# Patient Record
Sex: MALE | Race: WHITE | NOT HISPANIC OR LATINO | Employment: OTHER | URBAN - METROPOLITAN AREA
[De-identification: names, ages, dates, MRNs, and addresses within clinical notes are randomized per-mention and may not be internally consistent; named-entity substitution may affect disease eponyms.]

---

## 2017-01-06 ENCOUNTER — ALLSCRIPTS OFFICE VISIT (OUTPATIENT)
Dept: OTHER | Facility: OTHER | Age: 82
End: 2017-01-06

## 2017-01-06 DIAGNOSIS — I50.9 HEART FAILURE (HCC): ICD-10-CM

## 2017-01-06 DIAGNOSIS — R53.83 OTHER FATIGUE: ICD-10-CM

## 2017-01-06 DIAGNOSIS — D72.829 ELEVATED WHITE BLOOD CELL COUNT: ICD-10-CM

## 2017-01-06 DIAGNOSIS — J69.0 PNEUMONITIS DUE TO INHALATION OF FOOD AND VOMIT (HCC): ICD-10-CM

## 2017-01-06 LAB — HBA1C MFR BLD HPLC: 6.4 %

## 2017-01-10 ENCOUNTER — HOSPITAL ENCOUNTER (OUTPATIENT)
Dept: RADIOLOGY | Facility: HOSPITAL | Age: 82
Discharge: HOME | End: 2017-01-10
Payer: MEDICARE

## 2017-01-10 DIAGNOSIS — J69.0 PNEUMONITIS DUE TO INHALATION OF FOOD AND VOMIT (HCC): ICD-10-CM

## 2017-01-10 PROCEDURE — 71020 HB CHEST X-RAY 2VW FRONTAL&LATL: CPT

## 2017-01-20 ENCOUNTER — TRANSCRIBE ORDERS (OUTPATIENT)
Dept: LAB | Facility: CLINIC | Age: 82
End: 2017-01-20

## 2017-01-20 ENCOUNTER — APPOINTMENT (OUTPATIENT)
Dept: LAB | Facility: CLINIC | Age: 82
End: 2017-01-20
Payer: MEDICARE

## 2017-01-20 ENCOUNTER — ALLSCRIPTS OFFICE VISIT (OUTPATIENT)
Dept: OTHER | Facility: OTHER | Age: 82
End: 2017-01-20

## 2017-01-20 DIAGNOSIS — I50.9 HEART FAILURE (HCC): ICD-10-CM

## 2017-01-20 DIAGNOSIS — D72.829 ELEVATED WHITE BLOOD CELL COUNT: ICD-10-CM

## 2017-01-20 LAB
ANION GAP SERPL CALCULATED.3IONS-SCNC: 3 MMOL/L (ref 4–13)
BASOPHILS # BLD AUTO: 0.05 THOUSANDS/ΜL (ref 0–0.1)
BASOPHILS NFR BLD AUTO: 0 % (ref 0–1)
BUN SERPL-MCNC: 11 MG/DL (ref 5–25)
CALCIUM SERPL-MCNC: 8.9 MG/DL (ref 8.3–10.1)
CHLORIDE SERPL-SCNC: 101 MMOL/L (ref 100–108)
CO2 SERPL-SCNC: 33 MMOL/L (ref 21–32)
CREAT SERPL-MCNC: 0.68 MG/DL (ref 0.6–1.3)
EOSINOPHIL # BLD AUTO: 0.44 THOUSAND/ΜL (ref 0–0.61)
EOSINOPHIL NFR BLD AUTO: 3 % (ref 0–6)
ERYTHROCYTE [DISTWIDTH] IN BLOOD BY AUTOMATED COUNT: 14.9 % (ref 11.6–15.1)
GFR SERPL CREATININE-BSD FRML MDRD: >60 ML/MIN/1.73SQ M
GLUCOSE SERPL-MCNC: 157 MG/DL (ref 65–140)
HCT VFR BLD AUTO: 41 % (ref 36.5–49.3)
HGB BLD-MCNC: 13.4 G/DL (ref 12–17)
LYMPHOCYTES # BLD AUTO: 4.62 THOUSANDS/ΜL (ref 0.6–4.47)
LYMPHOCYTES NFR BLD AUTO: 34 % (ref 14–44)
MCH RBC QN AUTO: 31.8 PG (ref 26.8–34.3)
MCHC RBC AUTO-ENTMCNC: 32.7 G/DL (ref 31.4–37.4)
MCV RBC AUTO: 97 FL (ref 82–98)
MONOCYTES # BLD AUTO: 1.19 THOUSAND/ΜL (ref 0.17–1.22)
MONOCYTES NFR BLD AUTO: 9 % (ref 4–12)
NEUTROPHILS # BLD AUTO: 7.09 THOUSANDS/ΜL (ref 1.85–7.62)
NEUTS SEG NFR BLD AUTO: 54 % (ref 43–75)
NRBC BLD AUTO-RTO: 0 /100 WBCS
NT-PROBNP SERPL-MCNC: 1990 PG/ML
PLATELET # BLD AUTO: 200 THOUSANDS/UL (ref 149–390)
PMV BLD AUTO: 12.2 FL (ref 8.9–12.7)
POTASSIUM SERPL-SCNC: 4.2 MMOL/L (ref 3.5–5.3)
RBC # BLD AUTO: 4.22 MILLION/UL (ref 3.88–5.62)
SODIUM SERPL-SCNC: 137 MMOL/L (ref 136–145)
WBC # BLD AUTO: 13.6 THOUSAND/UL (ref 4.31–10.16)

## 2017-01-20 PROCEDURE — 80048 BASIC METABOLIC PNL TOTAL CA: CPT

## 2017-01-20 PROCEDURE — 36415 COLL VENOUS BLD VENIPUNCTURE: CPT

## 2017-01-20 PROCEDURE — 83880 ASSAY OF NATRIURETIC PEPTIDE: CPT

## 2017-01-20 PROCEDURE — 85025 COMPLETE CBC W/AUTO DIFF WBC: CPT

## 2017-01-23 ENCOUNTER — GENERIC CONVERSION - ENCOUNTER (OUTPATIENT)
Dept: OTHER | Facility: OTHER | Age: 82
End: 2017-01-23

## 2017-02-27 ENCOUNTER — ALLSCRIPTS OFFICE VISIT (OUTPATIENT)
Dept: OTHER | Facility: OTHER | Age: 82
End: 2017-02-27

## 2017-03-08 ENCOUNTER — ALLSCRIPTS OFFICE VISIT (OUTPATIENT)
Dept: OTHER | Facility: OTHER | Age: 82
End: 2017-03-08

## 2017-03-08 DIAGNOSIS — E11.40 TYPE 2 DIABETES MELLITUS WITH DIABETIC NEUROPATHY (HCC): ICD-10-CM

## 2017-03-08 DIAGNOSIS — I50.9 HEART FAILURE (HCC): ICD-10-CM

## 2017-03-08 DIAGNOSIS — I10 ESSENTIAL (PRIMARY) HYPERTENSION: ICD-10-CM

## 2017-03-08 DIAGNOSIS — J96.10 CHRONIC RESPIRATORY FAILURE (HCC): ICD-10-CM

## 2017-03-08 DIAGNOSIS — J69.0 PNEUMONITIS DUE TO INHALATION OF FOOD AND VOMIT (HCC): ICD-10-CM

## 2017-03-08 DIAGNOSIS — D72.829 ELEVATED WHITE BLOOD CELL COUNT: ICD-10-CM

## 2017-03-22 ENCOUNTER — APPOINTMENT (OUTPATIENT)
Dept: LAB | Facility: HOSPITAL | Age: 82
End: 2017-03-22
Payer: MEDICARE

## 2017-03-22 ENCOUNTER — HOSPITAL ENCOUNTER (OUTPATIENT)
Dept: RADIOLOGY | Facility: HOSPITAL | Age: 82
Discharge: HOME | End: 2017-03-22
Payer: MEDICARE

## 2017-03-22 ENCOUNTER — TRANSCRIBE ORDERS (OUTPATIENT)
Dept: ADMINISTRATIVE | Facility: HOSPITAL | Age: 82
End: 2017-03-22

## 2017-03-22 DIAGNOSIS — I50.9 HEART FAILURE (HCC): ICD-10-CM

## 2017-03-22 DIAGNOSIS — E11.40 TYPE 2 DIABETES MELLITUS WITH DIABETIC NEUROPATHY (HCC): ICD-10-CM

## 2017-03-22 DIAGNOSIS — J69.0 PNEUMONITIS DUE TO INHALATION OF FOOD OR VOMITUS (HCC): ICD-10-CM

## 2017-03-22 DIAGNOSIS — J69.0 PNEUMONITIS DUE TO INHALATION OF FOOD AND VOMIT (HCC): ICD-10-CM

## 2017-03-22 DIAGNOSIS — J69.0 PNEUMONITIS DUE TO INHALATION OF FOOD OR VOMITUS (HCC): Primary | ICD-10-CM

## 2017-03-22 LAB
ALBUMIN SERPL BCP-MCNC: 4.1 G/DL (ref 3.5–5)
ALP SERPL-CCNC: 147 U/L (ref 46–116)
ALT SERPL W P-5'-P-CCNC: 40 U/L (ref 12–78)
ANION GAP SERPL CALCULATED.3IONS-SCNC: 5 MMOL/L (ref 4–13)
AST SERPL W P-5'-P-CCNC: 17 U/L (ref 5–45)
BASOPHILS # BLD MANUAL: 0 THOUSAND/UL (ref 0–0.1)
BASOPHILS NFR MAR MANUAL: 0 % (ref 0–1)
BILIRUB SERPL-MCNC: 0.52 MG/DL (ref 0.2–1)
BUN SERPL-MCNC: 28 MG/DL (ref 5–25)
CALCIUM SERPL-MCNC: 9.7 MG/DL (ref 8.3–10.1)
CHLORIDE SERPL-SCNC: 102 MMOL/L (ref 100–108)
CO2 SERPL-SCNC: 35 MMOL/L (ref 21–32)
CREAT SERPL-MCNC: 0.81 MG/DL (ref 0.6–1.3)
EOSINOPHIL # BLD MANUAL: 0 THOUSAND/UL (ref 0–0.4)
EOSINOPHIL NFR BLD MANUAL: 0 % (ref 0–6)
ERYTHROCYTE [DISTWIDTH] IN BLOOD BY AUTOMATED COUNT: 14.9 % (ref 11.6–15.1)
EST. AVERAGE GLUCOSE BLD GHB EST-MCNC: 183 MG/DL
GFR SERPL CREATININE-BSD FRML MDRD: >60 ML/MIN/1.73SQ M
GLUCOSE P FAST SERPL-MCNC: 225 MG/DL (ref 65–99)
HBA1C MFR BLD: 8 % (ref 4.2–6.3)
HCT VFR BLD AUTO: 50.3 % (ref 36.5–49.3)
HGB BLD-MCNC: 16.6 G/DL (ref 12–17)
LG PLATELETS BLD QL SMEAR: PRESENT
LYMPHOCYTES # BLD AUTO: 36 % (ref 14–44)
LYMPHOCYTES # BLD AUTO: 6.81 THOUSAND/UL (ref 0.6–4.47)
MCH RBC QN AUTO: 31 PG (ref 26.8–34.3)
MCHC RBC AUTO-ENTMCNC: 33 G/DL (ref 31.4–37.4)
MCV RBC AUTO: 94 FL (ref 82–98)
METAMYELOCYTES NFR BLD MANUAL: 1 % (ref 0–1)
MONOCYTES # BLD AUTO: 0.76 THOUSAND/UL (ref 0–1.22)
MONOCYTES NFR BLD: 4 % (ref 4–12)
NEUTROPHILS # BLD MANUAL: 11.16 THOUSAND/UL (ref 1.85–7.62)
NEUTS BAND NFR BLD MANUAL: 6 % (ref 0–8)
NEUTS SEG NFR BLD AUTO: 53 % (ref 43–75)
NT-PROBNP SERPL-MCNC: 1211 PG/ML
PLATELET # BLD AUTO: 197 THOUSANDS/UL (ref 149–390)
PLATELET BLD QL SMEAR: ADEQUATE
PMV BLD AUTO: 12.4 FL (ref 8.9–12.7)
POTASSIUM SERPL-SCNC: 5.3 MMOL/L (ref 3.5–5.3)
PROT SERPL-MCNC: 7.9 G/DL (ref 6.4–8.2)
RBC # BLD AUTO: 5.36 MILLION/UL (ref 3.88–5.62)
RBC MORPH BLD: NORMAL
SODIUM SERPL-SCNC: 142 MMOL/L (ref 136–145)
TOTAL CELLS COUNTED SPEC: 100
WBC # BLD AUTO: 18.92 THOUSAND/UL (ref 4.31–10.16)

## 2017-03-22 PROCEDURE — 85027 COMPLETE CBC AUTOMATED: CPT

## 2017-03-22 PROCEDURE — 36415 COLL VENOUS BLD VENIPUNCTURE: CPT

## 2017-03-22 PROCEDURE — 71020 HB CHEST X-RAY 2VW FRONTAL&LATL: CPT

## 2017-03-22 PROCEDURE — 83036 HEMOGLOBIN GLYCOSYLATED A1C: CPT

## 2017-03-22 PROCEDURE — 85007 BL SMEAR W/DIFF WBC COUNT: CPT

## 2017-03-22 PROCEDURE — 83880 ASSAY OF NATRIURETIC PEPTIDE: CPT

## 2017-03-22 PROCEDURE — 80053 COMPREHEN METABOLIC PANEL: CPT

## 2017-03-23 ENCOUNTER — GENERIC CONVERSION - ENCOUNTER (OUTPATIENT)
Dept: OTHER | Facility: OTHER | Age: 82
End: 2017-03-23

## 2017-03-24 ENCOUNTER — GENERIC CONVERSION - ENCOUNTER (OUTPATIENT)
Dept: OTHER | Facility: OTHER | Age: 82
End: 2017-03-24

## 2017-04-25 ENCOUNTER — GENERIC CONVERSION - ENCOUNTER (OUTPATIENT)
Dept: OTHER | Facility: OTHER | Age: 82
End: 2017-04-25

## 2017-05-02 ENCOUNTER — ALLSCRIPTS OFFICE VISIT (OUTPATIENT)
Dept: OTHER | Facility: OTHER | Age: 82
End: 2017-05-02

## 2017-06-13 ENCOUNTER — ALLSCRIPTS OFFICE VISIT (OUTPATIENT)
Dept: OTHER | Facility: OTHER | Age: 82
End: 2017-06-13

## 2017-06-13 DIAGNOSIS — E11.40 TYPE 2 DIABETES MELLITUS WITH DIABETIC NEUROPATHY (HCC): ICD-10-CM

## 2017-06-13 DIAGNOSIS — K62.5 HEMORRHAGE OF ANUS AND RECTUM: ICD-10-CM

## 2017-06-13 LAB — HBA1C MFR BLD HPLC: 8.4 %

## 2017-06-14 ENCOUNTER — GENERIC CONVERSION - ENCOUNTER (OUTPATIENT)
Dept: OTHER | Facility: OTHER | Age: 82
End: 2017-06-14

## 2017-06-14 ENCOUNTER — APPOINTMENT (OUTPATIENT)
Dept: LAB | Facility: CLINIC | Age: 82
End: 2017-06-14
Payer: MEDICARE

## 2017-06-14 ENCOUNTER — TRANSCRIBE ORDERS (OUTPATIENT)
Dept: LAB | Facility: CLINIC | Age: 82
End: 2017-06-14

## 2017-06-14 DIAGNOSIS — E11.40 TYPE 2 DIABETES MELLITUS WITH DIABETIC NEUROPATHY (HCC): ICD-10-CM

## 2017-06-14 DIAGNOSIS — K62.5 HEMORRHAGE OF ANUS AND RECTUM: ICD-10-CM

## 2017-06-14 LAB
ALBUMIN SERPL BCP-MCNC: 4.1 G/DL (ref 3.5–5)
ALP SERPL-CCNC: 103 U/L (ref 46–116)
ALT SERPL W P-5'-P-CCNC: 35 U/L (ref 12–78)
ANION GAP SERPL CALCULATED.3IONS-SCNC: 10 MMOL/L (ref 4–13)
AST SERPL W P-5'-P-CCNC: 26 U/L (ref 5–45)
BASOPHILS # BLD AUTO: 0.04 THOUSANDS/ΜL (ref 0–0.1)
BASOPHILS NFR BLD AUTO: 0 % (ref 0–1)
BILIRUB SERPL-MCNC: 0.88 MG/DL (ref 0.2–1)
BUN SERPL-MCNC: 24 MG/DL (ref 5–25)
CALCIUM SERPL-MCNC: 8.9 MG/DL (ref 8.3–10.1)
CHLORIDE SERPL-SCNC: 101 MMOL/L (ref 100–108)
CO2 SERPL-SCNC: 29 MMOL/L (ref 21–32)
CREAT SERPL-MCNC: 1 MG/DL (ref 0.6–1.3)
EOSINOPHIL # BLD AUTO: 0.08 THOUSAND/ΜL (ref 0–0.61)
EOSINOPHIL NFR BLD AUTO: 1 % (ref 0–6)
ERYTHROCYTE [DISTWIDTH] IN BLOOD BY AUTOMATED COUNT: 14.5 % (ref 11.6–15.1)
GFR SERPL CREATININE-BSD FRML MDRD: >60 ML/MIN/1.73SQ M
GLUCOSE P FAST SERPL-MCNC: 227 MG/DL (ref 65–99)
HCT VFR BLD AUTO: 44.3 % (ref 36.5–49.3)
HGB BLD-MCNC: 14.6 G/DL (ref 12–17)
LYMPHOCYTES # BLD AUTO: 4.51 THOUSANDS/ΜL (ref 0.6–4.47)
LYMPHOCYTES NFR BLD AUTO: 32 % (ref 14–44)
MCH RBC QN AUTO: 31.7 PG (ref 26.8–34.3)
MCHC RBC AUTO-ENTMCNC: 33 G/DL (ref 31.4–37.4)
MCV RBC AUTO: 96 FL (ref 82–98)
MONOCYTES # BLD AUTO: 1.36 THOUSAND/ΜL (ref 0.17–1.22)
MONOCYTES NFR BLD AUTO: 10 % (ref 4–12)
NEUTROPHILS # BLD AUTO: 7.73 THOUSANDS/ΜL (ref 1.85–7.62)
NEUTS SEG NFR BLD AUTO: 57 % (ref 43–75)
NRBC BLD AUTO-RTO: 1 /100 WBCS
PLATELET # BLD AUTO: 186 THOUSANDS/UL (ref 149–390)
PMV BLD AUTO: 12.9 FL (ref 8.9–12.7)
POTASSIUM SERPL-SCNC: 4.2 MMOL/L (ref 3.5–5.3)
PROT SERPL-MCNC: 7.4 G/DL (ref 6.4–8.2)
RBC # BLD AUTO: 4.6 MILLION/UL (ref 3.88–5.62)
SODIUM SERPL-SCNC: 140 MMOL/L (ref 136–145)
WBC # BLD AUTO: 13.96 THOUSAND/UL (ref 4.31–10.16)

## 2017-06-14 PROCEDURE — 85025 COMPLETE CBC W/AUTO DIFF WBC: CPT

## 2017-06-14 PROCEDURE — 80053 COMPREHEN METABOLIC PANEL: CPT

## 2017-06-14 PROCEDURE — 36415 COLL VENOUS BLD VENIPUNCTURE: CPT

## 2017-07-14 DIAGNOSIS — E11.9 TYPE 2 DIABETES MELLITUS WITHOUT COMPLICATIONS (HCC): ICD-10-CM

## 2017-07-14 DIAGNOSIS — I10 ESSENTIAL (PRIMARY) HYPERTENSION: ICD-10-CM

## 2017-07-14 DIAGNOSIS — R06.02 SHORTNESS OF BREATH: ICD-10-CM

## 2017-07-14 DIAGNOSIS — R31.29 OTHER MICROSCOPIC HEMATURIA: ICD-10-CM

## 2017-07-14 DIAGNOSIS — I50.9 HEART FAILURE (HCC): ICD-10-CM

## 2017-07-14 DIAGNOSIS — R53.83 OTHER FATIGUE: ICD-10-CM

## 2017-07-21 ENCOUNTER — APPOINTMENT (OUTPATIENT)
Dept: LAB | Facility: CLINIC | Age: 82
End: 2017-07-21
Payer: MEDICARE

## 2017-07-21 ENCOUNTER — TRANSCRIBE ORDERS (OUTPATIENT)
Dept: LAB | Facility: CLINIC | Age: 82
End: 2017-07-21

## 2017-07-21 ENCOUNTER — ALLSCRIPTS OFFICE VISIT (OUTPATIENT)
Dept: OTHER | Facility: OTHER | Age: 82
End: 2017-07-21

## 2017-07-21 ENCOUNTER — LAB REQUISITION (OUTPATIENT)
Dept: LAB | Facility: HOSPITAL | Age: 82
End: 2017-07-21
Payer: MEDICARE

## 2017-07-21 ENCOUNTER — GENERIC CONVERSION - ENCOUNTER (OUTPATIENT)
Dept: OTHER | Facility: OTHER | Age: 82
End: 2017-07-21

## 2017-07-21 ENCOUNTER — APPOINTMENT (OUTPATIENT)
Dept: RADIOLOGY | Facility: MEDICAL CENTER | Age: 82
End: 2017-07-21
Payer: MEDICARE

## 2017-07-21 DIAGNOSIS — I10 ESSENTIAL (PRIMARY) HYPERTENSION: ICD-10-CM

## 2017-07-21 DIAGNOSIS — R06.02 SHORTNESS OF BREATH: ICD-10-CM

## 2017-07-21 DIAGNOSIS — I50.9 HEART FAILURE (HCC): ICD-10-CM

## 2017-07-21 DIAGNOSIS — E11.9 TYPE 2 DIABETES MELLITUS WITHOUT COMPLICATIONS (HCC): ICD-10-CM

## 2017-07-21 DIAGNOSIS — R31.29 OTHER MICROSCOPIC HEMATURIA: ICD-10-CM

## 2017-07-21 LAB
ALBUMIN SERPL BCP-MCNC: 4.1 G/DL (ref 3.5–5)
ALP SERPL-CCNC: 98 U/L (ref 46–116)
ALT SERPL W P-5'-P-CCNC: 28 U/L (ref 12–78)
ANION GAP SERPL CALCULATED.3IONS-SCNC: 9 MMOL/L (ref 4–13)
AST SERPL W P-5'-P-CCNC: 17 U/L (ref 5–45)
BASOPHILS # BLD AUTO: 0.03 THOUSANDS/ΜL (ref 0–0.1)
BASOPHILS NFR BLD AUTO: 0 % (ref 0–1)
BILIRUB SERPL-MCNC: 1.19 MG/DL (ref 0.2–1)
BILIRUB UR QL STRIP: NEGATIVE
BUN SERPL-MCNC: 18 MG/DL (ref 5–25)
CALCIUM SERPL-MCNC: 9.5 MG/DL (ref 8.3–10.1)
CHLORIDE SERPL-SCNC: 102 MMOL/L (ref 100–108)
CLARITY UR: NORMAL
CO2 SERPL-SCNC: 30 MMOL/L (ref 21–32)
COLOR UR: YELLOW
CREAT SERPL-MCNC: 0.77 MG/DL (ref 0.6–1.3)
EOSINOPHIL # BLD AUTO: 0.15 THOUSAND/ΜL (ref 0–0.61)
EOSINOPHIL NFR BLD AUTO: 2 % (ref 0–6)
ERYTHROCYTE [DISTWIDTH] IN BLOOD BY AUTOMATED COUNT: 14.4 % (ref 11.6–15.1)
GFR SERPL CREATININE-BSD FRML MDRD: >60 ML/MIN/1.73SQ M
GLUCOSE (HISTORICAL): NEGATIVE
GLUCOSE P FAST SERPL-MCNC: 149 MG/DL (ref 65–99)
HCT VFR BLD AUTO: 44.6 % (ref 36.5–49.3)
HGB BLD-MCNC: 15.2 G/DL (ref 12–17)
HGB UR QL STRIP.AUTO: NORMAL
KETONES UR STRIP-MCNC: NEGATIVE MG/DL
LEUKOCYTE ESTERASE UR QL STRIP: NEGATIVE
LYMPHOCYTES # BLD AUTO: 3.47 THOUSANDS/ΜL (ref 0.6–4.47)
LYMPHOCYTES NFR BLD AUTO: 39 % (ref 14–44)
MCH RBC QN AUTO: 33.2 PG (ref 26.8–34.3)
MCHC RBC AUTO-ENTMCNC: 34.1 G/DL (ref 31.4–37.4)
MCV RBC AUTO: 97 FL (ref 82–98)
MONOCYTES # BLD AUTO: 1 THOUSAND/ΜL (ref 0.17–1.22)
MONOCYTES NFR BLD AUTO: 11 % (ref 4–12)
NEUTROPHILS # BLD AUTO: 4.17 THOUSANDS/ΜL (ref 1.85–7.62)
NEUTS SEG NFR BLD AUTO: 48 % (ref 43–75)
NITRITE UR QL STRIP: NEGATIVE
NRBC BLD AUTO-RTO: 0 /100 WBCS
NT-PROBNP SERPL-MCNC: 2165 PG/ML
PH UR STRIP.AUTO: 6 [PH]
PLATELET # BLD AUTO: 142 THOUSANDS/UL (ref 149–390)
PMV BLD AUTO: 13.7 FL (ref 8.9–12.7)
POTASSIUM SERPL-SCNC: 4.5 MMOL/L (ref 3.5–5.3)
PROT SERPL-MCNC: 7.4 G/DL (ref 6.4–8.2)
PROT UR STRIP-MCNC: NORMAL MG/DL
RBC # BLD AUTO: 4.58 MILLION/UL (ref 3.88–5.62)
SODIUM SERPL-SCNC: 141 MMOL/L (ref 136–145)
SP GR UR STRIP.AUTO: 1.03
UROBILINOGEN UR QL STRIP.AUTO: 0.2
WBC # BLD AUTO: 8.97 THOUSAND/UL (ref 4.31–10.16)

## 2017-07-21 PROCEDURE — 80053 COMPREHEN METABOLIC PANEL: CPT

## 2017-07-21 PROCEDURE — 83880 ASSAY OF NATRIURETIC PEPTIDE: CPT

## 2017-07-21 PROCEDURE — 85025 COMPLETE CBC W/AUTO DIFF WBC: CPT

## 2017-07-21 PROCEDURE — 36415 COLL VENOUS BLD VENIPUNCTURE: CPT

## 2017-07-21 PROCEDURE — 71020 HB CHEST X-RAY 2VW FRONTAL&LATL: CPT

## 2017-07-21 PROCEDURE — 87086 URINE CULTURE/COLONY COUNT: CPT | Performed by: PHYSICIAN ASSISTANT

## 2017-07-22 LAB — BACTERIA UR CULT: NORMAL

## 2017-07-24 ENCOUNTER — GENERIC CONVERSION - ENCOUNTER (OUTPATIENT)
Dept: OTHER | Facility: OTHER | Age: 82
End: 2017-07-24

## 2017-08-01 ENCOUNTER — ALLSCRIPTS OFFICE VISIT (OUTPATIENT)
Dept: OTHER | Facility: OTHER | Age: 82
End: 2017-08-01

## 2017-08-30 ENCOUNTER — GENERIC CONVERSION - ENCOUNTER (OUTPATIENT)
Dept: OTHER | Facility: OTHER | Age: 82
End: 2017-08-30

## 2017-08-30 ENCOUNTER — ALLSCRIPTS OFFICE VISIT (OUTPATIENT)
Dept: OTHER | Facility: OTHER | Age: 82
End: 2017-08-30

## 2017-08-30 DIAGNOSIS — I50.22 CHRONIC SYSTOLIC CONGESTIVE HEART FAILURE (HCC): ICD-10-CM

## 2017-09-11 ENCOUNTER — GENERIC CONVERSION - ENCOUNTER (OUTPATIENT)
Dept: OTHER | Facility: OTHER | Age: 82
End: 2017-09-11

## 2017-09-22 ENCOUNTER — APPOINTMENT (OUTPATIENT)
Dept: LAB | Facility: CLINIC | Age: 82
End: 2017-09-22
Payer: MEDICARE

## 2017-09-22 ENCOUNTER — ALLSCRIPTS OFFICE VISIT (OUTPATIENT)
Dept: OTHER | Facility: OTHER | Age: 82
End: 2017-09-22

## 2017-09-22 ENCOUNTER — TRANSCRIBE ORDERS (OUTPATIENT)
Dept: LAB | Facility: CLINIC | Age: 82
End: 2017-09-22

## 2017-09-22 DIAGNOSIS — I50.22 CHRONIC SYSTOLIC CONGESTIVE HEART FAILURE (HCC): ICD-10-CM

## 2017-09-22 LAB
ANION GAP SERPL CALCULATED.3IONS-SCNC: 6 MMOL/L (ref 4–13)
BUN SERPL-MCNC: 12 MG/DL (ref 5–25)
CALCIUM SERPL-MCNC: 9.2 MG/DL (ref 8.3–10.1)
CHLORIDE SERPL-SCNC: 102 MMOL/L (ref 100–108)
CO2 SERPL-SCNC: 31 MMOL/L (ref 21–32)
CREAT SERPL-MCNC: 0.86 MG/DL (ref 0.6–1.3)
GFR SERPL CREATININE-BSD FRML MDRD: 81 ML/MIN/1.73SQ M
GLUCOSE P FAST SERPL-MCNC: 124 MG/DL (ref 65–99)
NT-PROBNP SERPL-MCNC: 1602 PG/ML
POTASSIUM SERPL-SCNC: 4.1 MMOL/L (ref 3.5–5.3)
SODIUM SERPL-SCNC: 139 MMOL/L (ref 136–145)

## 2017-09-22 PROCEDURE — 36415 COLL VENOUS BLD VENIPUNCTURE: CPT

## 2017-09-22 PROCEDURE — 83880 ASSAY OF NATRIURETIC PEPTIDE: CPT

## 2017-09-22 PROCEDURE — 80048 BASIC METABOLIC PNL TOTAL CA: CPT

## 2017-10-25 NOTE — PROGRESS NOTES
Assessment  Assessed    1  Atrial fibrillation, permanent (427 31) (I48 2)   2  Benign essential hypertension (401 1) (I10)   3  CAD S/P percutaneous coronary angioplasty (414 01,V45 82) (I25 10,Z98 61)   4  CHF, chronic (428 0) (I50 9)   5  Chronic systolic congestive heart failure (428 22,428 0) (I50 22)    Plan  Atrial fibrillation, permanent    · EKG/ECG- POC; Status:Complete;   Done: 27WBN2621 01:52PM   Perform: In Office; Last Updated Tony Silevrman; 8/30/2017 2:02:13 PM;Ordered; For:Atrial fibrillation, permanent; Ordered By:Kori Chavis;  Chronic systolic congestive heart failure    · Follow-up visit in 2 months Evaluation and Treatment  Follow-up  Status: Hold For -  Scheduling  Requested for: 06Oec1473   Ordered; For: Chronic systolic congestive heart failure; Ordered By: Burton Alvares Performed:  Due: 21GIP1104   · (1) BASIC METABOLIC PROFILE; Status:Active; Requested for:03Sdz7240;    Perform:[facility]; Due:85Xpp5974; Ordered; For:Chronic systolic congestive heart failure; Ordered By:Kori Chavis;   · (1) NT- BNP (PRO BRAIN NATRIURETIC PEPTIDE); Status:Active; Requested  for:89Bwq3603;    Perform:[facility]; Due:04Wry2213; Ordered; For:Chronic systolic congestive heart failure; Ordered By:Kori Chavis;    Discussion/Summary  Cardiology Discussion Summary Free Text Note Form [facility]:   #1  CAD status post remote myocardial infarction, PCI/stenting, unknown details: Symptoms of angina with at least moderate levels of exertion, which has been stable  Echocardiogram from August 2016 reviewed, ejection fraction likely low-normal with apical wall motion abnormalities and akinesis/wall thinning suggestive of prior myocardial infarction  Hypertension: Well controlled, continue metoprololPermanent atrial fibrillation, rate controlled, continue metoprolol but at lower dose secondary to intermittent bradycardia per rn, continue anticoagulation with Eliquis   Will transition to warfarin if patient's son is agreeable  Pt's personal RN (April) will let me know  Chronic diastolic heart failure: Euvolemic by examination without edema or shortness of breath  Continue furosemide 40 mg alternating with 20  This was recently increased by PCP secondary to increased BNP and shortness of breath which has resolved  Will give slip for BMP/BMP to be done in the near future before PCP appointment next month  Will follow up with patient in 2 months  in 2 months     Chief Complaint  Chief Complaint Free Text Note Form: 6 mth F/U of CHF-7/28/17 til 8/1/17--no hospital admission-resolved with diuretics      History of Present Illness  Cardiology HPI Free Text Note Form [facility]: This is an 80-year-old male with a history of CAD status post multiple PCI in the remote past and permanent atrial fibrillation, admitted to [facility] in August 2016 with health care acquired pneumonia and aspiration pneumonia  He is on chronic anticoagulation with Eliquis  His history of prior cardiomyopathy with ejection fraction 20%, however repeat echocardiogram in August 2016 revealed an ejection fraction about 55% with apical wall motion abnormalities  was hospitalized in December 2016 secondary to recurrent aspiration, and had gastroenteritis earlier in 2017  presents today for follow up with no complaints  He has had symptoms of stable angina, which has been controlled  He has a personal RN (April) that presents w/ him today  She states the pt's Eliquis is very expensive for him and states they may consider warfarin  His HR has mostly been mildly bradycardic at home  Review of Systems  Cardiology Male ROS:     Cardiac: chest pain, but-- no signs of swelling-- and-- no palpitations present  Genitourinary: frequent urination at night   Psychological: anxiety, but-- no depression  General: trouble sleeping-- and-- lack of energy/fatigue  Respiratory: no shortness of breath     Gastrointestinal: diarrhea, but-- no nausea,-- no vomiting,-- no heartburn-- and-- no constipation   Hematologic: no excessive bruising   Neurological: numbnes-- and-- dizziness, but-- no tingling-- neuropathy  Musculoskeletal: arthritis-- and-- back pain       ROS Reviewed:   ROS reviewed  Active Problems  Problems    1  Allergic rhinitis (477 9) (J30 9)   2  Atrial fibrillation, permanent (427 31) (I48 2)   3  Benign essential hypertension (401 1) (I10)   4  BPH with obstruction/lower urinary tract symptoms (600 01,599 69) (N40 1,N13 8)   5  CAD S/P percutaneous coronary angioplasty (414 01,V45 82) (I25 10,Z98 61)   6  Cancer, colon (153 9) (C18 9)   7  Cataract (366 9) (H26 9)   8  CHF, chronic (428 0) (I50 9)   9  Chronic systolic congestive heart failure (428 22,428 0) (I50 22)   10  Controlled type 2 diabetes with neuropathy (250 60,357 2) (E11 40)   11  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)   12  Dysphagia (787 20) (R13 10)   13  Eczema (692 9) (L30 9)   14  Encounter for preventive health examination (V70 0) (Z00 00)   15  Esophageal reflux (530 81) (K21 9)   16  Exercise counseling (V65 41) (Z71 89)   17  Fatigue (780 79) (R53 83)   18  Floaters in visual field (379 24) (H43 399)   19  Flu vaccine need (V04 81) (Z23)   20  Hemorrhoids (455 6) (K64 9)   21  Insomnia (780 52) (G47 00)   22  Leukocytosis (288 60) (D72 829)   23  Lumbar compression fracture (805 4) (S32 000A)   24  Medicare annual wellness visit, initial (V70 0) (Z00 00)   25  Microscopic hematuria (599 72) (R31 29)   26  Osteomyelitis (730 20) (M86 9)   27  Other muscle spasm (728 85) (M62 838)   28  Rectal bleeding (569 3) (K62 5)   29  Respiratory failure, chronic (518 83) (J96 10)   30  Shortness of breath (786 05) (R06 02)   31  Type 2 diabetes mellitus without complication (219 79) (Y75 0)   32  Urinary frequency (788 41) (R35 0)   33  History of Urinary retention (788 20) (R33 9)    Past Medical History  Problems    1   History of Aspiration pneumonia (507 0) (J69 0)   2  History of pneumonia due to Pseudomonas (V12 61) (Z87 01)   3  History of Hyponatremia (276 1) (E87 1)   4  History of Infectious discitis (722 90) (M46 40)   5  History of Osteomyelitis of spine (730 28) (M46 20)   6  History of Sepsis due to group B beta-hemolytic Streptococcus (038 0,041 02,995 91)   (A40 1)   7  History of Urinary retention (788 20) (R33 9)  Active Problems And Past Medical History Reviewed: The active problems and past medical history were reviewed and updated today  Surgical History  Problems    1  History of Cath Stent Placement   2  History of Colon Surgery   3  History of Hemorrhoidectomy   4  History of Laminectomy Lumbar   5  History of Lung Surgery   6  History of Percutaneous Placement Of Gastrostomy Tube   7  History of Tracheostomy  Surgical History Reviewed: The surgical history was reviewed and updated today  Family History  Mother    1  FH: heart attack (V17 3) (Z82 49)  Family History Reviewed: The family history was reviewed and updated today  Social History  Problems    · Former smoker (C04 54) (A85 401)   · Lives independently   · Retired  Social History Reviewed: The social history was reviewed and updated today  Current Meds   1  Accu-Chek FastClix Lancets Miscellaneous; TEST BLOOD SUGAR TWICE A DAY; Therapy: 00Bjx8768 to (Evaluate:2017)  Requested for: 14IYF2797; Last   Rx:2017 Ordered   2  Aspirin EC 81 MG Oral Tablet Delayed Release; TAKE 1 TABLET DAILY AS DIRECTED; Therapy: 42BPX3244 to (Evaluate:32Wkt4549); Last Rx:2016 Ordered   3  Celecoxib 200 MG Oral Capsule; TAKE 1 CAPSULE ONCE DAILY; Therapy: 80GBR6244 to [phone])  Requested for: 65WSB4085; Last   Rx:2017 Ordered   4  Eliquis 5 MG Oral Tablet; Take 1 tablet by mouth  twice a day; Therapy: 52WHF0609 to (Evaluate:08Bqa3071)  Requested for: 94VJH0040; Last   R06CMK5641 Ordered   5   Famotidine 20 MG Oral Tablet; TAKE 1 TABLET TWICE DAILY; Therapy: 79SRC5960 to (Evaluate:06Oct2017); Last Rx:11Oct2016 Ordered   6  Folic Acid 1 MG Oral Tablet; TAKE 1 TABLET DAILY; Last Rx:11Oct2016 Ordered   7  Furosemide 20 MG Oral Tablet; Alternate 20mg and 40mg daily  Requested for:   11JYT9295; Last DL:04ECM9208 Ordered   8  Hydrocortisone 2 5 % External Cream; APPLY SPARINGLY TO AFFECTED AREA(S) ON   THE NECK TWICE DAILY AS NEEDED; Therapy: 29Iwq2457 to (Evaluate:08Gpb8494)  Requested for: 95Yjm0153; Last   Rx:20Zwi4393 Ordered   9  Januvia 100 MG Oral Tablet; TAKE 1 TABLET DAILY; Therapy: 14XOU8004 to (Evaluate:14Jan2018)  Requested for: 96XWY7849; Last   Rx:52Hfj1229 Ordered   10  Levalbuterol HCl - 1 25 MG/3ML Inhalation Nebulization Solution; 1-VIAL VIA NEBULIZER    EVERY 6 HOURS AS NEEDED FOR SHORTNESS OF BREATH FOR 30 DAYS; Therapy: 58Ids4794 to (Evaluate:27Apr2018)  Requested for: 62ERH4433; Last    Rx:62Mcx6152 Ordered   11  Loratadine 10 MG Oral Tablet; 1 TABLET DAILY; Therapy: 55SNT9536 to (Evaluate:07Pue9626)  Requested for: 13JTB5992; Last    VA:86YOX2110 Ordered   12  Metoprolol Tartrate 25 MG Oral Tablet; TAKE 1/2 TABLET DAILY; Last Rx:22Fam1086    Ordered   13  Nasacort Allergy 24HR 55 MCG/ACT Nasal Aerosol; INHALE 1 PUFF DAILY; Therapy: 53QTP2808 to (Evaluate:01Jun2017); Last WA:75MMB3425 Ordered   14  Nitroglycerin 0 4 MG/SPRAY Translingual Solution; USE 1 SPRAY UNDER THE TONGUE    AS NEEDED FOR CHEST PAIN;    Therapy: 20Jan2017 to (Evaluate:46Ilh0134)  Requested for: 82MRN6241; Last    Rx:20Jan2017 Ordered   15  Pantoprazole Sodium 40 MG Oral Tablet Delayed Release; TAKE 1 TABLET DAILY; Therapy: 20QJD9439 to (Evaluate:62Dma7133)  Requested for: 09HNS1324 Recorded   16  Sodium Chloride 0 9 % Inhalation Nebulization Solution; INHALE 1 VIAL 4 times daily     Requested for: 02QDH5485; Last WX:55FAA5235 Ordered   17  Tamsulosin HCl - 0 4 MG Oral Capsule; TAKE 1 CAPSULE Daily;     Therapy: 68ZZI2725 to Candace Chisholm) Requested for: 50GQC5944; Last    Rx:11Oct2016 Ordered   18  TraMADol HCl - 50 MG Oral Tablet; Take 1 tablet twice daily; Therapy: 99LGD7999 to (Evaluate:01Jun2017); Last OL:71CWU1315 Ordered  Medication List Reviewed: The medication list was reviewed and updated today  Allergies  Medication    1  No Known Drug Allergies   2  No Known Drug Allergies    Vitals  Vital Signs    Recorded: 30Aug2017 02:02PM   Heart Rate 64, Apical   Respiration 16   Systolic 182, LUE, Sitting   Diastolic 72, LUE, Sitting   Height 5 ft 10 in   Weight 176 lb 4 oz   BMI Calculated 25 29   BSA Calculated 1 98     Physical Exam    Constitutional - General appearance: No acute distress, well appearing and well nourished  Eyes - Conjunctiva and Sclera examination: Conjunctiva pink, sclera anicteric  Neck - Normal, no JVD   Pulmonary - Respiratory effort: No signs of respiratory distress  -- Auscultation of lungs: Clear to auscultation  Cardiovascular - Auscultation of heart: Normal rate and rhythm, normal S1 and S2, no murmurs  -- Pedal pulses: Normal, 2+ bilaterally  -- Examination of extremities for edema and/or varicosities: Normal     Abdomen - Soft  Musculoskeletal -   Gait and station: Abnormal-- Ambulates with cane  Skin - Skin: Normal without rashes  Skin is warm and well perfused  Neurologic - Speech normal  No focal deficits  Psychiatric - Orientation to person, place, and time: Normal       Health Management  Type 2 diabetes mellitus without complication   *VB - Eye Exam; every 1 year; Last 25Apr2017; Next Due: 17ZAK8477; Active  *VB - Foot Exam; every 1 year; Last 30Aug2017; Next Due: 68Ojz6718; Active    Future Appointments    Date/Time Provider Specialty Site   09/11/2017 10:40 AM Jannet Tan DO Gastroenterology [facility]   09/29/2017 02:15 PM VALENTE Farr   Family Medicine [address] [place] [address]   09/25/2017 10:45 AM Vivian Bowman MD Urology  [facility] UROLOGY  [place]     Signatures   Electronically signed by : Lior Smith DO; Aug 30 2017  2:43PM EST                       (Author)

## 2017-11-09 ENCOUNTER — ALLSCRIPTS OFFICE VISIT (OUTPATIENT)
Dept: OTHER | Facility: OTHER | Age: 82
End: 2017-11-09

## 2017-11-09 LAB — HBA1C MFR BLD HPLC: 8.6 %

## 2017-11-29 ENCOUNTER — ALLSCRIPTS OFFICE VISIT (OUTPATIENT)
Dept: OTHER | Facility: OTHER | Age: 82
End: 2017-11-29

## 2017-11-29 DIAGNOSIS — I50.22 CHRONIC SYSTOLIC CONGESTIVE HEART FAILURE (HCC): ICD-10-CM

## 2017-11-29 DIAGNOSIS — R53.83 OTHER FATIGUE: ICD-10-CM

## 2017-12-01 ENCOUNTER — APPOINTMENT (OUTPATIENT)
Dept: LAB | Facility: CLINIC | Age: 82
End: 2017-12-01
Payer: MEDICARE

## 2017-12-01 ENCOUNTER — TRANSCRIBE ORDERS (OUTPATIENT)
Dept: LAB | Facility: CLINIC | Age: 82
End: 2017-12-01

## 2017-12-01 DIAGNOSIS — R53.83 OTHER FATIGUE: ICD-10-CM

## 2017-12-01 DIAGNOSIS — I50.22 CHRONIC SYSTOLIC CONGESTIVE HEART FAILURE (HCC): ICD-10-CM

## 2017-12-01 LAB
ALBUMIN SERPL BCP-MCNC: 3.6 G/DL (ref 3.5–5)
ALP SERPL-CCNC: 94 U/L (ref 46–116)
ALT SERPL W P-5'-P-CCNC: 49 U/L (ref 12–78)
ANION GAP SERPL CALCULATED.3IONS-SCNC: 5 MMOL/L (ref 4–13)
AST SERPL W P-5'-P-CCNC: 30 U/L (ref 5–45)
BACTERIA UR QL AUTO: ABNORMAL /HPF
BASOPHILS # BLD AUTO: 0.04 THOUSANDS/ΜL (ref 0–0.1)
BASOPHILS NFR BLD AUTO: 0 % (ref 0–1)
BILIRUB SERPL-MCNC: 1.22 MG/DL (ref 0.2–1)
BILIRUB UR QL STRIP: NEGATIVE
BUN SERPL-MCNC: 16 MG/DL (ref 5–25)
CALCIUM SERPL-MCNC: 9.1 MG/DL (ref 8.3–10.1)
CHLORIDE SERPL-SCNC: 100 MMOL/L (ref 100–108)
CLARITY UR: CLEAR
CO2 SERPL-SCNC: 32 MMOL/L (ref 21–32)
COLOR UR: YELLOW
CREAT SERPL-MCNC: 0.84 MG/DL (ref 0.6–1.3)
CRP SERPL QL: 4.9 MG/L
EOSINOPHIL # BLD AUTO: 0.17 THOUSAND/ΜL (ref 0–0.61)
EOSINOPHIL NFR BLD AUTO: 2 % (ref 0–6)
ERYTHROCYTE [DISTWIDTH] IN BLOOD BY AUTOMATED COUNT: 14.6 % (ref 11.6–15.1)
ERYTHROCYTE [SEDIMENTATION RATE] IN BLOOD: 2 MM/HOUR (ref 0–10)
GFR SERPL CREATININE-BSD FRML MDRD: 82 ML/MIN/1.73SQ M
GLUCOSE P FAST SERPL-MCNC: 198 MG/DL (ref 65–99)
GLUCOSE UR STRIP-MCNC: ABNORMAL MG/DL
HCT VFR BLD AUTO: 49.5 % (ref 36.5–49.3)
HGB BLD-MCNC: 16.6 G/DL (ref 12–17)
HGB UR QL STRIP.AUTO: NEGATIVE
HYALINE CASTS #/AREA URNS LPF: ABNORMAL /LPF
KETONES UR STRIP-MCNC: NEGATIVE MG/DL
LEUKOCYTE ESTERASE UR QL STRIP: NEGATIVE
LYMPHOCYTES # BLD AUTO: 4.49 THOUSANDS/ΜL (ref 0.6–4.47)
LYMPHOCYTES NFR BLD AUTO: 49 % (ref 14–44)
MCH RBC QN AUTO: 32.4 PG (ref 26.8–34.3)
MCHC RBC AUTO-ENTMCNC: 33.5 G/DL (ref 31.4–37.4)
MCV RBC AUTO: 97 FL (ref 82–98)
MONOCYTES # BLD AUTO: 0.75 THOUSAND/ΜL (ref 0.17–1.22)
MONOCYTES NFR BLD AUTO: 8 % (ref 4–12)
NEUTROPHILS # BLD AUTO: 4 THOUSANDS/ΜL (ref 1.85–7.62)
NEUTS SEG NFR BLD AUTO: 41 % (ref 43–75)
NITRITE UR QL STRIP: NEGATIVE
NON-SQ EPI CELLS URNS QL MICRO: ABNORMAL /HPF
NRBC BLD AUTO-RTO: 0 /100 WBCS
NT-PROBNP SERPL-MCNC: 1144 PG/ML
PH UR STRIP.AUTO: 7.5 [PH] (ref 4.5–8)
PLATELET # BLD AUTO: 150 THOUSANDS/UL (ref 149–390)
PMV BLD AUTO: 13.4 FL (ref 8.9–12.7)
POTASSIUM SERPL-SCNC: 4 MMOL/L (ref 3.5–5.3)
PROT SERPL-MCNC: 7.3 G/DL (ref 6.4–8.2)
PROT UR STRIP-MCNC: ABNORMAL MG/DL
RBC # BLD AUTO: 5.13 MILLION/UL (ref 3.88–5.62)
RBC #/AREA URNS AUTO: ABNORMAL /HPF
SODIUM SERPL-SCNC: 137 MMOL/L (ref 136–145)
SP GR UR STRIP.AUTO: 1.02 (ref 1–1.03)
TSH SERPL DL<=0.05 MIU/L-ACNC: 2.04 UIU/ML (ref 0.36–3.74)
UROBILINOGEN UR QL STRIP.AUTO: 1 E.U./DL
WBC # BLD AUTO: 9.64 THOUSAND/UL (ref 4.31–10.16)
WBC #/AREA URNS AUTO: ABNORMAL /HPF

## 2017-12-01 PROCEDURE — 86140 C-REACTIVE PROTEIN: CPT

## 2017-12-01 PROCEDURE — 80053 COMPREHEN METABOLIC PANEL: CPT

## 2017-12-01 PROCEDURE — 36415 COLL VENOUS BLD VENIPUNCTURE: CPT

## 2017-12-01 PROCEDURE — 81001 URINALYSIS AUTO W/SCOPE: CPT

## 2017-12-01 PROCEDURE — 85652 RBC SED RATE AUTOMATED: CPT

## 2017-12-01 PROCEDURE — 83880 ASSAY OF NATRIURETIC PEPTIDE: CPT

## 2017-12-01 PROCEDURE — 85025 COMPLETE CBC W/AUTO DIFF WBC: CPT

## 2017-12-01 PROCEDURE — 84443 ASSAY THYROID STIM HORMONE: CPT

## 2017-12-03 ENCOUNTER — GENERIC CONVERSION - ENCOUNTER (OUTPATIENT)
Dept: OTHER | Facility: OTHER | Age: 82
End: 2017-12-03

## 2017-12-05 NOTE — PROGRESS NOTES
Assessment    1  Atrial fibrillation, permanent (427 31) (I48 2)   2  Controlled type 2 diabetes with neuropathy (250 60,357 2) (E11 40)   3  CAD S/P percutaneous coronary angioplasty (414 01,V45 82) (I25 10,Z98 61)   · Stent x 5   4  Chronic systolic congestive heart failure (428 22,428 0) (I50 22)   5  Fatigue (780 79) (R53 83)   6  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, without long-term   current use of insulin (250 62,357 2) (E11 42,E11 65)   7  Urinary frequency (788 41) (R35 0)    Plan  Chronic systolic congestive heart failure    · (1) NT- BNP (PRO BRAIN NATRIURETIC PEPTIDE); Status:Active; Requested  for:29Nov2017; Controlled type 2 diabetes with neuropathy    · MetFORMIN HCl - 500 MG Oral Tablet; TAKE 1 TABLET DAILY  Fatigue    · (1) CBC/PLT/DIFF; Status:Active; Requested for:29Nov2017;    · (1) COMPREHENSIVE METABOLIC PANEL; Status:Active; Requested for:29Nov2017;    · (1) C-REACTIVE PROTEIN; Status:Active; Requested for:29Nov2017;    · (1) SED RATE; Status:Active; Requested for:29Nov2017;    · (1) TSH WITH FT4 REFLEX; Status:Active; Requested for:29Nov2017;    · (1) URINALYSIS w URINE C/S REFLEX (will reflex a microscopy if leukocytes, occult  blood, or nitrites are not within normal limits); Status:Active; Requested for:29Nov2017;   PMH: Aspiration pneumonia of left lower lobe, unspecified aspiration pneumonia type    · * XR CHEST PA & LATERAL; Status:Canceled; Unlinked    · VESIcare 10 MG Oral Tablet    Discussion/Summary    Patient presents today feeling a bit fatigued  He has multiple issues that could cause fatigue  He remains on Myrbetriq for urinary incontinence and feels this may be giving him a dry mouth  He is not sure if it is helping with his incontinence  I would certainly consider stopping this in light of some recent cognitive decline  He does have some significant polyuria likely secondary to chronic diuresis for his congestive heart failure   I would like him to get some lab work including a CBC, CMP, CRP as well as a urinalysis  He does have a history of diffuse osteomyelitis and, while I do not believe this is the issue, I would like to make sure his sed rate and CRP are not too elevated  Diabetic control continues to get worse and I am going to restart low-dose metformin 500 mg daily  He has a history of a peripheral neuropathy from his severe illness and probably some contribution from his type 2 diabetes  He has a history of congestive heart failure which currently seems clinically stable based on his weight, but I am going to get a BNP to compare to his previous ones  Hemoccult today was positive and CBC will be checked as above  He does remain on Eliquis for his history of atrial fibrillation  He also remains on Celebrex and I think it would be a good idea to stop that at this time as well  Continue with tramadol as needed  I will reach out to his son in regards to stopping Celebrex  In regards to his mental status, he did score 22/30 on his mini-mental status today which I was somewhat surprised that as he does not appear to have much in the way of cognitive issues  We will continue to monitor this I am going to bring him back within a month's time  Chief Complaint  c/o Mental status change  Up-to-date with Flu shot      History of Present Illness  HPI: Patient presents today for follow-up for his chronic health issues  He has also had some intermittent confusion lately according to his son  Apparently, he has been more forgetful with his cane and several other objects  The patient does not seem to think he is having any increased difficulty with his ADLs  He does receive a lot of assistance from April, who is a nurse who was hired for private duty to monitor him  He does admit to some probable cognitive decline and does not feel he is as âsharpâ as he used to be prior to his significant illness 2 years ago   He continues to live relatively independently with assistance with medications  His only new medicine his Nathalie Ubaldoma which was started by his son for chronic urinary incontinence  His son is Dr Doc Guerra  The patient is annoyed by some chronic polyuria as well as intermittent urinary incontinence  He does not feel that the medicine has been very helpful and he does continue to have a dry mouth which she had with other incontinence medications  He has a history of combined systolic and diastolic congestive heart failure  He feels his breathing has been relatively stable  He has chronic peripheral neuropathy which she feels seems to be a bit worse with some tingling of his feet and some sensation of lower extremity weakness  Unfortunately, is type 2 diabetic control is worse with an A1c of 8 6  He has a history of diarrhea with metformin and he just remains on Januvia currently  He has history of atrial fibrillation and remains on anticoagulation without excessive bruising or bleeding  Reflux is well controlled with pantoprazole  He denies heartburn or dysphagia  He does take Pepcid as well on occasion  He has severe chronic low back pain and has remained on tramadol up to 3 times daily  He believes he has recently been taking it routinely and not as needed  Review of Systems    Constitutional: feeling poorly, feeling tired and recent weight loss, but no recent weight gain  Respiratory: shortness of breath during exertion, but no shortness of breath, no cough and no wheezing  Gastrointestinal: as noted in HPI, no abdominal pain, no nausea, no constipation, no diarrhea and no blood in stools  Genitourinary: incontinence, but no urinary hesitancy and no nocturia  Musculoskeletal: arthralgias and myalgias  Integumentary: no rashes  Neurological: no headache  Active Problems    1  Allergic rhinitis (477 9) (J30 9)   2  Atrial fibrillation, permanent (427 31) (I48 2)   3  Benign essential hypertension (401 1) (I10)   4  BPH with obstruction/lower urinary tract symptoms (600 01,599 69) (N40 1,N13 8)   5  CAD S/P percutaneous coronary angioplasty (414 01,V45 82) (I25 10,Z98 61)   6  Cancer, colon (153 9) (C18 9)   7  Cataract (366 9) (H26 9)   8  Chronic systolic congestive heart failure (428 22,428 0) (I50 22)   9  Controlled type 2 diabetes with neuropathy (250 60,357 2) (E11 40)   10  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)   11  Diarrhea (787 91) (R19 7)   12  Dysphagia (787 20) (R13 10)   13  Eczema (692 9) (L30 9)   14  Esophageal reflux (530 81) (K21 9)   15  Exercise counseling (V65 41) (Z71 82)   16  Fatigue (780 79) (R53 83)   17  Floaters in visual field (379 24) (H43 399)   18  Flu vaccine need (V04 81) (Z23)   19  Hemorrhoids (455 6) (K64 9)   20  Insomnia (780 52) (G47 00)   21  Leukocytosis (288 60) (D72 829)   22  Lumbar compression fracture (805 4) (S32 000A)   23  Medicare annual wellness visit, initial (V70 0) (Z00 00)   24  Microscopic hematuria (599 72) (R31 29)   25  Osteomyelitis (730 20) (M86 9)   26  Other muscle spasm (728 85) (M62 838)   27  Rectal bleeding (569 3) (K62 5)   28  Respiratory failure, chronic (518 83) (J96 10)   29  Shortness of breath (786 05) (R06 02)   30  Sore throat (462) (J02 9)   31  Type 2 diabetes mellitus without complication (486 22) (A11 5)   32  Urinary frequency (788 41) (R35 0)   33  History of Urinary retention (788 20) (R33 9)    Past Medical History    1  History of Aspiration pneumonia (507 0) (J69 0)   2  History of Encounter for preventive health examination (V70 0) (Z00 00)   3  History of malignant neoplasm of colon (V10 05) (Z85 038)   4  History of pneumonia due to Pseudomonas (V12 61) (Z87 01)   5  History of Hyponatremia (276 1) (E87 1)   6  History of Infectious discitis (722 90) (M46 40)   7  History of Osteomyelitis of spine (730 28) (M46 20)   8  History of Sepsis due to group B beta-hemolytic Streptococcus (038 0,041 02,995 91)   (A40 1)   9   History of Urinary retention (788 20) (R33 9)  Active Problems And Past Medical History Reviewed: The active problems and past medical history were reviewed and updated today  Family History  Mother    1  Denied: Family history of colon cancer   2  Denied: Family history of colonic polyps   3  Denied: Family history of Crohn's disease   4  Denied: Family history of liver disease   5  FH: heart attack (V17 3) (Z82 49)  Father    6  Denied: Family history of colon cancer   7  Denied: Family history of colonic polyps   8  Denied: Family history of Crohn's disease   9  Denied: Family history of liver disease  Family History Reviewed: The family history was reviewed and updated today  Social History    · Former smoker (T56 91) (B66 038)   · 20 pack year history   · Lives independently   · Lived independently up until recent episode of sepsis 3/16- now in assisted living  · No alcohol use   · Non smoker (V49 89) (Z78 9)   · Retired   · Manual labor  The social history was reviewed and updated today  Surgical History    1  History of Appendectomy   2  History of Cath Stent Placement   3  History of Colon Surgery   4  History of Hemorrhoidectomy   5  History of Laminectomy Lumbar   6  History of Lung Surgery   7  History of Percutaneous Placement Of Gastrostomy Tube   8  History of Tracheostomy  Surgical History Reviewed: The surgical history was reviewed and updated today  Current Meds   1  Accu-Chek FastClix Lancets Miscellaneous; TEST BLOOD SUGAR TWICE A DAY; Therapy: 66Lse1306 to (Evaluate:28Nov2017)  Requested for: 23INJ4479; Last   Rx:02May2017 Ordered   2  Aspirin EC 81 MG Oral Tablet Delayed Release; TAKE 1 TABLET DAILY AS DIRECTED; Therapy: 30NEW8369 to (Evaluate:72Mna3512); Last Rx:21Nov2016 Ordered   3  Eliquis 5 MG Oral Tablet; Take 1 tablet by mouth  twice a day; Therapy: 35ZWD6404 to (Randy Cortez)  Requested for: 96IXP3288; Last   Rx:13Oct2017 Ordered   4   Famotidine 20 MG Oral Tablet; Take 1 tablet by mouth two  times daily; Therapy: 05Yqm3292 to (Evaluate:08Oct2018)  Requested for: 60KZB5668; Last   Rx:13Oct2017 Ordered   5  Folic Acid 1 MG Oral Tablet; TAKE 1 TABLET DAILY; Last Rx:11Oct2016 Ordered   6  Furosemide 20 MG Oral Tablet; Take 2 tablets by mouth in  the morning; Therapy: 31UOV8139 to (797 9386)  Requested for: 14MEI8806; Last   Rx:46Out7879 Ordered   7  Hydrocortisone 2 5 % External Cream; APPLY SPARINGLY TO AFFECTED AREA(S) ON   THE NECK TWICE DAILY AS NEEDED; Therapy: 37Ojj8753 to (Evaluate:37Mpp1384)  Requested for: 17Pis4149; Last   Rx:97Pfl8912 Ordered   8  Imodium A-D 2 MG Oral Tablet; TAKE 1 TABLET 4 TIMES DAILY AS NEEDED FOR   DIARRHEA; Therapy: 59Ocu7993 to (Evaluate:10Mar2018)  Requested for: 47Nvn5157; Last   Rx:63Pbb0259 Ordered   9  Januvia 100 MG Oral Tablet; TAKE 1 TABLET DAILY; Therapy: 64EQE0087 to (Evaluate:14Jan2018)  Requested for: 36YZD5883; Last   Rx:65Coh4979 Ordered   10  Levalbuterol HCl - 1 25 MG/3ML Inhalation Nebulization Solution; 1-VIAL VIA    NEBULIZER EVERY 6 HOURS AS NEEDED FOR SHORTNESS OF BREATH FOR 30    DAYS; Therapy: 66Ask4068 to (Evaluate:27Apr2018)  Requested for: 35BQP7756; Last    Rx:70Fnq7116 Ordered   11  Loratadine 10 MG Oral Tablet; 1 TABLET DAILY; Therapy: 36OPY7121 to (Evaluate:26Nrl1821)  Requested for: 11OFR3658; Last    TL:76EUP5739 Ordered   12  Metoprolol Tartrate 25 MG Oral Tablet; TAKE 1/2 TABLET DAILY; Last Rx:43Cmn6636    Ordered   13  Myrbetriq 50 MG Oral Tablet Extended Release 24 Hour; 1 PO QD; Therapy: 68IUL0609 to Recorded   14  Nasacort Allergy 24HR 55 MCG/ACT Nasal Aerosol; INHALE 1 PUFF DAILY; Therapy: 90GBQ2187 to (Evaluate:01Jun2017); Last NK:67KYY6505 Ordered   15  Nitroglycerin 0 4 MG/SPRAY Translingual Solution; USE 1 SPRAY UNDER THE TONGUE    AS NEEDED FOR CHEST PAIN;    Therapy: 20Jan2017 to (Evaluate:12Zpj9381)  Requested for: 60DWD1015; Last    Rx:20Jan2017 Ordered   16  Pantoprazole Sodium 40 MG Oral Tablet Delayed Release; TAKE 1 TABLET DAILY; Therapy: 03JCM1475 to (Murphy Montrose)  Requested for: 22NSF0526; Last    Rx:96Qqe5599 Ordered   17  Sodium Chloride 0 9 % Inhalation Nebulization Solution; INHALE 1 VIAL 4 times daily     Requested for: 46CDO7220; Last ZY:37AET1305 Ordered   18  Tamsulosin HCl - 0 4 MG Oral Capsule; TAKE 1 CAPSULE Daily; Therapy: 35CDC2849 to [phone])  Requested for: 22BMT9188; Last    Rx:77Hxf6481 Ordered   19  TraMADol HCl - 50 MG Oral Tablet; Take 1 tablet twice daily; Therapy: 03XJV6122 to (Evaluate:01Jun2017); Last CT:41UBN8355 Ordered   20  VESIcare 10 MG Oral Tablet; Therapy: 46BMT6864 to Recorded    The medication list was reviewed and updated today  Allergies    1  No Known Drug Allergies   2  No Known Drug Allergies    Vitals   Recorded: 40GYI8902 03:12PM   Heart Rate 72   Respiration 18   Systolic 355   Diastolic 80   Height 5 ft 8 in   Weight 172 lb    BMI Calculated 26 15   BSA Calculated 1 92   O2 Saturation 94, RA     Physical Exam    Constitutional   General appearance: No acute distress, well appearing and well nourished  Eyes   Conjunctiva and lids: No swelling, erythema, or discharge  Pupils and irises: Equal, round and reactive to light  Ears, Nose, Mouth, and Throat   External inspection of ears and nose: Normal     Nasal mucosa, septum, and turbinates: Normal without edema or erythema  Oropharynx: Normal with no erythema, edema, exudate or lesions  Pulmonary   Respiratory effort: No increased work of breathing or signs of respiratory distress  Auscultation of lungs: Clear to auscultation, equal breath sounds bilaterally, no wheezes, no rales, no rhonci  Cardiovascular   Auscultation of heart: Normal rate and rhythm, normal S1 and S2, without murmurs  Examination of extremities for edema and/or varicosities: Abnormal   trace edema b/l     Carotid pulses: Normal     Abdomen   Liver and spleen: No hepatomegaly or splenomegaly  Lymphatic   Palpation of lymph nodes in neck: No lymphadenopathy  Musculoskeletal   Gait and station: Abnormal   Slow and wide based  Skin   Skin and subcutaneous tissue: Normal without rashes or lesions  Neurologic   Cranial nerves: Cranial nerves 2-12 intact  Sensation: No sensory loss  Psychiatric   Orientation to person, place and time: Normal     Mood and affect: Normal         Mental Status Exam:  the patient achieved a score of 22 /30  date / time 3 / 5  place 3 / 5  registration 3 / 3  serial sevens 3 / 5  naming 2 objects 2 / 2  repeating a sentence 1 / 1  write sentence 0 / 1  3-stage verbal command 3 / 3  written command 1 / 1  copy design 1 / 1  recall 2 / 3  Future Appointments    Date/Time Provider Specialty Site   12/22/2017 09:15 AM VALENTE Monreal  [address] [place] [address]   01/05/2018 10:45 AM VALENTE Monreal   [address] [place] [address]   12/15/2017 10:20 AM Yojana Champion DO Cardiology  CARDIOLOGY  [place]     Signatures   Electronically signed by : VALENTE Lomeli ; Nov 30 2017  9:20AM EST                       (Author)

## 2017-12-15 ENCOUNTER — ALLSCRIPTS OFFICE VISIT (OUTPATIENT)
Dept: OTHER | Facility: OTHER | Age: 82
End: 2017-12-15

## 2017-12-16 NOTE — PROGRESS NOTES
Assessment  Assessed    1  Atrial fibrillation, permanent (427 31) (I48 2)    Plan  Atrial fibrillation, permanent    · Eliquis 5 MG Oral Tablet; Take 1 tablet twice daily   Rx By: Nicolas Ruelas; Dispense: 28 Days ; #:56 Tablet; Refill: 0;For: Atrial fibrillation, permanent; ROGELIO = N; Dispense Sample; Last Updated By: Yary Ontiveros; 12/15/2017 11:23:58 AM   · EKG/ECG- POC; Status:Complete;   Done: 77ASC1666 10:45AM   Perform: In Office; Last Updated By:Heber Aldridge; 12/15/2017 10:45:04 AM;Ordered; For:Atrial fibrillation, permanent; Ordered By:Kori Chavis;   · Follow-up visit in 6 months Evaluation and Treatment  Follow-up  Status: Hold For -Scheduling  Requested for: 63IFI8458   Ordered; For: Atrial fibrillation, permanent; Ordered By: Nicolas Ruelas Performed:  Due: 40OIF3990    Discussion/Summary  Cardiology Discussion Summary Free Text Note Form [facility]:   #1  CAD status post remote myocardial infarction, PCI/stenting, unknown details : Patient has reported symptoms of angina in the past with at least moderate levels of exertion which she denies today  Echocardiogram from August 2016 reviewed, ejection fraction likely low-normal with apical wall motion abnormalities and akinesis/wall thinning suggestive of prior myocardial infarction  #2  Hypertension: Well controlled, continue metoprolol#3  Permanent atrial fibrillation, rate controlled, continue metoprolol and Eliquis  #4  Chronic diastolic heart failure: Euvolemic by examination without edema or shortness of breath  Continue furosemide 40 mg alternating with 20  F/U in 6 months  Chief Complaint  Chief Complaint Free Text Note Form: 2M RTN OV      History of Present Illness  Cardiology HPI Free Text Note Form [facility]:  This is an 28-year-old male with a history of CAD status post multiple PCI in the remote past and permanent atrial fibrillation, admitted to [address] [place] [address] in August 2016 with health care acquired pneumonia and aspiration pneumonia  He is on chronic anticoagulation with Eliquis  His history of prior cardiomyopathy with ejection fraction 20%, however repeat echocardiogram in August 2016 revealed an ejection fraction about 55% with apical wall motion abnormalities  He was hospitalized in December 2016 secondary to recurrent aspiration, and had gastroenteritis earlier in 2017  Eliquis has been expensive for him, but he has family have decided to continue to stay on it as a post at switch to warfarin  In light of after he had some increased confusion and was treated with increased diuretics for 1 week although without symptoms of weight gain /lower extremity edema/ increased dyspnea  He presents today for follow up with no complaints  He has a personal RN (April) that presents w/ him today   Patient denies shortness of breath, chest pain, worsening lower extremity edema, weight gain  Review of Systems  Cardiology Male ROS:    Cardiac: No complaints of chest pain, no palpitations, no fainiting  Skin: No complaints of nonhealing sores or skin rash  Genitourinary: frequent urination at night, but-- no recurrent urinary tract infections,-- no difficult urination,-- no blood in urine,-- no kidney stones,-- no loss of bladder control,-- no kidney problems,-- no prostate problems-- and-- no erectile dysfunction  Psychological: No complaints of feeling depressed, anxiety, panic attacks, or difficulty concentrating  General: No complaints of trouble sleeping, lack of energy, fatigue, appetite changes, weight changes, fever, frequent infections, or night sweats  Respiratory: No complaints of shortness of breath, cough with sputum, or wheezing  HEENT: No complaints of serious problems, hearing problems, nose problems, throat problems, or snoring  Gastrointestinal: No complaints of liver problems, nausea, vomiting, heartburn, constipation, bloody stools, diarrhea, problems swallowing, adbominal pain, or rectal bleeding    Hematologic: No complaints of bleeding disorders, anemia, blood clots, or excessive brusing  Neurological: No complaints of numbness, tingling, dizziness, weakness, seizures, headaches, syncope or fainting, AM fatigue, daytime sleepiness, no witnessed apnea episodes  Musculoskeletal: arthritis-- and-- back pain, but-- no swelling/pain    ROS Reviewed:   ROS reviewed  Active Problems  Problems    1  Allergic rhinitis (477 9) (J30 9)   2  Atrial fibrillation, permanent (427 31) (I48 2)   3  Benign essential hypertension (401 1) (I10)   4  BPH with obstruction/lower urinary tract symptoms (600 01,599 69) (N40 1,N13 8)   5  CAD S/P percutaneous coronary angioplasty (414 01,V45 82) (I25 10,Z98 61)   6  Cancer, colon (153 9) (C18 9)   7  Cataract (366 9) (H26 9)   8  Chronic systolic congestive heart failure (428 22,428 0) (I50 22)   9  Controlled type 2 diabetes with neuropathy (250 60,357 2) (E11 40)   10  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)   11  Diarrhea (787 91) (R19 7)   12  Dysphagia (787 20) (R13 10)   13  Eczema (692 9) (L30 9)   14  Esophageal reflux (530 81) (K21 9)   15  Exercise counseling (V65 41) (Z71 82)   16  Fatigue (780 79) (R53 83)   17  Floaters in visual field (379 24) (H43 399)   18  Flu vaccine need (V04 81) (Z23)   19  Hemorrhoids (455 6) (K64 9)   20  Insomnia (780 52) (G47 00)   21  Leukocytosis (288 60) (D72 829)   22  Lumbar compression fracture (805 4) (S32 000A)   23  Medicare annual wellness visit, initial (V70 0) (Z00 00)   24  Microscopic hematuria (599 72) (R31 29)   25  Osteomyelitis (730 20) (M86 9)   26  Other muscle spasm (728 85) (M62 838)   27  Rectal bleeding (569 3) (K62 5)   28  Respiratory failure, chronic (518 83) (J96 10)   29  Shortness of breath (786 05) (R06 02)   30  Sore throat (462) (J02 9)   31  Type 2 diabetes mellitus without complication (613 41) (B13 4)   32   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, without long-term  current use of insulin (250 62,357 2) (E11 42,E11 65)   33  Urinary frequency (788 41) (R35 0)   34  History of Urinary retention (788 20) (R33 9)    Past Medical History  Problems    1  History of Aspiration pneumonia (507 0) (J69 0)   2  History of Encounter for preventive health examination (V70 0) (Z00 00)   3  History of malignant neoplasm of colon (V10 05) (Z85 038)   4  History of pneumonia due to Pseudomonas (V12 61) (Z87 01)   5  History of Hyponatremia (276 1) (E87 1)   6  History of Infectious discitis (722 90) (M46 40)   7  History of Osteomyelitis of spine (730 28) (M46 20)   8  History of Sepsis due to group B beta-hemolytic Streptococcus (038 0,041 02,995 91) (A40 1)   9  History of Urinary retention (788 20) (R33 9)  Active Problems And Past Medical History Reviewed: The active problems and past medical history were reviewed and updated today  Surgical History  Problems    1  History of Appendectomy   2  History of Cath Stent Placement   3  History of Colon Surgery   4  History of Hemorrhoidectomy   5  History of Laminectomy Lumbar   6  History of Lung Surgery   7  History of Percutaneous Placement Of Gastrostomy Tube   8  History of Tracheostomy  Surgical History Reviewed: The surgical history was reviewed and updated today  Family History  Mother    1  Denied: Family history of colon cancer   2  Denied: Family history of colonic polyps   3  Denied: Family history of Crohn's disease   4  Denied: Family history of liver disease   5  FH: heart attack (V17 3) (Z82 49)  Father    6  Denied: Family history of colon cancer   7  Denied: Family history of colonic polyps   8  Denied: Family history of Crohn's disease   9  Denied: Family history of liver disease  Family History Reviewed: The family history was reviewed and updated today         Social History  Problems    · Former smoker (K74 22) (N78 108)   · Lives independently   · No alcohol use   · Non smoker (V49 89) (Z78 9)   · Retired  Social History Reviewed: The social history was reviewed and updated today  Current Meds   1  Accu-Chek FastClix Lancets Miscellaneous; TEST BLOOD SUGAR TWICE A DAY; Therapy: 43Hid1635 to (Evaluate:28Nov2017)  Requested for: 95LKT4277; Last Rx:02May2017 Ordered   2  Aspirin EC 81 MG Oral Tablet Delayed Release; TAKE 1 TABLET DAILY AS DIRECTED; Therapy: 36FBW1807 to (Evaluate:98Bva6815); Last Rx:21Nov2016 Ordered   3  Eliquis 5 MG Oral Tablet; Take 1 tablet by mouth  twice a day; Therapy: 22KRX3997 to (Susie Goldstein)  Requested for: 76EMV1691; Last Rx:13Oct2017 Ordered   4  Famotidine 20 MG Oral Tablet; Take 1 tablet by mouth two  times daily; Therapy: 85Fqb5424 to (Evaluate:08Oct2018)  Requested for: 07IVS8524; Last Rx:13Oct2017 Ordered   5  Furosemide 20 MG Oral Tablet; Take 20mg daily alternating with 40mg daily; Therapy: 50BSQ5500 to (Evaluate:09Rmg1108)  Requested for: 52BAD5715 Recorded   6  Hydrocortisone 2 5 % External Cream; APPLY SPARINGLY TO AFFECTED AREA(S) ON THE NECK TWICE DAILY AS NEEDED; Therapy: 59Iph4642 to (Evaluate:26Dnw9704)  Requested for: 82Vwq3027; Last Rx:01Aug2017 Ordered   7  Imodium A-D 2 MG Oral Tablet; TAKE 1 TABLET 4 TIMES DAILY AS NEEDED FOR DIARRHEA; Therapy: 36Nsx2719 to (Evaluate:10Mar2018)  Requested for: 89Chu9684; Last Rx:22Jtc7952 Ordered   8  Levalbuterol HCl - 1 25 MG/3ML Inhalation Nebulization Solution; 1-VIAL VIA NEBULIZER EVERY 6 HOURS AS NEEDED FOR SHORTNESS OF BREATH FOR 30 DAYS; Therapy: 72Qjw3010 to (Evaluate:27Apr2018)  Requested for: 86ERC1883; Last Rx:02May2017 Ordered   9  Metoprolol Tartrate 25 MG Oral Tablet; TAKE 1/2 TABLET DAILY; Last Rx:02Aug2017 Ordered   10  Myrbetriq 50 MG Oral Tablet Extended Release 24 Hour; 1 PO QD; Therapy: 47STN7165 to Recorded   11  Nasacort Allergy 24HR 55 MCG/ACT Nasal Aerosol; INHALE 1 PUFF DAILY; Therapy: 62CFD8352 to (Evaluate:01Jun2017); Last PQ:32JXS2945 Ordered   12   Nitroglycerin 0 4 MG/SPRAY Translingual Solution; USE 1 SPRAY UNDER THE TONGUE  AS NEEDED FOR CHEST PAIN;  Therapy: 20Jan2017 to (Evaluate:76Hsb5595)  Requested for: 20Jan2017; Last  Rx:20Jan2017 Ordered   13  Pantoprazole Sodium 40 MG Oral Tablet Delayed Release; TAKE 1 TABLET DAILY; Therapy: 06XYJ5178 to (Avis Romberg)  Requested for: 14JYX6793; Last  Rx:92Bkb2852 Ordered   14  Sodium Chloride 0 9 % Inhalation Nebulization Solution; INHALE 1 VIAL 4 times daily   Requested for: 04ITR7224; Last BL:71STA0750 Ordered   15  TraMADol HCl - 50 MG Oral Tablet; Take 1 tablet twice daily; Therapy: 88AOH8379 to (Evaluate:01Jun2017); Last AT:18HWX2747 Ordered  Medication List Reviewed: The medication list was reviewed and updated today  Allergies  Medication    1  No Known Drug Allergies   2  No Known Drug Allergies    Physical Exam   Constitutional - General appearance: No acute distress, well appearing and well nourished  Eyes - Conjunctiva and Sclera examination: Conjunctiva pink, sclera anicteric  Neck - Normal, no JVD   Pulmonary - Respiratory effort: No signs of respiratory distress  -- Auscultation of lungs: Clear to auscultation  Cardiovascular - Auscultation of heart: Normal rate and rhythm, normal S1 and S2, no murmurs  -- Pedal pulses: Normal, 2+ bilaterally  -- Examination of extremities for edema and/or varicosities: Normal    Abdomen - Soft  Musculoskeletal - Gait and station: Normal gait  Skin - Skin: Normal without rashes  Skin is warm and well perfused  Neurologic - Speech normal  No focal deficits  Psychiatric - Orientation to person, place, and time: Normal       Health Management  Type 2 diabetes mellitus without complication   *VB - Eye Exam; every 1 year; Last 25Apr2017; Next Due: 88TAQ9120; Active  *VB - Foot Exam; every 1 year; Last 37Bzu2315; Next Due: 50Lub8417; Active    Future Appointments    Date/Time Provider Specialty Site   12/22/2017 09:15 AM VALENTE Vo   [facility]   01/05/2018 10:45 AM VALENTE Vo   Family Medicine [address] [facility]     Signatures   Electronically signed by Binu Morrow DO; Dec 15 2017 12:09PM EST                       (Author)

## 2017-12-22 ENCOUNTER — GENERIC CONVERSION - ENCOUNTER (OUTPATIENT)
Dept: OTHER | Facility: OTHER | Age: 82
End: 2017-12-22

## 2017-12-22 ENCOUNTER — LAB (OUTPATIENT)
Dept: LAB | Facility: CLINIC | Age: 82
End: 2017-12-22
Payer: MEDICARE

## 2017-12-22 DIAGNOSIS — I65.29 STENOSIS OF CAROTID ARTERY, UNSPECIFIED LATERALITY: ICD-10-CM

## 2017-12-22 DIAGNOSIS — E78.00 PURE HYPERCHOLESTEROLEMIA: ICD-10-CM

## 2017-12-22 DIAGNOSIS — D63.1 ANEMIA OF CHRONIC RENAL FAILURE, UNSPECIFIED CKD STAGE: ICD-10-CM

## 2017-12-22 DIAGNOSIS — I63.9 IMPENDING CEREBROVASCULAR ACCIDENT (HCC): ICD-10-CM

## 2017-12-22 DIAGNOSIS — N18.9 ANEMIA OF CHRONIC RENAL FAILURE, UNSPECIFIED CKD STAGE: ICD-10-CM

## 2017-12-22 DIAGNOSIS — I12.9 PARENCHYMAL RENAL HYPERTENSION, STAGE 1 THROUGH STAGE 4 OR UNSPECIFIED CHRONIC KIDNEY DISEASE: Primary | ICD-10-CM

## 2017-12-22 DIAGNOSIS — E87.5 HYPERPOTASSEMIA: ICD-10-CM

## 2017-12-22 DIAGNOSIS — I50.22 CHRONIC SYSTOLIC CONGESTIVE HEART FAILURE (HCC): ICD-10-CM

## 2017-12-22 DIAGNOSIS — M10.9 PODAGRA: ICD-10-CM

## 2017-12-22 DIAGNOSIS — E21.1 HYPERPARATHYROIDISM DUE TO 1,25(0H)2D3 (HCC): ICD-10-CM

## 2017-12-22 LAB
ANION GAP SERPL CALCULATED.3IONS-SCNC: 6 MMOL/L (ref 4–13)
BUN SERPL-MCNC: 15 MG/DL (ref 5–25)
CALCIUM SERPL-MCNC: 9 MG/DL (ref 8.3–10.1)
CHLORIDE SERPL-SCNC: 102 MMOL/L (ref 100–108)
CO2 SERPL-SCNC: 30 MMOL/L (ref 21–32)
CREAT SERPL-MCNC: 0.92 MG/DL (ref 0.6–1.3)
GFR SERPL CREATININE-BSD FRML MDRD: 78 ML/MIN/1.73SQ M
GLUCOSE SERPL-MCNC: 357 MG/DL (ref 65–140)
NT-PROBNP SERPL-MCNC: 1359 PG/ML
POTASSIUM SERPL-SCNC: 4.1 MMOL/L (ref 3.5–5.3)
SODIUM SERPL-SCNC: 138 MMOL/L (ref 136–145)

## 2017-12-22 PROCEDURE — 83880 ASSAY OF NATRIURETIC PEPTIDE: CPT

## 2017-12-22 PROCEDURE — 80048 BASIC METABOLIC PNL TOTAL CA: CPT

## 2017-12-22 PROCEDURE — 36415 COLL VENOUS BLD VENIPUNCTURE: CPT

## 2017-12-26 ENCOUNTER — GENERIC CONVERSION - ENCOUNTER (OUTPATIENT)
Dept: OTHER | Facility: OTHER | Age: 82
End: 2017-12-26

## 2018-01-09 NOTE — MISCELLANEOUS
Assessment    1  Type 2 diabetes mellitus without complication (388 50) (H22 8)   2  Atrial fibrillation, permanent (427 31) (I48 2)   3  Chronic systolic congestive heart failure (428 22,428 0) (I50 22)   4  Insomnia (780 52) (G47 00)   5  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)   6  CHF, chronic (428 0) (I50 9)   7  Osteomyelitis (730 20) (M86 9)   8  CAD S/P percutaneous coronary angioplasty (414 01,V45 82) (I25 10,Z98 61)   9  Controlled type 2 diabetes with neuropathy (250 60,357 2) (E11 40)   10  Cancer, colon (153 9) (C18 9)   11  Benign essential hypertension (401 1) (I10)   12  Respiratory failure, chronic (518 83) (J96 10)   13  BPH with obstruction/lower urinary tract symptoms (600 01,599 69) (N40 1,N13 8)   14  Lumbar compression fracture (805 4) (S32 000A)    Plan  CHF, chronic    · 1 - Randy Brown DO  (Cardiology) Physician Referral  Consult  Status: Active   Requested for: 32Kyv7398   Ordered; For: CHF, chronic; Ordered By: Marvin Verduzco Performed:  Due: 63SWK8128  Care Summary provided  : Yes   · (1) BASIC METABOLIC PROFILE; Status:Active; Requested for:24Ypp2261;    Perform:[facility]; GYU:[id number];FXPWGOT; For:CHF, chronic; Ordered By:McGorry Arvid Bence; Insomnia    · Mirtazapine 15 MG Oral Tablet; TAKE 1 TABLET AT BEDTIME   Rx By: Marvin Verduzco; Dispense: 30 Days ; #:30 Tablet; Refill: 5; For: Insomnia; ROGELIO = N; Verified Transmission to [address] [place] [address]; Last Updated By: System, [email or web address]; 8/24/2016 3:31:34 PM  Osteomyelitis    · (1) CBC/PLT/DIFF; Status:Active; Requested for:86Ivs9755;    Perform:[facility]; LJE:[id number];UDOAFAB;  For:Osteomyelitis; Ordered By:McGorry Arvid Bence;   · (1) SED RATE; Status:Active; Requested for:87Bez3374;    Perform:[facility]; ZRI:[id number];RCEXYJR;  For:Osteomyelitis; Ordered By:McGorry Arvid Bence;   Type 2 diabetes mellitus without complication    · MetFORMIN HCl - 500 MG Oral Tablet; TAKE 1 TABLET TWICE DAILY   Rx By: Lena Earl; Dispense: 30 Days ; #:60 Tablet; Refill: 3; For: Type 2 diabetes mellitus without complication; ROGELIO = N; Verified Transmission to [address] [place] [address]; Last Updated By: System, SureScripts; 8/24/2016 3:31:35 PM  Unlinked    · From  Furosemide 20 MG Oral Tablet One tablet daily To Furosemide 20 MG  Oral Tablet Take 2 tablets in the morning   Rx By: Lena Earl; Dispense: 0 Days ; #:60 Tablet; Refill: 5; ROGELIO = N; Sent To: [facility]    Discussion/Summary  Discussion Summary:   The patient resents today for his initial visit to our office  We did follow him at [place] for several months after his sepsis which led to significant encephalopathy, diffuse weakness, cardiomyopathy, congestive heart failure  Fortunately, he is doing well  He originally had a PEG tube which has been pulled and he is tolerating a normal diet at this point  He did require tracheostomy which has been removed now for the last month and he is doing well  He has a history of diabetes and his glucose has been extremely well controlled on low-dose Lantus  I'm going to stop Lantus and start him on low-dose metformin 500 mg twice daily  He has been checking his sugars and should contact me if they're consistently above 200  He does appear to have some fluid overload and I'm going to increase his Lasix to 40 mg each morning  He is still on Keflex for his diffuse ostial myelitis of the spine  I spoke with infectious disease yesterday and they would like him to continue with his Keflex 4 times daily at least until his visit with them on September 7  He will continue to have a CBC with a sedimentation rate done every 2 weeks  I'm going to have him get a basic metabolic panel done every 2 weeks as well since we're initiating metformin as well as increasing his Lasix to 20 mg twice daily for what appears to be a slight flare of congestive heart failure    He has a cardiomyopathy which hopefully has improved significantly  I'm going to have him see cardiology for repeat echocardiography as well as management  He has a history of coronary artery disease with 5 stents  He currently seems stable, but cardiology input will be appreciated  He is intending on having an epidural injection in the near future  He will need to hold his Eliquis 4 days prior to injection  He has been cleared from infectious disease to have an epidural injection without the need to repeat an MRI, as the MRI will remain abnormal, but clinically he is not   exhibiting signs of osteomyelitis  He has a right femoral DVT since 4/16 while in the ICU  He does remain on Eliquis  He will probably remain on Eliquis lifelong for his history of paroxysmal atrial fibrillation  I will await cardiology input on this, but he will need to be on it at least until mid to late September for his DVT  He has been having difficulty with sleep and I placed him on mirtazapine 15 mg daily at bedtime  I stopped his citalopram     He has a history of colon cancer status post polypectomy in the 1980s  Apparently, he had a colonoscopy done in March just prior to his hemorrhoidectomy, which may have been the culprit for his group B strep  Chief Complaint  Chief Complaint Free Text Note Form: Fluzone immunization given today   foot exam done      History of Present Illness  TCM Communication [facility]: The patient is being contacted for follow-up after hospitalization  He was hospitalized at  [facility] 8/23/16  Diagnosis: s/p elective hemorrhoidectomy, dev, B strep sepsis with vertebral osteomyelitis disciitis ,acute and chronic resp failure req vent/trach dev pneumonia asp, required bronchial lavage  He was discharged to an assisted living facility, [facility]  needs walker, cont on keflex Referrals Needed:  Mauri BLAKE ID     Communication performed and completed by Kiah Castano RN   HPI: Patient was asked today for his May 13  He has a history of coronary disease status post 5 stents  An echocardiogram done prior to his hospitalization had revealed an EF of 45%  EF on 3/27 echocardiogram was only 20%  He was seen by nephrology for some hyponatremia which was felt to be multifactorial, due to tube feeds, Celexa, medical illness  Upon admission to the Son home, patient was noted to be chronically ill with a PEG tube and a tracheostomy  He did require a readmission for bilious drainage from his PEG tube on June 29  CAT scan at that time revealed no issues with his gastrostomy tube but he did have a filling defect in his left main bronchus  He underwent bronchoscopy and suctioning of secretions  He was discharged on June 30  Tracheostomy was able to be removed in late June and his PEG tube was removed around August 10  He made significant gains in his physical strength from his sepsis-induced myopathy while at [facility]  His glucose was very well controlled with low-dose insulin  Renal function had improved  White blood cell Remained chronically a bit elevated at 12,000  His acute renal failure has resolved and has remained quite stable  He presents today accompanied by his son, Dr Poe Preciosu, who has been very much a part of his care  The patient's main concerns at this point are his pain  He has severe low back pain and is considering an epidural injection  He continues to be weak, but is doing well on his own apartment currently  He is getting medications back [facility] through [facility]  He denies any fever or chills  He denies any chest pain, palpitations or lightheadedness  His back pain is diffuse but seems worse in his lumbar spine  Cervical spine pain has been stable  He denies any current issues with chest pain, shortness of breath or palpitations  He does note some lower extremity swelling which seems to be worse since his discharge   He has some chronic shortness of breath with exertion but not orthopnea  He is voiding and stooling without difficulty  He is having some difficulty with insomnia  His old chart does note a history of OCD, but he has not exhibited symptoms consistent with this  He has some mild depression which he blames on his ongoing chronic illness  Review of Systems  Complete-Male:   Constitutional: recent weight gain, but no fever, not feeling poorly, no chills, not feeling tired and no recent weight loss  ENT: no earache  Cardiovascular: lower extremity edema, but the heart rate was not slow, no chest pain, no intermittent leg claudication, the heart rate was not fast and no palpitations  Respiratory: shortness of breath during exertion, but no shortness of breath, no cough and no wheezing  Active Problems    1  Atrial fibrillation, permanent (427 31) (I48 2)   2  Benign essential hypertension (401 1) (I10)   3  BPH with obstruction/lower urinary tract symptoms (600 01,599 69) (N40 1,N13 8)   4  Chronic systolic congestive heart failure (428 22,428 0) (I50 22)   5  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)   6  Dysphagia (787 20) (R13 10)   7  Microscopic hematuria (599 72) (R31 2)   8  Respiratory failure, chronic (518 83) (J96 10)   9  Type 2 diabetes mellitus without complication (950 32) (G36 8)   10  History of Urinary retention (788 20) (R33 9)    Past Medical History    1  History of pneumonia due to Pseudomonas (V12 61) (Z87 01)   2  History of Hyponatremia (276 1) (E87 1)   3  History of Infectious discitis (722 90) (M46 40)   4  History of Osteomyelitis of spine (730 28) (M46 20)   5  History of Sepsis due to group B beta-hemolytic Streptococcus (038 0,041 02,995 91)   (A40 1)   6  History of Urinary retention (788 20) (R33 9)    Surgical History    1  History of Cath Stent Placement   2  History of Colon Surgery   3  History of Hemorrhoidectomy   4  History of Laminectomy Lumbar   5  History of Lung Surgery   6   History of Percutaneous Placement Of Gastrostomy Tube   7  History of Tracheostomy    Family History  Mother    1  FH: heart attack (V17 3) (Z82 49)  Family History Reviewed: The family history was reviewed and updated today  Social History    · Former smoker (E90 62) (S47 191)   · Lives independently   · Retired  Social History Reviewed: The social history was reviewed and updated today  Current Meds   1  Albuterol Sulfate (2 5 MG/3ML) 0 083% Inhalation Nebulization Solution; USE 1 UNIT   DOSE IN NEBULIZER 4 TIMES DAILY Recorded   2  Aspirin 81 MG CAPS; take 1 capsule daily; Therapy: (O'Brien Bajwa) to Recorded   3  Cephalexin 500 MG Oral Capsule; TAKE 1 CAPSULE Every 6 hours; Therapy: (O'Brien Bajwa) to Recorded   4  Famotidine 20 MG Oral Tablet; TAKE 1 TABLET TWICE DAILY; Therapy: 04Aug2016 to Recorded   5  FentaNYL 12 MCG/HR Transdermal Patch 72 Hour; APPLY 1 PATCH EVERY 3 DAYS; Therapy: (O'Brien Bajwa) to Recorded   6  FentaNYL 50 MCG/HR Transdermal Patch 72 Hour; APPLY 1 PATCH EVERY 3 DAYS; Therapy: (O'Brien Bajwa) to Recorded   7  Folic Acid 1 MG Oral Tablet; TAKE 1 TABLET DAILY; Therapy: (O'Brien Bajwa) to Recorded   8  Furosemide 20 MG Oral Tablet; One tablet daily; Therapy: (Recorded:04Aug2016) to Recorded   9  Gabapentin 100 MG Oral Capsule; one capsule every 8 hours; Therapy: 04Aug2016 to Recorded   10  Lactinex 1 GM GRAN; TAKE 1 GM Twice daily; Therapy: (O'Brien Bajwa) to Recorded   11  Lantus 100 UNIT/ML Subcutaneous Solution; INJECT 10 UNIT Bedtime; Therapy: (O'Brien Bajwa) to Recorded   12  LORazepam 2 MG Oral Tablet; TAKE 1 TABLET 3 TIMES DAILY AS NEEDED; Therapy: 04Aug2016 to Recorded   13  Metoprolol Tartrate 25 MG Oral Tablet; TAKE 0 5 TABLET Twice daily; Therapy: (O'Brien Bajwa) to Recorded   14  Sodium Chloride 0 9 % Inhalation Nebulization Solution; INHALE 1 VIAL 4 times daily; Therapy: (O'Brien Bajwa) to Recorded   15   Tamsulosin HCl - 0 4 MG Oral Capsule; TAKE 1 CAPSULE Daily; Therapy: [id number] to 61 23 68)  Requested for: 76Gwv0830; Last    Rx:[id number] Ordered  Medication List Reviewed: The medication list was reviewed and updated today  Allergies    1  No Known Drug Allergies   2  No Known Drug Allergies    Physical Exam    Constitutional   General appearance: No acute distress, well appearing and well nourished  Eyes   Conjunctiva and lids: No swelling, erythema, or discharge  Pupils and irises: Equal, round and reactive to light  Ears, Nose, Mouth, and Throat   External inspection of ears and nose: Normal     Nasal mucosa, septum, and turbinates: Normal without edema or erythema  Oropharynx: Normal with no erythema, edema, exudate or lesions  Pulmonary   Respiratory effort: No increased work of breathing or signs of respiratory distress  Auscultation of lungs: Abnormal   He has bibasilar crackles  Cardiovascular   Auscultation of heart: Normal rate and rhythm, normal S1 and S2, without murmurs  Examination of extremities for edema and/or varicosities: Abnormal   He has 2+ pitting edema bilaterally  Carotid pulses: Normal     Abdomen   Abdomen: Non-tender, no masses  Liver and spleen: No hepatomegaly or splenomegaly  Lymphatic   Palpation of lymph nodes in neck: No lymphadenopathy  Musculoskeletal   Gait and station: Normal     Neurologic   Cranial nerves: Cranial nerves 2-12 intact  Reflexes: 2+ and symmetric  Sensation: No sensory loss  Psychiatric   Orientation to person, place and time: Normal     Mood and affect: Normal     Diabetic Foot Screen: Normal     Socks and shoes removed, the Right Foot: the foot was normal, no swelling, no erythema  The toes on the right were normal    The sensory exam showed  normal vibratory sensation at the level of the toes on the right  Normal tactile sensation with monofilament testing throughout the right foot     Socks and shoes removed, Left Foot: the foot was normal, no swelling, no erythema  The toes on the left were normal    The sensory exam showed  normal vibratory sensation at the level of the toes on the left , Normal tactile sensation with monofilament testing throughout the left foot        Future Appointments    Date/Time Provider Specialty Site   09/20/2016 02:30 PM Martin Quiñonez MD Urology [address]   Electronically signed by : Marie May, ; Aug 23 2016  6:00PM EST                       (Author)    Electronically signed by : VALENTE Almaraz ; Aug 25 2016 11:38AM EST                       (Author)

## 2018-01-10 NOTE — RESULT NOTES
Verified Results  * XR CHEST PA & LATERAL [id number] 08:57AM [place] Fidel Loaiza     Test Name Result Flag Reference   XR CHEST PA & LATERAL (Report)     CHEST DUAL ENERGY     INDICATION: Pneumonitis due to aspiration  Atrial fibrillation  History of colon cancer  Former smoker  COMPARISON: 1/10/2017     VIEWS: PA (including soft tissue and bone algorithms) and lateral projections; 4 images     FINDINGS:        Cardiomediastinal silhouette appears stable  The lungs are clear  No pneumothorax or pleural effusion  Visualized osseous structures appear within normal limits for the patient's age  IMPRESSION:     No active pulmonary disease  Workstation performed: [id number]     Signed by:    Raymundo Umana MD   3/23/17

## 2018-01-10 NOTE — PROCEDURES
Procedures by OPAL Richardson at 9/1/2016  12:10 PM      Author:  OPAL Richardson Service:  (none) Author Type:  Speech and Language Pathologist     Filed:  9/1/2016 12:46 PM Date of Service:  9/1/2016 12:10 PM Status:  Signed     :  OPAL Richardson (Speech and Language Pathologist)                                               Video Swallow Study      Patient Name: Harmony Latif  Today's Date: 9/1/2016  par  par  Past Medical History  Past Medical History      Diagnosis   Date    Atrial fibrillation      CAD (coronary artery disease)      Chronic diastolic (congestive) heart failure      COPD (chronic obstructive pulmonary disease)      Critical illness polyneuropathy      Diabetes mellitus       type 2     Hemorrhoids      History of colon cancer      History of DVT (deep vein thrombosis)      Hyperlipidemia      Hypertension      Osteomyelitis       par  Past Surgical History  Past Surgical History       Procedure   Laterality Date    Lumbar laminectomy       Tracheostomy       Hemorroidectomy       Colectomy       Appendectomy       Cholecystectomy       Back surgery       Bronchogram w/ bronchoscopy  N/A 6/30/2016     Procedure: BRONCHOSCOPY FLEXIBLE with bronchial lavage to the left lower lobe; Surgeon: Inge Thomas MD;  Location: [facility]; Service:        PEG    Pt is an 81yom known to the speech department  Admitted w/ R pna  Refer to bedside swallow eval done 8/29 for details  Previous VBS:  Most recent vbs 7/11/16 showed silent aspiration w/ thin  Still had trach at that time  Level 3 dysphagia and nectar recommended  Pt also had vbs 5/31/16 and 5/13/16  Current Diet:  Dysphagia 2 mech soft and nectar  Premorbid diet:  [place] [facility]  Reportedly was noncompliant w/ diet recs when he was on a modified diet  Dentition:  Upper plate was unavailable for study  O2 requirement:  3L nc  Oral mech:  Strength and ROM:  Wnl/wfl  States he is cold and tired  (given multiple blankets)  Vocal Quality/Speech:  wnl  Cognitive status:  Alert and oriented    Consistencies administered: Barium laden applesauce, cheerios/nectar, soft solid, hard solid, nectar thick, thin liquids, 13mm barium pill w/ applesauce  Liquids were administered by cup  Pt was seated laterally at 90 degrees  Oral stage:  WNL/mild due to upper plate not available  Lip closure:wnl  Mastication:prolonged due to plate unavailable  wfl though  Bolus formation: adequate  Bolus control:adequte x w/ thin liquids  Transfer:wnl  Residue:none/min    Pharyngeal stage:  Mild  Swallow promptness:wnl  Spill to valleculae:nectar and thin  Spill to pyriforms:thin  Epiglottic inversion:incomplete for ost swallows  Laryngeal rise:wnl/mild  Pharyngeal constriction: mildly reduced  Vallecular retention:max w/ puree and solids  Pyriform retention: midl w/ puree and solids  PPW coating: for initial swallows  Osteophytes: yes (also has distal cervical hardware)  Epiglottic undercoat: w/ most trials  Penetration: thin  Aspiration:trace x 1 w/ nectar  [place] aspiration w/ thin  lessened but still present w/ smaller sips  Response to aspiration: none  Mild wet vocal quality noted x 1 w/ kasey aspiration episode  A cued cough was strong and cleared penetration, but not aspiration  Screening of Esophageal stage:  Delayed mid/distal clearance of 13mm barium pill w/ applesauce  Needed additional dry swallow and then applesauce to clear  Summary:  MILD/wfl oral stage (upper plate not available for study), at least mild pharyngeal stage w/ kasey silent aspiration of thin liquids  Delayed passage of 13mm pill mid/distal esophagus  Recommendations:  Diet:Regular  Liquids:nectar  Meds:in appelsauce  Break or crush if large  Strategies: small sips   Periodic volitional cough to clear any penetration  F/u ST tx:  Intermittent Supervision  Results reviewed with: pt, nurse  Aspiration precaution posted  ? Compliance, as pt has a h/o non compliance, and is not fond or the thick liquids  I did stress the risk of aspiration pna  Goals:  Pt will tolerate least restrictive diet w/out s/s aspiration or oral/pharyngeal difficulties  Pt will produce volitional cough w/ min cues x 4 per meal to clear potential penetration    Pt will take small sips of liquids >80%x w/ min cues to reduce aspiration risk,                 Received for:Provider  EPIC   Sep  1 2016 12:47PM [facility] Standard Time

## 2018-01-10 NOTE — PROCEDURES
Procedures by OPAL Gudino at 5/13/2016  11:40 AM      Author:  OPAL Gudino Service:  (none) Author Type:  Speech and Language Pathologist     Filed:  5/13/2016  2:41 PM Date of Service:  5/13/2016 11:40 AM Status:  Signed     :  OPAL Gudino (Speech and Language Pathologist)         Procedures:       1  FL BARIUM [place] SPEECH [UIA001 (Custom)]                                                      Video Swallow Study      Patient Name: Sheba Mir  GFKCK'R Date: 5/13/2016  par  par  Past Medical History  No past medical history on file  par  Past Surgical History  No past surgical history on file  General Information:   [de-identified] yo gentleman referred for a VBS by Dr Estefani Mansfield for dysphagia w/  c/o +aspiration; noted puree residue expectorated from trach this am  [facility]  [place]  Pt s/p VDRF /w trach & PEG placement  Pt has #7 cortex, cuff deflated  Cognition:  Awake  Alert, c/o back pain when seated upright  Cooperative, but limited due to c/o pain  Speech/Swallow Mech: Oral motor movements appeared  WNL; Dentition was  Part natural lower, edent upper; dentures reported to be loose fitting; Cough was adequate  Respiratory Status: on trach collar;   Current diet: was on puree w/ NTL,  but reportedly had puree residue in trach  Prior VBS-unknown  Pt was seen in radiology for a Video Barium Swallow Study, seated in the upright position and viewed laterally with the following consistencies: puree,banana, HTL, NTL by tsp      Results are as follows:           Oral Stage:   adequate bolus retrieval, slow but adequate oral processing; pt took thick liquids by tsp  Transfer of all consistencies was slow  Pharyngeal Stage:   Swallow initiation was timely w/ reduced laryngeal excursion; epiglottis did not fully invert, noted soft bolus retention in vallecula   Penetration/undercoating of epiglottis w/ puree residue, eventual continued penetration; vol cough, w/ trach  occluded mostly cleared penetration  Residue of thick liquids noted in pyriform sinuses w/ overflow silent aspiration, small amt, down posterior tracheal wall  Also noted continued penetration of secretions mixed w/ barium/po residue w/ eventual silent  aspiration  Delayed coughing after study w/ barium residue in tracheal secretions noted  Esophageal Stage:   not assessed at this time       Assessment Summary: At least moderate pharyngeal dysphagia due to reduced laryngeal excursion, reduced airway closure  Noted silent aspiration of po residue mixed w/ secretions  Diagnosis/Prognosis:            Recommendations:   cont NPO, TF for primary nutrition  Po trials w/ speech only ( puree w/ HTL)  PMV to be worn w/ po trials, if tolerated  meds via PEG  Consider repeat VBS in 7-10 days or sooner if indicated                               Received for:Provider  EPIC   May 13 2016  2:41PM [facility] Standard Time

## 2018-01-11 NOTE — MISCELLANEOUS
Message  White count a bit elevated  Repeat one week  BNP trending down but still elevated  Recheck one week  I did send a note to his son via text and he agrees with plan  Diabetes is creeping back and I'm going to restart low-dose metformin at 500 mg daily  Plan  CHF, chronic    · (1) NT- BNP (PRO BRAIN NATRIURETIC PEPTIDE); Status:Active; Requested  for:30Mar2017; Controlled type 2 diabetes with neuropathy    · MetFORMIN HCl - 500 MG Oral Tablet; TAKE 1 TABLET DAILY  Leukocytosis    · (1) CBC/ PLT (NO DIFF); Status:Active;  Requested for:30Mar2017;     Signatures   Electronically signed by : VALENTE Armenta ; Mar 23 2017  9:31AM EST                       (Author)

## 2018-01-11 NOTE — PROGRESS NOTES
Assessment    1  CHF, chronic (428 0) (I50 9)   2  Atrial fibrillation, permanent (427 31) (I48 2)   3  Aspiration pneumonia of left lower lobe, unspecified aspiration pneumonia type (507 0)   (J69 0)   4  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)   5  Esophageal reflux (530 81) (K21 9)   6  Type 2 diabetes mellitus without complication (930 83) (P34 2)   7  Controlled type 2 diabetes with neuropathy (250 60,357 2) (E11 40)   8  Lumbar compression fracture (805 4) (S32 000A)   · L1    Plan  Aspiration pneumonia of left lower lobe, unspecified aspiration pneumonia type    · (1) CBC/PLT/DIFF; Status:Active; Requested for:08Mar2017;    · * XR CHEST PA & LATERAL; Status:Active; Requested for:08Mar2017;   CHF, chronic    · (1) COMPREHENSIVE METABOLIC PANEL; Status:Active; Requested for:08Mar2017;    · (1) NT- BNP (PRO BRAIN NATRIURETIC PEPTIDE); Status:Active; Requested  for:08Mar2017; Controlled type 2 diabetes with neuropathy    · (1) HEMOGLOBIN A1C; Status:Active; Requested for:08Mar2017; Insomnia    · Mirtazapine 30 MG Oral Tablet  Lumbar compression fracture    · Celecoxib 200 MG Oral Capsule (CeleBREX); TAKE 1 CAPSULE ONCE DAILY    Discussion/Summary    Patient presents today for follow-up for his chronic health issues  He has been feeling quite fatigued chronically, but this seems to have increased over the past 4 days or so  I gave him some blood work to have done to follow-up on his elevated BNP  He has some slight crackles in his left lower lobe  I will also CMP and CBC done  He's had some bright red blood per rectum, but his stool was heme-negative today  CBC will be done as above  Consider repeat colonoscopy and upper GI if he continues to have rectal bleeding or if his CBC reveals anemia  At this point, going through the upper or lower GI evaluation may be a bit distressing for him  Due to his fatigue, I'm going to stop mirtazapine and we will monitor for depressed mood    His pain seems to be a very big factor and he will be seeing pain management and near future  I gave him Celebrex to utilize just on a rare basis, with his history of being on Eliquis  He will be holding his Eliquis for the next 5 days in anticipation of an injection at pain management  Chief Complaint  2M Follow up - AFib      Advance Directives  Advance Directive  Luke:   YES - Patient has an advance health care directive  The patient has a living will located  a scanned copy is in the patient's chart  History of Present Illness  Patient presents today coming by his son, Joe Castillo  Patient complains of feeling poorly over the past several months  His son seems to note the as good days and bad days  He he complains of feeling excessively fatigued over last 4 days  He denies any fever or chills  Denies any excessive coughing, but does cough a bit throughout the exam  He believes his pain is his main issue  Fortunately, he will be seeing pain management in the next several weeks  His son will be holding his Eliquis in preparation for some injections  His son requests Celebrex 200 mg daily which has helped the patient in the past  He does continue to take intermittent Aleve with good results  He has had some problems with some intermittent bright red blood per rectum  His stools occasionally dark black but he does take Pepto-Bismol routinely  Recent CBC revealed no anemia  He did have a high BNP on his last labs  He has a history of type II which has been stable off metformin, but his last A1c did increase  He has a remote history of diffuse osteomyelitis  White blood cell count has been chronically elevated, but remained stable on blood work in January  Review of Systems    Constitutional: feeling poorly, recent weight gain and feeling tired, but no fever, no chills and no recent weight loss  Eyes: no eyesight problems     Cardiovascular: the heart rate was not slow, no chest pain, the heart rate was not fast and no palpitations  Respiratory: no shortness of breath, no cough and no shortness of breath during exertion  Gastrointestinal: bloody stools, but no abdominal pain, no nausea, no vomiting, no constipation and no diarrhea  Genitourinary: no dysuria and no nocturia  Musculoskeletal: arthralgias, but no myalgias  Integumentary: no rashes  Neurological: limb weakness and difficulty walking, but no headache, no numbness, no dizziness and no fainting  Psychiatric: no anxiety, no sleep disturbances and no depression  Endocrine: muscle weakness  Hematologic/Lymphatic: no tendency for easy bleeding and no tendency for easy bruising  Active Problems    1  Aspiration pneumonia of left lower lobe, unspecified aspiration pneumonia type (507 0)   (J69 0)   2  Atrial fibrillation, permanent (427 31) (I48 2)   3  Benign essential hypertension (401 1) (I10)   4  BPH with obstruction/lower urinary tract symptoms (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 01,599 69) (N40 1,N13 8)   5  CAD S/P percutaneous coronary angioplasty (414 01,V45 82) (I25 10,Z98 61)   6  Cancer, colon (153 9) (C18 9)   7  CHF, chronic (428 0) (I50 9)   8  Chronic systolic congestive heart failure (428 22,428 0) (I50 22)   9  Controlled type 2 diabetes with neuropathy (250 60,357 2) (E11 40)   10  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)   11  Dysphagia (787 20) (R13 10)   12  Encounter for preventive health examination (V70 0) (Z00 00)   13  Esophageal reflux (530 81) (K21 9)   14  Fatigue (780 79) (R53 83)   15  Floaters in visual field (379 24) (H43 399)   16  Flu vaccine need (V04 81) (Z23)   17  Insomnia (780 52) (G47 00)   18  Leukocytosis (288 60) (D72 829)   19  Lumbar compression fracture (805 4) (S32 000A)   20  Medicare annual wellness visit, initial (V70 0) (Z00 00)   21  Microscopic hematuria (599 72) (R31 29)   22  Osteomyelitis (730 20) (M86 9)   23  Other muscle spasm (728 85) (M62 838)   24  Respiratory failure, chronic (518 83) (J96 10)   25   Type 2 diabetes mellitus without complication (899 16) (G95 9)   26  History of Urinary retention (788 20) (R33 9)    Past Medical History    1  History of Aspiration pneumonia (507 0) (J69 0)   2  History of pneumonia due to Pseudomonas (V12 61) (Z87 01)   3  History of Hyponatremia (276 1) (E87 1)   4  History of Infectious discitis (722 90) (M46 40)   5  History of Osteomyelitis of spine (730 28) (M46 20)   6  History of Sepsis due to group B beta-hemolytic Streptococcus (038 0,041 02,995 91)   (A40 1)   7  History of Urinary retention (788 20) (R33 9)    The active problems and past medical history were reviewed and updated today  Surgical History    1  History of Cath Stent Placement   2  History of Colon Surgery   3  History of Hemorrhoidectomy   4  History of Laminectomy Lumbar   5  History of Lung Surgery   6  History of Percutaneous Placement Of Gastrostomy Tube   7  History of Tracheostomy    The surgical history was reviewed and updated today  Family History  Mother    1  FH: heart attack (V17 3) (Z82 49)    The family history was reviewed and updated today  Social History    · Former smoker (S96 44) (W14 915)   · Lives independently   · Retired  The social history was reviewed and updated today  Current Meds   1  Accu-Chek FastClix Lancets Miscellaneous; TEST BLOOD SUGAR TWICE A DAY; Therapy: 99Zao0350 to (Last Rx:89Mjz3768)  Requested for: 22Uau2898 Ordered   2  Accu-Chek Josselin SmartView w/Device Kit; test up to twice daily or as directed by   physician; Therapy: 07Qth4662 to (Last Rx:81Zuj9160) Ordered   3  Accu-Chek SmartView In Vitro Strip; TEST TWICE DAILY; Therapy: 74Tzt3926 to (Evaluate:52Xdz9774)  Requested for: 46Xne5322; Last   Rx:67Hyf6805 Ordered   4  Aspirin EC 81 MG Oral Tablet Delayed Release; TAKE 1 TABLET DAILY AS DIRECTED; Therapy: 49GLP5146 to (Evaluate:45Kki5987); Last Rx:21Nov2016 Ordered   5  Eliquis 5 MG Oral Tablet; Take 1 tablet twice daily;  Last HUGGINS:13YUZ6743 Ordered   6  Famotidine 20 MG Oral Tablet; TAKE 1 TABLET TWICE DAILY; Therapy: 10NFL3778 to (Evaluate:06Oct2017); Last Rx:11Oct2016 Ordered   7  Folic Acid 1 MG Oral Tablet; TAKE 1 TABLET DAILY; Last Rx:11Oct2016 Ordered   8  Furosemide 20 MG Oral Tablet; TAKE 1 TABLET DAILY  Requested for: 64REK8077; Last   Rx:18Nov2016 Ordered   9  Lactinex 1 GM GRAN; TAKE 1 GM Twice daily; Therapy: (Akanksha Vega) to Recorded   10  Levalbuterol HCl - 1 25 MG/3ML Inhalation Nebulization Solution; 1-VIAL VIA NEBULIZER    EVERY 6 HOURS AS NEEDED FOR SHORTNESS OF BREATH FOR 30 DAYS; Therapy: 98Kzp8878 to (21 234 )  Requested for: 52MCR9390; Last    Rx:11Oct2016 Ordered   11  Methocarbamol 500 MG Oral Tablet; Take 1 tablet twice daily as needed  Requested for:    60IST9044; Last Rx:21Nov2016 Ordered   12  Metoprolol Tartrate 25 MG Oral Tablet; Take 1 tablet daily; Therapy: ([phone] 61 38 26) to Recorded   13  Mirtazapine 30 MG Oral Tablet; take 1/2 tablet at bedtime; Therapy: 14Hme6319 to (Evaluate:01Jan2018)  Requested for: 93AXU2721; Last    Rx:06Jan2017 Ordered   14  Nitroglycerin 0 4 MG/SPRAY Translingual Solution; USE 1 SPRAY UNDER THE TONGUE    AS NEEDED FOR CHEST PAIN;    Therapy: 20Jan2017 to (Evaluate:16Vyp2731)  Requested for: 77TZO8256; Last    Rx:20Jan2017 Ordered   15  Pantoprazole Sodium 40 MG Oral Tablet Delayed Release; Take 1 tablet by mouth two     times daily 30 minutes  before breakfast and dinner; Therapy: 12PFK8667 to (66 216 78 09)  Requested for: 69HCP4810; Last    Rx:50Imb1003 Ordered   16  Sodium Chloride 0 9 % Inhalation Nebulization Solution; INHALE 1 VIAL 4 times daily     Requested for: 26FFH2466; Last Rx:09Nov2016 Ordered   17  Tamsulosin HCl - 0 4 MG Oral Capsule; TAKE 1 CAPSULE Daily; Therapy: 15TTR2253 to (21 234 )  Requested for: 73BNW5928; Last    Rx:11Oct2016 Ordered   18  Vitamin B12 TR 1000 MCG TBCR;     Therapy: (Recorded:12Oct2016) to Recorded    The medication list was reviewed and updated today  Allergies    1  No Known Drug Allergies   2  No Known Drug Allergies    Vitals  Vital Signs    Recorded: 97URF4908 04:51PM   Heart Rate 80   Respiration 16   Systolic 597   Diastolic 80   Height 5 ft 10 in   Weight 172 lb    BMI Calculated 24 68   BSA Calculated 1 96     Physical Exam    Constitutional   General appearance: Abnormal   He appears slightly fatigued  Eyes   Conjunctiva and lids: No swelling, erythema, or discharge  Pupils and irises: Equal, round and reactive to light  Ears, Nose, Mouth, and Throat   External inspection of ears and nose: Normal     Nasal mucosa, septum, and turbinates: Normal without edema or erythema  Oropharynx: Normal with no erythema, edema, exudate or lesions  Pulmonary   Respiratory effort: No increased work of breathing or signs of respiratory distress  Auscultation of lungs: Abnormal   He has LLL crackles  Cardiovascular   Auscultation of heart: Normal rate and rhythm, normal S1 and S2, without murmurs  Examination of extremities for edema and/or varicosities: Abnormal   trace edema b/l  Carotid pulses: Normal     Abdomen   Liver and spleen: No hepatomegaly or splenomegaly  Lymphatic   Palpation of lymph nodes in neck: No lymphadenopathy  Musculoskeletal   Gait and station: Abnormal   Slow and wide-based - he uses a cane  Skin   Skin and subcutaneous tissue: Normal without rashes or lesions  Neurologic   Cranial nerves: Cranial nerves 2-12 intact  Sensation: No sensory loss      Psychiatric   Orientation to person, place and time: Normal     Mood and affect: Normal          Future Appointments    Date/Time Provider Specialty Site   08/30/2017 01:40 PM Maribell Harkins DO Cardiology  CARDIOLOGY  [place] [address]   09/25/2017 10:45 AM Louie Guillory MD [facility]     Signatures   Electronically signed by : VALENTE Yates ; Mar  8 2017  5:41PM EST (Author)

## 2018-01-12 NOTE — RESULT NOTES
Verified Results  * XR CHEST PA & LATERAL 10AOP6925 09:26AM Daria Kappa   TW Order Number: [id number]     Test Name Result Flag Reference   XR CHEST PA & LATERAL (Report)     CHEST - DUAL ENERGY     INDICATION: Shortness of breath  COMPARISON: 3/22/2017     VIEWS: PA (including soft tissue/bone algorithms) and lateral projections     IMAGES: 4     FINDINGS:        Cardiomediastinal silhouette appears stable  Mild central vascular congestion noted with probable trace bilateral pleural effusions  Minimal biapical pleural thickening  No confluent infiltrates or pneumothorax  Stable bony thorax  IMPRESSION:     Mild CHF with trace pleural effusions  ##imslh##imslh       Workstation performed: [id number]     Signed by:   Nesha Middleton DO   7/21/17     (1) NT- BNP (PRO BRAIN NATRIURETIC PEPTIDE) 21AGU5180 08:58AM Daria Kappa   TW Order Number: [id number]     Test Name Result Flag Reference   NT-PRO BNP 2165 pg/mL H <450     (1) COMPREHENSIVE METABOLIC PANEL 79HZH5092 23:52NR Daria Kappa   TW Order Number: [id number]     Test Name Result Flag Reference   SODIUM 141 mmol/L  136-145   POTASSIUM 4 5 mmol/L  3 5-5 3   CHLORIDE 102 mmol/L  100-108   CARBON DIOXIDE 30 mmol/L  21-32   ANION GAP (CALC) 9 mmol/L  4-13   BLOOD UREA NITROGEN 18 mg/dL  5-25   CREATININE 0 77 mg/dL  0 60-1 30   Standardized to IDMS reference method   CALCIUM 9 5 mg/dL  8 3-10 1   BILI, TOTAL 1 19 mg/dL H 0 20-1 00   ALK PHOSPHATAS 98 U/L     ALT (SGPT) 28 U/L  12-78   AST(SGOT) 17 U/L  5-45   ALBUMIN 4 1 g/dL  3 5-5 0   TOTAL PROTEIN 7 4 g/dL  6 4-8 2   eGFR Non-African American      >60 0 ml/min/1 73sq [facility] Disease Education Program recommendations are as follows:  GFR calculation is accurate only with a steady state creatinine  Chronic Kidney disease less than 60 ml/min/1 73 sq  meters  Kidney failure less than 15 ml/min/1 73 sq  meters     GLUCOSE FASTING 149 mg/dL H 65-99     (1) CBC/PLT/DIFF 27Hvf1057 08:58AM Lei Shoulder   TW Order Number: [id number]     Test Name Result Flag Reference   WBC COUNT 8 97 Thousand/uL  4 31-10 16   RBC COUNT 4 58 Million/uL  3 88-5 62   HEMOGLOBIN 15 2 g/dL  12 0-17 0   HEMATOCRIT 44 6 %  36 5-49 3   MCV 97 fL  82-98   MCH 33 2 pg  26 8-34 3   MCHC 34 1 g/dL  31 4-37 4   RDW 14 4 %  11 6-15 1   MPV 13 7 fL H 8 9-12 7   PLATELET COUNT 085 Thousands/uL L 149-390   nRBC AUTOMATED 0 /100 WBCs     NEUTROPHILS RELATIVE PERCENT 48 %  43-75   LYMPHOCYTES RELATIVE PERCENT 39 %  14-44   MONOCYTES RELATIVE PERCENT 11 %  4-12   EOSINOPHILS RELATIVE PERCENT 2 %  0-6   BASOPHILS RELATIVE PERCENT 0 %  0-1   NEUTROPHILS ABSOLUTE COUNT 4 17 Thousands/? ??L  1 85-7 62   LYMPHOCYTES ABSOLUTE COUNT 3 47 Thousands/? ??L  0 60-4 47   MONOCYTES ABSOLUTE COUNT 1 00 Thousand/? ??L  0 17-1 22   EOSINOPHILS ABSOLUTE COUNT 0 15 Thousand/? ??L  0 00-0 61   BASOPHILS ABSOLUTE COUNT 0 03 Thousands/? ??L  0 00-0 10

## 2018-01-12 NOTE — MISCELLANEOUS
Message  Received request for F2F encounter form for Dr Garrison Barber to fill out  Per Dr Garrison Barber, we were not the prescribing prescribers for the pts O2  Notified the requesting office (Alison from Dr Mir Guevara) that we need the information from the original prescribing physician  Active Problems    1  Atrial fibrillation, permanent (427 31) (I48 2)   2  Benign essential hypertension (401 1) (I10)   3  BPH with obstruction/lower urinary tract symptoms (600 01,599 69) (N40 1,N13 8)   4  CAD S/P percutaneous coronary angioplasty (414 01,V45 82) (I25 10,Z98 61)   5  Cancer, colon (153 9) (C18 9)   6  CHF, chronic (428 0) (I50 9)   7  Chronic systolic congestive heart failure (428 22,428 0) (I50 22)   8  Controlled type 2 diabetes with neuropathy (250 60,357 2) (E11 40)   9  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)   10  Dysphagia (787 20) (R13 10)   11  Flu vaccine need (V04 81) (Z23)   12  Insomnia (780 52) (G47 00)   13  Lumbar compression fracture (805 4) (S32 000A)   14  Microscopic hematuria (599 72) (R31 29)   15  Osteomyelitis (730 20) (M86 9)   16  Respiratory failure, chronic (518 83) (J96 10)   17  Type 2 diabetes mellitus without complication (110 21) (D36 1)   18  History of Urinary retention (788 20) (R33 9)    Current Meds   1  Accu-Chek FastClix Lancets Miscellaneous; TEST BLOOD SUGAR TWICE A DAY; Therapy: 30Nqb2839 to (Last Rx:53Zss9003)  Requested for: 43Pww7837 Ordered   2  Accu-Chek Josselin SmartView w/Device Kit; test up to twice daily or as directed by   physician; Therapy: 07Hfb3629 to (Last Rx:16Nrk8405) Ordered   3  Accu-Chek SmartView In Vitro Strip; TEST TWICE DAILY; Therapy: 96Gtk7581 to (Evaluate:70Bwa6312)  Requested for: 10Cyi5461; Last   Rx:12Pci6538 Ordered   4  Albuterol Sulfate (2 5 MG/3ML) 0 083% Inhalation Nebulization Solution; USE 1 UNIT   DOSE IN NEBULIZER 4 TIMES DAILY Recorded   5  Aspirin 81 MG CAPS; take 1 capsule daily;    Therapy: (Mary Gomez) to Recorded   6  Famotidine 20 MG Oral Tablet; TAKE 1 TABLET TWICE DAILY; Therapy: 37Txh0765 to Recorded   7  FentaNYL 12 MCG/HR Transdermal Patch 72 Hour; APPLY 1 PATCH EVERY 3 DAYS; Therapy: (Esposito Sudeep) to Recorded   8  FentaNYL 50 MCG/HR Transdermal Patch 72 Hour; APPLY 1 PATCH EVERY 3 DAYS; Therapy: (Vaibhav Sheets) to Recorded   9  Folic Acid 1 MG Oral Tablet; TAKE 1 TABLET DAILY; Therapy: (Vaibhav Sheets) to Recorded   10  Furosemide 20 MG Oral Tablet; Take 2 tablets in the morning; Last Rx:96Ssf1331;    Status: NEED INFORMATION - Problem Ordered   11  Gabapentin 100 MG Oral Capsule; one capsule every 8 hours; Therapy: 09Bii7397 to Recorded   12  Lactinex 1 GM GRAN; TAKE 1 GM Twice daily; Therapy: (Vaibhav Sheets) to Recorded   13  Levalbuterol HCl - 1 25 MG/3ML Inhalation Nebulization Solution; 1-VIAL VIA    NEBULIZER EVERY 6 HOURS AS NEEDED FOR SHORTNESS OF BREATH FOR 30    DAYS; Therapy: 12Vln0128 to (Evaluate:26Fhi8226)  Requested for: 87PCO5816; Last    Rx:99Hmc6952 Ordered   14  LORazepam 2 MG Oral Tablet; TAKE 1 TABLET 3 TIMES DAILY AS NEEDED; Therapy: 44Bha5966 to Recorded   15  MetFORMIN HCl - 500 MG Oral Tablet; TAKE 1 TABLET TWICE DAILY; Therapy: 52Ziv9578 to (Evaluate:08Aot2818)  Requested for: 39Jeb8262; Last    Rx:24Ijb2586 Ordered   16  Metoprolol Tartrate 25 MG Oral Tablet; TAKE 0 5 TABLET Twice daily; Therapy: (Vaibhav Sheets) to Recorded   17  Mirtazapine 15 MG Oral Tablet; TAKE 1 TABLET AT BEDTIME; Therapy: 88Ioo9011 to (Evaluate:30Gpq1069)  Requested for: 14Mhf8179; Last    Rx:95Vfw1260 Ordered   18  Sodium Chloride 0 9 % Inhalation Nebulization Solution; INHALE 1 VIAL 4 times daily; Therapy: (Vaibhav Sheets) to Recorded   19  Tamsulosin HCl - 0 4 MG Oral Capsule; TAKE 1 CAPSULE Daily; Therapy: 97GEP1835 to (Evaluate:64Uxp7342)  Requested for: 24Kec4284; Last    Rx:81Kwa2454 Ordered    Allergies    1  No Known Drug Allergies   2  No Known Drug Allergies    Signatures   Electronically signed by : Coral Wagner, ; Oct  3 2016  4:14PM EST                       (Author)

## 2018-01-12 NOTE — RESULT NOTES
Verified Results  (1) CBC/PLT/DIFF 20Jan2017 01:57PM Samuelgin Conde    Order Number: [id number]     Test Name Result Flag Reference   WBC COUNT 13 60 Thousand/uL H 4 31-10 16   RBC COUNT 4 22 Million/uL  3 88-5 62   HEMOGLOBIN 13 4 g/dL  12 0-17 0   HEMATOCRIT 41 0 %  36 5-49 3   MCV 97 fL  82-98   MCH 31 8 pg  26 8-34 3   MCHC 32 7 g/dL  31 4-37 4   RDW 14 9 %  11 6-15 1   MPV 12 2 fL  8 9-12 7   PLATELET COUNT 005 Thousands/uL  149-390   nRBC AUTOMATED 0 /100 WBCs     NEUTROPHILS RELATIVE PERCENT 54 %  43-75   LYMPHOCYTES RELATIVE PERCENT 34 %  14-44   MONOCYTES RELATIVE PERCENT 9 %  4-12   EOSINOPHILS RELATIVE PERCENT 3 %  0-6   BASOPHILS RELATIVE PERCENT 0 %  0-1   NEUTROPHILS ABSOLUTE COUNT 7 09 Thousands/?L  1 85-7 62   LYMPHOCYTES ABSOLUTE COUNT 4 62 Thousands/?L H 0 60-4 47   MONOCYTES ABSOLUTE COUNT 1 19 Thousand/?L  0 17-1 22   EOSINOPHILS ABSOLUTE COUNT 0 44 Thousand/?L  0 00-0 61   BASOPHILS ABSOLUTE COUNT 0 05 Thousands/?L  0 00-0 10   - Patient Instructions: This bloodwork is non-fasting  Please drink two glasses of water morning of bloodwork  - Patient Instructions: This bloodwork is non-fasting  Please drink two glasses of water morning of bloodwork  (1) NT- BNP (PRO BRAIN NATRIURETIC PEPTIDE) 77RJU0360 01:57PM Irving Self Order Number: [id number]     Test Name Result Flag Reference   NT-PRO BNP 1990 pg/mL H <450     (1) BASIC METABOLIC PROFILE 15AUQ5694 01:57PM Samuelgin EvansJerome   TW Order Number: [id number]     Test Name Result Flag Reference   GLUCOSE,RANDM 157 mg/dL H    If the patient is fasting, the ADA then defines impaired fasting glucose as > 100 mg/dL and diabetes as > or equal to 123 mg/dL     SODIUM 137 mmol/L  136-145   POTASSIUM 4 2 mmol/L  3 5-5 3   CHLORIDE 101 mmol/L  100-108   CARBON DIOXIDE 33 mmol/L H 21-32   ANION GAP (CALC) 3 mmol/L L 4-13   BLOOD UREA NITROGEN 11 mg/dL  5-25   CREATININE 0 68 mg/dL  0 60-1 30 Standardized to IDMS reference method   CALCIUM 8 9 mg/dL  8 3-10 1   eGFR Non-African American      >60 0 ml/min/1 73sq m   [facility] Disease Education Program recommendations are as follows:  GFR calculation is accurate only with a steady state creatinine  Chronic Kidney disease less than 60 ml/min/1 73 sq  meters  Kidney failure less than 15 ml/min/1 73 sq  meters

## 2018-01-13 VITALS
WEIGHT: 178.38 LBS | DIASTOLIC BLOOD PRESSURE: 80 MMHG | SYSTOLIC BLOOD PRESSURE: 132 MMHG | HEIGHT: 70 IN | OXYGEN SATURATION: 97 % | BODY MASS INDEX: 25.54 KG/M2 | HEART RATE: 80 BPM | TEMPERATURE: 96.9 F

## 2018-01-13 VITALS
BODY MASS INDEX: 24.62 KG/M2 | HEART RATE: 80 BPM | WEIGHT: 172 LBS | RESPIRATION RATE: 16 BRPM | SYSTOLIC BLOOD PRESSURE: 100 MMHG | DIASTOLIC BLOOD PRESSURE: 80 MMHG | HEIGHT: 70 IN

## 2018-01-13 NOTE — RESULT NOTES
Verified Results  (1) URINE CULTURE 89Jgz9378 03:12PM Felipe Clear     Test Name Result Flag Reference   CLINICAL REPORT (Report)     Test:        Urine culture  Specimen Type:   Urine  Specimen Date:   7/21/2017 3:12 PM  Result Date:    7/22/2017 2:32 PM  Result Status:   Final result  Resulting Lab:   [address] [address]            Tel: [phone]      CULTURE                                       ------------------                                   No Growth <1000 cfu/mL

## 2018-01-13 NOTE — MISCELLANEOUS
Message  I spoke to the patient's son  The patient has been having some vomiting after meals over the past week or so  His fentanyl is recently been titrated down to 50 Âµg per hour  I'm going to place him on omeprazole 40 mg every morning 20 minutes prior to breakfast starting today  If his symptoms persist, he will certainly need to be seen and Dr Loraine May agrees        Plan  Acute gastritis    · Omeprazole 40 MG Oral Capsule Delayed Release; TAKE 1 CAPSULE Daily    Signatures   Electronically signed by : VALENTE Gonsales ; Nov 4 2016  9:43AM EST                       (Author)

## 2018-01-13 NOTE — PROCEDURES
Procedures by OPAL Gordon at 5/31/2016  9:30 AM      Author:  OPAL Gordon Service:  (none) Author Type:  Speech and Language Pathologist     Filed:  5/31/2016 11:36 AM Date of Service:  5/31/2016  9:30 AM Status:  Signed     :  OPAL Gordon (Speech and Language Pathologist)         Procedures:       1  FL BARIUM Geovanna Vini SPEECH [LOT512 (Custom)]                                                      Video Swallow Study      Patient Name: Cherie ELLISS'M Date: 5/31/2016  par  par  Past Medical History  Past Medical History      Diagnosis   Date    Atrial fibrillation      CAD (coronary artery disease)      Colon cancer      Critical illness polyneuropathy      Diabetes mellitus       type 2     DVT (deep venous thrombosis)      Hemorrhoids      Hyperlipidemia      OCD (obsessive compulsive disorder)      Osteomyelitis      Respiratory failure, chronic          Past Surgical History  Past Surgical History      Procedure  Laterality Date    Lumbar laminectomy      Tracheostomy      Hemorroidectomy           General Information:  Pt is known to this service from admission to the [facility] on 5/12/16  Pt completed prior VBS on 5/13/16, which revealed moderate pharyngeal dysphagia c silent aspiration of PO residual mixed c secretions  Pt has completed dysphagia tx sessions c PO trials  under SLP supervision only  Pt to now complete repeat VBS to determine improvement/progress of ability to tolerate PO at this time  Oral Stage:  Slower bolus manipulation noted c puree and HTL by tsp  Decreased bolus control noted c HTL by cup, NTL by tsp/cup, thin liquids by tsp/cup and ice chips, leading to premature spillage into pharynx before swallow initiated  Pharyngeal Stage:  Swallow initiation was mildly delayed c all PO trialed  Continues c decreased hyolaryngeal excursion, decreased pharyngeal constriction and no inversion of the epiglottis   The epiglottis does touch the posterior pharyngeal wall, but did not invert  to protect airway  Pt c mild-moderate vallecular and pyriform sinus retention as result of decreased pharyngeal strength/excursion was observed c all consistencies  Additionally, pt continues to demonstrate high penetration on the underside of the epiglottis  c puree and HTL by tsp  Deeper penetration observed to the vocal cords x1 c flash aspiration when taking large cup sips of HTL  Smaller sips of HTL by cup continued to present c deeper penetration  Observed c NTL by tsp to have immediate silent aspiration  on the posterior subglottic aspect and when taking NTL by cup, silent aspiration on the anterior subglottic aspect  SLP verbally prompting pt to elicit throat clear/cough which did not clear all aspiration  Pt continued to have silent aspiration events  c thin liquids by tsp and cup, again needing verbal prompts to clear  When taking ice chips, pt noted to have immediate dump into pharynx leading to deep penetration  Pt's throat clear/cough inconsistently cleared penetration/aspiration  Double swallow  strategy did not significantly decrease pharyngeal residual  Pt continues to be at high risk for aspiration at this time  Esophageal Stage:  Not assessed at this time    Assessment Summary:  Pt demonstrates mild oral dysphagia and moderate-severe pharyngeal dysphagia at this time  Continues to be at high risk for aspiration at this time due to decreased bolus control of HTL by cup, NTL by tsp/cup, ice chips, thin liquids by tsp/cup,  leading to premature spillage into pharynx before swallow  Continues to demonstrate mild delay in swallow initiation c decreased hyolaryngeal elevation/phrayngeal constriction and incomplete epiglottic inversion which leads to high penetration c puree/HTL  by tsp and deeper penetration c HTL by cup and ice chips, and silent aspiration of NTL by tsp/cup and thins by tsp/cup  Recommendations:  1   Continue NPO c alternative means of nutrition/hydration via PEG  2  Meds via PEG  3  PO trials of puree/HTL by tsp only under SLP supervision only  4  PMV to be placed during PO trials                              Received for:Provider  EPIC   May 31 2016 11:36AM [facility] Standard Time

## 2018-01-14 VITALS
WEIGHT: 173.25 LBS | HEIGHT: 70 IN | SYSTOLIC BLOOD PRESSURE: 134 MMHG | BODY MASS INDEX: 24.8 KG/M2 | DIASTOLIC BLOOD PRESSURE: 80 MMHG

## 2018-01-14 VITALS
HEIGHT: 70 IN | OXYGEN SATURATION: 96 % | BODY MASS INDEX: 24.62 KG/M2 | HEART RATE: 56 BPM | WEIGHT: 172 LBS | DIASTOLIC BLOOD PRESSURE: 72 MMHG | TEMPERATURE: 97.3 F | SYSTOLIC BLOOD PRESSURE: 110 MMHG

## 2018-01-14 VITALS
RESPIRATION RATE: 16 BRPM | SYSTOLIC BLOOD PRESSURE: 118 MMHG | WEIGHT: 176.25 LBS | HEART RATE: 64 BPM | DIASTOLIC BLOOD PRESSURE: 72 MMHG | BODY MASS INDEX: 25.23 KG/M2 | HEIGHT: 70 IN

## 2018-01-14 VITALS
OXYGEN SATURATION: 96 % | TEMPERATURE: 97.5 F | WEIGHT: 177.13 LBS | HEIGHT: 70 IN | DIASTOLIC BLOOD PRESSURE: 82 MMHG | BODY MASS INDEX: 25.36 KG/M2 | HEART RATE: 76 BPM | SYSTOLIC BLOOD PRESSURE: 148 MMHG

## 2018-01-14 VITALS
DIASTOLIC BLOOD PRESSURE: 80 MMHG | HEART RATE: 72 BPM | WEIGHT: 172 LBS | OXYGEN SATURATION: 94 % | BODY MASS INDEX: 26.07 KG/M2 | SYSTOLIC BLOOD PRESSURE: 120 MMHG | HEIGHT: 68 IN | RESPIRATION RATE: 18 BRPM

## 2018-01-15 VITALS
HEIGHT: 70 IN | HEART RATE: 78 BPM | TEMPERATURE: 96.8 F | DIASTOLIC BLOOD PRESSURE: 78 MMHG | WEIGHT: 176.38 LBS | RESPIRATION RATE: 18 BRPM | BODY MASS INDEX: 25.25 KG/M2 | SYSTOLIC BLOOD PRESSURE: 110 MMHG

## 2018-01-15 VITALS
BODY MASS INDEX: 24.05 KG/M2 | WEIGHT: 168 LBS | HEIGHT: 70 IN | SYSTOLIC BLOOD PRESSURE: 130 MMHG | RESPIRATION RATE: 18 BRPM | DIASTOLIC BLOOD PRESSURE: 80 MMHG | HEART RATE: 80 BPM

## 2018-01-15 VITALS
HEART RATE: 80 BPM | SYSTOLIC BLOOD PRESSURE: 132 MMHG | DIASTOLIC BLOOD PRESSURE: 70 MMHG | BODY MASS INDEX: 24.64 KG/M2 | RESPIRATION RATE: 16 BRPM | WEIGHT: 172.13 LBS | HEIGHT: 70 IN

## 2018-01-15 NOTE — MISCELLANEOUS
Message   Recorded as Task   Date: 10/11/2016 04:47 PM, Created By: Sarah Valverde   Task Name: Med Renewal Request   Assigned To: Sarah Valverde   Regarding Patient: Anthony Huynh, Status: Active   Comment:    Sarah Valverde - 11 Oct 2016 4:47 PM     TASK CREATED  Caller: Self; Renew Medication; [phone] (Home)  Son brought down a form and needs rxs for all meds as pt is in donut hole  I updated all meds except fentanyl patches ,lorazepam, lactinex or nacl  Also could not renew furosemide  Please print all rxs and we need to deliver to Dr Shazia Zamora tomorrow in am   Cynthia Wheeler has form to go with rxs  Marlon Delgadillo Jr - 11 Oct 2016 11:20 PM     TASK REPLIED TO: Previously Assigned To Marlon Delgadillo Jr                      fentanyl should be done by pain management as they manage this  Other prescriptions done   Jaky Dobbs - 13 Oct 2016 4:51 PM     TASK EDITED                 Son notified  Active Problems    1  Atrial fibrillation, permanent (427 31) (I48 2)   2  Benign essential hypertension (401 1) (I10)   3  BPH with obstruction/lower urinary tract symptoms (600 01,599 69) (N40 1,N13 8)   4  CAD S/P percutaneous coronary angioplasty (414 01,V45 82) (I25 10,Z98 61)   5  Cancer, colon (153 9) (C18 9)   6  CHF, chronic (428 0) (I50 9)   7  Chronic systolic congestive heart failure (428 22,428 0) (I50 22)   8  Controlled type 2 diabetes with neuropathy (250 60,357 2) (E11 40)   9  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)   10  Dysphagia (787 20) (R13 10)   11  Esophageal reflux (530 81) (K21 9)   12  Flu vaccine need (V04 81) (Z23)   13  Insomnia (780 52) (G47 00)   14  Lumbar compression fracture (805 4) (S32 000A)   15  Microscopic hematuria (599 72) (R31 29)   16  Osteomyelitis (730 20) (M86 9)   17  Respiratory failure, chronic (518 83) (J96 10)   18  Type 2 diabetes mellitus without complication (622 22) (S88 8)   19   History of Urinary retention (788 20) (R33 9)    Current Meds   1  Accu-Chek FastClix Lancets Miscellaneous; TEST BLOOD SUGAR TWICE A DAY; Therapy: 98Eoo4785 to (Last Rx:93Zpt3580)  Requested for: 24Aug2016 Ordered   2  Accu-Chek Josselin SmartView w/Device Kit; test up to twice daily or as directed by   physician; Therapy: 24Aug2016 to (Last Rx:24Aug2016) Ordered   3  Accu-Chek SmartView In Vitro Strip; TEST TWICE DAILY; Therapy: 63Hbs1773 to (Evaluate:28Eaq2129)  Requested for: 84Xql8987; Last   Rx:24Aug2016 Ordered   4  Aspirin 81 MG CAPS; take 1 capsule daily; Therapy: (Paula Mcrae) to Recorded   5  Cephalexin 250 MG Oral Capsule; TAKE 1 CAPSULE 4 TIMES DAILY; Therapy: (Recorded:12Oct2016) to Recorded   6  Eliquis 5 MG Oral Tablet; Take 1 tablet twice daily; Therapy: (Recorded:12Oct2016) to Recorded   7  Famotidine 20 MG Oral Tablet; TAKE 1 TABLET TWICE DAILY; Therapy: 61NLA2465 to (Evaluate:06Oct2017); Last Rx:11Oct2016 Ordered   8  FentaNYL 12 MCG/HR Transdermal Patch 72 Hour; APPLY 1 PATCH EVERY 3 DAYS; Therapy: (Paula Mcrae) to Recorded   9  FentaNYL 50 MCG/HR Transdermal Patch 72 Hour; APPLY 1 PATCH EVERY 3 DAYS; Therapy: (Paula Mcrae) to Recorded   10  Folic Acid 1 MG Oral Tablet; TAKE 1 TABLET DAILY; Last Rx:11Oct2016 Ordered   11  Furosemide 20 MG Oral Tablet; Take 2 tablets in the morning; Last Rx:17Eys6536    Ordered   12  Gabapentin 100 MG Oral Capsule; one capsule every 8 hours; Therapy: 01GPS2791 to (Last Rx:11Oct2016) Ordered   13  Lactinex 1 GM GRAN; TAKE 1 GM Twice daily; Therapy: (Paula Mcrae) to Recorded   14  Levalbuterol HCl - 1 25 MG/3ML Inhalation Nebulization Solution; 1-VIAL VIA    NEBULIZER EVERY 6 HOURS AS NEEDED FOR SHORTNESS OF BREATH FOR 30    DAYS; Therapy: 80Lmw1820 to ()  Requested for: 73MAX3243; Last    Rx:11Oct2016 Ordered   15  LORazepam 2 MG Oral Tablet; TAKE 1 TABLET 3 TIMES DAILY AS NEEDED; Therapy: 21JSI9286 to (Evaluate:10Nov2016);  Last Rx:11Oct2016 Ordered   16  MetFORMIN HCl - 500 MG Oral Tablet; TAKE 1 TABLET TWICE DAILY; Therapy: 06Jnn8784 to ()  Requested for: 88NXG0660; Last    Rx:11Oct2016 Ordered   17  Metoprolol Tartrate 25 MG Oral Tablet; TAKE 1/2 TABLET TWICE DAILY; Last    Rx:11Oct2016 Ordered   18  Mirtazapine 15 MG Oral Tablet; TAKE 1 TABLET AT BEDTIME; Therapy: 28Zyx1058 to ()  Requested for: 69XKB1476; Last    Rx:11Oct2016 Ordered   19  Tamsulosin HCl - 0 4 MG Oral Capsule; TAKE 1 CAPSULE Daily; Therapy: 80CZU6358 to ()  Requested for: 23RMT3666; Last    Rx:11Oct2016 Ordered   20  Vitamin B12 TR 1000 MCG TBCR; Therapy: (Recorded:12Oct2016) to Recorded    Allergies    1  No Known Drug Allergies   2   No Known Drug Allergies    Signatures   Electronically signed by : Juan C Lizarraga, ; Oct 13 2016  4:52PM EST                       (Author)

## 2018-01-15 NOTE — MISCELLANEOUS
History of Present Illness  TCM Communication [facility]: The patient is being contacted for follow-up after hospitalization  He was hospitalized at [facility]  The date of admission: 8/28/16, date of discharge: 9/5/16  Diagnosis: PNEUMONIA  He was discharged to a nursing home  Medications were not reviewed today  Communication performed and completed by ANGIE GRAHAM RN      Active Problems    1  Atrial fibrillation, permanent (427 31) (I48 2)   2  Benign essential hypertension (401 1) (I10)   3  BPH with obstruction/lower urinary tract symptoms (600 01,599 69) (N40 1,N13 8)   4  CAD S/P percutaneous coronary angioplasty (414 01,V45 82) (I25 10,Z98 61)   5  Cancer, colon (153 9) (C18 9)   6  CHF, chronic (428 0) (I50 9)   7  Chronic systolic congestive heart failure (428 22,428 0) (I50 22)   8  Controlled type 2 diabetes with neuropathy (250 60,357 2) (E11 40)   9  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)   10  Dysphagia (787 20) (R13 10)   11  Flu vaccine need (V04 81) (Z23)   12  Insomnia (780 52) (G47 00)   13  Lumbar compression fracture (805 4) (S32 000A)   14  Microscopic hematuria (599 72) (R31 2)   15  Osteomyelitis (730 20) (M86 9)   16  Respiratory failure, chronic (518 83) (J96 10)   17  Type 2 diabetes mellitus without complication (165 31) (A26 4)   18  History of Urinary retention (788 20) (R33 9)    Past Medical History    1  History of pneumonia due to Pseudomonas (V12 61) (Z87 01)   2  History of Hyponatremia (276 1) (E87 1)   3  History of Infectious discitis (722 90) (M46 40)   4  History of Osteomyelitis of spine (730 28) (M46 20)   5  History of Sepsis due to group B beta-hemolytic Streptococcus (038 0,041 02,995 91)   (A40 1)   6  History of Urinary retention (788 20) (R33 9)    Surgical History    1  History of Cath Stent Placement   2  History of Colon Surgery   3  History of Hemorrhoidectomy   4  History of Laminectomy Lumbar   5  History of Lung Surgery   6   History of Percutaneous Placement Of Gastrostomy Tube   7  History of Tracheostomy    Family History  Mother    1  FH: heart attack (V17 3) (Z82 49)    Social History    · Former smoker (W61 09) (S54 035)   · Lives independently   · Retired    Current Meds   1  Accu-Chek FastClix Lancets Miscellaneous; TEST BLOOD SUGAR TWICE A DAY; Therapy: 01Vpe8341 to (Last Rx:70Ppv6950)  Requested for: 15Flj2612 Ordered   2  Accu-Chek Josselin SmartView w/Device Kit; test up to twice daily or as directed by   physician; Therapy: 24Aug2016 to (Last Rx:51Wki2329) Ordered   3  Accu-Chek SmartView In Vitro Strip; TEST TWICE DAILY; Therapy: 24Aug2016 to (Evaluate:09Aug2017)  Requested for: 12Wnz0161; Last   Rx:92Keb8703 Ordered   4  Albuterol Sulfate (2 5 MG/3ML) 0 083% Inhalation Nebulization Solution; USE 1 UNIT   DOSE IN NEBULIZER 4 TIMES DAILY Recorded   5  Aspirin 81 MG CAPS; take 1 capsule daily; Therapy: (Morene President) to Recorded   6  Cephalexin 500 MG Oral Capsule; TAKE 1 CAPSULE Every 6 hours; Therapy: (Venitane President) to Recorded   7  Famotidine 20 MG Oral Tablet; TAKE 1 TABLET TWICE DAILY; Therapy: 04Aug2016 to Recorded   8  FentaNYL 12 MCG/HR Transdermal Patch 72 Hour; APPLY 1 PATCH EVERY 3 DAYS; Therapy: (Venitane President) to Recorded   9  FentaNYL 50 MCG/HR Transdermal Patch 72 Hour; APPLY 1 PATCH EVERY 3 DAYS; Therapy: (Venitane President) to Recorded   10  Folic Acid 1 MG Oral Tablet; TAKE 1 TABLET DAILY; Therapy: (Venitane President) to Recorded   11  Furosemide 20 MG Oral Tablet; Take 2 tablets in the morning; Last Rx:28Nwu9342;    Status: NEED INFORMATION - Problem Ordered   12  Gabapentin 100 MG Oral Capsule; one capsule every 8 hours; Therapy: 04Aug2016 to Recorded   13  Lactinex 1 GM GRAN; TAKE 1 GM Twice daily; Therapy: (Morene President) to Recorded   14  LORazepam 2 MG Oral Tablet; TAKE 1 TABLET 3 TIMES DAILY AS NEEDED; Therapy: 04Aug2016 to Recorded   15   MetFORMIN HCl - 500 MG Oral Tablet; TAKE 1 TABLET TWICE DAILY; Therapy: 75Kuu4766 to (Evaluate:83Msg1140)  Requested for: 74Gyz5048; Last    Rx:13Xpd4843 Ordered   16  Metoprolol Tartrate 25 MG Oral Tablet; TAKE 0 5 TABLET Twice daily; Therapy: (Nickvictoriano Menifee) to Recorded   17  Mirtazapine 15 MG Oral Tablet; TAKE 1 TABLET AT BEDTIME; Therapy: 28Kxh6181 to (Evaluate:69Uqc8567)  Requested for: 53Sgd5253; Last    Rx:41Qst7879 Ordered   18  Sodium Chloride 0 9 % Inhalation Nebulization Solution; INHALE 1 VIAL 4 times daily; Therapy: (Nickola Men[place]e) to Recorded   19  Tamsulosin HCl - 0 4 MG Oral Capsule; TAKE 1 CAPSULE Daily; Therapy: 35JZO4738 to (Evaluate:42Dbw9968)  Requested for: 02Yhy9141; Last    Rx:43Egl8740 Ordered    Allergies    1  No Known Drug Allergies   2  No Known Drug Allergies    Future Appointments    Date/Time Provider Specialty Site   09/14/2016 04:15 PM VALENTE Wallace   Family Medicine [facility]   09/20/2016 02:30 PM Shania López MD Urology [address]   Electronically signed by : Radha Garrett, ; Sep  6 2016 11:30AM EST                       (Author)    Electronically signed by : VALENTE Germain ; Dec 12 2016  6:23PM EST                       (Author)

## 2018-01-15 NOTE — PROCEDURES
Procedures by OPAL Zamora at 7/11/2016   2:45 PM      Author:  OPAL Jauregui Service:  (none) Author Type:  Speech and Language Pathologist     Filed:  7/11/2016  4:32 PM Date of Service:  7/11/2016  2:45 PM Status:  Signed     :  OPAL Jauregui (Speech and Language Pathologist)         Pre-procedure Diagnoses:       1  Other symptoms and signs concerning food and fluid intake [R63 8]       2  Oropharyngeal dysphagia [R13 12]                Procedures:       1  FL BARIUM Siomara Reason SPEECH [ABP014 (Custom)]                                                      Video Swallow Study      Patient Name: Nuha LEDESMACCH'JEREMY Date: 7/11/2016  par  par  Past Medical History  Past Medical History      Diagnosis   Date    Atrial fibrillation      CAD (coronary artery disease)      CHF (congestive heart failure)      Colon cancer      COPD (chronic obstructive pulmonary disease)      Critical illness polyneuropathy      Diabetes mellitus       type 2     DVT (deep venous thrombosis)      Hemorrhoids      Hyperlipidemia      OCD (obsessive compulsive disorder)      Osteomyelitis      Respiratory failure, chronic          Past Surgical History  Past Surgical History       Procedure   Laterality Date    Lumbar laminectomy       Tracheostomy       Hemorroidectomy       Colectomy       Appendectomy       Cholecystectomy       Back surgery       Bronchogram w/ bronchoscopy  N/A 6/30/2016     Procedure: BRONCHOSCOPY FLEXIBLE with bronchial lavage to the left lower lobe; Surgeon: Amira Weaver MD;  Location: [facility]; Service:            General Information:    [de-identified] yo gentleman referred to [facility]  for a VBS by Dr Ming Negro for dysphagia s/p trach decannulation   Pt had 2 previous VBS's, most recent 5/31/16 which showed mild oral/moderate-severe  pharyngeal dysphagia w/ high risk for aspiration due to decreased bolus control w/ HTL by cup, NTL by tsp/cup, thin liq by cup/tsp, high penetration w/ puree & HTL by tsp and deeper penetration w/ HTL by cup, silent aspiration of NTL & thin by cup and  tsp  NPO w/ cont PEG feedings recommended  Cognition:  Awake, alert, pleasant, cooperative, interactive  Speech/Swallow Mech: Oral motor movements appeared  [facility] Dentition was  Part natural lower, edentulous top-dentures not present w/ pt; Cough was adequate  Respiratory Status: WFL on RA;   Current diet: NPO w/ PEG  Prior VBS 5/13/16 and 5/31/16  Pt was seen in radiology for a Video Barium Swallow Study, seated in the upright position and viewed laterally with the following consistencies: puree, soft, solid, HTL, NTL, thin liquids, whole pill w/ NTL  Results are as follows:           Oral Stage:   pt w/ adequate bolus retrieval via spoon & cup  Prolonged mastication w/ soft & solids w/o dentures in place  Intermittent premature spill to vallecula w/ soft/solids  Pt took successive sips of HTL by cup, cued for single sips, but pt cont  to take mulitple sips  Pharyngeal Stage:   Swallow initation was timely w/ good airway closure; reduced tongue base retraction, epiglottic inversion, and pharyngeal constriction resulting in vallecular retention > pyriform sinus retention and residue along posterior pharyngeal wall  Retention and residue lessened over time w/ more trials  Whole pill was retained in vallecula and did not clear w/ several sips of NTL by cup  Pill eventually cleared w/ tsps of puree  Trace amts of penetration w/ eventual silent aspiration was noted  w/ successive sips of thin liquids  Unable to determine if cont w/ penetration w/ subsequent single sips of thin liquids  Esophageal Stage:    briefly assessed, all materials mostly cleared esophagus in a timely manner          Assessment Summary:   Mild-moderate oral/pharyngeal dysphagia characterized decreased mastication w/o dentures, mild pharyngeal retention, and trace amts of penetration w/ thin liquids w/ eventual silent aspiration  Whole pill was retained in vallecula, eventually  cleared w/ tsps of applesauce  Diagnosis/Prognosis:            Recommendations:    Begin dysphagia 3 w/ NTL w/ speech supervision  Pt to wear dentures  Single cup sips of NTL  Aspiration precautions  Pills can be whole in applesauce-cut or crush large pills OR via G tube                            Received for:Provider  EPIC   Jul 11 2016  4:32PM Deju[place] Standard Time

## 2018-01-15 NOTE — RESULT NOTES
Verified Results  (1) CBC/PLT/DIFF 22Mar2017 08:57AM Leona Daugherty [employer]   TW Order Number: [id number]     Test Name Result Flag Reference   WBC COUNT 18 92 Thousand/uL H 4 31-10 16   RBC COUNT 5 36 Million/uL  3 88-5 62   HEMOGLOBIN 16 6 g/dL  12 0-17 0   HEMATOCRIT 50 3 % H 36 5-49 3   MCV 94 fL  82-98   MCH 31 0 pg  26 8-34 3   MCHC 33 0 g/dL  31 4-37 4   RDW 14 9 %  11 6-15 1   MPV 12 4 fL  8 9-12 7   PLATELET COUNT 444 Thousands/uL  149-390   - Patient Instructions: This bloodwork is non-fasting  Please drink two glasses of water morning of bloodwork  - Patient Instructions: This bloodwork is non-fasting  Please drink two glasses of water morning of bloodwork  NEUTROPHILS - REL 53 %  43-75   BANDS - REL 6 %  0-8   LYMPHOCYTES - REL 36 %  14-44   MONOCYTES - REL 4 %  4-12   EOSINOPHILS - REL 0 %  0-6   BASOPHILS - REL 0 %  0-1   METAMYELOCYTES 1 %  0-1   NEUTROPHILS ABS 11 16 Thousand/uL H 1 85-7 62   LYMPHOTCYTES ABS 6 81 Thousand/uL H 0 60-4 47   MONOCYTES ABS 0 76 Thousand/uL  0 00-1 22   EOSINOPHILS ABS 0 00 Thousand/uL  0 00-0 40   BASOPHILS ABS 0 00 Thousand/uL  0 00-0 10   TOTAL COUNTED 100     RBC MORPHOLOGY Normal     PLT ESTIMATE Adequate  Adequate   Large Platelets Present     - Patient Instructions: This bloodwork is non-fasting  Please drink two glasses of water morning of bloodwork  - Patient Instructions: This bloodwork is non-fasting  Please drink two glasses of water morning of bloodwork       (1) COMPREHENSIVE METABOLIC PANEL [id number] 11:17XZ Leona Daugherty [employer]   TW Order Number: [id number]     Test Name Result Flag Reference   SODIUM 142 mmol/L  136-145   POTASSIUM 5 3 mmol/L  3 5-5 3   CHLORIDE 102 mmol/L  100-108   CARBON DIOXIDE 35 mmol/L H 21-32   ANION GAP (CALC) 5 mmol/L  4-13   BLOOD UREA NITROGEN 28 mg/dL H 5-25   CREATININE 0 81 mg/dL  0 60-1 30   Standardized to IDMS reference method   CALCIUM 9 7 mg/dL  8 3-10 1   BILI, TOTAL 0 52 mg/dL  0 20-1 00   ALK PHOSPHATAS 147 U/L H    ALT (SGPT) 40 U/L  12-78   AST(SGOT) 17 U/L  5-45   ALBUMIN 4 1 g/dL  3 5-5 0   TOTAL PROTEIN 7 9 g/dL  6 4-8 2   eGFR Non-African American      >60 0 ml/min/1 73sq m   [facility] Disease Education Program recommendations are as follows:  GFR calculation is accurate only with a steady state creatinine  Chronic Kidney disease less than 60 ml/min/1 73 sq  meters  Kidney failure less than 15 ml/min/1 73 sq  meters  GLUCOSE FASTING 225 mg/dL H 65-99     (1) NT- BNP (PRO BRAIN NATRIURETIC PEPTIDE) 39GER2313 08:57AM Leona Gilmore Order Number: [id number]     Test Name Result Flag Reference   NT-PRO BNP 1211 pg/mL H <450     (1) HEMOGLOBIN A1C 22Mar2017 08:57AM Leona James [facility] Order Number: [id number]     Test Name Result Flag Reference   HEMOGLOBIN A1C 8 0 % H 4 2-6 3   EST  AVG   GLUCOSE 183 mg/dl

## 2018-01-15 NOTE — RESULT NOTES
Verified Results  (1) CBC/PLT/DIFF [id number] 01:10PM Maryan Rogers Order Number: [id number]     Test Name Result Flag Reference   WBC COUNT 13 96 Thousand/uL H 4 31-10 16   RBC COUNT 4 60 Million/uL  3 88-5 62   HEMOGLOBIN 14 6 g/dL  12 0-17 0   HEMATOCRIT 44 3 %  36 5-49 3   MCV 96 fL  82-98   MCH 31 7 pg  26 8-34 3   MCHC 33 0 g/dL  31 4-37 4   RDW 14 5 %  11 6-15 1   MPV 12 9 fL H 8 9-12 7   PLATELET COUNT 868 Thousands/uL  149-390   nRBC AUTOMATED 1 /100 WBCs     NEUTROPHILS RELATIVE PERCENT 57 %  43-75   LYMPHOCYTES RELATIVE PERCENT 32 %  14-44   MONOCYTES RELATIVE PERCENT 10 %  4-12   EOSINOPHILS RELATIVE PERCENT 1 %  0-6   BASOPHILS RELATIVE PERCENT 0 %  0-1   NEUTROPHILS ABSOLUTE COUNT 7 73 Thousands/? ??L H 1 85-7 62   LYMPHOCYTES ABSOLUTE COUNT 4 51 Thousands/? ??L H 0 60-4 47   MONOCYTES ABSOLUTE COUNT 1 36 Thousand/? ??L H 0 17-1 22   EOSINOPHILS ABSOLUTE COUNT 0 08 Thousand/? ??L  0 00-0 61   BASOPHILS ABSOLUTE COUNT 0 04 Thousands/? ??L  0 00-0 10     (1) COMPREHENSIVE METABOLIC PANEL [id number] 08:24DT Maryan Rogers Order Number: [id number]     Test Name Result Flag Reference   SODIUM 140 mmol/L  136-145   POTASSIUM 4 2 mmol/L  3 5-5 3   CHLORIDE 101 mmol/L  100-108   CARBON DIOXIDE 29 mmol/L  21-32   ANION GAP (CALC) 10 mmol/L  4-13   BLOOD UREA NITROGEN 24 mg/dL  5-25   CREATININE 1 00 mg/dL  0 60-1 30   Standardized to IDMS reference method   CALCIUM 8 9 mg/dL  8 3-10 1   BILI, TOTAL 0 88 mg/dL  0 20-1 00   ALK PHOSPHATAS 103 U/L     ALT (SGPT) 35 U/L  12-78   AST(SGOT) 26 U/L  5-45   ALBUMIN 4 1 g/dL  3 5-5 0   TOTAL PROTEIN 7 4 g/dL  6 4-8 2   eGFR Non-African American      >60 0 ml/min/1 73sq m   [facility] Disease Education Program recommendations are as follows:  GFR calculation is accurate only with a steady state creatinine  Chronic Kidney disease less than 60 ml/min/1 73 sq  meters  Kidney failure less than 15 ml/min/1 73 sq  meters     GLUCOSE FASTING 227 mg/dL H 65-99

## 2018-01-16 NOTE — MISCELLANEOUS
Message  I had a discussion with the patient's son, Christine Leader  The patient has been depressed and we decided to bump up his mirtazapine to 30 mg daily        Plan  Insomnia    · From  Mirtazapine 15 MG Oral Tablet TAKE 1 TABLET AT BEDTIME To  Mirtazapine 30 MG Oral Tablet TAKE 1 TABLET AT BEDTIME    Signatures   Electronically signed by : VALENTE Krishnan ; Dec 12 2016  6:23PM EST                       (Author)

## 2018-01-18 NOTE — MISCELLANEOUS
History of Present Illness  TCM Communication [facility]: The patient is being contacted for follow-up after hospitalization  Hospital records were not available  He was hospitalized at [facility]  The date of admission: 12/15/2016, date of discharge: 12/19/2016  Diagnosis: ASP PNEUMONIA LLL,  He was discharged to home, with home health services  Medications were not reviewed today  He scheduled a follow up appointment  Referrals Needed:  DR Elda Harkins, [facility]  Counseling was provided to patient's caretaker  AVILA GRAHAM RN   Communication performed and completed by REESE LUNA  [facility] RN      Active Problems    1  Acute gastritis (535 00) (K29 00)   2  Atrial fibrillation, permanent (427 31) (I48 2)   3  Benign essential hypertension (401 1) (I10)   4  BPH with obstruction/lower urinary tract symptoms (600 01,599 69) (N40 1,N13 8)   5  CAD S/P percutaneous coronary angioplasty (414 01,V45 82) (I25 10,Z98 61)   6  Cancer, colon (153 9) (C18 9)   7  CHF, chronic (428 0) (I50 9)   8  Chronic systolic congestive heart failure (428 22,428 0) (I50 22)   9  Controlled type 2 diabetes with neuropathy (250 60,357 2) (E11 40)   10  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)   11  Dysphagia (787 20) (R13 10)   12  Esophageal reflux (530 81) (K21 9)   13  Floaters in visual field (379 24) (H43 399)   14  Flu vaccine need (V04 81) (Z23)   15  Insomnia (780 52) (G47 00)   16  Lumbar compression fracture (805 4) (S32 000A)   17  Microscopic hematuria (599 72) (R31 29)   18  Osteomyelitis (730 20) (M86 9)   19  Other muscle spasm (728 85) (M62 838)   20  Respiratory failure, chronic (518 83) (J96 10)   21  Type 2 diabetes mellitus without complication (847 45) (P82 9)   22  History of Urinary retention (788 20) (R33 9)    Past Medical History    1  History of pneumonia due to Pseudomonas (V12 61) (Z87 01)   2  History of Hyponatremia (276 1) (E87 1)   3  History of Infectious discitis (722 90) (M46 40)   4   History of Osteomyelitis of spine (730 28) (M46 20)   5  History of Sepsis due to group B beta-hemolytic Streptococcus (038 0,041 02,995 91)   (A40 1)   6  History of Urinary retention (788 20) (R33 9)    Surgical History    1  History of Cath Stent Placement   2  History of Colon Surgery   3  History of Hemorrhoidectomy   4  History of Laminectomy Lumbar   5  History of Lung Surgery   6  History of Percutaneous Placement Of Gastrostomy Tube   7  History of Tracheostomy    Family History  Mother    1  FH: heart attack (V17 3) (Z82 49)    Social History    · Former smoker (H40 76) (Y18 811)   · Lives independently   · Retired    Current Meds   1  Accu-Chek FastClix Lancets Miscellaneous; TEST BLOOD SUGAR TWICE A DAY; Therapy: 13Esz9357 to (Last Rx:58Kbf8844)  Requested for: 99Ptk8985 Ordered   2  Accu-Chek Josselin SmartView w/Device Kit; test up to twice daily or as directed by   physician; Therapy: 29Vtt2330 to (Last Rx:24Aug2016) Ordered   3  Accu-Chek SmartView In Vitro Strip; TEST TWICE DAILY; Therapy: 63Vcb1769 to (Evaluate:09Aug2017)  Requested for: 69Gbh0735; Last   Rx:24Aug2016 Ordered   4  Aspirin EC 81 MG Oral Tablet Delayed Release; TAKE 1 TABLET DAILY AS DIRECTED; Therapy: 43PYU1154 to (Evaluate:27Xtp4784); Last Rx:21Nov2016 Ordered   5  Cephalexin 250 MG Oral Capsule; TAKE 1 CAPSULE 4 TIMES DAILY; Therapy: (Recorded:12Oct2016) to Recorded   6  Eliquis 5 MG Oral Tablet; Take 1 tablet twice daily; Last Rx:09Nov2016 Ordered   7  Famotidine 20 MG Oral Tablet; TAKE 1 TABLET TWICE DAILY; Therapy: 40WHP2985 to (Evaluate:06Oct2017); Last Rx:11Oct2016 Ordered   8  FentaNYL 50 MCG/HR Transdermal Patch 72 Hour; APPLY 1 PATCH EVERY 3 DAYS; Therapy: 14RJG5378 to (Evaluate:13Nov2016); Last Rx:14Oct2016 Ordered   9  Folic Acid 1 MG Oral Tablet; TAKE 1 TABLET DAILY; Last Rx:11Oct2016 Ordered   10  Furosemide 20 MG Oral Tablet; TAKE 1 TABLET DAILY  Requested for: 98GVA2538; Last    Rx:18Nov2016 Ordered   11   Lactinex 1 GM GRAN; TAKE 1 GM Twice daily; Therapy: (Jaden Clarke) to Recorded   12  Levalbuterol HCl - 1 25 MG/3ML Inhalation Nebulization Solution; 1-VIAL VIA NEBULIZER    EVERY 6 HOURS AS NEEDED FOR SHORTNESS OF BREATH FOR 30 DAYS; Therapy: 63Uks1358 to ([phone])  Requested for: 23NRB5987; Last    Rx:11Oct2016 Ordered   13  LORazepam 2 MG Oral Tablet; TAKE 1 TABLET 3 TIMES DAILY AS NEEDED; Therapy: 09CEN8373 to (Evaluate:10Nov2016); Last Rx:11Oct2016 Ordered   14  Methocarbamol 500 MG Oral Tablet; Take 1 tablet twice daily as needed  Requested for:    83GBE3905; Last Rx:21Nov2016 Ordered   15  Metoprolol Tartrate 25 MG Oral Tablet; take 1/2 tablet daily; Last Rx:10Nov2016 Ordered   16  Mirtazapine 30 MG Oral Tablet; TAKE 1 TABLET AT BEDTIME; Therapy: 49Wux7890 to (Evaluate:59Vco7428)  Requested for: 62Xha7851; Last    Rx:32Rla4586 Ordered   17  Pantoprazole Sodium 40 MG Oral Tablet Delayed Release; TAKE 1 TABLET TWICE DAILY    30 MINUTES BEFORE BREAKFAST AND DINNER; Therapy: 40IDT0576 to (Evaluate:20Jan2017)  Requested for: 21Nov2016; Last    Rx:21Nov2016 Ordered   18  Sodium Chloride 0 9 % Inhalation Nebulization Solution; INHALE 1 VIAL 4 times daily     Requested for: 44HBW3487; Last Rx:09Nov2016 Ordered   19  Tamsulosin HCl - 0 4 MG Oral Capsule; TAKE 1 CAPSULE Daily; Therapy: 04IFW2797 to ([phone])  Requested for: 26BNW4041; Last    Rx:11Oct2016 Ordered   20  Vitamin B12 TR 1000 MCG TBCR; Therapy: (Recorded:12Oct2016) to Recorded    Allergies    1  No Known Drug Allergies   2   No Known Drug Allergies    Future Appointments    Date/Time Provider Specialty Site   02/15/2017 01:40 PM Malick Sánchez DO Cardiology  CARDIOLOGY  [facility]   09/25/2017 10:45 AM Con Cohn MD Urology [facility]     Signatures   Electronically signed by : VALENTE Gonzales ; Jan 4 2017 10:17PM EST

## 2018-01-22 VITALS
TEMPERATURE: 96.8 F | OXYGEN SATURATION: 94 % | HEART RATE: 74 BPM | SYSTOLIC BLOOD PRESSURE: 112 MMHG | WEIGHT: 177.13 LBS | HEIGHT: 70 IN | BODY MASS INDEX: 25.36 KG/M2 | DIASTOLIC BLOOD PRESSURE: 72 MMHG

## 2018-01-23 NOTE — RESULT NOTES
Verified Results  (1) BASIC METABOLIC PROFILE [id number] 10:28AM [facility] Order Number: [id number]     Test Name Result Flag Reference   GLUCOSE,RANDM 357 mg/dL H    If the patient is fasting, the ADA then defines impaired fasting glucose as > 100 mg/dL and diabetes as > or equal to 123 mg/dL  Specimen collection should occur prior to Sulfasalazine administration due to the potential for falsely depressed results  Specimen collection should occur prior to Sulfapyridine administration due to the potential for falsely elevated results  SODIUM 138 mmol/L  136-145   POTASSIUM 4 1 mmol/L  3 5-5 3   CHLORIDE 102 mmol/L  100-108   CARBON DIOXIDE 30 mmol/L  21-32   ANION GAP (CALC) 6 mmol/L  4-13   BLOOD UREA NITROGEN 15 mg/dL  5-25   CREATININE 0 92 mg/dL  0 60-1 30   Standardized to IDMS reference method   CALCIUM 9 0 mg/dL  8 3-10 1   eGFR 78 ml/min/1 73sq m     [facility] Disease Education Program recommendations are as follows:  GFR calculation is accurate only with a steady state creatinine  Chronic Kidney disease less than 60 ml/min/1 73 sq  meters  Kidney failure less than 15 ml/min/1 73 sq  meters       (1) NT- BNP [facility], THE NATRIURE[facility] PEPTIDE) [id number] 10:28AM [facility] Order Number: [id number]     Test Name Result Flag Reference   NT-PRO BNP 1359 pg/mL H <450

## 2018-01-23 NOTE — RESULT NOTES
Verified Results  (1) CBC/PLT/DIFF [id number] 08:57AM Leona Butler Order Number: [id number]     Test Name Result Flag Reference   WBC COUNT 9 64 Thousand/uL  4 31-10 16   RBC COUNT 5 13 Million/uL  3 88-5 62   HEMOGLOBIN 16 6 g/dL  12 0-17 0   HEMATOCRIT 49 5 % H 36 5-49 3   MCV 97 fL  82-98   MCH 32 4 pg  26 8-34 3   MCHC 33 5 g/dL  31 4-37 4   RDW 14 6 %  11 6-15 1   MPV 13 4 fL H 8 9-12 7   PLATELET COUNT 969 Thousands/uL  149-390   nRBC AUTOMATED 0 /100 WBCs     NEUTROPHILS RELATIVE PERCENT 41 % L 43-75   LYMPHOCYTES RELATIVE PERCENT 49 % H 14-44   MONOCYTES RELATIVE PERCENT 8 %  4-12   EOSINOPHILS RELATIVE PERCENT 2 %  0-6   BASOPHILS RELATIVE PERCENT 0 %  0-1   NEUTROPHILS ABSOLUTE COUNT 4 00 Thousands/? ??L  1 85-7 62   LYMPHOCYTES ABSOLUTE COUNT 4 49 Thousands/? ??L H 0 60-4 47   MONOCYTES ABSOLUTE COUNT 0 75 Thousand/? ??L  0 17-1 22   EOSINOPHILS ABSOLUTE COUNT 0 17 Thousand/? ??L  0 00-0 61   BASOPHILS ABSOLUTE COUNT 0 04 Thousands/? ??L  0 00-0 10     (1) COMPREHENSIVE METABOLIC PANEL [id number] 84:23WQ Leona Butler Order Number: [id number]     Test Name Result Flag Reference   SODIUM 137 mmol/L  136-145   POTASSIUM 4 0 mmol/L  3 5-5 3   CHLORIDE 100 mmol/L  100-108   CARBON DIOXIDE 32 mmol/L  21-32   ANION GAP (CALC) 5 mmol/L  4-13   BLOOD UREA NITROGEN 16 mg/dL  5-25   CREATININE 0 84 mg/dL  0 60-1 30   Standardized to IDMS reference method   CALCIUM 9 1 mg/dL  8 3-10 1   BILI, TOTAL 1 22 mg/dL H 0 20-1 00   ALK PHOSPHATAS 94 U/L     ALT (SGPT) 49 U/L  12-78   Specimen collection should occur prior to Sulfasalazine and/or Sulfapyridine administration due to the potential for falsely depressed results  AST(SGOT) 30 U/L  5-45   Specimen collection should occur prior to Sulfasalazine administration due to the potential for falsely depressed results     ALBUMIN 3 6 g/dL  3 5-5 0   TOTAL PROTEIN 7 3 g/dL  6 4-8 2   eGFR 82 ml/min/1 73sq umesh     [employer] Disease Education Program recommendations are as follows:  GFR calculation is accurate only with a steady state creatinine  Chronic Kidney disease less than 60 ml/min/1 73 sq  meters  Kidney failure less than 15 ml/min/1 73 sq  meters  GLUCOSE FASTING 198 mg/dL H 65-99   Specimen collection should occur prior to Sulfasalazine administration due to the potential for falsely depressed results  Specimen collection should occur prior to Sulfapyridine administration due to the potential for falsely elevated results  (1) TSH WITH FT4 REFLEX [id number] 08:57AM Leona Bakers Order Number: [id number]     Test Name Result Flag Reference   TSH 2 040 uIU/mL  0 358-3 740   Patients undergoing fluorescein dye angiography may retain small amounts of fluorescein in the body for 48-72 hours post procedure  Samples containing fluorescein can produce falsely depressed TSH values  If the patient had this procedure,a specimen should be resubmitted post fluorescein clearance       (1) URINALYSIS w URINE C/S REFLEX (will reflex a microscopy if leukocytes, occult blood, or nitrites are not within normal limits) [id number] 08:57AM Vinicio Dillard Order Number: [id number]     Test Name Result Flag Reference   COLOR Yellow     CLARITY Clear     PH UA 7 5  4 5-8 0   LEUKOCYTE ESTERASE UA Negative  Negative   NITRITE UA Negative  Negative   PROTEIN UA 30 (1+) mg/dl A Negative   GLUCOSE  (1/10%) mg/dl A Negative   KETONES UA Negative mg/dl  Negative   UROBILINOGEN UA 1 0 E U /dl  0 2, 1 0 E U /dl   BILIRUBIN UA Negative  Negative   BLOOD UA Negative  Negative   SPECIFIC GRAVITY UA 1 022  1 003-1 030   BACTERIA None Seen /hpf  None Seen, Occasional   EPITHELIAL CELLS None Seen /hpf  None Seen, Occasional   HYALINE CASTS None Seen /lpf  None Seen   RBC UA 4-10 /hpf A None Seen, 0-5   WBC UA None Seen /hpf  None Seen, 0-5, 5-55, 5-65     (1) SED RATE [id number] 08:57AM Zak Ogden    Order Number: [id number]     Test Name Result Flag Reference   SED RATE 2 mm/hour  0-10     (1) C-REACTIVE PROTEIN [id number] 08:57AM [facility] Philip Owens Order Number: [id number]     Test Name Result Flag Reference   C-REACT PROTEIN 4 9 mg/L H <3 0     (1) NT- BNP [facility], THE NATRIURETIC PEPTIDE) [id number] 08:57AM [facility] Philip Owens Order Number: [id number]     Test Name Result Flag Reference   NT-PRO BNP 1144 pg/mL H <450

## 2018-01-24 VITALS
OXYGEN SATURATION: 97 % | WEIGHT: 174 LBS | BODY MASS INDEX: 26.37 KG/M2 | HEIGHT: 68 IN | HEART RATE: 68 BPM | RESPIRATION RATE: 18 BRPM | SYSTOLIC BLOOD PRESSURE: 110 MMHG | DIASTOLIC BLOOD PRESSURE: 80 MMHG

## 2018-01-29 ENCOUNTER — TELEPHONE (OUTPATIENT)
Dept: FAMILY MEDICINE CLINIC | Facility: CLINIC | Age: 83
End: 2018-01-29

## 2018-01-29 NOTE — TELEPHONE ENCOUNTER
Viky from [facility] called today regarding pt requesting to have in home PT because he feels like he wants his legs to be stronger and he would like to be able to get around with a cane rather than with his walker  Would we be able to order this for him?

## 2018-02-06 DIAGNOSIS — R26.9 GAIT DISTURBANCE: Primary | ICD-10-CM

## 2018-02-22 ENCOUNTER — TELEPHONE (OUTPATIENT)
Dept: FAMILY MEDICINE CLINIC | Facility: CLINIC | Age: 83
End: 2018-02-22

## 2018-02-26 DIAGNOSIS — J96.90 RESPIRATORY FAILURE, UNSPECIFIED CHRONICITY, UNSPECIFIED WHETHER WITH HYPOXIA OR HYPERCAPNIA (HCC): Primary | ICD-10-CM

## 2018-03-07 NOTE — PROGRESS NOTES
Dear Nikita Lay,  My name is Royal Warren and I am a Registered Nurse and Care Coordinator for [facility]  We recently  spoke on the phone regarding your hospital stay and you opted out of continuing to receive follow-up phone calls from me  Because of the reason that you were in the hospital, Medicare has placed you in a program called [address] [place] [address]  I have enclosed a letter with more information about this program and have included my business card so that you have my contact information in case you would need to contact me in the future      Sincerely,     Royal Warren RN  Care Coordinator  [facility]  [phone]                    Electronically signed by:Shelley Gill RN  Sep  8 2016  2:53PM EST Author

## 2018-03-07 NOTE — PROGRESS NOTES
Dear Jf Ly,  My name is Robert Sharp and I am a Registered Nurse and Care Coordinator for [facility]     Because  of the reason that you were in the hospital, Medicare has placed you in a program called [address] [address]  I have enclosed a letter with more information about this program and have included my business card so that you have my contact  information in case you would need to contact me in the future      Sincerely,    Robert Sharp RN  Care Coordinator  [address] [place] [address]  [phone]            Electronically signed Bill Finn RN  Dec 21 2016 10:47AM EST Author

## 2018-04-17 RX ORDER — LANCETS
EACH MISCELLANEOUS 2 TIMES DAILY
COMMUNITY
Start: 2016-08-24 | End: 2019-06-24 | Stop reason: SDUPTHER

## 2018-04-18 ENCOUNTER — TRANSCRIBE ORDERS (OUTPATIENT)
Dept: LAB | Facility: CLINIC | Age: 83
End: 2018-04-18

## 2018-04-18 ENCOUNTER — OFFICE VISIT (OUTPATIENT)
Dept: FAMILY MEDICINE CLINIC | Facility: CLINIC | Age: 83
End: 2018-04-18
Payer: MEDICARE

## 2018-04-18 ENCOUNTER — APPOINTMENT (OUTPATIENT)
Dept: LAB | Facility: CLINIC | Age: 83
End: 2018-04-18
Payer: MEDICARE

## 2018-04-18 VITALS
HEART RATE: 72 BPM | HEIGHT: 71 IN | RESPIRATION RATE: 18 BRPM | DIASTOLIC BLOOD PRESSURE: 70 MMHG | BODY MASS INDEX: 23.1 KG/M2 | SYSTOLIC BLOOD PRESSURE: 100 MMHG | OXYGEN SATURATION: 91 % | WEIGHT: 165 LBS

## 2018-04-18 DIAGNOSIS — R53.83 FATIGUE, UNSPECIFIED TYPE: ICD-10-CM

## 2018-04-18 DIAGNOSIS — D72.829 LEUKOCYTOSIS, UNSPECIFIED TYPE: ICD-10-CM

## 2018-04-18 DIAGNOSIS — I10 BENIGN ESSENTIAL HYPERTENSION: ICD-10-CM

## 2018-04-18 DIAGNOSIS — Z98.61 CAD S/P PERCUTANEOUS CORONARY ANGIOPLASTY: ICD-10-CM

## 2018-04-18 DIAGNOSIS — IMO0002 UNCONTROLLED TYPE 2 DIABETES MELLITUS WITH DIABETIC POLYNEUROPATHY, WITHOUT LONG-TERM CURRENT USE OF INSULIN: Chronic | ICD-10-CM

## 2018-04-18 DIAGNOSIS — I50.42 CHRONIC COMBINED SYSTOLIC AND DIASTOLIC CONGESTIVE HEART FAILURE (HCC): ICD-10-CM

## 2018-04-18 DIAGNOSIS — M79.10 MYALGIA: ICD-10-CM

## 2018-04-18 DIAGNOSIS — M79.662 BILATERAL CALF PAIN: Primary | ICD-10-CM

## 2018-04-18 DIAGNOSIS — M79.661 BILATERAL CALF PAIN: Primary | ICD-10-CM

## 2018-04-18 DIAGNOSIS — I25.10 CAD S/P PERCUTANEOUS CORONARY ANGIOPLASTY: ICD-10-CM

## 2018-04-18 PROBLEM — J30.9 ALLERGIC RHINITIS: Status: ACTIVE | Noted: 2017-06-13

## 2018-04-18 LAB
25(OH)D3 SERPL-MCNC: 20.4 NG/ML (ref 30–100)
ALBUMIN SERPL BCP-MCNC: 3.6 G/DL (ref 3.5–5)
ALP SERPL-CCNC: 104 U/L (ref 46–116)
ALT SERPL W P-5'-P-CCNC: 45 U/L (ref 12–78)
ANION GAP SERPL CALCULATED.3IONS-SCNC: 10 MMOL/L (ref 4–13)
AST SERPL W P-5'-P-CCNC: 28 U/L (ref 5–45)
BASOPHILS # BLD MANUAL: 0 THOUSAND/UL (ref 0–0.1)
BASOPHILS NFR MAR MANUAL: 0 % (ref 0–1)
BILIRUB SERPL-MCNC: 0.93 MG/DL (ref 0.2–1)
BUN SERPL-MCNC: 27 MG/DL (ref 5–25)
CALCIUM SERPL-MCNC: 9.1 MG/DL (ref 8.3–10.1)
CHLORIDE SERPL-SCNC: 98 MMOL/L (ref 100–108)
CK SERPL-CCNC: 56 U/L (ref 39–308)
CO2 SERPL-SCNC: 27 MMOL/L (ref 21–32)
CREAT SERPL-MCNC: 1.1 MG/DL (ref 0.6–1.3)
CRP SERPL QL: <3 MG/L
EOSINOPHIL # BLD MANUAL: 0 THOUSAND/UL (ref 0–0.4)
EOSINOPHIL NFR BLD MANUAL: 0 % (ref 0–6)
ERYTHROCYTE [DISTWIDTH] IN BLOOD BY AUTOMATED COUNT: 13.5 % (ref 11.6–15.1)
ERYTHROCYTE [SEDIMENTATION RATE] IN BLOOD: 2 MM/HOUR (ref 0–10)
GFR SERPL CREATININE-BSD FRML MDRD: 62 ML/MIN/1.73SQ M
GLUCOSE SERPL-MCNC: 388 MG/DL (ref 65–140)
HCT VFR BLD AUTO: 50.7 % (ref 36.5–49.3)
HGB BLD-MCNC: 16.9 G/DL (ref 12–17)
LYMPHOCYTES # BLD AUTO: 45 % (ref 14–44)
LYMPHOCYTES # BLD AUTO: 5.09 THOUSAND/UL (ref 0.6–4.47)
MCH RBC QN AUTO: 32.8 PG (ref 26.8–34.3)
MCHC RBC AUTO-ENTMCNC: 33.3 G/DL (ref 31.4–37.4)
MCV RBC AUTO: 98 FL (ref 82–98)
METAMYELOCYTES NFR BLD MANUAL: 1 % (ref 0–1)
MONOCYTES # BLD AUTO: 0.79 THOUSAND/UL (ref 0–1.22)
MONOCYTES NFR BLD: 7 % (ref 4–12)
MYELOCYTES NFR BLD MANUAL: 2 % (ref 0–1)
NEUTROPHILS # BLD MANUAL: 5.09 THOUSAND/UL (ref 1.85–7.62)
NEUTS BAND NFR BLD MANUAL: 1 % (ref 0–8)
NEUTS SEG NFR BLD AUTO: 44 % (ref 43–75)
NRBC BLD AUTO-RTO: 0 /100 WBCS
NT-PROBNP SERPL-MCNC: 626 PG/ML
PLATELET # BLD AUTO: 144 THOUSANDS/UL (ref 149–390)
PLATELET BLD QL SMEAR: ADEQUATE
PMV BLD AUTO: 13.4 FL (ref 8.9–12.7)
POTASSIUM SERPL-SCNC: 4.3 MMOL/L (ref 3.5–5.3)
PROT SERPL-MCNC: 7.2 G/DL (ref 6.4–8.2)
RBC # BLD AUTO: 5.16 MILLION/UL (ref 3.88–5.62)
SODIUM SERPL-SCNC: 135 MMOL/L (ref 136–145)
TOTAL CELLS COUNTED SPEC: 100
TSH SERPL DL<=0.05 MIU/L-ACNC: 1.22 UIU/ML (ref 0.36–3.74)
WBC # BLD AUTO: 11.3 THOUSAND/UL (ref 4.31–10.16)

## 2018-04-18 PROCEDURE — 99214 OFFICE O/P EST MOD 30 MIN: CPT | Performed by: FAMILY MEDICINE

## 2018-04-18 PROCEDURE — 36415 COLL VENOUS BLD VENIPUNCTURE: CPT | Performed by: FAMILY MEDICINE

## 2018-04-18 PROCEDURE — 86140 C-REACTIVE PROTEIN: CPT

## 2018-04-18 PROCEDURE — 82550 ASSAY OF CK (CPK): CPT | Performed by: FAMILY MEDICINE

## 2018-04-18 PROCEDURE — 80053 COMPREHEN METABOLIC PANEL: CPT

## 2018-04-18 PROCEDURE — 84443 ASSAY THYROID STIM HORMONE: CPT

## 2018-04-18 PROCEDURE — 85007 BL SMEAR W/DIFF WBC COUNT: CPT

## 2018-04-18 PROCEDURE — 83880 ASSAY OF NATRIURETIC PEPTIDE: CPT

## 2018-04-18 PROCEDURE — 83036 HEMOGLOBIN GLYCOSYLATED A1C: CPT

## 2018-04-18 PROCEDURE — 82306 VITAMIN D 25 HYDROXY: CPT

## 2018-04-18 PROCEDURE — 85652 RBC SED RATE AUTOMATED: CPT

## 2018-04-18 PROCEDURE — 85027 COMPLETE CBC AUTOMATED: CPT

## 2018-04-18 RX ORDER — GABAPENTIN 400 MG/1
400 CAPSULE ORAL 2 TIMES DAILY
Refills: 3 | COMMUNITY
Start: 2018-04-02 | End: 2018-04-18 | Stop reason: ALTCHOICE

## 2018-04-18 NOTE — ASSESSMENT & PLAN NOTE
Check CPK and refer for some physical therapy  He is already on some narcotics as well as gabapentin  He has Robaxin at his disposal but it makes him quite fatigued  Hopefully, physical therapy will be helpful  He inquired about a TENs unit and that may be something we can initiate while at physical therapy

## 2018-04-18 NOTE — ASSESSMENT & PLAN NOTE
He appears to be clinically stable  He did have a very high BNP with last labs and I would like to repeat that today to see if he has a chronically elevated BNP at a baseline

## 2018-04-18 NOTE — PROGRESS NOTES
Assessment/Plan:    Bilateral calf pain  Check CPK and refer for some physical therapy  He is already on some narcotics as well as gabapentin  He has Robaxin at his disposal but it makes him quite fatigued  Hopefully, physical therapy will be helpful  He inquired about a TENs unit and that may be something we can initiate while at physical therapy  Chronic osteomyelitis of spine ([facility] [address] )  Check a CBC with inflammatory markers  Chronic combined systolic and diastolic congestive heart failure ([facility] [address] )  He appears to be clinically stable  He did have a very high BNP with last labs and I would like to repeat that today to see if he has a chronically elevated BNP at a baseline  Diagnoses and all orders for this visit:    Bilateral calf pain  -     CK    Leukocytosis, unspecified type  -     Comprehensive metabolic panel; Future  -     CBC and differential; Future    Chronic combined systolic and diastolic congestive heart failure (HCC)  -     Comprehensive metabolic panel; Future  -     NT-BNP PRO; Future    CAD S/P percutaneous coronary angioplasty    Benign essential hypertension    Fatigue, unspecified type  -     TSH, 3rd generation with T4 reflex; Future    Myalgia  -     TSH, 3rd generation with T4 reflex; Future  -     C-reactive protein; Future  -     Sedimentation rate, automated; Future  -     Vitamin D 25 hydroxy; Future  -     CK    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, without long-term current use of insulin ([facility])  -     HEMOGLOBIN A1C W/ EAG ESTIMATION; Future    Other orders  -     ACCU-CHEK FASTCLIX LANCETS MISC; by Does not apply route 2 (two) times a day  -     ONE TOUCH ULTRA TEST test strip; TEST TWICE DAILY E 11 9  -     Discontinue: gabapentin (NEURONTIN) 400 mg capsule; Take 400 mg by mouth 2 (two) times a day          Subjective:      Patient ID: Eros Rodriguez is a 81 y o  male  HPI  Patient presents today for follow-up for his chronic health issues  His main complaint today is bilateral calf pain  He notes this has been present ever since his significant illness back in March of 2016 when he had diskitis  He has blamed this intermittently on medications as well as a lack of activity  He is having some severe calf cramping with spasms intermittently  The pain is sometimes worse with ambulating but often occurs at rest   He denies any recent injuries  He does find pounding his legs on the bed seems to help a little bit when he is having the cramping  He has history of type 2 diabetes  He denies polyuria or polydipsia  He has a history of congestive heart failure  He denies any acute increase in shortness of breath  His weight has been stable  He denies chest pain, palpitations or lightheadedness currently  He remains on anticoagulation for history of DVT as well as atrial fibrillation  He denies any bleeding or bruising issues  The following portions of the patient's history were reviewed and updated as appropriate: allergies, current medications, past family history, past medical history, past social history, past surgical history and problem list     Review of Systems   Constitutional: Negative for appetite change, chills, fatigue, fever and unexpected weight change  HENT: Negative for trouble swallowing  Eyes: Negative for visual disturbance  Respiratory: Negative for cough, chest tightness, shortness of breath and wheezing  Cardiovascular: Negative for chest pain  Gastrointestinal: Negative for abdominal distention, abdominal pain, blood in stool, constipation and diarrhea  Endocrine: Negative for polyuria  Genitourinary: Negative for difficulty urinating and flank pain  Musculoskeletal: Negative for arthralgias and myalgias  Skin: Negative for rash  Neurological: Negative for dizziness and light-headedness  Hematological: Negative for adenopathy  Does not bruise/bleed easily     Psychiatric/Behavioral: Negative for sleep disturbance  Objective:      /70   Pulse 72   Resp 18   Ht 5' 11" (1 803 m)   Wt 74 8 kg (165 lb)   SpO2 91%   BMI 23 01 kg/m²          Physical Exam   Constitutional: He is oriented to person, place, and time  He appears well-developed and well-nourished  No distress  HENT:   Head: Normocephalic  Eyes: Pupils are equal, round, and reactive to light  Neck: No tracheal deviation present  No thyromegaly present  Cardiovascular: Normal rate, regular rhythm and normal heart sounds  No murmur heard  Pulmonary/Chest: Effort normal  No respiratory distress  He has no wheezes  He has no rales  Abdominal: Soft  He exhibits no distension  There is no tenderness  Musculoskeletal: Normal range of motion  He exhibits no edema  Neurological: He is alert and oriented to person, place, and time  No cranial nerve deficit  Skin: Skin is warm  He is not diaphoretic  Psychiatric: He has a normal mood and affect   Judgment and thought content normal

## 2018-04-19 LAB
EST. AVERAGE GLUCOSE BLD GHB EST-MCNC: 269 MG/DL
HBA1C MFR BLD: 11 % (ref 4.2–6.3)

## 2018-04-23 ENCOUNTER — HOSPITAL ENCOUNTER (EMERGENCY)
Facility: HOSPITAL | Age: 83
Discharge: HOME | End: 2018-04-23
Attending: EMERGENCY MEDICINE | Admitting: EMERGENCY MEDICINE
Payer: MEDICARE

## 2018-04-23 ENCOUNTER — APPOINTMENT (EMERGENCY)
Dept: CT IMAGING | Facility: HOSPITAL | Age: 83
End: 2018-04-23
Payer: MEDICARE

## 2018-04-23 VITALS
BODY MASS INDEX: 24.69 KG/M2 | TEMPERATURE: 97.7 F | DIASTOLIC BLOOD PRESSURE: 93 MMHG | HEART RATE: 86 BPM | WEIGHT: 177 LBS | RESPIRATION RATE: 17 BRPM | OXYGEN SATURATION: 97 % | SYSTOLIC BLOOD PRESSURE: 172 MMHG

## 2018-04-23 DIAGNOSIS — S22.49XA RIB FRACTURES: Primary | ICD-10-CM

## 2018-04-23 LAB
ALBUMIN SERPL BCP-MCNC: 3.7 G/DL (ref 3.5–5)
ALP SERPL-CCNC: 105 U/L (ref 46–116)
ALT SERPL W P-5'-P-CCNC: 68 U/L (ref 12–78)
ANION GAP BLD CALC-SCNC: 19 MMOL/L (ref 4–13)
ANION GAP SERPL CALCULATED.3IONS-SCNC: 13 MMOL/L (ref 4–13)
APTT PPP: 27 SECONDS (ref 23–35)
AST SERPL W P-5'-P-CCNC: 50 U/L (ref 5–45)
ATRIAL RATE: 416 BPM
BASOPHILS # BLD MANUAL: 0 THOUSAND/UL (ref 0–0.1)
BASOPHILS NFR MAR MANUAL: 0 % (ref 0–1)
BILIRUB SERPL-MCNC: 0.81 MG/DL (ref 0.2–1)
BUN BLD-MCNC: 27 MG/DL (ref 5–25)
BUN SERPL-MCNC: 25 MG/DL (ref 5–25)
CA-I BLD-SCNC: 1.12 MMOL/L (ref 1.12–1.32)
CALCIUM SERPL-MCNC: 8.9 MG/DL (ref 8.3–10.1)
CHLORIDE BLD-SCNC: 98 MMOL/L (ref 100–108)
CHLORIDE SERPL-SCNC: 99 MMOL/L (ref 100–108)
CO2 SERPL-SCNC: 27 MMOL/L (ref 21–32)
CREAT BLD-MCNC: 0.7 MG/DL (ref 0.6–1.3)
CREAT SERPL-MCNC: 1.03 MG/DL (ref 0.6–1.3)
EOSINOPHIL # BLD MANUAL: 0 THOUSAND/UL (ref 0–0.4)
EOSINOPHIL NFR BLD MANUAL: 0 % (ref 0–6)
ERYTHROCYTE [DISTWIDTH] IN BLOOD BY AUTOMATED COUNT: 13.8 % (ref 11.6–15.1)
GFR SERPL CREATININE-BSD FRML MDRD: 67 ML/MIN/1.73SQ M
GFR SERPL CREATININE-BSD FRML MDRD: 88 ML/MIN/1.73SQ M
GLUCOSE SERPL-MCNC: 410 MG/DL (ref 65–140)
GLUCOSE SERPL-MCNC: 411 MG/DL (ref 65–140)
HCT VFR BLD AUTO: 47.6 % (ref 36.5–49.3)
HCT VFR BLD CALC: 51 % (ref 36.5–49.3)
HGB BLD-MCNC: 16.6 G/DL (ref 12–17)
HGB BLDA-MCNC: 17.3 G/DL (ref 12–17)
INR PPP: 1.17 (ref 0.86–1.16)
LYMPHOCYTES # BLD AUTO: 3.82 THOUSAND/UL (ref 0.6–4.47)
LYMPHOCYTES # BLD AUTO: 37 % (ref 14–44)
MCH RBC QN AUTO: 33.5 PG (ref 26.8–34.3)
MCHC RBC AUTO-ENTMCNC: 34.9 G/DL (ref 31.4–37.4)
MCV RBC AUTO: 96 FL (ref 82–98)
MONOCYTES # BLD AUTO: 1.03 THOUSAND/UL (ref 0–1.22)
MONOCYTES NFR BLD: 10 % (ref 4–12)
NEUTROPHILS # BLD MANUAL: 5.47 THOUSAND/UL (ref 1.85–7.62)
NEUTS SEG NFR BLD AUTO: 53 % (ref 43–75)
NRBC BLD AUTO-RTO: 0 /100 WBCS
PCO2 BLD: 27 MMOL/L (ref 21–32)
PLATELET # BLD AUTO: 139 THOUSANDS/UL (ref 149–390)
PLATELET BLD QL SMEAR: ABNORMAL
PMV BLD AUTO: 13.3 FL (ref 8.9–12.7)
POTASSIUM BLD-SCNC: 3.8 MMOL/L (ref 3.5–5.3)
POTASSIUM SERPL-SCNC: 3.7 MMOL/L (ref 3.5–5.3)
PROT SERPL-MCNC: 7.2 G/DL (ref 6.4–8.2)
PROTHROMBIN TIME: 15 SECONDS (ref 12.1–14.4)
QRS AXIS: -61 DEGREES
QRSD INTERVAL: 84 MS
QT INTERVAL: 356 MS
QTC INTERVAL: 445 MS
RBC # BLD AUTO: 4.96 MILLION/UL (ref 3.88–5.62)
RBC MORPH BLD: NORMAL
SODIUM BLD-SCNC: 139 MMOL/L (ref 136–145)
SODIUM SERPL-SCNC: 139 MMOL/L (ref 136–145)
SPECIMEN SOURCE: ABNORMAL
T WAVE AXIS: 76 DEGREES
TOTAL CELLS COUNTED SPEC: 100
VENTRICULAR RATE: 94 BPM
WBC # BLD AUTO: 10.33 THOUSAND/UL (ref 4.31–10.16)

## 2018-04-23 PROCEDURE — 85007 BL SMEAR W/DIFF WBC COUNT: CPT | Performed by: EMERGENCY MEDICINE

## 2018-04-23 PROCEDURE — 72125 CT NECK SPINE W/O DYE: CPT

## 2018-04-23 PROCEDURE — 80047 BASIC METABLC PNL IONIZED CA: CPT

## 2018-04-23 PROCEDURE — 74177 CT ABD & PELVIS W/CONTRAST: CPT

## 2018-04-23 PROCEDURE — 85610 PROTHROMBIN TIME: CPT | Performed by: EMERGENCY MEDICINE

## 2018-04-23 PROCEDURE — 85014 HEMATOCRIT: CPT

## 2018-04-23 PROCEDURE — 36415 COLL VENOUS BLD VENIPUNCTURE: CPT | Performed by: EMERGENCY MEDICINE

## 2018-04-23 PROCEDURE — 70450 CT HEAD/BRAIN W/O DYE: CPT

## 2018-04-23 PROCEDURE — 93010 ELECTROCARDIOGRAM REPORT: CPT | Performed by: INTERNAL MEDICINE

## 2018-04-23 PROCEDURE — 71260 CT THORAX DX C+: CPT

## 2018-04-23 PROCEDURE — 85027 COMPLETE CBC AUTOMATED: CPT | Performed by: EMERGENCY MEDICINE

## 2018-04-23 PROCEDURE — 80053 COMPREHEN METABOLIC PANEL: CPT | Performed by: EMERGENCY MEDICINE

## 2018-04-23 PROCEDURE — 99284 EMERGENCY DEPT VISIT MOD MDM: CPT

## 2018-04-23 PROCEDURE — 85730 THROMBOPLASTIN TIME PARTIAL: CPT | Performed by: EMERGENCY MEDICINE

## 2018-04-23 PROCEDURE — 93005 ELECTROCARDIOGRAM TRACING: CPT

## 2018-04-23 RX ORDER — ACETAMINOPHEN 325 MG/1
975 TABLET ORAL ONCE
Status: COMPLETED | OUTPATIENT
Start: 2018-04-23 | End: 2018-04-23

## 2018-04-23 RX ORDER — LIDOCAINE 50 MG/G
1 PATCH TOPICAL EVERY 24 HOURS
Qty: 10 PATCH | Refills: 0 | Status: SHIPPED | OUTPATIENT
Start: 2018-04-23 | End: 2019-05-09 | Stop reason: SDUPTHER

## 2018-04-23 RX ORDER — LIDOCAINE 50 MG/G
2 PATCH TOPICAL ONCE
Status: DISCONTINUED | OUTPATIENT
Start: 2018-04-23 | End: 2018-04-23 | Stop reason: HOSPADM

## 2018-04-23 RX ORDER — TRAMADOL HYDROCHLORIDE 50 MG/1
50 TABLET ORAL ONCE
Status: COMPLETED | OUTPATIENT
Start: 2018-04-23 | End: 2018-04-23

## 2018-04-23 RX ORDER — LIDOCAINE HYDROCHLORIDE AND EPINEPHRINE 10; 10 MG/ML; UG/ML
5 INJECTION, SOLUTION INFILTRATION; PERINEURAL ONCE
Status: COMPLETED | OUTPATIENT
Start: 2018-04-23 | End: 2018-04-23

## 2018-04-23 RX ORDER — FUROSEMIDE 40 MG/1
40 TABLET ORAL EVERY OTHER DAY
COMMUNITY
End: 2019-02-04

## 2018-04-23 RX ORDER — ACETAMINOPHEN 325 MG/1
650 TABLET ORAL EVERY 6 HOURS PRN
Qty: 30 TABLET | Refills: 0 | Status: SHIPPED | OUTPATIENT
Start: 2018-04-23 | End: 2018-04-28

## 2018-04-23 RX ADMIN — LIDOCAINE HYDROCHLORIDE,EPINEPHRINE BITARTRATE 5 ML: 10; .01 INJECTION, SOLUTION INFILTRATION; PERINEURAL at 15:27

## 2018-04-23 RX ADMIN — IOHEXOL 100 ML: 350 INJECTION, SOLUTION INTRAVENOUS at 15:59

## 2018-04-23 RX ADMIN — ACETAMINOPHEN 975 MG: 325 TABLET, FILM COATED ORAL at 16:50

## 2018-04-23 RX ADMIN — LIDOCAINE 2 PATCH: 50 PATCH TOPICAL at 16:47

## 2018-04-23 RX ADMIN — TRAMADOL HYDROCHLORIDE 50 MG: 50 TABLET, FILM COATED ORAL at 16:49

## 2018-04-23 NOTE — ED NOTES
Placed Ice on patient's head at this time; visiting nurse and son of patient would like something stronger for pain; provider notified, Dr Tyrone Mtz at bedside at this time      Lindsey Farah RN  04/23/18

## 2018-04-23 NOTE — ED NOTES
Patient transported to [facility], [facility]  04/23/18 1897

## 2018-04-23 NOTE — ED NOTES
Rolled patient at this time with provider; maintained C-spine alignment at this time;      Waldemar Coffey RN  04/23/18 2055

## 2018-04-23 NOTE — ED NOTES
Dr Roe Lunch at bedside suturing patient's laceration at this time;      Christina Shipman RN  04/23/18 1027

## 2018-04-23 NOTE — ED PROVIDER NOTES
History  Chief Complaint   Patient presents with    Fall     patient transported via EMS from [employer]; patient was out walking and miss stepped and fell from standing position; patient complaining of right sided rib pain; lacerations noted to right side of face; + thinners; Pt is a 81-year-old male who was walking outside today when he fell forward, mechanical fall, hit his head, denies loss of consciousness  He did have a laceration above the right eyebrow which I repaired at bedside  Patient was taken to the CT scanner, given his right chest wall tenderness I will get a CT scan of the chest to rule out rib fracture  No focal neurological defects  Trauma exam performed: GCS 15, full ROM of bilateral upper and lower extremities  Airway intact, bilateral breath sounds, palpable pulses  No active bleeding  No bony point tenderness in extremities, abdomen or c/t,l spine to warrant imaging unless otherwise noted in the exam/hpi  No crepitus, abdomen soft/non tender  Chest wall tender to right side  Pelvis stable  Medical decision-m[age]-year-old male, fall, head injury, on thinners, will perform imaging for trauma related injury  Prior to Admission Medications   Prescriptions Last Dose Informant Patient Reported? Taking? ACCU-CHEK FASTCLIX LANCETS MISC   Yes Yes   Sig: by Does not apply route 2 (two) times a day   Multiple Vitamin (MULTIVITAMIN) tablet   Yes Yes   Sig: Take 1 tablet by mouth daily   ONE TOUCH ULTRA TEST test strip   Yes Yes   Sig: TEST TWICE DAILY E 11 9   Triamcinolone Acetonide (NASACORT ALLERGY 24HR) 55 MCG/ACT AERO   Yes No   Si puff into each nostril as needed     apixaban (ELIQUIS) 5 mg   Yes Yes   Sig: Take 5 mg by mouth 2 (two) times a day     aspirin (ECOTRIN LOW STRENGTH) 81 mg EC tablet   Yes Yes   Sig: Take 1 tablet by mouth daily   famotidine (PEPCID) 20 mg tablet   Yes Yes   Sig: Take 1 tablet by mouth 2 (two) times a day   folic acid (FOLVITE) 1 mg tablet   Yes Yes   Sig: Take by mouth  furosemide (LASIX) 20 mg tablet   Yes Yes   Sig: Take 20 mg by mouth     furosemide (LASIX) 40 mg tablet   Yes Yes   Sig: Take 40 mg by mouth 2 (two) times a day   gabapentin (NEURONTIN) 100 mg capsule   Yes Yes   Sig: Take 400 mg by mouth 3 (three) times a day     levalbuterol (XOPENEX) 1 25 mg/3 mL nebulizer solution   Yes Yes   Sig: Inhale   loperamide (IMODIUM A-D) 2 MG tablet   Yes Yes   Sig: Take 1 tablet by mouth 4 (four) times a day as needed   metoprolol tartrate (LOPRESSOR) 25 mg tablet   Yes Yes   Sig: Take 12 5 mg by mouth daily     nitroglycerin (NITROLINGUAL) 0 4 mg/spray spray   Yes Yes   Si spray every 5 (five) minutes as needed     pantoprazole (PROTONIX) 40 mg tablet   Yes Yes   Sig: Take 40 mg by mouth daily     sodium chloride 0 9 % nebulizer solution   Yes Yes   Sig: Inhale every 4 (four) hours as needed     traMADol (ULTRAM) 50 mg tablet   Yes No   Sig: Take 1 tablet by mouth every 8 (eight) hours as needed        Facility-Administered Medications: None       Past Medical History:   Diagnosis Date    Arthritis     Atrial fibrillation (HCC)     BPH (benign prostatic hyperplasia)     CAD (coronary artery disease)     Chronic back pain     Chronic diastolic (congestive) heart failure (HCC)     COPD (chronic obstructive pulmonary disease) (HCC)     Critical illness polyneuropathy (HCC)     Diabetes mellitus (HCC)     type 2    Dysphagia     Hemorrhoids     History of colon cancer     History of DVT (deep vein thrombosis)     Hyperlipidemia     Hypertension     Hyponatremia     Malignant ne[facility] of colon [facility] [address] [facility]     Myocardial infarct (HCC)     Osteomyelitis (HCC)     Pneumonia     Sepsis ([facility] )     Urinary retention        Past Surgical History:   Procedure Laterality Date    APPENDECTOMY      BACK SURGERY      BRONCHOSCOPY N/A 2016    Procedure: BRONCHOSCOPY FLEXIBLE with bronchial lavage to the left lower lobe;   Surgeon: Maribell Sanchez MD;  Location: [facility]; Service:     CHOLECYSTECTOMY      COLECTOMY      COLON SURGERY      polypectomy for cancerous lesion    CORONARY ANGIOPLASTY WITH STENT PLACEMENT      x5  pt unsure of where    GASTROSTOMY TUBE PLACEMENT      percutaneous placement of gastrostomy tube placed 4/16 - dc 8/16    HEMORROIDECTOMY      LUMBAR LAMINECTOMY      LUNG SURGERY      PEG TUBE REMOVAL      TRACHEOSTOMY         Family History   Problem Relation Age of Onset    Heart attack Mother      I have reviewed and agree with the history as documented  Social History   Substance Use Topics    Smoking status: Former Smoker     Packs/day: 1 00     Years: 20 00     Types: Cigarettes    Smokeless tobacco: Never Used      Comment: quit 40 years ago all scripts says non smoker    Alcohol use No        Review of Systems   Skin: Positive for wound  All other systems reviewed and are negative  Physical Exam  ED Triage Vitals   Temperature Pulse Respirations Blood Pressure SpO2   04/23/18 1510 04/23/18 1506 04/23/18 1506 04/23/18 1506 04/23/18 1506   97 7 °F (36 5 °C) 90 18 142/93 93 %      Temp Source Heart Rate Source Patient Position - Orthostatic VS BP Location FiO2 (%)   04/23/18 1510 04/23/18 1506 04/23/18 1506 04/23/18 1506 --   Oral Monitor Lying Right arm       Pain Score       --                  Orthostatic Vital Signs  Vitals:    04/23/18 1506 04/23/18 1615 04/23/18 1730 04/23/18 1745   BP: 142/93 158/84  (!) 172/93   Pulse: 90 90 86    Patient Position - Orthostatic VS: Lying Lying  Lying       Physical Exam   Constitutional: He is oriented to person, place, and time  He appears well-developed and well-nourished  HENT:   Head: Normocephalic  Swelling and laceration to right eyebrow   Eyes: EOM are normal  Pupils are equal, round, and reactive to light  Neck: Normal range of motion  Neck supple  Cardiovascular: Normal rate, regular rhythm and normal heart sounds      No murmur heard   Pulmonary/Chest: Effort normal and breath sounds normal  No respiratory distress  He has no wheezes  He exhibits tenderness  Abdominal: Soft  Bowel sounds are normal  He exhibits no distension  There is no tenderness  Musculoskeletal: Normal range of motion  He exhibits no edema or tenderness  Neurological: He is alert and oriented to person, place, and time  No cranial nerve deficit  Coordination normal    Skin: Skin is warm and dry  He is not diaphoretic  No erythema  Psychiatric: He has a normal mood and affect  His behavior is normal    Nursing note and vitals reviewed  ED Medications  Medications   lidocaine-epinephrine (XYLOCAINE/EPINEPHRINE) 1 %-1:100,000 injection 5 mL (5 mL Infiltration Given by Other 4/23/18 1527)   iohexol (OMNIPAQUE) 350 MG/ML injection (MULTI-DOSE) 100 mL (100 mL Intravenous Given 4/23/18 1559)   acetaminophen (TYLENOL) tablet 975 mg (975 mg Oral Given 4/23/18 1650)   traMADol (ULTRAM) tablet 50 mg (50 mg Oral Given 4/23/18 1649)       Diagnostic Studies  Results Reviewed     Procedure Component Value Units Date/Time    Comprehensive metabolic panel [[id number]]  (Abnormal) Collected:  04/23/18 1528    Lab Status:  Final result Specimen:  Blood from Arm, Left Updated:  04/23/18 1605     Sodium 139 mmol/L      Potassium 3 7 mmol/L      Chloride 99 (L) mmol/L      CO2 27 mmol/L      Anion Gap 13 mmol/L      BUN 25 mg/dL      Creatinine 1 03 mg/dL      Glucose 410 (H) mg/dL      Calcium 8 9 mg/dL      AST 50 (H) U/L      ALT 68 U/L      Alkaline Phosphatase 105 U/L      Total Protein 7 2 g/dL      Albumin 3 7 g/dL      Total Bilirubin 0 81 mg/dL      eGFR 67 ml/min/1 73sq m     Narrative:         National Kidney Disease Education Program recommendations are as follows:  GFR calculation is accurate only with a steady state creatinine  Chronic Kidney disease less than 60 ml/min/1 73 sq  meters  Kidney failure less than 15 ml/min/1 73 sq  meters      CBC and differential [[id number]]  (Abnormal) Collected:  04/23/18 1528    Lab Status:  Final result Specimen:  Blood from Arm, Left Updated:  04/23/18 1603     WBC 10 33 (H) Thousand/uL      RBC 4 96 Million/uL      Hemoglobin 16 6 g/dL      Hematocrit 47 6 %      MCV 96 fL      MCH 33 5 pg      MCHC 34 9 g/dL      RDW 13 8 %      MPV 13 3 (H) fL      Platelets 963 (L) Thousands/uL      nRBC 0 /100 WBCs     APTT [[id number]]  (Normal) Collected:  04/23/18 1528    Lab Status:  Final result Specimen:  Blood from Arm, Left Updated:  04/23/18 1552     PTT 27 seconds     Protime-INR [[id number]]  (Abnormal) Collected:  04/23/18 1528    Lab Status:  Final result Specimen:  Blood from Arm, Left Updated:  04/23/18 1552     Protime 15 0 (H) seconds      INR 1 17 (H)    POCT Chem 8+ [[id number]]  (Abnormal) Collected:  04/23/18 1526    Lab Status:  Final result Updated:  04/23/18 1531     SODIUM, I-STAT 139 mmol/l      Potassium, i-STAT 3 8 mmol/L      Chloride, istat 98 (L) mmol/L      CO2, i-STAT 27 mmol/L      Anion Gap, Istat 19 (H) mmol/L      Calcium, Ionized i-STAT 1 12 mmol/L      BUN, I-STAT 27 (H) mg/dl      Creatinine, i-STAT 0 7 mg/dl      eGFR 88 ml/min/1 73sq m      Glucose, i-STAT 411 (H) mg/dl      Hct, i-STAT 51 (H) %      Hgb, i-STAT 17 3 (H) g/dl      Specimen Type VENOUS                 CT chest abdomen pelvis w contrast   Final Result by Lupe Davey MD (04/23 1707)      Fracture of the right 5th and 6th rib at its lateral aspect      No pleural effusion or pneumothorax seen      No solid visceral injury seen      There is thickening of the right lateral wall of the urinary bladder with contour deformity, this may be due to a focal lesion or due to presence of foot small diverticulum  Consider urology evaluation    Alternatively this can be characterized with    ultrasound of the urinary bladder performed with distended bladder, performed on nonemergent basis          I personally discussed this study with Bob Marino on 4/23/2018 at 5:06 PM                       Workstation performed: [id number]         CT spine cervical without contrast   Final Result by Yue Palma MD (04/23 1638)      No acute compression collapse of the vertebra seen   Anterior cervical fusion at C5-6                   Workstation performed: [id number]         CT head without contrast   Final Result by Yue Palma MD (04/23 1624)      No acute intracranial hemorrhage seen   No mass effect or midline shift seen   Soft tissue swelling seen in the right periorbital region                  Workstation performed: [id number]                    Procedures  Lac Repair  Date/Time: 4/23/2018 4:03 PM  Performed by: Shaheen Nuñez  Authorized by: Shaheen Nuñez   Consent: Verbal consent obtained    Patient identity confirmed: verbally with patient  Body area: head/neck  Location details: right eyebrow  Laceration length: 2 cm  Foreign bodies: no foreign bodies  Tendon involvement: none  Nerve involvement: none  Vascular damage: no  Anesthesia: local infiltration    Anesthesia:  Local Anesthetic: lidocaine 1% with epinephrine    Sedation:  Patient sedated: no    Wound Dehiscence:  Superficial Wound Dehiscence: simple closure  Secondary closure or dehiscence: complex    Procedure Details:  Irrigation solution: saline  Amount of cleaning: standard  Debridement: none  Degree of undermining: none  Skin closure: 6-0 nylon  Technique: simple  Approximation: close  Approximation difficulty: simple  Dressing: 4x4 sterile gauze             Phone Contacts  ED Phone Contact    ED Course  ED Course as of Apr 29 1652 Mon Apr 23, 2018   1642   No acute compression collapse of the vertebra seen  Anterior cervical fusion at C5-6    1704 Speaking with Dr Aki Bolden, patient with     Darya LUNA                                   [facility]  CritCare Time    Disposition  Final diagnoses:   Rib fractures     Time reflects when diagnosis was documented in both MDM as applicable and the Disposition within this note     Time User Action Codes Description Comment    4/23/2018  5:24  [facility],  [address] Rib fractures       ED Disposition     ED Disposition Condition Comment    Discharge  Harmony Latif discharge to home/self care      Condition at discharge: Good        Follow-up Information     Follow up With Specialties Details Why [address] Office  In 2 days  [address] [facility] [address] [facility] [address] [phone]        Discharge Medication List as of 4/23/2018  5:28 PM      START taking these medications    Details   acetaminophen (TYLENOL) 325 mg tablet Take 2 tablets (650 mg total) by mouth every 6 (six) hours as needed for mild pain for up to 5 days, Starting Mon 4/23/2018, Until Sat 4/28/2018, Print      lidocaine (LIDODERM) 5 % Place 1 patch on the skin every 24 hours Remove & Discard patch within 12 hours or as directed by MD, Starting Mon 4/23/2018, Print         CONTINUE these medications which have NOT CHANGED    Details   ACCU-CHEK FASTCLIX LANCETS MISC by Does not apply route 2 (two) times a day, Starting Wed 8/24/2016, Historical Med      apixaban (ELIQUIS) 5 mg Take 5 mg by mouth 2 (two) times a day , Until Discontinued, Historical Med      aspirin (ECOTRIN LOW STRENGTH) 81 mg EC tablet Take 1 tablet by mouth daily, Starting Mon 11/21/2016, Historical Med      famotidine (PEPCID) 20 mg tablet Take 1 tablet by mouth 2 (two) times a day, Starting Thu 8/4/2016, Historical Med      folic acid (FOLVITE) 1 mg tablet Take by mouth , Until Discontinued, Historical Med      !! furosemide (LASIX) 20 mg tablet Take 20 mg by mouth  , Historical Med      !! furosemide (LASIX) 40 mg tablet Take 40 mg by mouth 2 (two) times a day, Historical Med      gabapentin (NEURONTIN) 100 mg capsule Take 400 mg by mouth 3 (three) times a day  , Starting Thu 8/4/2016, Historical Med levalbuterol (XOPENEX) 1 25 mg/3 mL nebulizer solution Inhale, Starting Mon 9/12/2016, Historical Med      loperamide (IMODIUM A-D) 2 MG tablet Take 1 tablet by mouth 4 (four) times a day as needed, Starting Mon 9/11/2017, Historical Med      metoprolol tartrate (LOPRESSOR) 25 mg tablet Take 12 5 mg by mouth daily  , Until Discontinued, Historical Med      Multiple Vitamin (MULTIVITAMIN) tablet Take 1 tablet by mouth daily, Until Discontinued, Historical Med      nitroglycerin (NITROLINGUAL) 0 4 mg/spray spray 1 spray every 5 (five) minutes as needed  , Starting Fri 1/20/2017, Historical Med      ONE TOUCH ULTRA TEST test strip TEST TWICE DAILY E 11 9, Historical Med      pantoprazole (PROTONIX) 40 mg tablet Take 40 mg by mouth daily  , Historical Med      sodium chloride 0 9 % nebulizer solution Inhale every 4 (four) hours as needed  , Historical Med      traMADol (ULTRAM) 50 mg tablet Take 1 tablet by mouth every 8 (eight) hours as needed  , Starting Tue 5/2/2017, Historical Med      Triamcinolone Acetonide (NASACORT ALLERGY 24HR) 55 MCG/ACT AERO 1 puff into each nostril as needed  , Starting Tue 5/2/2017, Historical Med       !! - Potential duplicate medications found  Please discuss with provider  No discharge procedures on file      ED Provider  Electronically Signed by           Apolinar Travis MD  04/29/18 [employer] Hussein Bryan MD  04/29/18 7543

## 2018-04-23 NOTE — ED NOTES
Pt cleansed with soap and water prior to d/c  Pt's son and nurse from facility at pt's bedside        Savanah Castillo RN  04/23/18 [id number]

## 2018-04-23 NOTE — DISCHARGE INSTRUCTIONS
Please use your incentive spirometer frequently and followup with the trauma team      If you have fevers, cough, chest pain or shortness of breath please return ASAP  Rib Fracture   WHAT YOU NEED TO KNOW:   A rib fracture is a crack or break in a rib bone  Rib fractures usually heal within 6 weeks  You should be able to return to normal activities before that time  Do not wrap anything around your body to try to splint your ribs  This can prevent you from taking deep breaths and increases your risk for pneumonia  DISCHARGE INSTRUCTIONS:   Call 911 for any of the following:   · You have trouble breathing  · You have new or increased pain  Return to the emergency department if:   · Your pain does not get better, even after treatment  · You have a fever or a cough  Contact your healthcare provider if:   · You have questions or concerns about your condition or care  Medicines:   · NSAIDs  help decrease swelling and pain  NSAIDs are available without a doctor's order  Ask your healthcare provider which medicine is right for you  Ask how much to take and when to take it  Take as directed  NSAIDs can cause stomach bleeding and kidney problems if not taken correctly  · Prescription pain medicine  may be given  Ask your healthcare provider how to take this medicine safely  Some prescription pain medicines contain acetaminophen  Do not take other medicines that contain acetaminophen without talking to your healthcare provider  Too much acetaminophen may cause liver damage  Prescription pain medicine may cause constipation  Ask your healthcare provider how to prevent or treat constipation  · Take your medicine as directed  Contact your healthcare provider if you think your medicine is not helping or if you have side effects  Tell him or her if you are allergic to any medicine  Keep a list of the medicines, vitamins, and herbs you take  Include the amounts, and when and why you take them   Bring the list or the pill bottles to follow-up visits  Carry your medicine list with you in case of an emergency  Follow up with your healthcare provider as directed:  Write down your questions so you remember to ask them during your visits  Deep breathing:  Deep breathing will decrease your risk for pneumonia  Hug a pillow on the injured side while doing this exercise, to decrease pain  Take a deep breath and hold it for as long as possible  You should let the air out and then cough strongly  Deep breaths help open your airway  You may be given an incentive spirometer to help take deep breaths  Put the plastic piece in your mouth  Take a slow, deep breath  You should then let the air out and cough  Repeat these steps 10 times every hour  Rest:  Rest and limit activity to decrease swelling and pain, and allow your injury to heal  Avoid activities that may cause more pain or damage to your ribs such as, pulling, pushing, and lifting  As your pain decreases, begin movements slowly  Take short walks between rest periods  Ice:  Apply ice on the fractured area for 15 to 20 minutes every hour or as directed  Use an ice pack or put crushed ice in a plastic bag  Cover it with a towel  Ice helps prevent tissue damage and decreases swelling and pain  © 2017 [facility] Information is for End User's use only and may not be sold, redistributed or otherwise used for commercial purposes  All illustrations and images included in CareNotes® are the copyrighted property of A D A M , Inc  or Lexa Bowens  The above information is an  only  It is not intended as medical advice for individual conditions or treatments  Talk to your doctor, nurse or pharmacist before following any medical regimen to see if it is safe and effective for you

## 2018-04-24 ENCOUNTER — TELEPHONE (OUTPATIENT)
Dept: SURGERY | Facility: CLINIC | Age: 83
End: 2018-04-24

## 2018-04-24 NOTE — TELEPHONE ENCOUNTER
Received a call from April RN, [facility]; Independent living) Patient fell yesterday  Has R brow laceration and a hematoma R eye that is beginning to swell  It is responding to the ice when applied  April states that she would be able to removed the sutures from his brow  Her concern is the hematoma at the R eye  The son is a doctor and pushing for him to be seen asap  Please advise    Patient is Afib CT scan of head & body did not show any bleeds  He also has 2 Rib Fractures

## 2018-04-24 NOTE — TELEPHONE ENCOUNTER
I reviewed patient's chart and CT images of his head  I reviewed CT head with Dr Marcelle Kaplan, the radiologist whom read the scan yesterday also and verified no identification of any facial fractures (likely chronic sinus opacification)  Given he is on blood thinners it is possible the sherrill-orbital hematoma could worsen depending on his coumadin levels  If he is having any vision changes he should see an ophthalmologist  If he has difficulty moving his eyeball in any direction or has eye pain, or if the hematoma is rapidly expanding or concerning he should be re-evaluated by the ER or his PCP today  Unfortunately we do not have any open appointments today

## 2018-04-24 NOTE — TELEPHONE ENCOUNTER
Called April at [facility] and informed her of Brandie's reply  April understood the instructions given  I Made f/u appointment for 5/8/18  April informed office that she would be removing the sutures from his brow

## 2018-05-01 DIAGNOSIS — IMO0002 UNCONTROLLED DIABETES MELLITUS WITH POLYNEUROPATHY: Primary | ICD-10-CM

## 2018-05-01 DIAGNOSIS — D72.829 LEUKOCYTOSIS, UNSPECIFIED TYPE: ICD-10-CM

## 2018-06-05 RX ORDER — LEVOFLOXACIN 750 MG/1
750 TABLET ORAL DAILY
Refills: 0 | COMMUNITY
Start: 2018-04-28 | End: 2018-06-12

## 2018-06-07 DIAGNOSIS — G62.81 CRITICAL ILLNESS NEUROPATHY (HCC): Primary | ICD-10-CM

## 2018-06-07 DIAGNOSIS — M54.50 CHRONIC RIGHT-SIDED LOW BACK PAIN WITHOUT SCIATICA: ICD-10-CM

## 2018-06-07 DIAGNOSIS — G89.29 CHRONIC RIGHT-SIDED LOW BACK PAIN WITHOUT SCIATICA: ICD-10-CM

## 2018-06-07 RX ORDER — TRAMADOL HYDROCHLORIDE 50 MG/1
TABLET ORAL
Qty: 60 TABLET | Refills: 3 | Status: SHIPPED | OUTPATIENT
Start: 2018-06-07 | End: 2018-12-25 | Stop reason: SDUPTHER

## 2018-06-07 RX ORDER — FOLIC ACID 1 MG/1
TABLET ORAL
Qty: 90 TABLET | Refills: 3 | Status: SHIPPED | OUTPATIENT
Start: 2018-06-07 | End: 2019-02-11 | Stop reason: ALTCHOICE

## 2018-06-12 ENCOUNTER — OFFICE VISIT (OUTPATIENT)
Dept: CARDIOLOGY CLINIC | Facility: CLINIC | Age: 83
End: 2018-06-12
Payer: MEDICARE

## 2018-06-12 VITALS
WEIGHT: 177 LBS | DIASTOLIC BLOOD PRESSURE: 76 MMHG | BODY MASS INDEX: 25.34 KG/M2 | SYSTOLIC BLOOD PRESSURE: 134 MMHG | HEART RATE: 82 BPM | HEIGHT: 70 IN

## 2018-06-12 DIAGNOSIS — I48.91 ATRIAL FIBRILLATION, UNSPECIFIED TYPE (HCC): ICD-10-CM

## 2018-06-12 DIAGNOSIS — I10 BENIGN ESSENTIAL HTN: Primary | ICD-10-CM

## 2018-06-12 DIAGNOSIS — I25.5 ISCHEMIC CARDIOMYOPATHY: ICD-10-CM

## 2018-06-12 PROCEDURE — 99214 OFFICE O/P EST MOD 30 MIN: CPT | Performed by: INTERNAL MEDICINE

## 2018-06-12 NOTE — PROGRESS NOTES
Cardiology Follow Up        Gagandeep Boone      1935      [id number]      Discussion/Summary:    1  CAD status post remote myocardial infarction, prior PCI/coronary stenting with unknown details:  Occasional stabbing pain under left breast which occurs somewhat randomly at rest and with walking  Unsure if this represents angina, but have asked patient and his LPN to monitor if any symptoms with activity/exertion  Prior echocardiogram 08/2016 with normal ejection fraction/apical wall motion abnormality suggestive of prior MI  Would consider ischemic evaluation/stress testing if any exertional symptoms  2   Benign essential hypertension:  Acceptable, continue metoprolol    3  Chronic diastolic heart failure:  Euvolemic on examination, continue furosemide 20 mg alternating with 40 mg daily  Prior NT proBNP 04/2018 within normal limits, around 600  Prior echocardiogram 2016 with normal ejection fraction  4   Permanent atrial fibrillation:  Adequately rate controlled on exam, continue metoprolol  Patient's LPN and patient both wish to transition from Eliquis to warfarin for financial reasons  Have given them both information about warfarin  She will call when he has several days left of Eliquis later this month at which time we will transition to warfarin  Follow-up in 3 months              Interval History: This is an 41-year-old male with a history of CAD status post multiple PCI in the remote past, permanent atrial fibrillation, prior cardiomyopathy with ejection fraction 20% with subsequent echocardiogram 08/2016 revealing ejection fraction 55%  He also has a history of chronic diastolic heart failure  He presents today for follow-up with his personal LPN (April) with no complaints  He denies exertional dyspnea, lower extremity edema, dizziness    Occasionally he will have a stabbing discomfort under his left breast which he states comes on somewhat randomly at rest and with walking, self-limiting x1 minute  Vitals:  Vitals:    06/12/18 1559   BP: 134/76   BP Location: Right arm   Patient Position: Sitting   Cuff Size: Standard   Pulse: 82   Weight: 80 3 kg (177 lb)   Height: 5' 10" (1 778 m)         Past Medical History:   Diagnosis Date    Arthritis     Atrial fibrillation (HCC)     BPH (benign prostatic hyperplasia)     CAD (coronary artery disease)     Chronic back pain     Chronic diastolic (congestive) heart failure (HCC)     COPD (chronic obstructive pulmonary disease) (HCC)     Critical illness polyneuropathy (HCC)     Diabetes mellitus (HCC)     type 2    Dysphagia     Hemorrhoids     History of colon cancer     History of DVT (deep vein thrombosis)     Hyperlipidemia     Hypertension     Hyponatremia     [facility]     Myocardial infarct ([facility] )     Osteomyelitis ([facility] )     Pneumonia     Sepsis ([place] [address] )     Urinary retention      Social History     Social History    Marital status:      Spouse name: N/A    Number of children: N/A    Years of education: N/A     Occupational History     - retired      Social History Main Topics    Smoking status: Former Smoker     Packs/day: 1 00     Years: 20 00     Types: Cigarettes    Smokeless tobacco: Never Used      Comment: quit 40 years ago all scripts says non smoker    Alcohol use No    Drug use: No    Sexual activity: Not Currently     Partners: Male     Birth control/ protection: None     Other Topics Concern    Not on file     Social History Narrative    Lives independently until recent episode of sepsis 3/16 - now in assisted living      Family History   Problem Relation Age of Onset    Heart attack Mother      Past Surgical History:   Procedure Laterality Date    APPENDECTOMY      BACK SURGERY      BRONCHOSCOPY N/A 6/30/2016    Procedure: BRONCHOSCOPY FLEXIBLE with bronchial lavage to the left lower lobe;   Surgeon: Santy Barron MD;  Location: [facility];   Service:     CHOLECYSTECTOMY      COLECTOMY      COLON SURGERY      polypectomy for cancerous lesion    CORONARY ANGIOPLASTY WITH STENT PLACEMENT      x5  pt unsure of where    GASTROSTOMY TUBE PLACEMENT      percutaneous placement of gastrostomy tube placed 4/16 - dc 8/16    HEMORROIDECTOMY      LUMBAR LAMINECTOMY      LUNG SURGERY      PEG TUBE REMOVAL      TRACHEOSTOMY         Current Outpatient Prescriptions:     apixaban (ELIQUIS) 5 mg, Take 5 mg by mouth 2 (two) times a day , Disp: , Rfl:     aspirin (ECOTRIN LOW STRENGTH) 81 mg EC tablet, Take 1 tablet by mouth daily, Disp: , Rfl:     famotidine (PEPCID) 20 mg tablet, Take 1 tablet by mouth 2 (two) times a day, Disp: , Rfl:     folic acid (FOLVITE) 1 mg tablet, TAKE 1 TABLET BY MOUTH  DAILY, Disp: 90 tablet, Rfl: 3    furosemide (LASIX) 20 mg tablet, Take 20 mg by mouth every other day  , Disp: , Rfl:     furosemide (LASIX) 40 mg tablet, Take 40 mg by mouth every other day  , Disp: , Rfl:     gabapentin (NEURONTIN) 400 mg capsule, Take 400 mg by mouth 3 (three) times a day  , Disp: , Rfl:     levalbuterol (XOPENEX) 1 25 mg/3 mL nebulizer solution, Inhale, Disp: , Rfl:     lidocaine (LIDODERM) 5 %, Place 1 patch on the skin every 24 hours Remove & Discard patch within 12 hours or as directed by MD, Disp: 10 patch, Rfl: 0    loperamide (IMODIUM A-D) 2 MG tablet, Take 1 tablet by mouth 4 (four) times a day as needed, Disp: , Rfl:     metoprolol tartrate (LOPRESSOR) 25 mg tablet, Take 12 5 mg by mouth daily  , Disp: , Rfl:     Multiple Vitamin (MULTIVITAMIN) tablet, Take 1 tablet by mouth daily, Disp: , Rfl:     nitroglycerin (NITROLINGUAL) 0 4 mg/spray spray, 1 spray every 5 (five) minutes as needed  , Disp: , Rfl:     ONE TOUCH ULTRA TEST test strip, TEST TWICE DAILY E 11 9, Disp: , Rfl: 0    pantoprazole (PROTONIX) 40 mg tablet, Take 40 mg by mouth daily  , Disp: , Rfl:     sodium chloride 0 9 % nebulizer solution, Inhale every 4 (four) hours as needed  , Disp: , Rfl:     traMADol (ULTRAM) 50 mg tablet, TAKE 1 TABLET BY MOUTH TWO  TIMES DAILY (Patient taking differently: TAKE 1 TABLET BY MOUTH TWO  TIMES DAILY  AS NEEDED), Disp: 60 tablet, Rfl: 3    Triamcinolone Acetonide (NASACORT ALLERGY 24HR) 55 MCG/ACT AERO, 1 puff into each nostril as needed  , Disp: , Rfl:     ACCU-CHEK FASTCLIX LANCETS MISC, by Does not apply route 2 (two) times a day, Disp: , Rfl:         Review of Systems:  Review of Systems   Respiratory: Negative  Cardiovascular: Positive for chest pain  All other systems reviewed and are negative  Physical Exam:  Physical Exam   Constitutional: He is oriented to person, place, and time  He appears well-developed and well-nourished  No distress  HENT:   Head: Normocephalic and atraumatic  Eyes: EOM are normal  Pupils are equal, round, and reactive to light  Right eye exhibits no discharge  No scleral icterus  Neck: Normal range of motion  Neck supple  No thyromegaly present  Cardiovascular: Normal rate and normal heart sounds  Exam reveals no gallop and no friction rub  No murmur heard  Irregularly irregular rhythm, normal rate   Pulmonary/Chest: Effort normal and breath sounds normal    Abdominal: He exhibits no distension  There is no tenderness  There is no rebound and no guarding  Musculoskeletal: Normal range of motion  He exhibits no edema  Neurological: He is alert and oriented to person, place, and time  Coordination normal    Skin: Skin is warm and dry  No rash noted  He is not diaphoretic  No erythema  No pallor  Psychiatric: He has a normal mood and affect   His behavior is normal  Judgment and thought content normal

## 2018-06-20 ENCOUNTER — TELEPHONE (OUTPATIENT)
Dept: UROLOGY | Facility: AMBULATORY SURGERY CENTER | Age: 83
End: 2018-06-20

## 2018-06-20 ENCOUNTER — TELEPHONE (OUTPATIENT)
Dept: FAMILY MEDICINE CLINIC | Facility: CLINIC | Age: 83
End: 2018-06-20

## 2018-06-20 NOTE — TELEPHONE ENCOUNTER
April called stating she went to see pt and pt has a fungal rash of the groin and is requesting an rx for diflucan

## 2018-06-20 NOTE — TELEPHONE ENCOUNTER
Transfer of Care:  Patient's son is a physician and is requesting Dr Leo Walsh  Formerly a patient of Dr Javier Mason      Complaint:  Urinary Frequency day and night    Insurance:  Medicare and [employer] supplement    History of Cancer:  Colon cancer ~20 years ago

## 2018-06-20 NOTE — TELEPHONE ENCOUNTER
They can have Diflucan 150 mg x1, but he should also use nystatin powder 2-3 times daily  Until resolved    1  Large bottle with 3 refills

## 2018-06-22 DIAGNOSIS — B49 FUNGAL INFECTION: Primary | ICD-10-CM

## 2018-06-22 RX ORDER — FLUCONAZOLE 150 MG/1
150 TABLET ORAL ONCE
Qty: 1 TABLET | Refills: 0 | Status: SHIPPED | OUTPATIENT
Start: 2018-06-22 | End: 2018-06-22

## 2018-06-22 RX ORDER — NYSTATIN 100000 [USP'U]/G
POWDER TOPICAL
Qty: 15 G | Refills: 0 | Status: SHIPPED | OUTPATIENT
Start: 2018-06-22 | End: 2019-09-06 | Stop reason: ALTCHOICE

## 2018-06-27 ENCOUNTER — OFFICE VISIT (OUTPATIENT)
Dept: UROLOGY | Facility: CLINIC | Age: 83
End: 2018-06-27
Payer: MEDICARE

## 2018-06-27 VITALS
HEART RATE: 78 BPM | HEIGHT: 70 IN | DIASTOLIC BLOOD PRESSURE: 78 MMHG | WEIGHT: 174 LBS | SYSTOLIC BLOOD PRESSURE: 130 MMHG | BODY MASS INDEX: 24.91 KG/M2

## 2018-06-27 DIAGNOSIS — N40.1 BPH WITH OBSTRUCTION/LOWER URINARY TRACT SYMPTOMS: Primary | ICD-10-CM

## 2018-06-27 DIAGNOSIS — N13.8 BPH WITH OBSTRUCTION/LOWER URINARY TRACT SYMPTOMS: Primary | ICD-10-CM

## 2018-06-27 DIAGNOSIS — N32.81 OAB (OVERACTIVE BLADDER): ICD-10-CM

## 2018-06-27 LAB
SL AMB  POCT GLUCOSE, UA: ABNORMAL
SL AMB LEUKOCYTE ESTERASE,UA: ABNORMAL
SL AMB POCT BILIRUBIN,UA: ABNORMAL
SL AMB POCT BLOOD,UA: ABNORMAL
SL AMB POCT CLARITY,UA: CLEAR
SL AMB POCT COLOR,UA: YELLOW
SL AMB POCT KETONES,UA: ABNORMAL
SL AMB POCT NITRITE,UA: ABNORMAL
SL AMB POCT PH,UA: 5
SL AMB POCT SPECIFIC GRAVITY,UA: 1.02
SL AMB POCT URINE PROTEIN: ABNORMAL
SL AMB POCT UROBILINOGEN: ABNORMAL

## 2018-06-27 PROCEDURE — 99214 OFFICE O/P EST MOD 30 MIN: CPT | Performed by: UROLOGY

## 2018-06-27 PROCEDURE — 52000 CYSTOURETHROSCOPY: CPT | Performed by: UROLOGY

## 2018-06-27 PROCEDURE — 81002 URINALYSIS NONAUTO W/O SCOPE: CPT | Performed by: UROLOGY

## 2018-06-27 PROCEDURE — 76872 US TRANSRECTAL: CPT | Performed by: UROLOGY

## 2018-06-27 PROCEDURE — 87086 URINE CULTURE/COLONY COUNT: CPT | Performed by: UROLOGY

## 2018-06-27 NOTE — LETTER
June 27, 2018     Juana Kauffman MD  [id number]  [address]    Patient: Nancy Snyder   YOB: 1935   Date of Visit: 6/27/2018       Dear Dr Mia Knox: Thank you for referring Nancy Snyder to me for evaluation  Below are my notes for this consultation  If you have questions, please do not hesitate to call me  I look forward to following your patient along with you  Sincerely,        Julien Vance MD        CC: MD Kori Haskins MD  6/27/2018  4:18 PM  Sign at close encounter  Referring Physician: Juana Kauffman MD  A copy of this note was sent to the referring physician  Diagnoses and all orders for this visit:    BPH with obstruction/lower urinary tract symptoms  -     Urine culture; Future  -     Urine culture  -     POCT urine dip  -     Case request operating room: [address] [place] [address]; Standing  -     Case request operating room: CYSTOSCOPY WITH INSERTION UROLIFT    OAB (overactive bladder)  -     Urine culture; Future  -     Urine culture  -     POCT urine dip    Other orders  -     Diet NPO; Sips with meds; Standing  -     Place sequential compression device; Standing  -     ceFAZolin (ANCEF) 1,000 mg in dextrose 5 % 100 mL IVPB; Infuse 1,000 mg into a venous catheter on call to OR             Assessment and plan:     BPH with medically refractory lower urinary tract symptoms    Patient has bothersome frequency and urgency despite tamsulosin as well as mirabegron  The symptoms are impacting his quality of life and activities of daily living  We reviewed the options for treating BPH/LUTS which include but are not limited to expectant management, medical therapy, transurethral resection of prostate (TURP)  We also discussed minimally invasive options  At this point, the patient wishes to proceed with Urolift    This is a great option for the patient based on the following criteria:    - cystoscopy revealed moderate lateral lobe hyperplasia, a tiny nonobstructing median lobe coming from the right side of the gland  - gland volume 32 g calculated by transrectal ultrasound  - uroflow with Q max of 6  - PVR with 22  - normal renal function  - no active urinary tract infection  - PSA:  NotApplicable as the patient is outside of screening age   - no documented allergy to nickel    - He has failed the following treatment options:  Tamsulosin, mirabegron  The additional morbidity of standard surgical options (ie, TURP) is not necessary given the above criteria  The risks of Urolift include but are not limited to bleeding, infection, reaction to anesthesia such as heart attack, stroke, DVT/PE, hyponatremia, bladder neck contracture, urethral stricture, injury to surrounding structures (ureters, rectum, etc)  We discussed that additional risks of trans urethral resection procedures such as incontinence, retrograde ejaculation, and erectile dysfunction have been only rarely reported with the Uro lift procedure  We did discuss that this is a new were procedure and a permanent implant  We discussed that there may be some long-term implications that her on for seen with this newer technology, such as perhaps complicating treatment for prostate cancer if indicated down the line  Finally, I told him that he may require additional procedures secondary to some of these complications  Informed consent was obtained for the Uro lift procedure  This will be scheduled in the near future to be performed under IV sedation  I will request medical clearance by Dr Cuate Zuniga  The patient and his caregiver were instructed discontinue the Eliquis 48 hours prior to surgery  Finally we discussed that due to his severe storage symptoms, we will likely continue mirabegron in conjunction with an anticholinergic in the immediate postoperative period        Frank Rossi MD      Chief Complaint     BPH evaluation      History of Present Illness     Lalo Aviles is a 81 y o  male with a longstanding history of obstructive lower urinary tract symptoms  He has been on medical management for some time including tamsulosin and currently mirabegron  He also used an anticholinergic in the past   He reports bothersome weak stream, daytime frequency and urgency  He has no incontinence however  He denies a history of prior urinary tract infections or gross hematuria  He does have a number of medical comorbidities including history of myopathy requiring feeding tube placement as well as a prior tracheostomy in the past, diabetes, hypertension, distant history of deep vein thrombosis, and atrial fibrillation on Eliquis  Detailed Urologic History     - please refer to HPI    Review of Systems     Review of Systems   Constitutional: Negative for activity change and fatigue  HENT: Negative for congestion  Eyes: Negative for visual disturbance  Respiratory: Negative for shortness of breath and wheezing  Cardiovascular: Negative for chest pain and leg swelling  Gastrointestinal: Negative for abdominal pain  Endocrine: Positive for polyuria  Genitourinary: Positive for dysuria, frequency and urgency  Negative for flank pain and hematuria  Musculoskeletal: Negative for back pain  Allergic/Immunologic: Negative for immunocompromised state  Neurological: Negative for dizziness and numbness  Psychiatric/Behavioral: Negative for dysphoric mood  All other systems reviewed and are negative  Allergies     No Known Allergies    Physical Exam     Physical Exam   Constitutional: He is oriented to person, place, and time  He appears well-developed and well-nourished  No distress  HENT:   Head: Normocephalic and atraumatic  Eyes: EOM are normal    Neck: Normal range of motion     Prior tracheostomy site noted   Cardiovascular:   Negative lower extremity edema   Pulmonary/Chest: Effort normal and breath sounds normal    Abdominal: Soft  Genitourinary: Penis normal    Musculoskeletal: Normal range of motion  Neurological: He is alert and oriented to person, place, and time  Skin: Skin is warm  Psychiatric: He has a normal mood and affect  His behavior is normal            Vital Signs  There were no vitals filed for this visit        Current Medications       Current Outpatient Prescriptions:     ACCU-CHEK FASTCLIX LANCETS MISC, by Does not apply route 2 (two) times a day, Disp: , Rfl:     apixaban (ELIQUIS) 5 mg, Take 5 mg by mouth 2 (two) times a day , Disp: , Rfl:     aspirin (ECOTRIN LOW STRENGTH) 81 mg EC tablet, Take 1 tablet by mouth daily, Disp: , Rfl:     famotidine (PEPCID) 20 mg tablet, Take 1 tablet by mouth 2 (two) times a day, Disp: , Rfl:     folic acid (FOLVITE) 1 mg tablet, TAKE 1 TABLET BY MOUTH  DAILY, Disp: 90 tablet, Rfl: 3    furosemide (LASIX) 20 mg tablet, Take 20 mg by mouth every other day  , Disp: , Rfl:     furosemide (LASIX) 40 mg tablet, Take 40 mg by mouth every other day  , Disp: , Rfl:     gabapentin (NEURONTIN) 400 mg capsule, Take 400 mg by mouth 3 (three) times a day  , Disp: , Rfl:     levalbuterol (XOPENEX) 1 25 mg/3 mL nebulizer solution, Inhale, Disp: , Rfl:     lidocaine (LIDODERM) 5 %, Place 1 patch on the skin every 24 hours Remove & Discard patch within 12 hours or as directed by MD, Disp: 10 patch, Rfl: 0    loperamide (IMODIUM A-D) 2 MG tablet, Take 1 tablet by mouth 4 (four) times a day as needed, Disp: , Rfl:     metoprolol tartrate (LOPRESSOR) 25 mg tablet, Take 12 5 mg by mouth daily  , Disp: , Rfl:     Multiple Vitamin (MULTIVITAMIN) tablet, Take 1 tablet by mouth daily, Disp: , Rfl:     nitroglycerin (NITROLINGUAL) 0 4 mg/spray spray, 1 spray every 5 (five) minutes as needed  , Disp: , Rfl:     nystatin (MYCOSTATIN) powder, Apply to affected area 2-3 times daily until resolved , Disp: 15 g, Rfl: 0    ONE TOUCH ULTRA TEST test strip, TEST TWICE DAILY E 11 9, Disp: , Rfl: 0    pantoprazole (PROTONIX) 40 mg tablet, Take 40 mg by mouth daily  , Disp: , Rfl:     sodium chloride 0 9 % nebulizer solution, Inhale every 4 (four) hours as needed  , Disp: , Rfl:     traMADol (ULTRAM) 50 mg tablet, TAKE 1 TABLET BY MOUTH TWO  TIMES DAILY (Patient taking differently: TAKE 1 TABLET BY MOUTH TWO  TIMES DAILY  AS NEEDED), Disp: 60 tablet, Rfl: 3    Triamcinolone Acetonide (NASACORT ALLERGY 24HR) 55 MCG/ACT AERO, 1 puff into each nostril as needed  , Disp: , Rfl:       Active Problems     Patient Active Problem List   Diagnosis    Critical illness myopathy    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, without long-term current use of insulin ([facility]    Chronic osteomyelitis of spine ([facility]    Respiratory failure, chronic ([facility]    Atrial fibrillation, permanent ([facility]    Cancer, colon ([facility]    Critical illness neuropathy ([facility]    Diabetes mellitus [facility]    DVT (deep venous thrombosis) ([facility] )    Pure hypercholesterolemia    Hemorrhoids    Malfunction of percutaneous endoscopic gastrostomy (PEG) tube ([facility]    History of DVT (deep vein thrombosis)    History of colon cancer    COPD (chronic obstructive pulmonary disease) ([facility]    Pneumonia involving right lung    Aspiration pneumonia of left lower lobe ([facility]    Chronic right-sided low back pain without sciatica    Allergic rhinitis    Benign essential hypertension    BPH with obstruction/lower urinary tract symptoms    CAD S/P percutaneous coronary angioplasty    Chronic combined systolic and diastolic congestive heart failure ([facility]    Dysphagia    Esophageal reflux    Fatigue    Floaters in visual field    Insomnia    Leukocytosis    Lumbar compression fracture ([facility]    Bilateral calf pain    OAB (overactive bladder)         Past Medical History     Past Medical History:   Diagnosis Date    Arthritis     Atrial fibrillation (HCC)     BPH (benign prostatic hyperplasia)     CAD (coronary artery disease)     Chronic back pain     Chronic diastolic (congestive) heart failure (HCC)     COPD (chronic obstructive pulmonary disease) (HCC)     Critical illness polyneuropathy (HCC)     [facility]     type 2    Dysphagia     Hemorrhoids     History of colon cancer     History of DVT (deep vein thrombosis)     Hyperlipidemia     Hypertension     Hyponatremia     Malignant neoplasm of colon (HCC)     [facility]ct [facility]     [facility]     Pneumonia     Sepsis [facility]     Urinary retention          Surgical History     Past Surgical History:   Procedure Laterality Date    APPENDECTOMY      BACK SURGERY      BRONCHOSCOPY N/A 6/30/2016    Procedure: BRONCHOSCOPY FLEXIBLE with bronchial lavage to the left lower lobe; Surgeon: Otilia Sever, MD;  Location: [facility];   Service:     CHOLECYSTECTOMY      COLECTOMY      COLON SURGERY      polypectomy for cancerous lesion    CORONARY ANGIOPLASTY WITH STENT PLACEMENT      x5  pt unsure of where    GASTROSTOMY TUBE PLACEMENT      percutaneous placement of gastrostomy tube placed 4/16 - dc 8/16    HEMORROIDECTOMY      LUMBAR LAMINECTOMY      LUNG SURGERY      PEG TUBE REMOVAL      TRACHEOSTOMY           Family History     Family History   Problem Relation Age of Onset    Heart attack Mother          Social History     Social History     History   Smoking Status    Former Smoker    Packs/day: 1 00    Years: 20 00    Types: Cigarettes   Smokeless Tobacco    Never Used     Comment: quit 40 years ago all scripts says non smoker         Pertinent Lab Values     Lab Results   Component Value Date    CREATININE 1 03 04/23/2018       No results found for: PSA    @RESULTRCNT(1H])@      Pertinent Imaging      - n/a    Portions of the record may have been created with voice recognition software   Occasional wrong word or "sound a like" substitutions may have occurred due to the inherent limitations of voice recognition software   Read the chart carefully and recognize, using context, where substitutions have occurred  Ray Dia MD  6/27/2018  2:43 PM  Sign at close encounter  Office TRUS    Indication    Prostate volumetric for surgical planning     Transrectal ultrasonography  The patient was placed in the left lateral decubitus position  After an attentive digital rectal examination, a 7 5 mHz sidefire ultrasound probe was gently inserted into the rectum and biplanar imaging of the prostate was done with the findings noted below  Images were taken of any abnormal findings and also to document prostate size  Bladder  The bladder base appeared normal     Prostate      Ultrasound size measurements:  -Volume:  32 cm3 (4 9 width x 3 2 by 3 9)    Ultrasound findings:  -Cysts: None  -Masses: None  -Median lobe: absent       Ray Dia MD  6/27/2018  2:42 PM  Sign at close encounter  Office Cystoscopy Procedure Note    Indication:     medically refractory lower urinary tract symptoms     Informed consent   The risks, benefits, complications, treatment options, and expected outcomes were discussed with the patient  The patient concurred with the proposed plan and provided informed consent  Anesthesia  Lidocaine jelly 2%    Antibiotic prophylaxis   None    Procedure  The patient was placed in the supineposition, was prepped and draped in the usual manner using sterile technique, and 2% lidocaine jelly instilled into the urethra  A 17 F flexible cystoscope was then inserted into the urethra and the urethra and bladder carefully examined  The following findings were noted:    Findings:  Urethra:  Normal  Prostate: Moderate lateral lobe hyperplasia with near kissing of the lobes in the midline  Prostatic fossa is short and not particularly high    There is a tiny nonobstructing median lobe present emanating from the right side  Bladder:  High capacity, grade 2-3 trabeculation throughout  Ureteral orifices:  Normal  Other findings:  None     Specimens: None                 Complications:    None; patient tolerated the procedure well           Disposition: To home after 30 minute observation             Condition: Stable

## 2018-06-27 NOTE — PROGRESS NOTES
Office TRUS    Indication    Prostate volumetric for surgical planning     Transrectal ultrasonography  The patient was placed in the left lateral decubitus position  After an attentive digital rectal examination, a 7 5 mHz sidefire ultrasound probe was gently inserted into the rectum and biplanar imaging of the prostate was done with the findings noted below  Images were taken of any abnormal findings and also to document prostate size      Bladder  The bladder base appeared normal     Prostate      Ultrasound size measurements:  -Volume:  32 cm3 (4 9 width x 3 2 by 3 9)    Ultrasound findings:  -Cysts: None  -Masses: None  -Median lobe: absent

## 2018-06-27 NOTE — PROGRESS NOTES
Referring Physician: Rohan Garland MD  A copy of this note was sent to the referring physician  Diagnoses and all orders for this visit:    BPH with obstruction/lower urinary tract symptoms  -     Urine culture; Future  -     Urine culture  -     POCT urine dip  -     Case request operating room: [address] ; Standing  -     Case request operating room: CYSTOSCOPY WITH INSERTION UROLIFT    OAB (overactive bladder)  -     Urine culture; Future  -     Urine culture  -     POCT urine dip    Other orders  -     Diet NPO; Sips with meds; Standing  -     Place sequential compression device; Standing  -     ceFAZolin (ANCEF) 1,000 mg in dextrose 5 % 100 mL IVPB; Infuse 1,000 mg into a venous catheter on call to OR             Assessment and plan:     BPH with medically refractory lower urinary tract symptoms    Patient has bothersome frequency and urgency despite tamsulosin as well as mirabegron  The symptoms are impacting his quality of life and activities of daily living  We reviewed the options for treating BPH/LUTS which include but are not limited to expectant management, medical therapy, transurethral resection of prostate (TURP)  We also discussed minimally invasive options  At this point, the patient wishes to proceed with Urolift  This is a great option for the patient based on the following criteria:    - cystoscopy revealed moderate lateral lobe hyperplasia, a tiny nonobstructing median lobe coming from the right side of the gland  - gland volume 32 g calculated by transrectal ultrasound  - uroflow with Q max of 6  - PVR with 22  - normal renal function  - no active urinary tract infection  - PSA:  NotApplicable as the patient is outside of screening age   - no documented allergy to nickel    - He has failed the following treatment options:  Tamsulosin, mirabegron    The additional morbidity of standard surgical options (ie, TURP) is not necessary given the above criteria  The risks of Urolift include but are not limited to bleeding, infection, reaction to anesthesia such as heart attack, stroke, DVT/PE, hyponatremia, bladder neck contracture, urethral stricture, injury to surrounding structures (ureters, rectum, etc)  We discussed that additional risks of trans urethral resection procedures such as incontinence, retrograde ejaculation, and erectile dysfunction have been only rarely reported with the Uro lift procedure  We did discuss that this is a new were procedure and a permanent implant  We discussed that there may be some long-term implications that her on for seen with this newer technology, such as perhaps complicating treatment for prostate cancer if indicated down the line  Finally, I told him that he may require additional procedures secondary to some of these complications  Informed consent was obtained for the Uro lift procedure  This will be scheduled in the near future to be performed under IV sedation  I will request medical clearance by Dr Brain Thibodeaux  The patient and his caregiver were instructed discontinue the Eliquis 48 hours prior to surgery  Finally we discussed that due to his severe storage symptoms, we will likely continue mirabegron in conjunction with an anticholinergic in the immediate postoperative period  Soledad Goodell, MD      Chief Complaint     BPH evaluation      History of Present Illness     Sheba Mir is a 81 y o  male with a longstanding history of obstructive lower urinary tract symptoms  He has been on medical management for some time including tamsulosin and currently mirabegron  He also used an anticholinergic in the past   He reports bothersome weak stream, daytime frequency and urgency  He has no incontinence however  He denies a history of prior urinary tract infections or gross hematuria      He does have a number of medical comorbidities including history of myopathy requiring feeding tube placement as well as a prior tracheostomy in the past, diabetes, hypertension, distant history of deep vein thrombosis, and atrial fibrillation on Eliquis  Detailed Urologic History     - please refer to HPI    Review of Systems     Review of Systems   Constitutional: Negative for activity change and fatigue  HENT: Negative for congestion  Eyes: Negative for visual disturbance  Respiratory: Negative for shortness of breath and wheezing  Cardiovascular: Negative for chest pain and leg swelling  Gastrointestinal: Negative for abdominal pain  Endocrine: Positive for polyuria  Genitourinary: Positive for dysuria, frequency and urgency  Negative for flank pain and hematuria  Musculoskeletal: Negative for back pain  Allergic/Immunologic: Negative for immunocompromised state  Neurological: Negative for dizziness and numbness  Psychiatric/Behavioral: Negative for dysphoric mood  All other systems reviewed and are negative  Allergies     No Known Allergies    Physical Exam     Physical Exam   Constitutional: He is oriented to person, place, and time  He appears well-developed and well-nourished  No distress  HENT:   Head: Normocephalic and atraumatic  Eyes: EOM are normal    Neck: Normal range of motion  Prior tracheostomy site noted   Cardiovascular:   Negative lower extremity edema   Pulmonary/Chest: Effort normal and breath sounds normal    Abdominal: Soft  Genitourinary: Penis normal    Musculoskeletal: Normal range of motion  Neurological: He is alert and oriented to person, place, and time  Skin: Skin is warm  Psychiatric: He has a normal mood and affect  His behavior is normal            Vital Signs  There were no vitals filed for this visit        Current Medications       Current Outpatient Prescriptions:     ACCU-CHEK FASTCLIX LANCETS MISC, by Does not apply route 2 (two) times a day, Disp: , Rfl:     apixaban (ELIQUIS) 5 mg, Take 5 mg by mouth 2 (two) times a day , Disp: , Rfl:     aspirin (ECOTRIN LOW STRENGTH) 81 mg EC tablet, Take 1 tablet by mouth daily, Disp: , Rfl:     famotidine (PEPCID) 20 mg tablet, Take 1 tablet by mouth 2 (two) times a day, Disp: , Rfl:     folic acid (FOLVITE) 1 mg tablet, TAKE 1 TABLET BY MOUTH  DAILY, Disp: 90 tablet, Rfl: 3    furosemide (LASIX) 20 mg tablet, Take 20 mg by mouth every other day  , Disp: , Rfl:     furosemide (LASIX) 40 mg tablet, Take 40 mg by mouth every other day  , Disp: , Rfl:     gabapentin (NEURONTIN) 400 mg capsule, Take 400 mg by mouth 3 (three) times a day  , Disp: , Rfl:     levalbuterol (XOPENEX) 1 25 mg/3 mL nebulizer solution, Inhale, Disp: , Rfl:     lidocaine (LIDODERM) 5 %, Place 1 patch on the skin every 24 hours Remove & Discard patch within 12 hours or as directed by MD, Disp: 10 patch, Rfl: 0    loperamide (IMODIUM A-D) 2 MG tablet, Take 1 tablet by mouth 4 (four) times a day as needed, Disp: , Rfl:     metoprolol tartrate (LOPRESSOR) 25 mg tablet, Take 12 5 mg by mouth daily  , Disp: , Rfl:     Multiple Vitamin (MULTIVITAMIN) tablet, Take 1 tablet by mouth daily, Disp: , Rfl:     nitroglycerin (NITROLINGUAL) 0 4 mg/spray spray, 1 spray every 5 (five) minutes as needed  , Disp: , Rfl:     nystatin (MYCOSTATIN) powder, Apply to affected area 2-3 times daily until resolved , Disp: 15 g, Rfl: 0    ONE TOUCH ULTRA TEST test strip, TEST TWICE DAILY E 11 9, Disp: , Rfl: 0    pantoprazole (PROTONIX) 40 mg tablet, Take 40 mg by mouth daily  , Disp: , Rfl:     sodium chloride 0 9 % nebulizer solution, Inhale every 4 (four) hours as needed  , Disp: , Rfl:     traMADol (ULTRAM) 50 mg tablet, TAKE 1 TABLET BY MOUTH TWO  TIMES DAILY (Patient taking differently: TAKE 1 TABLET BY MOUTH TWO  TIMES DAILY  AS NEEDED), Disp: 60 tablet, Rfl: 3    Triamcinolone Acetonide (NASACORT ALLERGY 24HR) 55 MCG/ACT AERO, 1 puff into each nostril as needed  , Disp: , Rfl:       Active Problems     Patient Active Problem List   Diagnosis    Critical illness myopathy    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, without long-term current use of insulin (HCC)    Chronic osteomyelitis of spine (HCC)    Respiratory failure, chronic (HCC)    Atrial fibrillation, permanent (HCC)    Cancer, colon (HCC)    Critical illness neuropathy ([facility] )    Diabetes mellitus ([facility] )    DVT (deep venous thrombosis) ([facility] )    Pure hypercholesterolemia    Hemorrhoids    Malfunction of percutaneous endoscopic gastrostomy (PEG) tube ([facility] )    History of DVT (deep vein thrombosis)    History of colon cancer    COPD (chronic obstructive pulmonary disease) (HCC)    Pneumonia involving right lung    Aspiration pneumonia of left lower lobe (HCC)    Chronic right-sided low back pain without sciatica    Allergic rhinitis    Benign essential hypertension    BPH with obstruction/lower urinary tract symptoms    CAD S/P percutaneous coronary angioplasty    Chronic combined systolic and diastolic congestive heart failure (HCC)    Dysphagia    Esophageal reflux    Fatigue    Floaters in visual field    Insomnia    Leukocytosis    Lumbar compression fracture (HCC)    Bilateral calf pain    OAB (overactive bladder)         Past Medical History     Past Medical History:   Diagnosis Date    Arthritis     Atrial fibrillation (HCC)     BPH (benign prostatic hyperplasia)     CAD (coronary artery disease)     Chronic back pain     Chronic diastolic (congestive) heart failure (HCC)     COPD (chronic obstructive pulmonary disease) (HCC)     Critical illness polyneuropathy (HCC)     Diabetes mellitus (HCC)     type 2    Dysphagia     Hemorrhoids     History of colon cancer     History of DVT (deep vein thrombosis)     Hyperlipidemia     Hypertension     Hyponatremia     Malignant neoplasm of colon (HCC)     Myocardial infarct ([facility] )     Osteomyelitis (HCC)     Pneumonia     Sepsis ([facility] )     Urinary retention          Surgical History     Past Surgical History:   Procedure Laterality Date    APPENDECTOMY      BACK SURGERY      BRONCHOSCOPY N/A 6/30/2016    Procedure: BRONCHOSCOPY FLEXIBLE with bronchial lavage to the left lower lobe; Surgeon: Amira Weaver MD;  Location: [facility]; Service:     CHOLECYSTECTOMY      COLECTOMY      COLON SURGERY      polypectomy for cancerous lesion    CORONARY ANGIOPLASTY WITH STENT PLACEMENT      x5  pt unsure of where    GASTROSTOMY TUBE PLACEMENT      percutaneous placement of gastrostomy tube placed 4/16 - dc 8/16    HEMORROIDECTOMY      LUMBAR LAMINECTOMY      LUNG SURGERY      PEG TUBE REMOVAL      TRACHEOSTOMY           Family History     Family History   Problem Relation Age of Onset    Heart attack Mother          Social History     Social History     History   Smoking Status    Former Smoker    Packs/day: 1 00    Years: 20 00    Types: Cigarettes   Smokeless Tobacco    Never Used     Comment: quit 40 years ago all scripts says non smoker         Pertinent Lab Values     Lab Results   Component Value Date    CREATININE 1 03 04/23/2018       No results found for: PSA    @RESULTRCNT(1H])@      Pertinent Imaging      - n/a    Portions of the record may have been created with voice recognition software   Occasional wrong word or "sound a like" substitutions may have occurred due to the inherent limitations of voice recognition software   Read the chart carefully and recognize, using context, where substitutions have occurred

## 2018-06-27 NOTE — PROGRESS NOTES
Office Cystoscopy Procedure Note    Indication:     medically refractory lower urinary tract symptoms     Informed consent   The risks, benefits, complications, treatment options, and expected outcomes were discussed with the patient  The patient concurred with the proposed plan and provided informed consent  Anesthesia  Lidocaine jelly 2%    Antibiotic prophylaxis   None    Procedure  The patient was placed in the supineposition, was prepped and draped in the usual manner using sterile technique, and 2% lidocaine jelly instilled into the urethra  A 17 F flexible cystoscope was then inserted into the urethra and the urethra and bladder carefully examined  The following findings were noted:    Findings:  Urethra:  Normal  Prostate: Moderate lateral lobe hyperplasia with near kissing of the lobes in the midline  Prostatic fossa is short and not particularly high  There is a tiny nonobstructing median lobe present emanating from the right side  Bladder:  High capacity, grade 2-3 trabeculation throughout  Ureteral orifices:  Normal  Other findings:  None     Specimens: None                 Complications:    None; patient tolerated the procedure well           Disposition: To home after 30 minute observation             Condition: Stable

## 2018-06-28 ENCOUNTER — TELEPHONE (OUTPATIENT)
Dept: UROLOGY | Facility: AMBULATORY SURGERY CENTER | Age: 83
End: 2018-06-28

## 2018-06-28 LAB — BACTERIA UR CULT: NORMAL

## 2018-06-28 NOTE — TELEPHONE ENCOUNTER
Can you please let me know where this guideline is coming from? His current HbA1c is not a contraindication to this minor cystoscopic procedure  Please let me know who I can speak do about the minimally invasive nature of his surgery, to make sure it gets scheduled  The patient and his son would like to proceed with surgery at this time despite his endocrinologic issues

## 2018-06-28 NOTE — TELEPHONE ENCOUNTER
I will send the [facility] Perioperative Hyperglycemic Algorithm for Surgical Optimization to you in an attachment to your email  Medical clearance was requested  I will call patient's nurse and get clearance scheduled  If he is cleared, I will schedule him

## 2018-06-28 NOTE — TELEPHONE ENCOUNTER
Dr Lynn Torre would like to do a cysto/Urolift procedure on patient, but, must obtain medical clearance  Patient has uncontrolled diabetes, glucose on 4/23/18 was 410, glucose on 4/18/18 was 388, A1C on 4/18/18 was 11 0  Blood sugars need to be controlled and closer to 200 for surgery  I spoke with patient's nurse, April, and she absolutely was on board with this  She will contact Dr Edilia Montano office to get started with this  Patient will be transitioning to Coumadin in the next 2 months per Dr Dianna Rosenbaum note of 6/12/18 April will call me when patient is controlled and ready to schedule Urolift

## 2018-06-28 NOTE — TELEPHONE ENCOUNTER
I spoke with Dr Filipe Acevedo nurse, Young Rondon  His bloodwork cannot be done before three months time  He is due to have it done after 7/18/18  He will see Dr George Vasques on 8/10/18 to go over everything and clear, or not clear him for his Urolift  I've given them a date of 8/27/18 for his Urolift  I will let patient's nurse know

## 2018-07-03 RX ORDER — FLUCONAZOLE 150 MG/1
TABLET ORAL
COMMUNITY
Start: 2018-06-24 | End: 2018-08-14

## 2018-07-05 ENCOUNTER — CONSULT (OUTPATIENT)
Dept: FAMILY MEDICINE CLINIC | Facility: CLINIC | Age: 83
End: 2018-07-05
Payer: MEDICARE

## 2018-07-05 VITALS
RESPIRATION RATE: 20 BRPM | DIASTOLIC BLOOD PRESSURE: 80 MMHG | HEIGHT: 70 IN | OXYGEN SATURATION: 94 % | HEART RATE: 90 BPM | BODY MASS INDEX: 24.48 KG/M2 | WEIGHT: 171 LBS | TEMPERATURE: 98.1 F | SYSTOLIC BLOOD PRESSURE: 126 MMHG

## 2018-07-05 DIAGNOSIS — I50.42 CHRONIC COMBINED SYSTOLIC AND DIASTOLIC CONGESTIVE HEART FAILURE (HCC): ICD-10-CM

## 2018-07-05 DIAGNOSIS — N13.8 BPH WITH OBSTRUCTION/LOWER URINARY TRACT SYMPTOMS: ICD-10-CM

## 2018-07-05 DIAGNOSIS — IMO0002 UNCONTROLLED TYPE 2 DIABETES MELLITUS WITH DIABETIC POLYNEUROPATHY, WITHOUT LONG-TERM CURRENT USE OF INSULIN: Primary | Chronic | ICD-10-CM

## 2018-07-05 DIAGNOSIS — I48.21 ATRIAL FIBRILLATION, PERMANENT (HCC): Chronic | ICD-10-CM

## 2018-07-05 DIAGNOSIS — N40.1 BPH WITH OBSTRUCTION/LOWER URINARY TRACT SYMPTOMS: ICD-10-CM

## 2018-07-05 PROBLEM — R53.83 FATIGUE: Status: RESOLVED | Noted: 2017-01-06 | Resolved: 2018-07-05

## 2018-07-05 PROBLEM — M79.661 BILATERAL CALF PAIN: Status: RESOLVED | Noted: 2018-04-18 | Resolved: 2018-07-05

## 2018-07-05 PROBLEM — Z01.818 PREOPERATIVE CLEARANCE: Status: ACTIVE | Noted: 2018-07-05

## 2018-07-05 PROBLEM — M79.662 BILATERAL CALF PAIN: Status: RESOLVED | Noted: 2018-04-18 | Resolved: 2018-07-05

## 2018-07-05 PROCEDURE — 99214 OFFICE O/P EST MOD 30 MIN: CPT | Performed by: INTERNAL MEDICINE

## 2018-07-09 NOTE — PROGRESS NOTES
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, without long-term current use of insulin (HCC)  -     insulin glargine (LANTUS SOLOSTAR) 100 units/mL injection pen; Inject 10 Units under the skin daily at bedtime  -     Hemoglobin A1C; Future    Atrial fibrillation, permanent (HCC)    Chronic combined systolic and diastolic congestive heart failure (HCC)  -     Comprehensive metabolic panel  -     CBC and differential  -     NT-BNP PRO; Future    BPH with obstruction/lower urinary tract symptoms      Asif Vargas was seen and examined in the office today  On exam, he does have an irregularly irregular rhythm, consistent with atrial fibrillation and appears euvolemic  His lab work was reviewed and does show an A1c of 11 0%  We discussed this in detail and decided to start Lantus once daily  He has done this in the past  I discussed that the goal initially be for the fasting sugar to be less than 200  He will increase by 3 units every 3-5 days until fastings are less than 200  In terms of his proposed upcoming procedure, further cardiac testing is not warranted at this time  He did see Dr Georgia Loera in June and this note was reviewed  Otherwise no other changes were made  Subjective:      Patient ID: Cherie Hannon is a 81 y o  male  Asif Vargas is here today for a preoperative evaluation  He has been bothered with frequent urination and is hoping to undergo urolift procedure  Medical history was reviewed and updated  He is here today with his nurse as well  He has known diabetes mellitus and was on Lantus for short period in the past when he was in rehabilitation at [address] [place] [address]  He was eventually taken off of this  Recent blood work showed and increase in the A1c and it was suggested that he start Metformin  He was having GI side effects and remained off of this  He denies being tried on extended release metformin   Other history was reviewed and updated  The following portions of the patient's history were reviewed and updated as appropriate: allergies, current medications, past family history, past medical history, past social history, past surgical history and problem list     Review of Systems   Constitutional: Negative for chills, fever and unexpected weight change  HENT: Negative for sinus pain, sinus pressure and sore throat  Eyes: Negative for visual disturbance  Respiratory: Negative for cough, chest tightness, shortness of breath and wheezing  Cardiovascular: Negative for chest pain, palpitations and leg swelling  Gastrointestinal: Negative for abdominal pain, blood in stool, constipation, diarrhea, nausea and vomiting  Genitourinary: Positive for frequency  Negative for difficulty urinating and dysuria  Musculoskeletal: Negative for arthralgias, back pain and myalgias  Neurological: Negative for dizziness, syncope, numbness and headaches  Psychiatric/Behavioral: Negative for dysphoric mood and sleep disturbance  The patient is not nervous/anxious  Objective:      /80   Pulse 90   Temp 98 1 °F (36 7 °C)   Resp 20   Ht 5' 10" (1 778 m)   Wt 77 6 kg (171 lb)   SpO2 94%   BMI 24 54 kg/m²          Physical Exam   Constitutional: He is oriented to person, place, and time  He appears well-developed and well-nourished  No distress  HENT:   Head: Normocephalic and atraumatic  Right Ear: External ear normal    Left Ear: External ear normal    Mouth/Throat: Oropharynx is clear and moist    Eyes: Conjunctivae and EOM are normal  Right eye exhibits no discharge  Left eye exhibits no discharge  No scleral icterus  Neck: Normal range of motion  No tracheal deviation present  No thyromegaly present  Cardiovascular: Normal rate and normal heart sounds  An irregularly irregular rhythm present  No murmur heard  Pulmonary/Chest: Effort normal and breath sounds normal  No respiratory distress   He has no wheezes  He exhibits no tenderness  Abdominal: Soft  Bowel sounds are normal  There is no tenderness  There is no rebound  Musculoskeletal: Normal range of motion  He exhibits no edema  Lymphadenopathy:     He has no cervical adenopathy  Neurological: He is alert and oriented to person, place, and time  He has normal reflexes  No cranial nerve deficit  Skin: Skin is warm and dry  He is not diaphoretic  No erythema  Psychiatric: He has a normal mood and affect  His speech is normal and behavior is normal  Judgment and thought content normal    Vitals reviewed

## 2018-07-27 ENCOUNTER — TELEPHONE (OUTPATIENT)
Dept: FAMILY MEDICINE CLINIC | Facility: CLINIC | Age: 83
End: 2018-07-27

## 2018-07-27 DIAGNOSIS — F32.A DEPRESSION, UNSPECIFIED DEPRESSION TYPE: Primary | ICD-10-CM

## 2018-07-27 NOTE — TELEPHONE ENCOUNTER
April, nurse liaison from [facility], called today and wanted to know if we are going to start pt on metformin  She states it was discussed at his appt that we were going to try a different kind of metformin than he was previously prescribed that would be once a day in the evening  She also would like to know if we can start pt back on his remeron because he was taken off of it in the hospital after being adjusted to a higher dose (15mg) and had some cognitive impairment  She states that he tolerated the 7 5mg dose well and family is concerned about him and would like him to start taking it again

## 2018-07-30 NOTE — TELEPHONE ENCOUNTER
The 7 5 mg of remeron is okay  There is an extended release of the Metformin that can be tried at supper time  But can you confirm that he is taking insulin?

## 2018-07-31 NOTE — TELEPHONE ENCOUNTER
Left detailed message on April's voicemail and asked for call back to confirm and schedule f/u appt  Also remeron just needs to be approved  Thanks!

## 2018-07-31 NOTE — TELEPHONE ENCOUNTER
PT referral is fine  With the lantus, why dont we first start going up on that  3 units every 3 days until fasting sugars are less than 300  Im going to hold off on the metformin right now and then lets have him come in in 3-4 weeks to go over it all

## 2018-07-31 NOTE — TELEPHONE ENCOUNTER
Pt is taking lantus but sugars are still running in the high 200s/low 300s per pt's nurse April  Order entered for remeron pending your approval   I did not enter the metformin because I wasn't sure what dose you wanted  Pt nurse also states that pt had a fall a couple months ago and pt was seen at the hospital and they wanted him to stay but son signed him out, however, that resulted in no rehab or pt/ot  Nurse has noticed a change in pt's gait and decline in ambulation abilities and believes he would really benefit from PT/OT    Would it be okay for us to refer him to  PT?

## 2018-08-01 ENCOUNTER — TRANSCRIBE ORDERS (OUTPATIENT)
Dept: ADMINISTRATIVE | Facility: HOSPITAL | Age: 83
End: 2018-08-01

## 2018-08-01 DIAGNOSIS — M96.1 POST LAMINECTOMY SYNDROME: Primary | ICD-10-CM

## 2018-08-01 DIAGNOSIS — G47.00 INSOMNIA, UNSPECIFIED TYPE: Primary | ICD-10-CM

## 2018-08-01 RX ORDER — MIRTAZAPINE 7.5 MG/1
7.5 TABLET, FILM COATED ORAL
Qty: 30 TABLET | Refills: 1 | Status: SHIPPED | OUTPATIENT
Start: 2018-08-01 | End: 2019-02-11 | Stop reason: SDUPTHER

## 2018-08-02 LAB — HBA1C MFR BLD HPLC: 12.5 %

## 2018-08-03 ENCOUNTER — HOSPITAL ENCOUNTER (OUTPATIENT)
Dept: NON INVASIVE DIAGNOSTICS | Facility: HOSPITAL | Age: 83
Discharge: HOME | End: 2018-08-03
Attending: UROLOGY
Payer: MEDICARE

## 2018-08-03 DIAGNOSIS — N13.8 ENLARGED PROSTATE WITH URINARY OBSTRUCTION: ICD-10-CM

## 2018-08-03 DIAGNOSIS — N40.1 ENLARGED PROSTATE WITH URINARY OBSTRUCTION: ICD-10-CM

## 2018-08-03 LAB
QRS AXIS: -44 DEGREES
QRSD INTERVAL: 76 MS
QT INTERVAL: 396 MS
QTC INTERVAL: 427 MS
T WAVE AXIS: 85 DEGREES
VENTRICULAR RATE: 70 BPM

## 2018-08-03 PROCEDURE — 93010 ELECTROCARDIOGRAM REPORT: CPT | Performed by: INTERNAL MEDICINE

## 2018-08-03 PROCEDURE — 93005 ELECTROCARDIOGRAM TRACING: CPT

## 2018-08-06 RX ORDER — MIRTAZAPINE 7.5 MG/1
7.5 TABLET, FILM COATED ORAL
Qty: 30 TABLET | Refills: 0 | Status: SHIPPED | OUTPATIENT
Start: 2018-08-06 | End: 2018-08-14

## 2018-08-14 ENCOUNTER — HOSPITAL ENCOUNTER (OUTPATIENT)
Dept: MRI IMAGING | Facility: HOSPITAL | Age: 83
Discharge: HOME | End: 2018-08-14
Attending: ANESTHESIOLOGY
Payer: MEDICARE

## 2018-08-14 ENCOUNTER — TELEPHONE (OUTPATIENT)
Dept: FAMILY MEDICINE CLINIC | Facility: CLINIC | Age: 83
End: 2018-08-14

## 2018-08-14 ENCOUNTER — PREP FOR PROCEDURE (OUTPATIENT)
Dept: UROLOGY | Facility: AMBULATORY SURGERY CENTER | Age: 83
End: 2018-08-14

## 2018-08-14 DIAGNOSIS — M96.1 POST LAMINECTOMY SYNDROME: ICD-10-CM

## 2018-08-14 PROCEDURE — 72148 MRI LUMBAR SPINE W/O DYE: CPT

## 2018-08-14 RX ORDER — FESOTERODINE FUMARATE 4 MG/1
TABLET, EXTENDED RELEASE ORAL
COMMUNITY
End: 2019-02-04

## 2018-08-14 NOTE — PRE-PROCEDURE INSTRUCTIONS
Pre-Surgery Instructions:   Medication Instructions    apixaban (ELIQUIS) 5 mg Patient was instructed by Physician and understands   aspirin (ECOTRIN LOW STRENGTH) 81 mg EC tablet Instructed patient per Anesthesia Guidelines   famotidine (PEPCID) 20 mg tablet Instructed patient per Anesthesia Guidelines   Fesoterodine Fumarate ER (TOVIAZ) 4 MG TB24 Instructed patient per Anesthesia Guidelines   folic acid (FOLVITE) 1 mg tablet Instructed patient per Anesthesia Guidelines   furosemide (LASIX) 20 mg tablet Instructed patient per Anesthesia Guidelines   furosemide (LASIX) 40 mg tablet Instructed patient per Anesthesia Guidelines   gabapentin (NEURONTIN) 400 mg capsule Instructed patient per Anesthesia Guidelines   insulin glargine (LANTUS SOLOSTAR) 100 units/mL injection pen Instructed patient per Anesthesia Guidelines   levalbuterol (XOPENEX) 1 25 mg/3 mL nebulizer solution Instructed patient per Anesthesia Guidelines   lidocaine (LIDODERM) 5 % Instructed patient per Anesthesia Guidelines   loperamide (IMODIUM A-D) 2 MG tablet Instructed patient per Anesthesia Guidelines   metoprolol tartrate (LOPRESSOR) 25 mg tablet Instructed patient per Anesthesia Guidelines   Mirabegron ER (MYRBETRIQ) 50 MG TB24 Instructed patient per Anesthesia Guidelines   mirtazapine (REMERON) 7 5 MG tablet Instructed patient per Anesthesia Guidelines   Multiple Vitamin (MULTIVITAMIN) tablet Instructed patient per Anesthesia Guidelines   nitroglycerin (NITROLINGUAL) 0 4 mg/spray spray Instructed patient per Anesthesia Guidelines   nystatin (MYCOSTATIN) powder Instructed patient per Anesthesia Guidelines   pantoprazole (PROTONIX) 40 mg tablet Instructed patient per Anesthesia Guidelines   traMADol (ULTRAM) 50 mg tablet Instructed patient per Anesthesia Guidelines   Triamcinolone Acetonide (NASACORT ALLERGY 24HR) 55 MCG/ACT AERO Instructed patient per Anesthesia Guidelines         Pre-op and bathing instructions reviewed with his nurse April April reported Eliquis instructions were to hold for three days prior to DOS

## 2018-08-14 NOTE — TELEPHONE ENCOUNTER
He is scheduled for Urolift on Aug 27  They noted that his labs are bad - Ha1C is 12 5! (done [facility]) This needs to be addressed at his appt tomorrow  If this is not under better control, they may have to cancel his surgery  Please call Emma Dallas back after visit with an update

## 2018-08-16 DIAGNOSIS — E11.8 TYPE 2 DIABETES MELLITUS WITH COMPLICATION, WITH LONG-TERM CURRENT USE OF INSULIN (HCC): Primary | ICD-10-CM

## 2018-08-16 DIAGNOSIS — Z79.4 TYPE 2 DIABETES MELLITUS WITH COMPLICATION, WITH LONG-TERM CURRENT USE OF INSULIN (HCC): Primary | ICD-10-CM

## 2018-08-24 ENCOUNTER — TELEPHONE (OUTPATIENT)
Dept: OTHER | Facility: HOSPITAL | Age: 83
End: 2018-08-24

## 2018-08-24 NOTE — PROGRESS NOTES
Lab work reviewed including hemoglobin A-1 C  Discussed with the patient son who is a physician as well as his primary care physician  From my standpoint there are no current contraindications to proceeding with the minimally invasive Urolift procedure under sedation  He will continue to address his hyperglycemia with his PCP Dr George Vasques

## 2018-08-26 ENCOUNTER — ANESTHESIA EVENT (OUTPATIENT)
Dept: PERIOP | Facility: HOSPITAL | Age: 83
End: 2018-08-26
Payer: MEDICARE

## 2018-08-27 ENCOUNTER — HOSPITAL ENCOUNTER (OUTPATIENT)
Facility: HOSPITAL | Age: 83
Setting detail: OUTPATIENT SURGERY
Discharge: HOME | End: 2018-08-27
Attending: UROLOGY | Admitting: UROLOGY
Payer: MEDICARE

## 2018-08-27 ENCOUNTER — ANESTHESIA (OUTPATIENT)
Dept: PERIOP | Facility: HOSPITAL | Age: 83
End: 2018-08-27
Payer: MEDICARE

## 2018-08-27 VITALS
BODY MASS INDEX: 24.48 KG/M2 | WEIGHT: 171 LBS | TEMPERATURE: 98 F | HEART RATE: 68 BPM | HEIGHT: 70 IN | OXYGEN SATURATION: 96 % | DIASTOLIC BLOOD PRESSURE: 70 MMHG | SYSTOLIC BLOOD PRESSURE: 138 MMHG | RESPIRATION RATE: 20 BRPM

## 2018-08-27 DIAGNOSIS — N13.8 BPH WITH OBSTRUCTION/LOWER URINARY TRACT SYMPTOMS: Primary | ICD-10-CM

## 2018-08-27 DIAGNOSIS — N40.1 BPH WITH OBSTRUCTION/LOWER URINARY TRACT SYMPTOMS: Primary | ICD-10-CM

## 2018-08-27 LAB
GLUCOSE SERPL-MCNC: 144 MG/DL (ref 65–140)
GLUCOSE SERPL-MCNC: 154 MG/DL (ref 65–140)

## 2018-08-27 PROCEDURE — 82948 REAGENT STRIP/BLOOD GLUCOSE: CPT

## 2018-08-27 PROCEDURE — L8699 PROSTHETIC IMPLANT NOS: HCPCS | Performed by: UROLOGY

## 2018-08-27 PROCEDURE — 52441 CYSTO INSJ TRNSPRSTC 1 IMPLT: CPT | Performed by: UROLOGY

## 2018-08-27 PROCEDURE — 52442 CYSTO INS TRNSPRSTC IMPLT EA: CPT | Performed by: UROLOGY

## 2018-08-27 DEVICE — IMPLANT URO PROSTATE UROLIFT: Type: IMPLANTABLE DEVICE | Site: URETHRA | Status: FUNCTIONAL

## 2018-08-27 RX ORDER — DOCUSATE SODIUM 100 MG/1
100 CAPSULE, LIQUID FILLED ORAL 2 TIMES DAILY
Qty: 30 CAPSULE | Refills: 0 | Status: SHIPPED | OUTPATIENT
Start: 2018-08-27 | End: 2019-02-11 | Stop reason: SDUPTHER

## 2018-08-27 RX ORDER — IBUPROFEN 200 MG
600 TABLET ORAL EVERY 6 HOURS PRN
Refills: 0
Start: 2018-08-27 | End: 2019-02-04

## 2018-08-27 RX ORDER — SODIUM CHLORIDE 9 MG/ML
INJECTION, SOLUTION INTRAVENOUS CONTINUOUS PRN
Status: DISCONTINUED | OUTPATIENT
Start: 2018-08-27 | End: 2018-08-27 | Stop reason: SURG

## 2018-08-27 RX ORDER — HYDROCODONE BITARTRATE AND ACETAMINOPHEN 5; 325 MG/1; MG/1
1 TABLET ORAL EVERY 6 HOURS PRN
Qty: 5 TABLET | Refills: 0 | Status: SHIPPED | OUTPATIENT
Start: 2018-08-27 | End: 2018-09-05 | Stop reason: ALTCHOICE

## 2018-08-27 RX ORDER — PROPOFOL 10 MG/ML
INJECTION, EMULSION INTRAVENOUS CONTINUOUS PRN
Status: DISCONTINUED | OUTPATIENT
Start: 2018-08-27 | End: 2018-08-27 | Stop reason: SURG

## 2018-08-27 RX ORDER — FENTANYL CITRATE/PF 50 MCG/ML
25 SYRINGE (ML) INJECTION
Status: DISCONTINUED | OUTPATIENT
Start: 2018-08-27 | End: 2018-08-27 | Stop reason: HOSPADM

## 2018-08-27 RX ORDER — MIDAZOLAM HYDROCHLORIDE 1 MG/ML
INJECTION INTRAMUSCULAR; INTRAVENOUS AS NEEDED
Status: DISCONTINUED | OUTPATIENT
Start: 2018-08-27 | End: 2018-08-27 | Stop reason: SURG

## 2018-08-27 RX ORDER — FENTANYL CITRATE 50 UG/ML
INJECTION, SOLUTION INTRAMUSCULAR; INTRAVENOUS AS NEEDED
Status: DISCONTINUED | OUTPATIENT
Start: 2018-08-27 | End: 2018-08-27 | Stop reason: SURG

## 2018-08-27 RX ORDER — ONDANSETRON 2 MG/ML
4 INJECTION INTRAMUSCULAR; INTRAVENOUS ONCE AS NEEDED
Status: DISCONTINUED | OUTPATIENT
Start: 2018-08-27 | End: 2018-08-27 | Stop reason: HOSPADM

## 2018-08-27 RX ORDER — PROPOFOL 10 MG/ML
INJECTION, EMULSION INTRAVENOUS AS NEEDED
Status: DISCONTINUED | OUTPATIENT
Start: 2018-08-27 | End: 2018-08-27 | Stop reason: SURG

## 2018-08-27 RX ORDER — CEPHALEXIN 500 MG/1
500 CAPSULE ORAL EVERY 6 HOURS SCHEDULED
Qty: 8 CAPSULE | Refills: 0 | Status: SHIPPED | OUTPATIENT
Start: 2018-08-27 | End: 2018-08-29

## 2018-08-27 RX ORDER — PHENAZOPYRIDINE HYDROCHLORIDE 200 MG/1
200 TABLET, FILM COATED ORAL 3 TIMES DAILY PRN
Qty: 10 TABLET | Refills: 0 | Status: SHIPPED | OUTPATIENT
Start: 2018-08-27 | End: 2018-08-30

## 2018-08-27 RX ORDER — MAGNESIUM HYDROXIDE 1200 MG/15ML
LIQUID ORAL AS NEEDED
Status: DISCONTINUED | OUTPATIENT
Start: 2018-08-27 | End: 2018-08-27 | Stop reason: HOSPADM

## 2018-08-27 RX ADMIN — FENTANYL CITRATE 100 MCG: 50 INJECTION INTRAMUSCULAR; INTRAVENOUS at 12:00

## 2018-08-27 RX ADMIN — PROPOFOL 100 MCG/KG/MIN: 10 INJECTION, EMULSION INTRAVENOUS at 12:03

## 2018-08-27 RX ADMIN — CEFAZOLIN SODIUM 1000 MG: 1 SOLUTION INTRAVENOUS at 12:00

## 2018-08-27 RX ADMIN — MIDAZOLAM HYDROCHLORIDE 2 MG: 1 INJECTION, SOLUTION INTRAMUSCULAR; INTRAVENOUS at 12:00

## 2018-08-27 RX ADMIN — PROPOFOL 50 MG: 10 INJECTION, EMULSION INTRAVENOUS at 12:03

## 2018-08-27 RX ADMIN — SODIUM CHLORIDE: 0.9 INJECTION, SOLUTION INTRAVENOUS at 12:00

## 2018-08-27 NOTE — H&P
UROLOGY PREOP     Patient Identifiers: Ngozi Lea (MRN [id number])  Service Providing Consultation:  Urology, Zainab Pineda MD    Date of Service: 8/27/2018        ASSESSMENT:     81 y o  old male with  medically refractory BPH presents for Uro lift  PLAN:     - to OR for Uro lift  - informed consent was verified today  - plan for hospital discharge postoperatively, most likely with a catheter for 24 hours      History of Present Illness:     Ngozi Lea is a 81 y o  old with a history of medically refractory BPH presents for the Uro lift    Past Medical, Past Surgical History:     Past Medical History:   Diagnosis Date    Arthritis     Atrial fibrillation (HCC)     BPH (benign prostatic hyperplasia)     CAD (coronary artery disease)     Chronic back pain     Chronic diastolic (congestive) heart failure (HCC)     COPD (chronic obstructive pulmonary disease) ([address] )     Critical illness poly[facility] [address] [facility]     Diabetes mellitus ([address] )     type 2    Dysphagia     GERD (gastroesophageal reflux disease)     Hemorrhoids     History of colon cancer     History of DVT (deep vein thrombosis)     Hyperlipidemia     Hypertension     Hyponatremia     Malignant neoplasm of colon ([facility] [address] [facility]     Myocardial infarct [facility] [address] [facility]     Osteomyelitis ([facility] [address] [facility]     Pneumonia     Sepsis ([facility] [address] [facility]     Urinary retention    :    Past Surgical History:   Procedure Laterality Date    APPENDECTOMY      BACK SURGERY      BRONCHOSCOPY N/A 6/30/2016    Procedure: BRONCHOSCOPY FLEXIBLE with bronchial lavage to the left lower lobe; Surgeon: Otilia Sever, MD;  Location: [facility];   Service:     CATARACT EXTRACTION      CHOLECYSTECTOMY      COLECTOMY      COLON SURGERY      polypectomy for cancerous lesion    COLONOSCOPY      CORONARY ANGIOPLASTY WITH STENT PLACEMENT      x5  pt unsure of where    GASTROSTOMY TUBE PLACEMENT      percutaneous placement of gastrostomy tube placed 4/16 - dc 8/16    HEMORROIDECTOMY  LUMBAR LAMINECTOMY      LUNG SURGERY      PEG TUBE REMOVAL      TRACHEOSTOMY     :    Medications, Allergies:     Current Facility-Administered Medications:     ceFAZolin (ANCEF) IVPB (premix) 1,000 mg, 1,000 mg, Intravenous, On Call To OR, Zainab Pineda MD    Allergies:  No Known Allergies:    Social and Family History:   Social History:   Social History   Substance Use Topics    Smoking status: Former Smoker     Packs/day: 1 00     Years: 20 00     Types: Cigarettes    Smokeless tobacco: Never Used      Comment: quit 40 years ago    Alcohol use No        History   Smoking Status    Former Smoker    Packs/day: 1 00    Years: 20 00    Types: Cigarettes   Smokeless Tobacco    Never Used     Comment: quit 40 years ago       Family History:  Family History   Problem Relation Age of Onset    Heart attack Mother    :     Review of Systems:     General: Fever, chills, or night sweats: negative  Cardiac: Negative for chest pain  Pulmonary: Negative for shortness of breath  Gastrointestinal: Abdominal pain negative  Nausea, vomiting, or diarrhea negative,  Genitourinary: See HPI above  Patient does not have hematuria  All other systems queried were negative  Physical Exam:   General: Patient is pleasant and in NAD  Awake and alert  /92   Pulse 67   Temp (!) 96 9 °F (36 1 °C) (Temporal)   Resp 18   Ht 5' 10" (1 778 m)   Wt 77 6 kg (171 lb)   SpO2 96%   BMI 24 54 kg/m²   Cardiac: Peripheral edema: negative  Pulmonary: Non-labored breathing  Abdomen: Soft, non-tender, non-distended  No surgical scars  No masses, tenderness, hernias noted  Genitourinary: Negative CVA tenderness, negative suprapubic tenderness        Labs:     Lab Results   Component Value Date    HGB 16 6 04/23/2018    HCT 47 6 04/23/2018    WBC 10 33 (H) 04/23/2018     (L) 04/23/2018   ]    Lab Results   Component Value Date     04/23/2018    K 3 7 04/23/2018    CL 99 (L) 04/23/2018    CO2 27 04/23/2018    BUN 25 04/23/2018    CREATININE 1 03 04/23/2018    CALCIUM 8 9 04/23/2018    GLUCOSE 411 (H) 04/23/2018   ]      Thank you for allowing me to participate in this patients care  Please do not hesitate to call with any additional questions    Rob Davison MD

## 2018-08-27 NOTE — DISCHARGE INSTRUCTIONS
UROLIFT    Please remove the hollingsworth catheter tomorrow Tuesday 8/29 as instructed by the nurses  OK to resume aspirin  Can resume Elliquis wednesday      WHAT YOU NEED TO KNOW:   A UroLift is procedure that is done to open the natural channel within your prostate gland  DISCHARGE INSTRUCTIONS:   Medicines:   · Pain medicine: You may be given a prescription medicine to decrease pain  Do not wait until the pain is severe before you take this medicine  · Antibiotics:  You may be provided with antibiotics at discharge  This medicine is given to fight or prevent an infection caused by bacteria  Always take your antibiotics exactly as ordered by your healthcare provider  Do not stop taking your medicine unless directed by your healthcare provider  Never save antibiotics or take leftover antibiotics that were given to you for another illness  · Take your medicine as directed  Contact your healthcare provider if you think your medicine is not helping or if you have side effects  Tell him or her if you are allergic to any medicine  Keep a list of the medicines, vitamins, and herbs you take  Include the amounts, and when and why you take them  Bring the list or the pill bottles to follow-up visits  Carry your medicine list with you in case of an emergency  Follow up with your healthcare provider or urologist as directed: You may need to return to make sure you do not have an infection, or to have your Hollingsworth catheter removed  Write down your questions so you remember to ask them during your visits  Hollingsworth catheter care: You may be sent home with a Hollingsworth catheter  If so you will be provided with instructions to remove it    Bladder control:  After surgery, you may leak urine and have trouble controlling when you urinate   Ask for more information about the following ways to help decrease urine leakage:  · Avoid caffeine:  Caffeine can cause problems with bladder control and increase your need to urinate  · Do pelvic floor muscle exercises:  Pelvic floor muscle exercises may help improve your bladder control, if you leak urine  These exercises are done by tightening and relaxing your pelvic muscles  Ask how to do pelvic floor muscle exercises, and how often to do them  · Limit your liquids:  Drink smaller amounts of liquid throughout the day  Do not drink before bedtime  Ask if you should decrease the amount of liquid you drink each day  This may help you control your bladder  · Wear a pad or adult diapers: These may help to absorb leaking urine and decrease the odor  Activity guidelines:  Ask when it is okay for you to return to work and activities, or to have sex  Contact your healthcare provider or urologist if:   · You have a fever  · You have new or more blood in your urine  · You have trouble starting to urinate, or have a weak stream of urine when you urinate  · You feel like you have a full bladder, even after you urinate  You may also leak urine  · You often wake up during the night to urinate  You may also feel the need to urinate right away  · You feel pain and burning when you urinate  · You feel pain or pressure in your lower abdomen  · Your urine looks cloudy, and smells bad  · You have trouble getting an erection or ejaculating  · You have questions or concerns about your condition or care  Seek care immediately or call 911 if:   · You urinate little or not at all  · You have severe abdominal or back pain  · You are dizzy or confused  · You have abdominal pain, nausea, and vomiting  · Your heartbeat is slower than usual   © 2017 [address] [employer] Information is for End User's use only and may not be sold, redistributed or otherwise used for commercial purposes  All illustrations and images included in CareNotes® are the copyrighted property of [employer]  or Lexa Bow[employer]    The above information is an educational aid only  It is not intended as medical advice for individual conditions or treatments  Talk to your doctor, nurse or pharmacist before following any medical regimen to see if it is safe and effective for you

## 2018-08-27 NOTE — OP NOTE
Operative Note     PATIENT:  Ella Hernandez (MRN [id number])    DATE OF PROCEDURE:   8/27/2018    PRE-OP DIAGNOSES:   1) BPH with obstruction     POST-OP DIAGNOSES AND OPERATIVE FINDINGS:   1) BPH with obstruction    PROCEDURES:  1) UroLift implantation    SURGEON:   Abe Mabry MD    No qualified teaching residents available to assist    ANESTHESIA TYPE:  General anesthesia    ESTIMATED BLOOD LOSS:   Minimal    COMPLICATIONS:   None    ANTIBIOTICS:  cefazolin    INTRAOPERATIVE THROMBOEMBOLISM PROPHYLAXIS:  Pneumatic compression stockings      PROCEDURE SUMMARY:    The patient was identified, brought to the operating room, and placed on the table in supine position  After induction of general anesthesia, the patient was placed in dorsal lithotomy position and prepped and draped in the usual sterile fashion  A complete formal timeout was performed  A 20F cystoscope was inserted into the bladder  The cystoscopy bridge was replaced with a UroLift delivery device  The first treatment site was the patient's left side approximately 1 5 cm distal to the bladder neck  The distal tip of the delivery device was then angled laterally approximately 20 degrees at this position to compress the lateral lobe  The trigger was pulled, thereby deploying a needle containing the implant through the prostate  The needle was then retracted, allowing one end of the implant to be delivered to the capsular surface of the prostate  The implant was then tensioned to assure capsular seating and removal of slack monofilament  The device was then angled back toward midline and slowly advanced proximally until cystoscopic verification of the monofilament being centered in the delivery bay  The urethral end piece was then affixed to the monofilament thereby tailoring the size of the implant  Excess filament was then severed  The delivery device was then re-advanced into the bladder   The delivery device was then replaced with cystoscope and bridge and the implant location and opening effect was confirmed cystoscopically  The same procedure was then repeated on the right side, and two additional implants were delivered just proximal to the veru montanum, again one on left and one on right side of the prostate, following the same technique  A total of 5 implants were deployed to create a nice anterior channel  Implants were deployed in the following locations:    1  Right bladder neck  2  Left bladder neck  3  Right mid gland  4  Left mid gland  5  Right apex      A final cystoscopy was conducted first to inspect the location and state of each implant and second, to confirm the presence of a continuous anterior channel was present through the prostatic urethra with irrigation flow turned off  A hollingsworth catheter was then placed  The patient tolerated the procedure well and was transferred to the recovery room awake alert and in stable condition  IMPLANTS:     Implant Name Type Inv   Item Serial No   Lot No  LRB No  Used   IMPLANT URO PROSTATE UROLIFT - [id number]  IMPLANT URO PROSTATE UROLIFT  [employer] [id number] N/A 4   IMPLANT URO PROSTATE UROLIFT - [id number]   IMPLANT URO PROSTATE UROLIFT   [employer] [id number] N/A 1

## 2018-08-27 NOTE — ANESTHESIA POSTPROCEDURE EVALUATION
Post-Op Assessment Note      CV Status:  Stable    Mental Status:  Alert and awake    Hydration Status:  Stable and euvolemic    PONV Controlled:  Controlled    Airway Patency:  Patent and adequate    Post Op Vitals Reviewed: Yes          Staff: CRNA           /71 (08/27/18 1228)    Temp     Pulse 74 (08/27/18 1228)   Resp 20 (08/27/18 1228)    SpO2 97 % (08/27/18 1228)

## 2018-08-28 ENCOUNTER — TELEPHONE (OUTPATIENT)
Dept: UROLOGY | Facility: CLINIC | Age: 83
End: 2018-08-28

## 2018-08-28 NOTE — TELEPHONE ENCOUNTER
Called and spoke to home nurse April at [phone] and confirmed she has order to remove hollingsworth this AM,  Advised patient to follow up on 9/24/18 at 9:30 am, [facility] with DR Fartun Escobedo as scheduled

## 2018-08-28 NOTE — TELEPHONE ENCOUNTER
Miguelairam Gutierrez patient, s/p Urolift Implantation on 8/27/18 with Dr Benny Og, per Dr Benny Og, home nurse to remove hollingsworth this AM 8/28/18 and patient to follow up in 3 to 4 weeks with MD Uroflow and PVR at visit  Post op already scheduled for 9/24/18 with DR Benny Og

## 2018-08-29 ENCOUNTER — OFFICE VISIT (OUTPATIENT)
Dept: FAMILY MEDICINE CLINIC | Facility: CLINIC | Age: 83
End: 2018-08-29
Payer: MEDICARE

## 2018-08-29 VITALS
HEIGHT: 70 IN | DIASTOLIC BLOOD PRESSURE: 90 MMHG | WEIGHT: 173 LBS | HEART RATE: 76 BPM | SYSTOLIC BLOOD PRESSURE: 140 MMHG | BODY MASS INDEX: 24.77 KG/M2 | OXYGEN SATURATION: 97 %

## 2018-08-29 DIAGNOSIS — D72.829 LEUKOCYTOSIS, UNSPECIFIED TYPE: ICD-10-CM

## 2018-08-29 DIAGNOSIS — I48.21 ATRIAL FIBRILLATION, PERMANENT (HCC): Chronic | ICD-10-CM

## 2018-08-29 DIAGNOSIS — M46.20: ICD-10-CM

## 2018-08-29 DIAGNOSIS — E78.00 PURE HYPERCHOLESTEROLEMIA: ICD-10-CM

## 2018-08-29 DIAGNOSIS — Z98.61 CAD S/P PERCUTANEOUS CORONARY ANGIOPLASTY: ICD-10-CM

## 2018-08-29 DIAGNOSIS — IMO0002 UNCONTROLLED TYPE 2 DIABETES MELLITUS WITH DIABETIC POLYNEUROPATHY, WITHOUT LONG-TERM CURRENT USE OF INSULIN: Primary | Chronic | ICD-10-CM

## 2018-08-29 DIAGNOSIS — E11.8 TYPE 2 DIABETES MELLITUS WITH COMPLICATION, WITH LONG-TERM CURRENT USE OF INSULIN (HCC): ICD-10-CM

## 2018-08-29 DIAGNOSIS — I25.10 CAD S/P PERCUTANEOUS CORONARY ANGIOPLASTY: ICD-10-CM

## 2018-08-29 DIAGNOSIS — N40.1 BPH WITH OBSTRUCTION/LOWER URINARY TRACT SYMPTOMS: ICD-10-CM

## 2018-08-29 DIAGNOSIS — Z79.4 TYPE 2 DIABETES MELLITUS WITH COMPLICATION, WITH LONG-TERM CURRENT USE OF INSULIN (HCC): ICD-10-CM

## 2018-08-29 DIAGNOSIS — I50.42 CHRONIC COMBINED SYSTOLIC AND DIASTOLIC CONGESTIVE HEART FAILURE (HCC): ICD-10-CM

## 2018-08-29 DIAGNOSIS — N13.8 BPH WITH OBSTRUCTION/LOWER URINARY TRACT SYMPTOMS: ICD-10-CM

## 2018-08-29 DIAGNOSIS — M54.50 ACUTE LEFT-SIDED LOW BACK PAIN WITHOUT SCIATICA: ICD-10-CM

## 2018-08-29 PROBLEM — Z01.818 PREOPERATIVE CLEARANCE: Status: RESOLVED | Noted: 2018-07-05 | Resolved: 2018-08-29

## 2018-08-29 PROCEDURE — 99214 OFFICE O/P EST MOD 30 MIN: CPT | Performed by: FAMILY MEDICINE

## 2018-08-29 NOTE — ASSESSMENT & PLAN NOTE
Clinically stable  I stressed the need to really get control of his diabetes to prevent further recurrence of infections and other complications

## 2018-08-29 NOTE — ASSESSMENT & PLAN NOTE
He is still having some hematuria  Continue to monitor him clinically  Continue with Urology follow-up

## 2018-08-29 NOTE — ASSESSMENT & PLAN NOTE
Lab Results   Component Value Date    HGBA1C 12 5 08/02/2018     Increase insulin to 18 U daily at this point  Monitor glucose readings at least twice daily  His glucose will bump up in light of my trigger point injection today

## 2018-08-29 NOTE — ASSESSMENT & PLAN NOTE
He does have chronic severe low back pain  He remains on tramadol as needed  He also follows routinely with pain management

## 2018-08-29 NOTE — PROGRESS NOTES
Assessment/Plan:    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, without long-term current use of insulin ([facility])  Lab Results   Component Value Date    HGBA1C 12 5 08/02/2018     Increase insulin to 18 U daily at this point  Monitor glucose readings at least twice daily  His glucose will bump up in light of my trigger point injection today  Atrial fibrillation, permanent ([facility] [address] )   Rate controlled  He does remain on anticoagulation and beta blockade  DVT (deep venous thrombosis) ([facility])    No sign of recurrence  He is on   Eliquis    Benign essential hypertension   Blood pressure remains well controlled  Continue current medications  CAD S/P percutaneous coronary angioplasty    He seems quite stable from a coronary perspective  Chronic combined systolic and diastolic congestive heart failure ([facility] [address] )    He appears to be euvolemic at this point  Chronic osteomyelitis of spine ([facility])    Clinically stable  I stressed the need to really get control of his diabetes to prevent further recurrence of infections and other complications  Lumbar compression fracture ([facility])    He does have chronic severe low back pain  He remains on tramadol as needed  He also follows routinely with pain management  BPH with obstruction/lower urinary tract symptoms   He is still having some hematuria  Continue to monitor him clinically  Continue with Urology follow-up  Leukocytosis   Check CBC       Diagnoses and all orders for this visit:    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, without long-term current use of insulin ([facility])  -     insulin glargine (LANTUS SOLOSTAR) 100 units/mL injection pen; Inject 18 Units under the skin daily  -     Hemoglobin A1C; Future  -     Comprehensive metabolic panel; Future    Atrial fibrillation, permanent ([facility])  -     CBC and differential; Future  -     TSH, 3rd generation with Free T4 reflex;  Future    CAD S/P percutaneous coronary angioplasty  -     Comprehensive metabolic panel; Future  -     CBC and differential; Future    Chronic combined systolic and diastolic congestive heart failure (HCC)    Chronic osteomyelitis of spine (HCC)    Type 2 diabetes mellitus with complication, with long-term current use of insulin (HCC)  -     Hemoglobin A1C; Future    BPH with obstruction/lower urinary tract symptoms    Pure hypercholesterolemia  -     Comprehensive metabolic panel; Future    Acute left-sided low back pain without sciatica  -     Inj Trigger Point Single/Multiple; Future    Leukocytosis, unspecified type          Subjective:      Patient ID: Gagandeep Boone is a 81 y o  male  HPI patient presents today for follow-up for his chronic health issues  He has history of type 2 diabetes which remains poorly controlled  Unfortunately, his glucose readings are still in the 200s although this is an improvement  He denies polyuria or polydipsia  He does have a history of atrial fibrillation as well as coronary artery disease  He denies any current problems chest pain, shortness of breath, palpitations or lightheadedness  He tolerates anticoagulation without bruising or bleeding  His main complaint today is pain in his left upper buttock  He does have chronic low back pain with a history of compression fracture  This pain has been present for about 2 weeks and occurred in the absence of any fall or injury  It is quite severe and increases when he palpates on the area  He has history of congestive heart failure and denies any excessive shortness of breath lower extremity swelling  He has known COPD and denies any current coughing or wheezing      He is currently on Keflex for a recent procedure for his chronic polyuria called a Uro lift    The following portions of the patient's history were reviewed and updated as appropriate: allergies, current medications, past family history, past medical history, past social history, past surgical history and problem list     Review of Systems   Constitutional: Negative for appetite change, chills, fatigue, fever and unexpected weight change  HENT: Negative for trouble swallowing  Eyes: Negative for visual disturbance  Respiratory: Negative for cough, chest tightness, shortness of breath and wheezing  Cardiovascular: Negative for chest pain  Gastrointestinal: Negative for abdominal distention, abdominal pain, blood in stool, constipation and diarrhea  Endocrine: Negative for polyuria  Genitourinary: Positive for hematuria  Negative for difficulty urinating and flank pain  Musculoskeletal: Positive for arthralgias, back pain and gait problem  Negative for myalgias  Skin: Negative for rash  Neurological: Negative for dizziness and light-headedness  Hematological: Negative for adenopathy  Does not bruise/bleed easily  Psychiatric/Behavioral: Negative for sleep disturbance  Objective:      /90 (BP Location: Left arm, Patient Position: Sitting, Cuff Size: Standard)   Pulse 76   Ht 5' 10" (1 778 m)   Wt 78 5 kg (173 lb)   SpO2 97%   BMI 24 82 kg/m²          Physical Exam   Constitutional: He is oriented to person, place, and time  He appears well-developed and well-nourished  No distress  HENT:   Head: Normocephalic  Eyes: Pupils are equal, round, and reactive to light  Neck: No tracheal deviation present  No thyromegaly present  Cardiovascular: Normal rate, regular rhythm and normal heart sounds  No murmur heard  Pulmonary/Chest: Effort normal  No respiratory distress  He has no wheezes  He has no rales  Abdominal: Soft  He exhibits no distension  There is no tenderness  Musculoskeletal: Normal range of motion  He exhibits tenderness ( he has significant painTo palpation and the left upper buttock  )  Neurological: He is alert and oriented to person, place, and time  No cranial nerve deficit  Skin: Skin is warm  He is not diaphoretic  Psychiatric: He has a normal mood and affect   Judgment and thought content normal

## 2018-09-04 DIAGNOSIS — IMO0002 UNCONTROLLED TYPE 2 DIABETES MELLITUS WITH DIABETIC POLYNEUROPATHY, WITHOUT LONG-TERM CURRENT USE OF INSULIN: Chronic | ICD-10-CM

## 2018-09-04 DIAGNOSIS — R26.2 AMBULATORY DYSFUNCTION: Primary | ICD-10-CM

## 2018-09-05 ENCOUNTER — OFFICE VISIT (OUTPATIENT)
Dept: CARDIOLOGY CLINIC | Facility: CLINIC | Age: 83
End: 2018-09-05
Payer: MEDICARE

## 2018-09-05 VITALS
DIASTOLIC BLOOD PRESSURE: 60 MMHG | BODY MASS INDEX: 24.88 KG/M2 | WEIGHT: 173.8 LBS | SYSTOLIC BLOOD PRESSURE: 100 MMHG | HEART RATE: 75 BPM | HEIGHT: 70 IN

## 2018-09-05 DIAGNOSIS — I48.91 ATRIAL FIBRILLATION, UNSPECIFIED TYPE (HCC): Primary | ICD-10-CM

## 2018-09-05 PROCEDURE — 93000 ELECTROCARDIOGRAM COMPLETE: CPT | Performed by: INTERNAL MEDICINE

## 2018-09-05 PROCEDURE — 99214 OFFICE O/P EST MOD 30 MIN: CPT | Performed by: INTERNAL MEDICINE

## 2018-09-05 RX ORDER — CELECOXIB 100 MG/1
CAPSULE ORAL
COMMUNITY
Start: 2015-06-24 | End: 2019-02-04

## 2018-09-05 RX ORDER — ATORVASTATIN CALCIUM 10 MG/1
TABLET, FILM COATED ORAL
COMMUNITY
Start: 2015-09-15 | End: 2019-02-04

## 2018-09-05 RX ORDER — AMLODIPINE BESYLATE 5 MG/1
5 TABLET ORAL DAILY
COMMUNITY
Start: 2015-05-26 | End: 2019-02-04

## 2018-09-05 NOTE — TELEPHONE ENCOUNTER
Pt rx printed    Which pharm was it supposed to be sent or faxed to? I called and left message for pt to call us back and let us know  But I put rx up front in the meantime!

## 2018-09-05 NOTE — PROGRESS NOTES
Cardiology Follow Up        Nuha Vicente      1935      [id number]      Discussion/Summary:    1  CAD status post remote myocardial infarction, prior PCI/coronary stenting with unknown details:  No symptoms of angina  Prior echocardiogram 08/2016 with normal ejection fraction/apical wall motion abnormality suggestive of prior MI  Would consider ischemic evaluation/stress testing if any exertional symptoms  2   Benign essential hypertension:  Acceptable, continue metoprolol    3  Chronic diastolic heart failure:  Euvolemic on examination, continue furosemide 20 mg alternating with 40 mg daily  4   Permanent atrial fibrillation:  Adequately rate controlled on exam, continue metoprolol  Patient's LPN and patient both have expressed interest in transitioning from Eliquis to warfarin for financial reasons  I called his LPN, April who stated that the patient has about 2 more weeks of Eliquis left after which she will call and we will make the transition to warfarin  The patient is agreeable with this  Follow-up in 3 months              Interval History: This is an 81-year-old male with a history of CAD status post multiple PCI in the remote past, permanent atrial fibrillation, prior cardiomyopathy with ejection fraction 20% with subsequent echocardiogram 08/2016 revealing ejection fraction 55%  He also has a history of chronic diastolic heart failure  He presents today for follow-up  He has no complaints on today's visit  His personal LPN (April) is not present with him today             Vitals:  Vitals:    09/05/18 1352   BP: 100/60   BP Location: Left arm   Patient Position: Sitting   Cuff Size: Large   Pulse: 75   Weight: 78 8 kg (173 lb 12 8 oz)   Height: 5' 10" (1 778 m)         Past Medical History:   Diagnosis Date    Arthritis     Atrial fibrillation (HCC)     BPH (benign prostatic hyperplasia)     CAD (coronary artery disease)     Chronic back pain     Chronic diastolic (congestive) heart failure (HCC)     COPD (chronic obstructive pulmonary disease) (HCC)     Critical illness polyneuropathy (HCC)     Diabetes mellitus (HCC)     type 2    Dysphagia     GERD (gastroesophageal reflux disease)     Hemorrhoids     History of colon cancer     History of DVT (deep vein thrombosis)     Hyperlipidemia     Hypertension     Hyponatremia     [facility]     Myocardial infarct ([facility] )     Osteomyelitis ([facility] )     Pneumonia     Sepsis ([facility] )     Urinary retention      Social History     Social History    Marital status:      Spouse name: N/A    Number of children: N/A    Years of education: N/A     Occupational History     - retired      Social History Main Topics    Smoking status: Former Smoker     Packs/day: 1 00     Years: 20 00     Types: Cigarettes     Quit date: 8/29/1978    Smokeless tobacco: Never Used      Comment: quit 40 years ago    Alcohol use No    Drug use: No    Sexual activity: Not on file     Other Topics Concern    Not on file     Social History Narrative    Lives independently until recent episode of sepsis 3/16 - now in assisted living      Family History   Problem Relation Age of Onset    Heart attack Mother      Past Surgical History:   Procedure Laterality Date    APPENDECTOMY      BACK SURGERY      BRONCHOSCOPY N/A 6/30/2016    Procedure: BRONCHOSCOPY FLEXIBLE with bronchial lavage to the left lower lobe; Surgeon: Amira Weaver MD;  Location: [facility];   Service:     CATARACT EXTRACTION      CHOLECYSTECTOMY      COLECTOMY      COLON SURGERY      polypectomy for cancerous lesion    COLONOSCOPY      CORONARY ANGIOPLASTY WITH STENT PLACEMENT      x5  pt unsure of where    GASTROSTOMY TUBE PLACEMENT      percutaneous placement of gastrostomy tube placed 4/16 - dc 8/16    HEMORROIDECTOMY      LUMBAR LAMINECTOMY      LUNG SURGERY      PEG TUBE REMOVAL      WA CYSTOURETHRO W/IMPLANT N/A 8/27/2018    Procedure: Denisha Hawkins;  Surgeon: Omar Ramos MD;  Location: [facility];  Service: Urology    TRACHEOSTOMY         Current Outpatient Prescriptions:     ACCU-CHEK FASTCLIX LANCETS MISC, by Does not apply route 2 (two) times a day, Disp: , Rfl:     amLODIPine (NORVASC) 5 mg tablet, Take by mouth, Disp: , Rfl:     apixaban (ELIQUIS) 5 mg, Take 5 mg by mouth 2 (two) times a day , Disp: , Rfl:     aspirin (ECOTRIN LOW STRENGTH) 81 mg EC tablet, Take 1 tablet by mouth daily, Disp: , Rfl:     atorvastatin (LIPITOR) 10 mg tablet, Take by mouth, Disp: , Rfl:     celecoxib (CELEBREX) 100 mg capsule, Take by mouth, Disp: , Rfl:     docusate sodium (COLACE) 100 mg capsule, Take 1 capsule (100 mg total) by mouth 2 (two) times a day for 15 days, Disp: 30 capsule, Rfl: 0    famotidine (PEPCID) 20 mg tablet, Take 1 tablet by mouth 2 (two) times a day, Disp: , Rfl:     Fesoterodine Fumarate ER (TOVIAZ) 4 MG TB24, Take by mouth, Disp: , Rfl:     folic acid (FOLVITE) 1 mg tablet, TAKE 1 TABLET BY MOUTH  DAILY, Disp: 90 tablet, Rfl: 3    furosemide (LASIX) 20 mg tablet, Take 20 mg by mouth every other day  , Disp: , Rfl:     furosemide (LASIX) 40 mg tablet, Take 40 mg by mouth every other day  , Disp: , Rfl:     gabapentin (NEURONTIN) 400 mg capsule, Take 400 mg by mouth 3 (three) times a day  , Disp: , Rfl:     ibuprofen (MOTRIN) 200 mg tablet, Take 3 tablets (600 mg total) by mouth every 6 (six) hours as needed for mild pain, Disp: , Rfl: 0    insulin glargine (LANTUS SOLOSTAR) 100 units/mL injection pen, Inject 18 Units under the skin daily, Disp: 5 pen, Rfl: 5    levalbuterol (XOPENEX) 1 25 mg/3 mL nebulizer solution, Inhale, Disp: , Rfl:     lidocaine (LIDODERM) 5 %, Place 1 patch on the skin every 24 hours Remove & Discard patch within 12 hours or as directed by MD (Patient taking differently: Place 1 patch on the skin every 24 hours Remove & Discard patch within 12 hours or as directed by MD ), Disp: 10 patch, Rfl: 0    loperamide (IMODIUM A-D) 2 MG tablet, Take 1 tablet by mouth 4 (four) times a day as needed, Disp: , Rfl:     metoprolol tartrate (LOPRESSOR) 25 mg tablet, Take 12 5 mg by mouth daily at bedtime  , Disp: , Rfl:     Mirabegron ER (MYRBETRIQ) 50 MG TB24, Take by mouth, Disp: , Rfl:     mirtazapine (REMERON) 7 5 MG tablet, Take 1 tablet (7 5 mg total) by mouth daily at bedtime, Disp: 30 tablet, Rfl: 1    Multiple Vitamin (MULTIVITAMIN) tablet, Take 1 tablet by mouth daily, Disp: , Rfl:     nitroglycerin (NITROLINGUAL) 0 4 mg/spray spray, 1 spray every 5 (five) minutes as needed  , Disp: , Rfl:     nystatin (MYCOSTATIN) powder, Apply to affected area 2-3 times daily until resolved , Disp: 15 g, Rfl: 0    ONE TOUCH ULTRA TEST test strip, TEST TWICE DAILY E 11 9, Disp: , Rfl: 0    Psyllium (METAMUCIL) 28 3 % POWD, Take by mouth, Disp: , Rfl:     sodium chloride 0 9 % nebulizer solution, Inhale every 4 (four) hours as needed  , Disp: , Rfl:     traMADol (ULTRAM) 50 mg tablet, TAKE 1 TABLET BY MOUTH TWO  TIMES DAILY (Patient taking differently: TAKE 1 TABLET BY MOUTH TWO  TIMES DAILY  AS NEEDED), Disp: 60 tablet, Rfl: 3    Triamcinolone Acetonide (NASACORT ALLERGY 24HR) 55 MCG/ACT AERO, 1 puff into each nostril as needed  , Disp: , Rfl:         Review of Systems:  Review of Systems   Respiratory: Negative  Cardiovascular: Negative for chest pain  All other systems reviewed and are negative  Physical Exam:  Physical Exam   Constitutional: He is oriented to person, place, and time  He appears well-developed and well-nourished  No distress  HENT:   Head: Normocephalic and atraumatic  Eyes: EOM are normal  Pupils are equal, round, and reactive to light  Right eye exhibits no discharge  No scleral icterus  Neck: Normal range of motion  Neck supple  No thyromegaly present     Cardiovascular: Normal rate and normal heart sounds  Exam reveals no gallop and no friction rub  No murmur heard  Irregularly irregular rhythm, normal rate   Pulmonary/Chest: Effort normal and breath sounds normal    Abdominal: He exhibits no distension  There is no tenderness  There is no rebound and no guarding  Musculoskeletal: Normal range of motion  He exhibits no edema  Neurological: He is alert and oriented to person, place, and time  Coordination normal    Skin: Skin is warm and dry  No rash noted  He is not diaphoretic  No erythema  No pallor  Psychiatric: He has a normal mood and affect   His behavior is normal  Judgment and thought content normal

## 2018-09-10 ENCOUNTER — EVALUATION (OUTPATIENT)
Dept: PHYSICAL THERAPY | Facility: CLINIC | Age: 83
End: 2018-09-10
Payer: MEDICARE

## 2018-09-10 DIAGNOSIS — R26.2 AMBULATORY DYSFUNCTION: Primary | ICD-10-CM

## 2018-09-10 PROCEDURE — 97162 PT EVAL MOD COMPLEX 30 MIN: CPT

## 2018-09-10 PROCEDURE — G8978 MOBILITY CURRENT STATUS: HCPCS

## 2018-09-10 PROCEDURE — G8979 MOBILITY GOAL STATUS: HCPCS
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
## 2018-09-13 ENCOUNTER — OFFICE VISIT (OUTPATIENT)
Dept: PHYSICAL THERAPY | Facility: CLINIC | Age: 83
End: 2018-09-13
Payer: MEDICARE

## 2018-09-13 DIAGNOSIS — R26.2 AMBULATORY DYSFUNCTION: Primary | ICD-10-CM

## 2018-09-13 PROCEDURE — 97112 NEUROMUSCULAR REEDUCATION: CPT

## 2018-09-13 PROCEDURE — 97110 THERAPEUTIC EXERCISES: CPT

## 2018-09-13 NOTE — PROGRESS NOTES
Daily Note     Today's date: 2018  Patient name: Sarmad Bowen  : 1935  MRN: [id number]  Referring provider: Freddy Owen DO  Dx: No diagnosis found  Subjective: Patient states he is having back pain this morning, however, it feels a little better once he moves around  Precautions: Fall Risk,     Daily Treatment Diary     Manual                                                                                   Exercise Diary              Bike X 5 min            UT stretch 20 sec x 4                         Cervical extension- towel 20 sec x 4            Cervical rotations- towel 20 sec x 4            Physio ball rollout 10 : x 10            SKTC 5 sec x 10                         Chin Tucks 3 sec x 10            Standing hip 3 way 2 x 10 each B            Feet together balance/floor - eyes open X 2            Step Ups  NV            Marching  X 2 x 20 ft min A            Sit to stands X 10                                                       Modalities                                                         Objective: See treatment diary above      Assessment: Tolerated treatment fairly well  LEs are very weak  Had much difficulty standing up for chair and cervical ROM remains limited as well  Followed all exercise commands well  Plan: Continue with plan

## 2018-09-17 ENCOUNTER — OFFICE VISIT (OUTPATIENT)
Dept: PHYSICAL THERAPY | Facility: CLINIC | Age: 83
End: 2018-09-17
Payer: MEDICARE

## 2018-09-17 DIAGNOSIS — R26.2 AMBULATORY DYSFUNCTION: Primary | ICD-10-CM

## 2018-09-17 PROCEDURE — 97110 THERAPEUTIC EXERCISES: CPT | Performed by: PHYSICAL THERAPIST

## 2018-09-17 PROCEDURE — 97112 NEUROMUSCULAR REEDUCATION: CPT | Performed by: PHYSICAL THERAPIST

## 2018-09-17 NOTE — PROGRESS NOTES
Daily Note     Today's date: 2018  Patient name: Ulises Zavala  : 1935  MRN: [id number]  Referring provider: Sheryl Pugh DO  Dx:   Encounter Diagnosis     ICD-10-CM    1  Ambulatory dysfunction R26 2                   Subjective: Pt comes to therapy denying significant changes from last session  States he feels he had a good workout after last session  Precautions: Fall Risk,     Daily Treatment Diary     Manual                                                                                   Exercise Diary             Bike X 5 min 5 min           UT stretch 20sec x 4 20sec x 4                        Cervical extension- towel 20sec x 4 20sec x 4           Cervical rotations- towel 20sec x 4 3 sec x10           Physio ball rollout :10 x 10 :10 x 10           SKTC 5 sec x 10 10 sec x10                        Chin Tucks 3 sec x 10 3" x10           Standing hip 3 way 2 x 10 each B x30 ea           Feet together balance/floor - eyes closed X 2 30" x3           SLS floor  nv           Biodex - LOS  nv                        Step Ups  NV 1R x10 ea           Marching  X 2 x 20 ft min A np           Sit to stands X 10 x10 no HHA                                                      Modalities                                                         Objective: See treatment diary above      Assessment: Tolerated treatment well  Cuing for set up and dosage  Good challenge with balance activities  Given HEP today  Plan: Continue with plan

## 2018-09-20 ENCOUNTER — OFFICE VISIT (OUTPATIENT)
Dept: PHYSICAL THERAPY | Facility: CLINIC | Age: 83
End: 2018-09-20
Payer: MEDICARE

## 2018-09-20 DIAGNOSIS — R26.2 AMBULATORY DYSFUNCTION: Primary | ICD-10-CM

## 2018-09-20 PROCEDURE — 97110 THERAPEUTIC EXERCISES: CPT | Performed by: PHYSICAL THERAPIST

## 2018-09-20 PROCEDURE — 97112 NEUROMUSCULAR REEDUCATION: CPT | Performed by: PHYSICAL THERAPIST

## 2018-09-20 PROCEDURE — 97150 GROUP THERAPEUTIC PROCEDURES: CPT

## 2018-09-20 NOTE — PROGRESS NOTES
Daily Note     Today's date: 2018  Patient name: Ella Hernandez  : 1935  MRN: [id number]  Referring provider: Jonnathan Lim DO  Dx:   Encounter Diagnosis     ICD-10-CM    1  Ambulatory dysfunction R26 2        Start Time: 930  Stop Time: 103  Total time in clinic (min): 60 minutes    Subjective: Pt states he has a little bit of pain across his low back  Pt notes his endurance has been limited due to his low back pain  Objective: See treatment diary below    Precautions: Fall Risk,      Daily Treatment Diary      Manual                                                                                                                                                      Exercise Diary                   Bike X 5 min 5 min  7 min                 UT stretch 20sec x 4 20sec x 4  20 sec x4                                         Cervical extension- towel 20sec x 4 20sec x 4  20"x4                 Cervical rotations- towel 20sec x 4 3 sec x10  10"x10                 Physio ball rollout :10 x 10 :10 x 10  10x10                 SKTC 5 sec x 10 10 sec x10  10 secx10                                         Chin Tucks 3 sec x 10 3" x10  3"x15                 Standing hip 3 way 2 x 10 each B x30 ea  10x ea                 Feet together balance/floor - eyes closed X 2 30" x3  30"x3                 SLS floor   nv  30"                 Biodex - LOS   nv  performed                                         Step Ups  NV 1R x10 ea  1Rx10 ea                 Marching  X 2 x 20 ft min A np  np                 Sit to stands X 10 x10 no HHA  5x tired                                                                                                Modalities                                                                                                      Assessment: Tolerated treatment well   Patient demonstrated fatigue post treatment, exhibited good technique with therapeutic exercises and would benefit from continued PT  Pt demonstrated fatigue post standing hip 3 way and was cued to keep trunk neutral  Pt worked out hard today and showed fatigue by rep 5 during sit to stands and was unable to perform the rest of the reps  Pt responded great to new exercises and was advised to continue HEP  Pt states his back is still sore post treatment and states he is going to get a patch from pain management which helps him  Plan: Continue per plan of care  Progress treatment as tolerated

## 2018-09-24 ENCOUNTER — OFFICE VISIT (OUTPATIENT)
Dept: UROLOGY | Facility: CLINIC | Age: 83
End: 2018-09-24
Payer: MEDICARE

## 2018-09-24 VITALS
HEIGHT: 70 IN | WEIGHT: 170 LBS | BODY MASS INDEX: 24.34 KG/M2 | HEART RATE: 76 BPM | SYSTOLIC BLOOD PRESSURE: 138 MMHG | DIASTOLIC BLOOD PRESSURE: 72 MMHG

## 2018-09-24 DIAGNOSIS — N40.1 BENIGN PROSTATIC HYPERPLASIA WITH LOWER URINARY TRACT SYMPTOMS, SYMPTOM DETAILS UNSPECIFIED: Primary | ICD-10-CM

## 2018-09-24 LAB
POST-VOID RESIDUAL VOLUME, ML POC: 206 ML
SL AMB  POCT GLUCOSE, UA: ABNORMAL
SL AMB LEUKOCYTE ESTERASE,UA: ABNORMAL
SL AMB POCT BILIRUBIN,UA: ABNORMAL
SL AMB POCT BLOOD,UA: ABNORMAL
SL AMB POCT CLARITY,UA: ABNORMAL
SL AMB POCT COLOR,UA: YELLOW
SL AMB POCT KETONES,UA: ABNORMAL
SL AMB POCT NITRITE,UA: ABNORMAL
SL AMB POCT PH,UA: 5
SL AMB POCT SPECIFIC GRAVITY,UA: 1.01
SL AMB POCT URINE PROTEIN: ABNORMAL
SL AMB POCT UROBILINOGEN: ABNORMAL

## 2018-09-24 PROCEDURE — 51798 US URINE CAPACITY MEASURE: CPT | Performed by: UROLOGY

## 2018-09-24 PROCEDURE — 81002 URINALYSIS NONAUTO W/O SCOPE: CPT | Performed by: UROLOGY

## 2018-09-24 PROCEDURE — 99214 OFFICE O/P EST MOD 30 MIN: CPT | Performed by: UROLOGY

## 2018-09-24 PROCEDURE — 87086 URINE CULTURE/COLONY COUNT: CPT | Performed by: UROLOGY

## 2018-09-24 RX ORDER — HYDROCODONE BITARTRATE AND ACETAMINOPHEN 5; 325 MG/1; MG/1
TABLET ORAL
COMMUNITY
Start: 2018-09-08 | End: 2019-02-04

## 2018-09-24 RX ORDER — METFORMIN HYDROCHLORIDE 500 MG/1
1000 TABLET, EXTENDED RELEASE ORAL
COMMUNITY
Start: 2018-09-06 | End: 2019-09-06 | Stop reason: ALTCHOICE

## 2018-09-24 NOTE — PROGRESS NOTES
Referring Physician: Shahid Guillory MD  A copy of this note was sent to the referring physician  Diagnoses and all orders for this visit:    Benign prostatic hyperplasia with lower urinary tract symptoms, symptom details unspecified  -     POCT Measure PVR    Other orders  -     metFORMIN (GLUCOPHAGE-XR) 500 mg 24 hr tablet;   -     HYDROcodone-acetaminophen (NORCO) 5-325 mg per tablet; Earliest Fill Date: 9/8/18             Assessment and plan:     BPH with medically refractory lower urinary tract symptoms      Ramy Myers does have persistent storage symptoms including urgency frequency and occasional urge urinary incontinence following the Uro lift  He continues on maximum dual modality medication for his overactive bladder including Toviaz and Myrbetriq  At this point is bladder outlet appears to be optimized with the procedure  Plan:  - will submit urine for culture to rule out infection  - marginal postvoid residual of 200 cc noted today obtained medially after void  Given this I recommended a trial of stopping his anticholinergic in 2 weeks and continue with mirabegron monotherapy  - also recommended continued glycemic control under the direction of Dr Bassem Parekh   - follow-up 3 months additional uroflow PVR  I expect that ongoing medical management will be required for his complex voiding dysfunction      Abe Mabry MD      Chief Complaint     Uro lift follow-up      History of Present Illness     Ella Hernandez is a 81 y o  male with a longstanding history of obstructive lower urinary tract symptoms  He is status post Uro lift procedure  He notes an improvement in his flow  He still sits to urinate in order to better direct his stream   He remains highly bothered however by extreme frequency up to every hour during the day  He continues on combination medical management with mirabegron as well as Toviaz      No interval UTIs or hematuria or pain    Detailed Urologic History     - please refer to HPI    Review of Systems     Review of Systems   Constitutional: Negative for activity change and fatigue  HENT: Negative for congestion  Eyes: Negative for visual disturbance  Respiratory: Negative for shortness of breath and wheezing  Cardiovascular: Negative for chest pain and leg swelling  Gastrointestinal: Negative for abdominal pain  Endocrine: Positive for polyuria  Genitourinary: Positive for dysuria, frequency and urgency  Negative for flank pain and hematuria  Musculoskeletal: Negative for back pain  Allergic/Immunologic: Negative for immunocompromised state  Neurological: Negative for dizziness and numbness  Psychiatric/Behavioral: Negative for dysphoric mood  All other systems reviewed and are negative  Allergies     No Known Allergies    Physical Exam     Physical Exam   Constitutional: He is oriented to person, place, and time  He appears well-developed and well-nourished  No distress  HENT:   Head: Normocephalic and atraumatic  Eyes: EOM are normal    Neck: Normal range of motion  Prior tracheostomy site noted   Cardiovascular:   Negative lower extremity edema   Pulmonary/Chest: Effort normal and breath sounds normal    Abdominal: Soft  Genitourinary: Penis normal    Musculoskeletal: Normal range of motion  Neurological: He is alert and oriented to person, place, and time  Skin: Skin is warm  Psychiatric: He has a normal mood and affect   His behavior is normal            Vital Signs  Vitals:    09/24/18 0926   BP: 138/72   Pulse: 76   Weight: 77 1 kg (170 lb)   Height: 5' 10" (1 778 m)         Current Medications       Current Outpatient Prescriptions:     ACCU-CHEK FASTCLIX LANCETS MISC, by Does not apply route 2 (two) times a day, Disp: , Rfl:     amLODIPine (NORVASC) 5 mg tablet, Take by mouth, Disp: , Rfl:     apixaban (ELIQUIS) 5 mg, Take 5 mg by mouth 2 (two) times a day , Disp: , Rfl:     aspirin (ECOTRIN LOW STRENGTH) 81 mg EC tablet, Take 1 tablet by mouth daily, Disp: , Rfl:     atorvastatin (LIPITOR) 10 mg tablet, Take by mouth, Disp: , Rfl:     celecoxib (CELEBREX) 100 mg capsule, Take by mouth, Disp: , Rfl:     famotidine (PEPCID) 20 mg tablet, Take 1 tablet by mouth 2 (two) times a day, Disp: , Rfl:     Fesoterodine Fumarate ER (TOVIAZ) 4 MG TB24, Take by mouth, Disp: , Rfl:     folic acid (FOLVITE) 1 mg tablet, TAKE 1 TABLET BY MOUTH  DAILY, Disp: 90 tablet, Rfl: 3    furosemide (LASIX) 20 mg tablet, Take 20 mg by mouth every other day  , Disp: , Rfl:     furosemide (LASIX) 40 mg tablet, Take 40 mg by mouth every other day  , Disp: , Rfl:     gabapentin (NEURONTIN) 400 mg capsule, Take 400 mg by mouth 3 (three) times a day  , Disp: , Rfl:     HYDROcodone-acetaminophen (NORCO) 5-325 mg per tablet, , Disp: , Rfl:     ibuprofen (MOTRIN) 200 mg tablet, Take 3 tablets (600 mg total) by mouth every 6 (six) hours as needed for mild pain, Disp: , Rfl: 0    insulin glargine (LANTUS SOLOSTAR) 100 units/mL injection pen, Inject 18 Units under the skin daily, Disp: 5 pen, Rfl: 5    levalbuterol (XOPENEX) 1 25 mg/3 mL nebulizer solution, Inhale, Disp: , Rfl:     lidocaine (LIDODERM) 5 %, Place 1 patch on the skin every 24 hours Remove & Discard patch within 12 hours or as directed by MD (Patient taking differently: Place 1 patch on the skin every 24 hours Remove & Discard patch within 12 hours or as directed by MD ), Disp: 10 patch, Rfl: 0    loperamide (IMODIUM A-D) 2 MG tablet, Take 1 tablet by mouth 4 (four) times a day as needed, Disp: , Rfl:     metFORMIN (GLUCOPHAGE-XR) 500 mg 24 hr tablet, , Disp: , Rfl:     metoprolol tartrate (LOPRESSOR) 25 mg tablet, Take 12 5 mg by mouth daily at bedtime  , Disp: , Rfl:     Mirabegron ER (MYRBETRIQ) 50 MG TB24, Take by mouth, Disp: , Rfl:     mirtazapine (REMERON) 7 5 MG tablet, Take 1 tablet (7 5 mg total) by mouth daily at bedtime, Disp: 30 tablet, Rfl: 1    Multiple Vitamin (MULTIVITAMIN) tablet, Take 1 tablet by mouth daily, Disp: , Rfl:     nitroglycerin (NITROLINGUAL) 0 4 mg/spray spray, 1 spray every 5 (five) minutes as needed  , Disp: , Rfl:     nystatin (MYCOSTATIN) powder, Apply to affected area 2-3 times daily until resolved , Disp: 15 g, Rfl: 0    ONE TOUCH ULTRA TEST test strip, TEST TWICE DAILY E 11 9, Disp: , Rfl: 0    Psyllium (METAMUCIL) 28 3 % POWD, Take by mouth, Disp: , Rfl:     sodium chloride 0 9 % nebulizer solution, Inhale every 4 (four) hours as needed  , Disp: , Rfl:     traMADol (ULTRAM) 50 mg tablet, TAKE 1 TABLET BY MOUTH TWO  TIMES DAILY (Patient taking differently: TAKE 1 TABLET BY MOUTH TWO  TIMES DAILY  AS NEEDED), Disp: 60 tablet, Rfl: 3    Triamcinolone Acetonide (NASACORT ALLERGY 24HR) 55 MCG/ACT AERO, 1 puff into each nostril as needed  , Disp: , Rfl:     docusate sodium (COLACE) 100 mg capsule, Take 1 capsule (100 mg total) by mouth 2 (two) times a day for 15 days, Disp: 30 capsule, Rfl: 0      Active Problems     Patient Active Problem List   Diagnosis    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, without long-term current use of insulin ([facility])    Chronic osteomyelitis of spine ([facility])    Atrial fibrillation, permanent ([facility])    Cancer, colon ([facility])    DVT (deep venous thrombosis) ([facility])    Pure hypercholesterolemia    Hemorrhoids    History of DVT (deep vein thrombosis)    History of colon cancer    COLD (chronic obstructive lung disease) ([facility])    Chronic right-sided low back pain without sciatica    Allergic rhinitis    Benign essential hypertension    BPH with obstruction/lower urinary tract symptoms    CAD S/P percutaneous coronary angioplasty    Chronic combined systolic and diastolic congestive heart failure ([facility])    Dysphagia    Esophageal reflux    Floaters in visual field    Insomnia    Leukocytosis    Lumbar compression fracture ([facility])    OAB (overactive bladder)         Past Medical History     Past Medical History:   Diagnosis Date    Arthritis     Atrial fibrillation (HCC)     BPH (benign prostatic hyperplasia)     CAD (coronary artery disease)     Chronic back pain     Chronic diastolic (congestive) heart failure (HCC)     COPD (chronic obstructive pulmonary disease) (HCC)     Critical illness polyneuropathy (HCC)     Diabetes mellitus (HCC)     type 2    Dysphagia     GERD (gastroesophageal reflux disease)     Hemorrhoids     History of colon cancer     History of DVT (deep vein thrombosis)     Hyperlipidemia     Hypertension     Hyponatremia     Malignant neoplasm of colon (HCC)     [facility] infarct [facility] [address] [facility]     Osteomyelitis [facility] [address] [facility]     Pneumonia     [facility]     Urinary retention          Surgical History     Past Surgical History:   Procedure Laterality Date    APPENDECTOMY      BACK SURGERY      BRONCHOSCOPY N/A 6/30/2016    Procedure: BRONCHOSCOPY FLEXIBLE with bronchial lavage to the left lower lobe; Surgeon: Taylor Pires MD;  Location: [facility];   Service:     CATARACT EXTRACTION      CHOLECYSTECTOMY      COLECTOMY      COLON SURGERY      polypectomy for cancerous lesion    COLONOSCOPY      CORONARY ANGIOPLASTY WITH STENT PLACEMENT      x5  pt unsure of where    GASTROSTOMY TUBE PLACEMENT      percutaneous placement of gastrostomy tube placed 4/16 - dc 8/16    HEMORROIDECTOMY      LUMBAR LAMINECTOMY      LUNG SURGERY      PEG TUBE REMOVAL      MT CYSTOURETHRO W/IMPLANT N/A 8/27/2018    Procedure: CYSTOSCOPY WITH INSERTION UROLIFT;  Surgeon: Charlyne Lefort, MD;  Location: [facility];  Service: Urology    TRACHEOSTOMY           Family History     Family History   Problem Relation Age of Onset    Heart attack Mother          Social History     Social History     History   Smoking Status    Former Smoker    Packs/day: 1 00    Years: 20 00    Types: Cigarettes    Quit date: 8/29/1978   Smokeless Tobacco    Never Used     Comment: quit 40 years ago         Pertinent Lab Values     Lab Results   Component Value Date    CREATININE 1 03 04/23/2018       No results found for: PSA    @RESULTRCNT(1H])@      Pertinent Imaging      - n/a    Portions of the record may have been created with voice recognition software   Occasional wrong word or "sound a like" substitutions may have occurred due to the inherent limitations of voice recognition software   Read the chart carefully and recognize, using context, where substitutions have occurred

## 2018-09-25 ENCOUNTER — APPOINTMENT (OUTPATIENT)
Dept: PHYSICAL THERAPY | Facility: CLINIC | Age: 83
End: 2018-09-25
Payer: MEDICARE

## 2018-09-25 LAB — BACTERIA UR CULT: NORMAL

## 2018-09-27 ENCOUNTER — APPOINTMENT (OUTPATIENT)
Dept: PHYSICAL THERAPY | Facility: CLINIC | Age: 83
End: 2018-09-27
Payer: MEDICARE

## 2018-10-01 ENCOUNTER — OFFICE VISIT (OUTPATIENT)
Dept: PHYSICAL THERAPY | Facility: CLINIC | Age: 83
End: 2018-10-01
Payer: MEDICARE

## 2018-10-01 DIAGNOSIS — R26.2 AMBULATORY DYSFUNCTION: Primary | ICD-10-CM

## 2018-10-01 PROCEDURE — 97110 THERAPEUTIC EXERCISES: CPT

## 2018-10-01 PROCEDURE — 97112 NEUROMUSCULAR REEDUCATION: CPT

## 2018-10-01 NOTE — PROGRESS NOTES
Daily Note     Today's date: 10/1/2018  Patient name: Bg Thacker  : 1935  MRN: [id number]  Referring provider: Salma Enriquez DO  Dx:   Encounter Diagnosis     ICD-10-CM    1  Ambulatory dysfunction R26 2                   Subjective: Pt states he is feeling bad today and that he feels weak  Objective: See treatment diary below    Precautions: Fall Risk,      Daily Treatment Diary      Manual                                                                                                                                                      Exercise Diary  9/13 9/17  9/20  10/1               Bike X 5 min 5 min  7 min   7'               UT stretch 20sec x 4 20sec x 4  20 sec x4  20''x4                                       Cervical extension- towel 20sec x 4 20sec x 4  20"x4  20''x4 no towel               Cervical rotations- towel 20sec x 4 3 sec x10  10"x10  20''x4 no towel               Physio ball rollout :10 x 10 :10 x 10  10x10  10''x    10               SKTC 5 sec x 10 10 sec x10  10 secx10  10''x    10                                       Chin Tucks 3 sec x 10 3" x10  3"x15  3''x15               Standing hip 3 way 2 x 10 each B x30 ea  10x ea   10x     ea               Feet together balance/floor - eyes closed X 2 30" x3  30"x3  3x30''               SLS floor   nv  30"  30''  ea               Biodex - LOS   nv  performed  perf  x3                                       Step Ups  NV 1R x10 ea  1Rx10 ea  1R x20 ea               Marching  X 2 x 20 ft min A np  np  20               Sit to stands X 10 x10 no HHA  5x tired   5x tired                                                                                             Modalities                                                                                                      Assessment: Tolerated treatment well   Patient demonstrated fatigue post treatment, exhibited good technique with therapeutic exercises and would benefit from continued PT  Vc's required for standing hip 3 way to slow down and not bend trunk  Pt tolerated increase in step ups well  Pt states he tries to perform his HEP best he can  Plan: Continue per plan of care  Progress treatment as tolerated

## 2018-10-04 ENCOUNTER — OFFICE VISIT (OUTPATIENT)
Dept: PHYSICAL THERAPY | Facility: CLINIC | Age: 83
End: 2018-10-04
Payer: MEDICARE

## 2018-10-04 DIAGNOSIS — R26.2 AMBULATORY DYSFUNCTION: Primary | ICD-10-CM

## 2018-10-04 PROCEDURE — 97112 NEUROMUSCULAR REEDUCATION: CPT

## 2018-10-04 PROCEDURE — 97110 THERAPEUTIC EXERCISES: CPT

## 2018-10-04 PROCEDURE — 97150 GROUP THERAPEUTIC PROCEDURES: CPT

## 2018-10-04 NOTE — PROGRESS NOTES
Daily Note     Today's date: 10/4/2018  Patient name: Puneet Lema  : 1935  MRN: [id number]  Referring provider: Osmany Whipple DO  Dx:   Encounter Diagnosis     ICD-10-CM    1  Ambulatory dysfunction R26 2                   Subjective: Pt reports feeling stiff prior to therapy today  Pt presents to therapy c SPC  Objective: See treatment diary below    Precautions: Fall Risk,      Daily Treatment Diary      Manual                                                                                                                                                      Exercise Diary  9/13 9/17  9/20  10/1  10/           Bike X 5 min 5 min  7 min   7'  7'           UT stretch 20sec x 4 20sec x 4  20 sec x4  20''x4  np                                 Cervical extension- towel 20sec x 4 20sec x 4  20"x4  20''x4 no towel  3x30'' no towel vc           Cervical rotations- towel 20sec x 4 3 sec x10  10"x10  20''x4 no towel  3x30'' no towel           Physio ball rollout :10 x 10 :10 x 10  10x10  10''x    10  10''x  10           SKTC 5 sec x 10 10 sec x10  10 secx10  10''x    10 10''x  10                                 Chin Tucks 3 sec x 10 3" x10  3"x15  3''x15  3''x15           Standing hip 3 way 2 x 10 each B x30 ea  10x ea   10x     ea  x15 ea vc           Feet together balance/floor - eyes closed X 2 30" x3  30"x3  3x30''  3x30'' EC           SLS floor   nv  30"  30''  ea  30''x3 vc           Biodex - LOS   nv  performed  perf  x3 perfx3 26%                                 Step Ups  NV 1R x10 ea  1Rx10 ea  1R x20 ea 1R x20ea            Marching  X 2 x 20 ft min A np  np  20  20           Sit to stands X 10 x10 no HHA  5x tired   5x tired  2x5   Tired 2HHA                                                                                   Modalities                                                                                                      Assessment: Tolerated treatment fair   Pain felt during UT stretch due to previous surgreys, therefore np  Poor body mechanics during sit to stand, pt required [facility] to complete exercise  Vc's to slow down and stabilize trunk during standing hip 3 way; increase reps nv  Vc's during SLS to keep uninvolved foot off floor  Pt back started to hurt towards the end of therapy during Biodex LOS, therefore will reassess form next visit  Pt states feeling less stiff after therapy  Plan: Continue per plan of care  Progress treatment as tolerated

## 2018-10-08 ENCOUNTER — EVALUATION (OUTPATIENT)
Dept: PHYSICAL THERAPY | Facility: CLINIC | Age: 83
End: 2018-10-08
Payer: MEDICARE

## 2018-10-08 DIAGNOSIS — G89.29 CHRONIC BILATERAL LOW BACK PAIN, WITH SCIATICA PRESENCE UNSPECIFIED: ICD-10-CM

## 2018-10-08 DIAGNOSIS — M54.5 CHRONIC BILATERAL LOW BACK PAIN, WITH SCIATICA PRESENCE UNSPECIFIED: ICD-10-CM

## 2018-10-08 DIAGNOSIS — M54.2 CERVICALGIA: ICD-10-CM

## 2018-10-08 DIAGNOSIS — R26.2 AMBULATORY DYSFUNCTION: Primary | ICD-10-CM

## 2018-10-08 PROCEDURE — G8978 MOBILITY CURRENT STATUS: HCPCS

## 2018-10-08 PROCEDURE — 97110 THERAPEUTIC EXERCISES: CPT

## 2018-10-08 PROCEDURE — 97140 MANUAL THERAPY 1/> REGIONS: CPT

## 2018-10-08 PROCEDURE — G8979 MOBILITY GOAL STATUS: HCPCS

## 2018-10-08 NOTE — PROGRESS NOTES
PT Re-Evaluation     Today's date: 10/8/2018  Patient name: Ally Rosales  : 1935  MRN: [id number]  Referring provider: Anand Estrada DO  Dx:   Encounter Diagnosis     ICD-10-CM    1  Ambulatory dysfunction R26 2                   Assessment  Impairments: abnormal coordination, abnormal gait, abnormal or restricted ROM, impaired balance, impaired physical strength, lacks appropriate home exercise program, pain with function and poor posture     Assessment details: Ally Rosales has been treated in outpatient physical therapy over the past 4 weeks for diagnosis/complaints of Ambulatory dysfunction  (primary encounter diagnosis)  Pt demonstrates improved ROM, increased balance, improved strength, decreased pain, and increased activity tolerance  Pt continues to present with pain, decreased range of motion, decreased strength, and decreased tolerance to activity  Pt would continue to benefit from skilled physical therapy to address limitations and to achieve goals  Thank you for this referral   Barriers to therapy: Memory loss   Understanding of Dx/Px/POC: good   Prognosis: good    Goals  Short Term:  1  Pt will be able to stand for 2 minutes with limited movement within 4 weeks  - MET  2  Pt will be able to perform ADL's with less than a 2/10 pain within 4 weeks  - PARTIALLY MET  Long Term Goals:  1  Pt will improve FOTO score by 12 points by discharge  2  Pt will walk one block without any rest breaks by discharge       Plan  Patient would benefit from: skilled physical therapy and PT eval  Planned modality interventions: TENS, cryotherapy and thermotherapy: hydrocollator packs  Planned therapy interventions: balance, abdominal trunk stabilization, balance/weight bearing training, flexibility, functional ROM exercises, gait training, home exercise program, transfer training, therapeutic exercise, therapeutic activities, stretching, strengthening, patient education, neuromuscular re-education and manual therapy  Frequency: 2x week  Duration in visits: 8  Duration in weeks: 4  Treatment plan discussed with: patient        Subjective Evaluation    History of Present Illness  Mechanism of injury: Pt reports he no longer experiences soreness in cervical spine  Pt states he continues to experience numbness and tingling down both legs, attributed to diabetic neuropathy  Pt states he has decreased endurance and balance  Pt uses SPC to ambulate outside the home  Reports he feels most unsteady when bending forward to pick items up from floor  Quality of life: fair    Pain  Pain scale: cervical: 0/10, lumbar: 0/10  Pain scale at highest: cervical: 8/10, lumbar: 7/10  Quality: dull ache  Relieving factors: rest and heat  Aggravating factors: standing    Social Support  Steps to enter house: no  Stairs in house: no   Lives in: community-based residential facility    Employment status: not working  Treatments  Previous treatment: physical therapy  Current treatment: physical therapy  Patient Goals  Patient goals for therapy: decreased pain, improved balance, independence with ADLs/IADLs and increased strength          Objective     Static Posture     Head  Forward  Shoulders  Rounded  Tenderness     Left Hip   PSIS: bilat       Active Range of Motion   Cervical/Thoracic Spine   Cervical    Flexion: 45 degrees   Extension: 40 degrees   Left lateral flexion: 40 degrees   Right lateral flexion: 30 degrees   Left rotation: 50 degrees   Right rotation: 35 degrees     Lumbar   Flexion: 55 degrees   Extension: 15 degrees   Left lateral flexion: 15 degrees   Right lateral flexion: 15 degrees     Additional Active Range of Motion Details  At IE: Forward flexion limited secondary to fear of loss of balance     Strength/Myotome Testing     Left Hip   Planes of Motion   Flexion: 4-    Right Hip   Planes of Motion   Flexion: 4-  Abduction: 4    Left Knee   Flexion: 4  Extension: 4    Right Knee   Flexion: 4+  Extension: 4+    Left Ankle/Foot   Dorsiflexion: 4  Plantar flexion: 4+    Right Ankle/Foot   Dorsiflexion: 4+  Plantar flexion: 4    Tests     Left Hip   Kar: Negative  Knee flexion: 51  Right Hip   Kar: Negative  Knee flexion: 48  Ambulation     Observational Gait   Decreased walking speed and stride length       Functional Assessment     Comments  TUG: no assistive device  1st trial: 16 sec  2nd trial: 17 sec    5x Sit to stand: 42 sec       Flowsheet Rows      Most Recent Value   PT/OT G-Codes   Current Score  76   Projected Score  58   FOTO information reviewed  Yes   Assessment Type  Re-evaluation   G code set  Mobility: Walking & Moving Around   Mobility: Walking and Moving Around Current Status ()  CJ   Mobility: Walking and Moving Around Goal Status ()  CK          Precautions: Fall Risk,     Daily Treatment Diary     Manual                                                                                   Exercise Diary              Bike             UT stretch                          Cervical extension- towel             Cervical rotations- towel             Physio ball rollout             SKTC                          Chin Tucks             Standing hip 3 way             Feet together balance/floor - eyes open             Step Ups              Marching              Sit to stands                                                        Modalities

## 2018-10-08 NOTE — PROGRESS NOTES
Daily Note     Today's date: 10/8/2018  Patient name: Patricia Simental  : 1935  MRN: [id number]  Referring provider: Maureen Espinoza DO  Dx:   Encounter Diagnosis     ICD-10-CM    1  Ambulatory dysfunction R26 2                   Subjective: Pt reports feeling steadier on his feet, but occasionally stumbles while walking  He denied LOB/falls since LV      Objective: See treatment diary below    Precautions: Fall Risk,      Daily Treatment Diary      Manual   10/8                      Re-eval MIGUE                                                                                                                           Exercise Diary  9/13 9/17  9/20  10/1  10/4 10/8         Bike X 5 min 5 min  7 min   7'  7'  8'         UT stretch 20sec x 4 20sec x 4  20 sec x4  20''x4  np  3x  30''                               Cervical extension- towel 20sec x 4 20sec x 4  20"x4  20''x4 no towel  3x30'' no towel vc  3x  30''         Cervical rotations- towel 20sec x 4 3 sec x10  10"x10  20''x4 no towel  3x30'' no towel  3"  x10         Physio ball rollout :10 x 10 :10 x 10  10x10  10''x    10  10''x  10  10"x10 ea         SKTC 5 sec x 10 10 sec x10  10 secx10  10''x    10 10''x  10                                 Chin Tucks 3 sec x 10 3" x10  3"x15  3''x15  3''x15  3"  x15         Standing hip 3 way 2 x 10 each B x30 ea  10x ea   10x     ea  x15 ea vc  nv         Feet together balance/floor - eyes closed X 2 30" x3  30"x3  3x30''  3x30'' EC  nv         SLS floor   nv  30"  30''  ea  30''x3 vc  nv         Biodex - LOS   nv  performed  perf  x3 perfx3 26%  nv                               Step Ups  NV 1R x10 ea  1Rx10 ea  1R x20 ea 1R x20ea   nv         Marching  X 2 x 20 ft min A np  np  20  20  nv         Sit to stands X 10 x10 no HHA  5x tired   5x tired  2x5   Tired 2HHA  nv                                                                                 Modalities                                                                                                      Assessment: Pt appeared to tolerate exercises well and without complaints  He required frequent VC's to follow through exercises properly  RE completed by Benja Gil DPT this visit; see POC for progress  Plan: Continue per plan of care  Progress treatment as tolerated

## 2018-10-11 ENCOUNTER — OFFICE VISIT (OUTPATIENT)
Dept: PHYSICAL THERAPY | Facility: CLINIC | Age: 83
End: 2018-10-11
Payer: MEDICARE

## 2018-10-11 DIAGNOSIS — R26.2 AMBULATORY DYSFUNCTION: Primary | ICD-10-CM

## 2018-10-11 DIAGNOSIS — G89.29 CHRONIC BILATERAL LOW BACK PAIN, WITH SCIATICA PRESENCE UNSPECIFIED: ICD-10-CM

## 2018-10-11 DIAGNOSIS — M54.2 CERVICALGIA: ICD-10-CM

## 2018-10-11 DIAGNOSIS — M54.5 CHRONIC BILATERAL LOW BACK PAIN, WITH SCIATICA PRESENCE UNSPECIFIED: ICD-10-CM

## 2018-10-11 PROCEDURE — 97110 THERAPEUTIC EXERCISES: CPT | Performed by: PHYSICAL THERAPIST

## 2018-10-11 PROCEDURE — 97112 NEUROMUSCULAR REEDUCATION: CPT | Performed by: PHYSICAL THERAPIST

## 2018-10-11 NOTE — PROGRESS NOTES
Daily Note     Today's date: 10/11/2018  Patient name: Danica Byrd  : 1935  MRN: [id number]  Referring provider: Lary Ramirez DO  Dx:   Encounter Diagnosis     ICD-10-CM    1  Ambulatory dysfunction R26 2    2  Cervicalgia M54 2    3  Chronic bilateral low back pain, with sciatica presence unspecified M54 5     G89 29                   Subjective: Pt comes to therapy with [facility]  Reports he feels his lower extremities need strengthening       Objective: See treatment diary below    Precautions: Fall Risk,      Daily Treatment Diary      Manual   10/8                      Re-santy MIGUE                                                                                                                           Exercise Diary  9/13 9/17  9/20  10/1  10/4 10/8  10/11       Bike X 5 min 5 min  7 min   7'  7'  8'  8'       UT stretch 20sec x 4 20sec x 4  20 sec x4  20''x4  np  3x  27''  D/C HEP                             Cervical extension- towel 20sec x 4 20sec x 4  20"x4  20''x4 no towel  3x30'' no towel vc  3x  30''  D/C HEP       Cervical rotations- towel 20sec x 4 3 sec x10  10"x10  20''x4 no towel  3x30'' no towel  3"  x10  D/C HEP       Physio ball rollout :10 x 10 :10 x 10  10x10  10''x    10  10''x  10  10"x10 ea  np       SKTC 5 sec x 10 10 sec x10  10 secx10  10''x    10 10''x  10           Chin Tucks 3 sec x 10 3" x10  3"x15  3''x15  3''x15  3"  x15  D/C HEP       Mini squats       2x10     HR/TR       2x10     Standing hip 3 way 2 x 10 each B x30 ea  10x ea   10x     ea  x15 ea vc  nv  2x10 ea       Feet together balance/floor - eyes closed X 2 30" x3  30"x3  3x30''  3x30'' EC  nv  30"x3 on foam       SLS floor   nv  30"  30''  ea  30''x3 vc  nv  30"x2 ea       Biodex - LOS   nv  performed  perf  x3 perfx3 26%  nv  np                             Step Ups  NV 1R x10 ea  1Rx10 ea  1R x20 ea 1R x20ea   nv  np       Stand K curls       3# 2x10 ea     Marching  X 2 x 20 ft min A np  np  20  20  nv 3#   2x10 ea       Sit to stands X 10 x10 no HHA  5x tired   5x tired  2x5   Tired 2HHA  nv  2x5 foam  1 HHA        LAQ             3# 2x10        Bridges             2x10        SLR flex             2x10 ea       Clamshells       2x10 ea           Modalities                                                                                                          Assessment: Tolerated treatment well and without complaints  Reported good challenge with PRE's today  Minor cuing for proper set up  Fatigue noted at end of session  Continue as able  Plan: Continue per plan of care  Progress treatment as tolerated

## 2018-10-15 ENCOUNTER — OFFICE VISIT (OUTPATIENT)
Dept: PHYSICAL THERAPY | Facility: CLINIC | Age: 83
End: 2018-10-15
Payer: MEDICARE

## 2018-10-15 DIAGNOSIS — R26.2 AMBULATORY DYSFUNCTION: Primary | ICD-10-CM

## 2018-10-15 DIAGNOSIS — G89.29 CHRONIC BILATERAL LOW BACK PAIN, WITH SCIATICA PRESENCE UNSPECIFIED: ICD-10-CM

## 2018-10-15 DIAGNOSIS — M54.2 CERVICALGIA: ICD-10-CM

## 2018-10-15 DIAGNOSIS — M54.5 CHRONIC BILATERAL LOW BACK PAIN, WITH SCIATICA PRESENCE UNSPECIFIED: ICD-10-CM

## 2018-10-15 PROCEDURE — 97110 THERAPEUTIC EXERCISES: CPT

## 2018-10-15 PROCEDURE — 97112 NEUROMUSCULAR REEDUCATION: CPT

## 2018-10-15 NOTE — PROGRESS NOTES
Daily Note     Today's date: 10/15/2018  Patient name: Santiago Bowser  : 1935  MRN: [id number]  Referring provider: Linda Hood DO  Dx:   Encounter Diagnosis     ICD-10-CM    1  Ambulatory dysfunction R26 2    2  Cervicalgia M54 2    3  Chronic bilateral low back pain, with sciatica presence unspecified M54 5     G89 29                   Subjective: Pt reports with c/o significant pain in lower back graded 7/10, otherwise denied neck pain      Objective: See treatment diary below    Precautions: Fall Risk,      Daily Treatment Diary      Manual   10/8                      Re-eval MIGUE                                                                                                                           Exercise Diary  9/13 9/17  9/20  10/1  10/4 10/8  10/11  10/15     Bike X 5 min 5 min  7 min   7'  7'  8'  8'  5'     UT stretch 20sec x 4 20sec x 4  20 sec x4  20''x4  np  3x  30''  D/C HEP                             Cervical extension- towel 20sec x 4 20sec x 4  20"x4  20''x4 no towel  3x30'' no towel vc  3x  30''  D/C HEP       Cervical rotations- towel 20sec x 4 3 sec x10  10"x10  20''x4 no towel  3x30'' no towel  3"  x10  D/C HEP       Physio ball rollout :10 x 10 :10 x 10  10x10  10''x    10  10''x  10  10"x10 ea  np 10"x10     SKTC 5 sec x 10 10 sec x10  10 secx10  10''x    10 10''x  10           Chin Tucks 3 sec x 10 3" x10  3"x15  3''x15  3''x15  3"  x15  D/C HEP       Mini squats       2x10 2x10    HR/TR       2x10 20    Standing hip 3 way 2 x 10 each B x30 ea  10x ea   10x     ea  x15 ea vc  nv  2x10 ea 20 ea     Feet together balance/floor - eyes closed X 2 30" x3  30"x3  3x30''  3x30'' EC  nv  30"x3 on foam  30"x3 on foam     SLS floor   nv  30"  30''  ea  30''x3 vc  nv  30"x2 ea  30"x2     Biodex - LOS   nv  performed  perf  x3 perfx3 26%  nv  np  np                           Step Ups  NV 1R x10 ea  1Rx10 ea  1R x20 ea 1R x20ea   nv  np       Stand K curls       3# 2x10 ea 3# 2x10 ea Marching  X 2 x 20 ft min A np  np  20  20  nv 3#   2x10 ea  3# 2x10 ea     Sit to stands X 10 x10 no HHA  5x tired   5x tired  2x5   Tired 2HHA  nv  2x5 foam  1 HHA  x10 foam 1HHA      LAQ             3# 2x10  3# 2x10      Bridges             2x10  2x10      SLR flex             2x10 ea  2x10 ea     Clamshells       2x10 ea 2x10 ea          Modalities                                                                                                          Assessment: Tolerated treatment well and without complaints  Challenged with current program  He noted soreness in LB post treatment; offered MHP, but deferred  Plan: Continue per plan of care  Progress treatment as tolerated

## 2018-10-18 ENCOUNTER — OFFICE VISIT (OUTPATIENT)
Dept: PHYSICAL THERAPY | Facility: CLINIC | Age: 83
End: 2018-10-18
Payer: MEDICARE

## 2018-10-18 ENCOUNTER — TELEPHONE (OUTPATIENT)
Dept: CARDIOLOGY CLINIC | Facility: CLINIC | Age: 83
End: 2018-10-18

## 2018-10-18 DIAGNOSIS — M54.5 CHRONIC BILATERAL LOW BACK PAIN, WITH SCIATICA PRESENCE UNSPECIFIED: ICD-10-CM

## 2018-10-18 DIAGNOSIS — I50.42 CHRONIC COMBINED SYSTOLIC AND DIASTOLIC CONGESTIVE HEART FAILURE (HCC): Primary | ICD-10-CM

## 2018-10-18 DIAGNOSIS — M54.2 CERVICALGIA: ICD-10-CM

## 2018-10-18 DIAGNOSIS — K21.9 GASTROESOPHAGEAL REFLUX DISEASE WITHOUT ESOPHAGITIS: ICD-10-CM

## 2018-10-18 DIAGNOSIS — IMO0002 UNCONTROLLED TYPE 2 DIABETES MELLITUS WITH DIABETIC POLYNEUROPATHY, WITHOUT LONG-TERM CURRENT USE OF INSULIN: Chronic | ICD-10-CM

## 2018-10-18 DIAGNOSIS — R26.2 AMBULATORY DYSFUNCTION: Primary | ICD-10-CM

## 2018-10-18 DIAGNOSIS — G89.29 CHRONIC BILATERAL LOW BACK PAIN, WITH SCIATICA PRESENCE UNSPECIFIED: ICD-10-CM

## 2018-10-18 PROCEDURE — 97112 NEUROMUSCULAR REEDUCATION: CPT

## 2018-10-18 PROCEDURE — 97110 THERAPEUTIC EXERCISES: CPT

## 2018-10-18 PROCEDURE — 97530 THERAPEUTIC ACTIVITIES: CPT

## 2018-10-18 PROCEDURE — G8980 MOBILITY D/C STATUS: HCPCS

## 2018-10-18 PROCEDURE — G8979 MOBILITY GOAL STATUS: HCPCS

## 2018-10-18 RX ORDER — FUROSEMIDE 20 MG/1
TABLET ORAL
Qty: 180 TABLET | Refills: 3 | Status: SHIPPED | OUTPATIENT
Start: 2018-10-18 | End: 2019-02-11 | Stop reason: SDUPTHER

## 2018-10-18 RX ORDER — PANTOPRAZOLE SODIUM 40 MG/1
TABLET, DELAYED RELEASE ORAL
Qty: 90 TABLET | Refills: 3 | Status: SHIPPED | OUTPATIENT
Start: 2018-10-18 | End: 2018-11-12 | Stop reason: SDUPTHER

## 2018-10-18 RX ORDER — FAMOTIDINE 20 MG/1
TABLET, FILM COATED ORAL
Qty: 180 TABLET | Refills: 3 | Status: SHIPPED | OUTPATIENT
Start: 2018-10-18 | End: 2019-02-11 | Stop reason: ALTCHOICE

## 2018-10-18 NOTE — TELEPHONE ENCOUNTER
Return call from April, she reports patient has 2 week supply of eliquis 5 mg tablets available  She reports he has had to stop eliquis in the past for epidural injections  Will review with dr Marce Castillo  April reports patient will have [facility] come to residence to draw inr  All results and dosing management to be call to April  Please advise

## 2018-10-18 NOTE — PROGRESS NOTES
Daily Note     Today's date: 10/18/2018  Patient name: Royal Rhodes  : 1935  MRN: [id number]  Referring provider: Gurmeet Morgan DO  Dx:   Encounter Diagnosis     ICD-10-CM    1  Ambulatory dysfunction R26 2    2  Cervicalgia M54 2    3  Chronic bilateral low back pain, with sciatica presence unspecified M54 5     G89 29               Subjective: Pt notes no change in pain or LOB since LV and looks forward to being discharged today      Objective: See treatment diary below    Precautions: Fall Risk     Daily Treatment Diary   Exercise Diary  9/13 9/17  9/20  10/1  10/4 10/8  10/11  10/15  10/18   Bike X 5 min 5 min  7 min   7'  7'  8'  8'  5'  10'   UT stretch 20sec x 4 20sec x 4  20 sec x4  20''x4  np  3x  27''  D/C HEP   ---   Cervical extension- towel 20sec x 4 20sec x 4  20"x4  20''x4 no towel  3x30'' no towel vc  3x  30''  D/C HEP   ---   Cervical rotations- towel 20sec x 4 3 sec x10  10"x10  20''x4 no towel  3x30'' no towel  3"  x10  D/C HEP   ---   Physio ball rollout :10x10 :10x10 10x10  10''x10  10''x10  10"x10  np 10"x10 10"x10   SKTC 5 sec x 10 10 sec x10  10 secx10  10''x    10 10''x  10       ---   Chin Tucks 3 sec x 10 3" x10  3"x15  3''x15  3''x15  3"  x15  D/C HEP   ---   Mini squats       2x10 2x10 20x   HR/TR       2x10 20 25x ea   Standing hip 3 way 2 x 10 each B x30 ea  10x ea   10x     ea  x15 ea vc  nv  2x10 ea 20 ea 20x ea b/l   Feet together balance/floor - eyes closed X 2 30" x3  30"x3  3x30''  3x30'' EC  nv  30"x3 on foam  30"x3 on foam 30"x3 foam CG   SLS floor   nv  30"  30''  ea  30''x3 vc  nv  30"x2 ea  30"x2 30"x2 ea HHA   Biodex - LOS   nv  performed  perf  x3 perfx3 26%  nv  np  np ---   Step Ups  NV 1R x10 ea  1Rx10 ea  1R x20 ea 1R x20ea   nv  np   ---   Stand K curls       3#   2x10 ea 3#   2x10 ea 3#   2x10 ea   Marching  X 2 x 20 ft min A np  np  20  20  nv 3#   2x10 ea  3# 2x10 ea 3#  2x10 ea   Sit to stands X 10 x10 no HHA  5x tired   5x tired  2x5   Tired 2HHA  nv  2x5 foam  1 HHA  x10 foam 1HHA 10x foam (knees)    LAQ             3# 2x10  3# 2x10 3#   3"x20 ea    Bridges             2x10  2x10 2x10    SLR flex             2x10 ea  2x10 ea 2x10 ea   Clamshells       2x10 ea 2x10 ea 2x10 ea   *1:1 with TE, PTA from 9:45-10:20am    Assessment: Tolerated treatment well  Patient demonstrated fatigue post treatment and exhibited good technique with therapeutic exercises, though he req cuing for counting repetitions  LLE fatigued more quickly than RLE  Plan: Discharge per Paul Mosqueda, PTA

## 2018-10-19 NOTE — TELEPHONE ENCOUNTER
Can start warfarin 5 mg daily when pt has about 5 days of Eliquis left so can bridge/overlap  Would check INR after 3 days of warfarin and con't to overlap Eliquis to cover  You and April can work out a date of when to start    Thx    RP

## 2018-10-22 ENCOUNTER — APPOINTMENT (OUTPATIENT)
Dept: PHYSICAL THERAPY | Facility: CLINIC | Age: 83
End: 2018-10-22
Payer: MEDICARE

## 2018-10-23 DIAGNOSIS — I48.91 ATRIAL FIBRILLATION, UNSPECIFIED TYPE (HCC): Primary | ICD-10-CM

## 2018-10-23 RX ORDER — WARFARIN SODIUM 5 MG/1
TABLET ORAL
Qty: 30 TABLET | Refills: 2 | Status: SHIPPED | OUTPATIENT
Start: 2018-10-23 | End: 2019-01-22 | Stop reason: SDUPTHER

## 2018-10-23 NOTE — TELEPHONE ENCOUNTER
AR,   Can you please ask April to stop Jesse's ASA now that starting warfarin, to reduce bleeding risk?   Marthax    RP

## 2018-10-23 NOTE — TELEPHONE ENCOUNTER
Phone call to April, [phone]  Per dr Kenan Nye  Patient to start warfarin 5 mg daily on thurs, oct, 25  Patient is to bridge with eliquis when starting warfarin, pt inr due 4 days post starting warfarin  April understood same  She requested warfarin prescription be sent to [facility] [place]  She will  pt/inr script at office, he will have blood work to be done at [facility] lab

## 2018-10-24 ENCOUNTER — TELEPHONE (OUTPATIENT)
Dept: CARDIOLOGY CLINIC | Facility: CLINIC | Age: 83
End: 2018-10-24

## 2018-10-24 DIAGNOSIS — I48.91 ATRIAL FIBRILLATION, UNSPECIFIED TYPE (HCC): Primary | ICD-10-CM

## 2018-10-24 DIAGNOSIS — I48.21 ATRIAL FIBRILLATION, PERMANENT (HCC): ICD-10-CM

## 2018-10-24 DIAGNOSIS — Z86.718 HISTORY OF DVT (DEEP VEIN THROMBOSIS): ICD-10-CM

## 2018-10-24 NOTE — TELEPHONE ENCOUNTER
Phone call to patient's nurse, April, [phone]  Patient requires pt/inr prescription   Patient's residence has [facility] mobile lab provide services to patient  Will provide pt/inr script to patient  Also noted instructions from dr Leetta Buerger, to instruct April, now that patient will be starting on warfarin, patient is to stop asa to reduce bleeding risk  instructed April on same  She repeated order and understood same

## 2018-10-25 ENCOUNTER — APPOINTMENT (OUTPATIENT)
Dept: PHYSICAL THERAPY | Facility: CLINIC | Age: 83
End: 2018-10-25
Payer: MEDICARE

## 2018-10-26 ENCOUNTER — ANTICOAG VISIT (OUTPATIENT)
Dept: CARDIOLOGY CLINIC | Facility: CLINIC | Age: 83
End: 2018-10-26

## 2018-10-26 DIAGNOSIS — Z86.718 HISTORY OF DVT (DEEP VEIN THROMBOSIS): ICD-10-CM

## 2018-10-26 DIAGNOSIS — I48.21 ATRIAL FIBRILLATION, PERMANENT (HCC): ICD-10-CM

## 2018-10-29 ENCOUNTER — TELEPHONE (OUTPATIENT)
Dept: CARDIOLOGY CLINIC | Facility: CLINIC | Age: 83
End: 2018-10-29

## 2018-10-29 ENCOUNTER — APPOINTMENT (OUTPATIENT)
Dept: PHYSICAL THERAPY | Facility: CLINIC | Age: 83
End: 2018-10-29
Payer: MEDICARE

## 2018-10-29 NOTE — TELEPHONE ENCOUNTER
Phone call from April, [address] visiting nurse calling to inform the office that all blood thinners are on hold for now due to increased hematuria  Bradly Alegria was on eliquis and is to switch over to coumadin but his hematuria is getting worse, so this is on hold until cleared by Urology  April will call and let Paulina know when they have been given the ok

## 2018-11-02 ENCOUNTER — TELEPHONE (OUTPATIENT)
Dept: CARDIOLOGY CLINIC | Facility: CLINIC | Age: 83
End: 2018-11-02

## 2018-11-02 NOTE — TELEPHONE ENCOUNTER
Phone call to Apri,l as asked by [facility] REYNA HO, to follow up on Tono Astudillo and his hematuria   "he is still having problems and his follow up appt is in January"  There may not be a resolution for quite a while, and due to this inquiry, April was asking if Dr Colin Roberts wants him to continue his anticoagulant? They are trying to get this resolved soon but cannot make any promises and again, "we will call Dr Umer Damon office as soon as it is "  [phone] is April's number

## 2018-11-05 ENCOUNTER — PROCEDURE VISIT (OUTPATIENT)
Dept: UROLOGY | Facility: CLINIC | Age: 83
End: 2018-11-05
Payer: MEDICARE

## 2018-11-05 VITALS
DIASTOLIC BLOOD PRESSURE: 84 MMHG | WEIGHT: 170 LBS | HEART RATE: 68 BPM | SYSTOLIC BLOOD PRESSURE: 128 MMHG | BODY MASS INDEX: 24.34 KG/M2 | HEIGHT: 70 IN

## 2018-11-05 DIAGNOSIS — N40.1 BPH WITH OBSTRUCTION/LOWER URINARY TRACT SYMPTOMS: ICD-10-CM

## 2018-11-05 DIAGNOSIS — N13.8 BPH WITH OBSTRUCTION/LOWER URINARY TRACT SYMPTOMS: ICD-10-CM

## 2018-11-05 DIAGNOSIS — R31.0 GROSS HEMATURIA: ICD-10-CM

## 2018-11-05 DIAGNOSIS — N32.81 OAB (OVERACTIVE BLADDER): ICD-10-CM

## 2018-11-05 DIAGNOSIS — N30.01 ACUTE CYSTITIS WITH HEMATURIA: Primary | ICD-10-CM

## 2018-11-05 PROCEDURE — 88112 CYTOPATH CELL ENHANCE TECH: CPT | Performed by: PATHOLOGY

## 2018-11-05 PROCEDURE — 52000 CYSTOURETHROSCOPY: CPT | Performed by: UROLOGY

## 2018-11-05 RX ORDER — CEPHALEXIN 500 MG/1
500 CAPSULE ORAL EVERY 6 HOURS SCHEDULED
Qty: 20 CAPSULE | Refills: 0 | Status: SHIPPED | OUTPATIENT
Start: 2018-11-05 | End: 2018-11-10

## 2018-11-06 PROBLEM — R31.0 GROSS HEMATURIA: Status: ACTIVE | Noted: 2018-11-06

## 2018-11-06 NOTE — TELEPHONE ENCOUNTER
Please let him know this question should be directed at urologist who needs to clear Mae Comment for AC---from my standpoint, he needs it, but urologist needs to clear  If still 'having problems' stay in touch w/ urologist and let me know when cleared    Thx

## 2018-11-06 NOTE — PROGRESS NOTES
Office Cystoscopy Procedure Note    Indication:    Hematuria    Informed consent   The risks, benefits, complications, treatment options, and expected outcomes were discussed with the patient  The patient concurred with the proposed plan and provided informed consent  Anesthesia  Lidocaine jelly 2%    Antibiotic prophylaxis   None    Procedure  The patient was placed in the supineposition, was prepped and draped in the usual manner using sterile technique, and 2% lidocaine jelly instilled into the urethra  A 17 F flexible cystoscope was then inserted into the urethra and the urethra and bladder carefully examined  The following findings were noted:    Findings:  Urethra:  Normal  Prostate:  Nice wide-open prostatic fossa status post prior Uro lift  There was no active bleeding  The overlying mucosa is normal   None of the urethral end plate tabs are visualized  Bladder:  Stable, high capacity, grade 4 trabeculated bladder  There are diffuse erythematous generalized changes consistent with resolving cystitis  There is no active hematuria  There are no papillary bladder tumors  There are no concerning erythematous lesions that would require a biopsy  Ureteral orifices:  Normal  Other findings:  None     Specimens: None                 Complications:    None; patient tolerated the procedure well           Disposition: To home after 30 minute observation  Condition: Stable    Plan:   My impression is resolving gross hematuria, likely secondary to inflammation possible infection of the bladder  A sterile urine culture was submitted through the cystoscope  Patient was initiated on Keflex empirically  His last urine culture obtained in the office was negative  Discussed with his caregiver and his son recommendations for holding his Eliquis for another 3 days and then resuming  Patient's son inquires about possible bladder Botox given his refractory storage symptoms    This may be a good option and can be discussed that his next follow-up visit

## 2018-11-12 ENCOUNTER — OFFICE VISIT (OUTPATIENT)
Dept: FAMILY MEDICINE CLINIC | Facility: CLINIC | Age: 83
End: 2018-11-12
Payer: MEDICARE

## 2018-11-12 VITALS
SYSTOLIC BLOOD PRESSURE: 130 MMHG | BODY MASS INDEX: 25.91 KG/M2 | RESPIRATION RATE: 18 BRPM | OXYGEN SATURATION: 91 % | HEART RATE: 64 BPM | WEIGHT: 181 LBS | HEIGHT: 70 IN | DIASTOLIC BLOOD PRESSURE: 80 MMHG

## 2018-11-12 DIAGNOSIS — IMO0002 UNCONTROLLED TYPE 2 DIABETES MELLITUS WITH DIABETIC POLYNEUROPATHY, WITHOUT LONG-TERM CURRENT USE OF INSULIN: Primary | ICD-10-CM

## 2018-11-12 DIAGNOSIS — C18.9 MALIGNANT NEOPLASM OF COLON, UNSPECIFIED PART OF COLON (HCC): ICD-10-CM

## 2018-11-12 DIAGNOSIS — I50.42 CHRONIC COMBINED SYSTOLIC AND DIASTOLIC CONGESTIVE HEART FAILURE (HCC): ICD-10-CM

## 2018-11-12 DIAGNOSIS — I48.91 ATRIAL FIBRILLATION WITH SLOW VENTRICULAR RESPONSE (HCC): Chronic | ICD-10-CM

## 2018-11-12 DIAGNOSIS — I10 BENIGN ESSENTIAL HYPERTENSION: ICD-10-CM

## 2018-11-12 DIAGNOSIS — K21.9 GASTROESOPHAGEAL REFLUX DISEASE WITHOUT ESOPHAGITIS: ICD-10-CM

## 2018-11-12 DIAGNOSIS — T17.908S ASPIRATION INTO AIRWAY, SEQUELA: ICD-10-CM

## 2018-11-12 DIAGNOSIS — E78.2 MIXED HYPERLIPIDEMIA: ICD-10-CM

## 2018-11-12 LAB — SL AMB POCT HEMOGLOBIN AIC: NORMAL

## 2018-11-12 PROCEDURE — 83036 HEMOGLOBIN GLYCOSYLATED A1C: CPT | Performed by: FAMILY MEDICINE

## 2018-11-12 PROCEDURE — 99214 OFFICE O/P EST MOD 30 MIN: CPT | Performed by: FAMILY MEDICINE

## 2018-11-12 RX ORDER — PANTOPRAZOLE SODIUM 40 MG/1
TABLET, DELAYED RELEASE ORAL
Qty: 90 TABLET | Refills: 0 | Status: SHIPPED | OUTPATIENT
Start: 2018-11-12 | End: 2019-02-11 | Stop reason: ALTCHOICE

## 2018-11-12 NOTE — PROGRESS NOTES
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, without long-term current use of insulin (HCC) - Primary (Chronic)    Relevant Orders    POCT hemoglobin A1c (Completed)    Microalbumin / creatinine urine ratio    Comprehensive metabolic panel    Hemoglobin A1C       Cardiovascular and Mediastinum    Atrial fibrillation with slow ventricular response (HCC) (Chronic)    Relevant Orders    CBC and differential    Chronic combined systolic and diastolic congestive heart failure (HCC)    Relevant Orders    CBC and differential      Other Visit Diagnoses     Aspiration into airway, sequela        Check chest x-ray    Relevant Orders    XR chest pa & lateral    CBC and differential            Subjective:      Patient ID: Jonathan Coronel is a 81 y o  male  HPI patient presents today accompanied by his son for follow-up for routine healthcare  He has history of congestive heart failure  He denies any recent significant shortness of breath  His son notes that he has been coughing quite a bit after in liquids  He has had no fever or chills  The patient admits that he is not always compliant with his thickened liquids diet  In regards to his congestive heart failure, he is having no lower extremity swelling  He has type 2 diabetes which has improved significantly in regards to his control  A1c is down to 9 2 from the mid 12 region  He has had no hypoglycemia  He denies any polyuria polydipsia  He has a history of colon cancer status post colectomy  He denies any GI bleeding  He has had no abdominal pain  He has hyperlipidemia and denies any problems chest pain, shortness of breath or palpitations currently    He continues to be bothered by chronic low back pain which remains relatively stable  He is following with pain management at this time  The following portions of the patient's history were reviewed and updated as appropriate: allergies, current medications, past family history, past medical history, past social history, past surgical history and problem list     Review of Systems   Constitutional: Negative for appetite change, chills, fatigue, fever and unexpected weight change  HENT: Negative for trouble swallowing  Eyes: Negative for visual disturbance  Respiratory: Negative for cough, chest tightness, shortness of breath and wheezing  Cardiovascular: Negative for chest pain  Gastrointestinal: Negative for abdominal distention, abdominal pain, blood in stool, constipation and diarrhea  Endocrine: Negative for polyuria  Genitourinary: Negative for difficulty urinating and flank pain  Musculoskeletal: Negative for arthralgias and myalgias  Skin: Negative for rash  Neurological: Negative for dizziness and light-headedness  Hematological: Negative for adenopathy  Does not bruise/bleed easily  Psychiatric/Behavioral: Negative for sleep disturbance  Objective:      /80   Pulse 64   Resp 18   Ht 5' 10" (1 778 m)   Wt 82 1 kg (181 lb)   SpO2 91%   BMI 25 97 kg/m²          Physical Exam   Constitutional: He is oriented to person, place, and time  He appears well-developed and well-nourished  No distress  HENT:   Head: Normocephalic  Eyes: Pupils are equal, round, and reactive to light  Right eye exhibits no discharge  Neck: No tracheal deviation present  No thyromegaly present  Cardiovascular: Normal rate, regular rhythm and normal heart sounds  Pulses are no weak pulses  No murmur heard  Pulses:       Dorsalis pedis pulses are 2+ on the right side, and 2+ on the left side  Posterior tibial pulses are 2+ on the right side, and 2+ on the left side  Pulmonary/Chest: Effort normal  No respiratory distress   He has no wheezes  He has no rales  Abdominal: Soft  He exhibits no distension  There is no tenderness  Musculoskeletal: Normal range of motion  Feet:   Right Foot:   Skin Integrity: Negative for ulcer, skin breakdown, erythema, warmth, callus or dry skin  Left Foot:   Skin Integrity: Negative for ulcer, skin breakdown, erythema, warmth, callus or dry skin  Neurological: He is alert and oriented to person, place, and time  No cranial nerve deficit  Skin: Skin is warm  He is not diaphoretic  Psychiatric: He has a normal mood and affect  Judgment and thought content normal        Patient's shoes and socks removed  Right Foot/Ankle   Right Foot Inspection  Skin Exam: skin normal and skin intact no dry skin, no warmth, no callus, no erythema, no maceration, no abnormal color, no pre-ulcer, no ulcer and no callus                            Sensory   Vibration: intact  Proprioception: intact   Monofilament testing: intact  Vascular    The right DP pulse is 2+  The right PT pulse is 2+  Left Foot/Ankle  Left Foot Inspection  Skin Exam: skin normal and skin intactno dry skin, no warmth, no erythema, no maceration, normal color, no pre-ulcer, no ulcer and no callus                                         Sensory   Vibration: intact  Proprioception: intact  Monofilament: intact  Vascular    The left DP pulse is 2+  The left PT pulse is 2+  Assign Risk Category:  No deformity present; No loss of protective sensation;  No weak pulses       Risk: 0      Leandro Degroot MD

## 2018-11-12 NOTE — PROGRESS NOTES
Assessment/Plan:    No problem-specific Assessment & Plan notes found for this encounter  {Assess/PlanSmartLinks:93119}      Subjective:      Patient ID: Janet Samaniego is a 81 y o  male  HPI    {Common ambulatory SmartLinks:18999}    Review of Systems      Objective:      /80   Pulse 64   Resp 18   Ht 5' 10" (1 778 m)   Wt 82 1 kg (181 lb)   SpO2 91%   BMI 25 97 kg/m²          Physical Exam      Patient's shoes and socks removed

## 2018-11-13 ENCOUNTER — HOSPITAL ENCOUNTER (OUTPATIENT)
Dept: RADIOLOGY | Facility: HOSPITAL | Age: 83
Discharge: HOME | End: 2018-11-13
Payer: MEDICARE

## 2018-11-13 DIAGNOSIS — T17.908S ASPIRATION INTO AIRWAY, SEQUELA: ICD-10-CM

## 2018-11-13 PROCEDURE — 71046 X-RAY EXAM CHEST 2 VIEWS: CPT

## 2018-11-14 ENCOUNTER — TELEPHONE (OUTPATIENT)
Dept: CARDIOLOGY CLINIC | Facility: CLINIC | Age: 83
End: 2018-11-14

## 2018-11-14 NOTE — TELEPHONE ENCOUNTER
Received message from Aprildenver, regarding patient  Question regarding inr test  Returned call to April  Had to leave message requesting her to call back office regarding her questions and concerns , will wait for return call

## 2018-11-15 ENCOUNTER — TELEPHONE (OUTPATIENT)
Dept: CARDIOLOGY CLINIC | Facility: CLINIC | Age: 83
End: 2018-11-15

## 2018-11-15 DIAGNOSIS — I50.42 CHRONIC COMBINED SYSTOLIC AND DIASTOLIC CONGESTIVE HEART FAILURE (HCC): Primary | ICD-10-CM

## 2018-11-15 DIAGNOSIS — J18.9 PNEUMONIA OF LEFT UPPER LOBE DUE TO INFECTIOUS ORGANISM: Primary | ICD-10-CM

## 2018-11-15 RX ORDER — AZITHROMYCIN 250 MG/1
TABLET, FILM COATED ORAL
Qty: 6 TABLET | Refills: 0 | Status: SHIPPED | OUTPATIENT
Start: 2018-11-15 | End: 2018-11-19

## 2018-11-15 NOTE — TELEPHONE ENCOUNTER
Phone call from April, [phone], patient's personal nurse  Reports patient was seen by urology, dr Suzie Guidry, see ov note from urology on 11/5  Nurse reports patient has not had hematuria for the past 3 days  Has 5 mg tablets of warfarin in home  Has eliquis in home  Will review with dr Fariba Javier, please advise on restarting warfarin, question dosing

## 2018-11-15 NOTE — PROGRESS NOTES
I received a message from the patient's caretaker, April  His weight is up at home  Pulse ox has remained quite stable  We are going to furosemide 40 mg daily x5 doses and check labs early next week including a CMP and BNP

## 2018-11-15 NOTE — TELEPHONE ENCOUNTER
Please have him take warfarin 5 mg fri/sat/sun and check INR Monday  If any recurrent hematuria, stop warfarin, eat a green/leafy salad and call urologist over weekend  Will not bridge w/ Eliquis to reduce risk of recurrent bleeding  Thanks

## 2018-11-15 NOTE — PROGRESS NOTES
Pt with FRANKIE pneumonia on CXR  - zpack - recheck CXR 1 mo- check INR 4 days - communicated with son and caregiver, April

## 2018-11-16 ENCOUNTER — ANTICOAG VISIT (OUTPATIENT)
Dept: CARDIOLOGY CLINIC | Facility: CLINIC | Age: 83
End: 2018-11-16

## 2018-11-16 DIAGNOSIS — Z86.718 HISTORY OF DVT (DEEP VEIN THROMBOSIS): ICD-10-CM

## 2018-11-16 DIAGNOSIS — I48.91 ATRIAL FIBRILLATION WITH SLOW VENTRICULAR RESPONSE (HCC): ICD-10-CM

## 2018-11-16 NOTE — PROGRESS NOTES
10/26/18, per dr Sindi Sheikh, spoke with nurse, April n 10/24  Pt is to start warfarin 5 mg daily  on 10/25, and also continue eliquis until mon 10/29  Pt inr due 10/29  otilia  10/30/18, noted chart, pt's caregiver April had called on 10/29 to report warfarin and eliquis on hold dur to hematuria  April will call when ok to restart per urology  otilia      11/16/18above note copied today  otilia    11/16/18, tc to April per dr Sindi Sheikh  Pt to restart warfarin 5 mg f/s/s  inr due mon   otilia

## 2018-11-16 NOTE — TELEPHONE ENCOUNTER
Phone call to April ,relayed message from dr Ryan Cm  She understood same  Patient uses [facility] at his residence to draw blood work

## 2018-11-19 ENCOUNTER — TELEPHONE (OUTPATIENT)
Dept: CARDIOLOGY CLINIC | Facility: CLINIC | Age: 83
End: 2018-11-19

## 2018-11-19 NOTE — TELEPHONE ENCOUNTER
Phone call from April  She reports she did not  script for pt/inr at office as previously planned  Faxed script to [facility] at [phone]  April will  copy for inr today for today's inr test    She also reports pt will start z pack today   otilia

## 2018-11-21 ENCOUNTER — APPOINTMENT (OUTPATIENT)
Dept: LAB | Facility: HOSPITAL | Age: 83
End: 2018-11-21
Attending: INTERNAL MEDICINE
Payer: MEDICARE

## 2018-11-21 DIAGNOSIS — T17.908S ASPIRATION INTO AIRWAY, SEQUELA: ICD-10-CM

## 2018-11-21 DIAGNOSIS — I48.91 ATRIAL FIBRILLATION WITH SLOW VENTRICULAR RESPONSE (HCC): ICD-10-CM

## 2018-11-21 DIAGNOSIS — IMO0002 UNCONTROLLED TYPE 2 DIABETES MELLITUS WITH DIABETIC POLYNEUROPATHY, WITHOUT LONG-TERM CURRENT USE OF INSULIN: ICD-10-CM

## 2018-11-21 DIAGNOSIS — I50.42 CHRONIC COMBINED SYSTOLIC AND DIASTOLIC CONGESTIVE HEART FAILURE (HCC): ICD-10-CM

## 2018-11-21 LAB
ALBUMIN SERPL BCP-MCNC: 4 G/DL (ref 3.5–5)
ALP SERPL-CCNC: 82 U/L (ref 46–116)
ALT SERPL W P-5'-P-CCNC: 33 U/L (ref 12–78)
ANION GAP SERPL CALCULATED.3IONS-SCNC: 10 MMOL/L (ref 4–13)
AST SERPL W P-5'-P-CCNC: 32 U/L (ref 5–45)
BASOPHILS # BLD MANUAL: 0 THOUSAND/UL (ref 0–0.1)
BASOPHILS NFR MAR MANUAL: 0 % (ref 0–1)
BILIRUB SERPL-MCNC: 1.07 MG/DL (ref 0.2–1)
BUN SERPL-MCNC: 31 MG/DL (ref 5–25)
CALCIUM SERPL-MCNC: 9.4 MG/DL (ref 8.3–10.1)
CHLORIDE SERPL-SCNC: 101 MMOL/L (ref 100–108)
CO2 SERPL-SCNC: 31 MMOL/L (ref 21–32)
CREAT SERPL-MCNC: 1.36 MG/DL (ref 0.6–1.3)
EOSINOPHIL # BLD MANUAL: 0.08 THOUSAND/UL (ref 0–0.4)
EOSINOPHIL NFR BLD MANUAL: 1 % (ref 0–6)
ERYTHROCYTE [DISTWIDTH] IN BLOOD BY AUTOMATED COUNT: 13.2 % (ref 11.6–15.1)
EST. AVERAGE GLUCOSE BLD GHB EST-MCNC: 209 MG/DL
GFR SERPL CREATININE-BSD FRML MDRD: 48 ML/MIN/1.73SQ M
GLUCOSE SERPL-MCNC: 175 MG/DL (ref 65–140)
HBA1C MFR BLD: 8.9 % (ref 4.2–6.3)
HCT VFR BLD AUTO: 48.6 % (ref 36.5–49.3)
HGB BLD-MCNC: 15.7 G/DL (ref 12–17)
LYMPHOCYTES # BLD AUTO: 2.37 THOUSAND/UL (ref 0.6–4.47)
LYMPHOCYTES # BLD AUTO: 29 % (ref 14–44)
MACROCYTES BLD QL AUTO: PRESENT
MCH RBC QN AUTO: 32.5 PG (ref 26.8–34.3)
MCHC RBC AUTO-ENTMCNC: 32.3 G/DL (ref 31.4–37.4)
MCV RBC AUTO: 101 FL (ref 82–98)
METAMYELOCYTES NFR BLD MANUAL: 1 % (ref 0–1)
MONOCYTES # BLD AUTO: 0.49 THOUSAND/UL (ref 0–1.22)
MONOCYTES NFR BLD: 6 % (ref 4–12)
MYELOCYTES NFR BLD MANUAL: 2 % (ref 0–1)
NEUTROPHILS # BLD MANUAL: 4.57 THOUSAND/UL (ref 1.85–7.62)
NEUTS BAND NFR BLD MANUAL: 1 % (ref 0–8)
NEUTS SEG NFR BLD AUTO: 55 % (ref 43–75)
NRBC BLD AUTO-RTO: 0 /100 WBCS
PLATELET # BLD AUTO: 171 THOUSANDS/UL (ref 149–390)
PLATELET BLD QL SMEAR: ADEQUATE
PMV BLD AUTO: 12.1 FL (ref 8.9–12.7)
POTASSIUM SERPL-SCNC: 4.1 MMOL/L (ref 3.5–5.3)
PROT SERPL-MCNC: 7.8 G/DL (ref 6.4–8.2)
RBC # BLD AUTO: 4.83 MILLION/UL (ref 3.88–5.62)
SODIUM SERPL-SCNC: 142 MMOL/L (ref 136–145)
TOTAL CELLS COUNTED SPEC: 100
TSH SERPL DL<=0.05 MIU/L-ACNC: 1.35 UIU/ML (ref 0.36–3.74)
VARIANT LYMPHS # BLD AUTO: 5 %
WBC # BLD AUTO: 8.16 THOUSAND/UL (ref 4.31–10.16)

## 2018-11-21 PROCEDURE — 83036 HEMOGLOBIN GLYCOSYLATED A1C: CPT

## 2018-11-21 PROCEDURE — 85027 COMPLETE CBC AUTOMATED: CPT

## 2018-11-21 PROCEDURE — 80053 COMPREHEN METABOLIC PANEL: CPT

## 2018-11-21 PROCEDURE — 84443 ASSAY THYROID STIM HORMONE: CPT

## 2018-11-21 PROCEDURE — 85007 BL SMEAR W/DIFF WBC COUNT: CPT

## 2018-11-26 ENCOUNTER — ANTICOAG VISIT (OUTPATIENT)
Dept: CARDIOLOGY CLINIC | Facility: CLINIC | Age: 83
End: 2018-11-26

## 2018-11-26 DIAGNOSIS — I48.91 ATRIAL FIBRILLATION WITH SLOW VENTRICULAR RESPONSE (HCC): ICD-10-CM

## 2018-11-26 DIAGNOSIS — Z86.718 HISTORY OF DVT (DEEP VEIN THROMBOSIS): ICD-10-CM

## 2018-11-26 NOTE — PROGRESS NOTES
11/26/18, tc from April, questioning results of inr done 11/21/  Results were not received  Pt continued on 5 mg daily  Will have inr done tomorrow at [facility]

## 2018-11-28 ENCOUNTER — APPOINTMENT (OUTPATIENT)
Dept: LAB | Facility: HOSPITAL | Age: 83
End: 2018-11-28
Attending: INTERNAL MEDICINE
Payer: MEDICARE

## 2018-11-28 ENCOUNTER — ANTICOAG VISIT (OUTPATIENT)
Dept: CARDIOLOGY CLINIC | Facility: CLINIC | Age: 83
End: 2018-11-28

## 2018-11-28 DIAGNOSIS — Z86.718 HISTORY OF DVT (DEEP VEIN THROMBOSIS): ICD-10-CM

## 2018-11-28 DIAGNOSIS — I48.91 ATRIAL FIBRILLATION WITH SLOW VENTRICULAR RESPONSE (HCC): ICD-10-CM

## 2018-11-28 NOTE — PROGRESS NOTES
11/28/18, tc to April, reviewed with dr Javier Jacobs, hold warfarin wed/thurs  inr due fri  Faxed instructions to joey at [facility]  As requested   otilia

## 2018-11-30 ENCOUNTER — ANTICOAG VISIT (OUTPATIENT)
Dept: CARDIOLOGY CLINIC | Facility: CLINIC | Age: 83
End: 2018-11-30

## 2018-11-30 DIAGNOSIS — Z86.718 HISTORY OF DVT (DEEP VEIN THROMBOSIS): ICD-10-CM

## 2018-11-30 DIAGNOSIS — I48.91 ATRIAL FIBRILLATION WITH SLOW VENTRICULAR RESPONSE (HCC): ICD-10-CM

## 2018-11-30 LAB — INR PPP: 3.2 (ref 0.86–1.17)

## 2018-11-30 NOTE — PROGRESS NOTES
PC to April with directions for Eve Landry to take 0 today then 0,5,5,0,5,5,0 inr in one week 12/7/18

## 2018-12-07 LAB — INR PPP: 1.3 (ref 0.86–1.17)

## 2018-12-10 ENCOUNTER — ANTICOAG VISIT (OUTPATIENT)
Dept: CARDIOLOGY CLINIC | Facility: CLINIC | Age: 83
End: 2018-12-10

## 2018-12-10 DIAGNOSIS — I48.91 ATRIAL FIBRILLATION WITH SLOW VENTRICULAR RESPONSE (HCC): ICD-10-CM

## 2018-12-10 DIAGNOSIS — Z86.718 HISTORY OF DVT (DEEP VEIN THROMBOSIS): ICD-10-CM

## 2018-12-10 NOTE — PROGRESS NOTES
12/10/18, tc to April , personal nurse, [phone], left message on her cell, 2 5 mg mon/5/ mg tues/2 5 mg wed, inr dur thurs   Faxed to [facility] , att [facility] at [phone]

## 2018-12-13 ENCOUNTER — ANTICOAG VISIT (OUTPATIENT)
Dept: CARDIOLOGY CLINIC | Facility: CLINIC | Age: 83
End: 2018-12-13

## 2018-12-13 ENCOUNTER — TELEPHONE (OUTPATIENT)
Dept: CARDIOLOGY CLINIC | Facility: CLINIC | Age: 83
End: 2018-12-13

## 2018-12-13 DIAGNOSIS — Z86.718 HISTORY OF DVT (DEEP VEIN THROMBOSIS): ICD-10-CM

## 2018-12-13 DIAGNOSIS — I48.91 ATRIAL FIBRILLATION WITH SLOW VENTRICULAR RESPONSE (HCC): ICD-10-CM

## 2018-12-13 LAB — INR PPP: 2.6 (ref 0.86–1.17)

## 2018-12-13 NOTE — PROGRESS NOTES
12/13/18, tc to April, reports pt started with hematuria today  No clots  Urine bright red  Will hold x 1 day, then take 2 5 mg daily, inr due 1 week  Faxed to [facility], [phone]   otilia

## 2018-12-13 NOTE — TELEPHONE ENCOUNTER
Phone call to April regarding inr today, 2 6 April reports patient started with hematuria today  Urine bright red, no clots  She states she spoke with urologist, he is not concerned  She spoke with patient 's son,  Juan Daniel Dominguez, suggested he take 2 5 mg warfarin daily  Pt will hold warfarin today, then  take 2 5 mg daily, inr due 1 weeks  nurse will continue to monitor for bleeding  She will call if bleeding persists  Will review with dr Thang Schmitt, currently inr target is 2 0 -3 0  Please advise

## 2018-12-14 ENCOUNTER — ANTICOAG VISIT (OUTPATIENT)
Dept: CARDIOLOGY CLINIC | Facility: CLINIC | Age: 83
End: 2018-12-14

## 2018-12-14 DIAGNOSIS — I48.91 ATRIAL FIBRILLATION WITH SLOW VENTRICULAR RESPONSE (HCC): ICD-10-CM

## 2018-12-14 DIAGNOSIS — Z86.718 HISTORY OF DVT (DEEP VEIN THROMBOSIS): ICD-10-CM

## 2018-12-14 NOTE — PROGRESS NOTES
12/14/18, TC TO April   PER DR CASTREJON, CONTINUE CURRENT PLAN  WILL DECREASE T[facility] INR 1 8 - 2 2   PILI

## 2018-12-14 NOTE — TELEPHONE ENCOUNTER
OK to reduce to 2 5 mg daily and keep INR on lower side ~ 1 8-2 2  Please encourage April to reach out to urology again if persistent hematuria  Agree w/ recheck in 1 week    THx

## 2018-12-14 NOTE — TELEPHONE ENCOUNTER
Paced call to personal nurse, April, [phone], left message on her cell , reviewed dr peralta's instructions  April to call if any problems

## 2018-12-20 LAB — INR PPP: 1.2 (ref 0.86–1.17)

## 2018-12-21 ENCOUNTER — ANTICOAG VISIT (OUTPATIENT)
Dept: CARDIOLOGY CLINIC | Facility: CLINIC | Age: 83
End: 2018-12-21

## 2018-12-21 DIAGNOSIS — I48.91 ATRIAL FIBRILLATION WITH SLOW VENTRICULAR RESPONSE (HCC): ICD-10-CM

## 2018-12-21 DIAGNOSIS — Z86.718 HISTORY OF DVT (DEEP VEIN THROMBOSIS): ICD-10-CM

## 2018-12-21 NOTE — PROGRESS NOTES
12/21/18, tc to personal nurse, April, [phone]  denies any hematuria will take 5 mg fri, 2 5 mg other days of the week, inr due 1 week  faxed to joey at [facility]

## 2018-12-25 DIAGNOSIS — M54.50 CHRONIC RIGHT-SIDED LOW BACK PAIN WITHOUT SCIATICA: ICD-10-CM

## 2018-12-25 DIAGNOSIS — G89.29 CHRONIC RIGHT-SIDED LOW BACK PAIN WITHOUT SCIATICA: ICD-10-CM

## 2018-12-26 RX ORDER — TRAMADOL HYDROCHLORIDE 50 MG/1
TABLET ORAL
Qty: 60 TABLET | Refills: 0 | Status: SHIPPED | OUTPATIENT
Start: 2018-12-26 | End: 2019-02-11 | Stop reason: SDUPTHER

## 2018-12-28 ENCOUNTER — ANTICOAG VISIT (OUTPATIENT)
Dept: CARDIOLOGY CLINIC | Facility: CLINIC | Age: 83
End: 2018-12-28

## 2018-12-28 DIAGNOSIS — Z86.718 HISTORY OF DVT (DEEP VEIN THROMBOSIS): ICD-10-CM

## 2018-12-28 DIAGNOSIS — I48.91 ATRIAL FIBRILLATION WITH SLOW VENTRICULAR RESPONSE (HCC): ICD-10-CM

## 2018-12-28 LAB — INR PPP: 1.3 (ref 0.86–1.17)

## 2018-12-28 NOTE — PROGRESS NOTES
12/28/18, tc to nurse, April, left message on her answering machine, increase dose, 5 mg m/f, 2 5 mg other days of the week, inr due 1 week   otilia

## 2019-01-04 ENCOUNTER — OFFICE VISIT (OUTPATIENT)
Dept: UROLOGY | Facility: CLINIC | Age: 84
End: 2019-01-04
Payer: MEDICARE

## 2019-01-04 VITALS
SYSTOLIC BLOOD PRESSURE: 132 MMHG | HEART RATE: 71 BPM | HEIGHT: 71 IN | DIASTOLIC BLOOD PRESSURE: 88 MMHG | WEIGHT: 182.2 LBS | BODY MASS INDEX: 25.51 KG/M2

## 2019-01-04 DIAGNOSIS — R35.0 URINARY FREQUENCY: Primary | ICD-10-CM

## 2019-01-04 DIAGNOSIS — R35.0 BENIGN PROSTATIC HYPERPLASIA WITH URINARY FREQUENCY: ICD-10-CM

## 2019-01-04 DIAGNOSIS — N40.1 BENIGN PROSTATIC HYPERPLASIA WITH URINARY FREQUENCY: ICD-10-CM

## 2019-01-04 LAB
INR PPP: 1.7 (ref 0.86–1.17)
POST-VOID RESIDUAL VOLUME, ML POC: 65 ML
SL AMB  POCT GLUCOSE, UA: NORMAL
SL AMB LEUKOCYTE ESTERASE,UA: NORMAL
SL AMB POCT BILIRUBIN,UA: NORMAL
SL AMB POCT BLOOD,UA: NORMAL
SL AMB POCT CLARITY,UA: NORMAL
SL AMB POCT COLOR,UA: YELLOW
SL AMB POCT KETONES,UA: NORMAL
SL AMB POCT NITRITE,UA: NORMAL
SL AMB POCT PH,UA: 5
SL AMB POCT SPECIFIC GRAVITY,UA: 1.02
SL AMB POCT URINE PROTEIN: 300
SL AMB POCT UROBILINOGEN: 0.2

## 2019-01-04 PROCEDURE — 99214 OFFICE O/P EST MOD 30 MIN: CPT | Performed by: UROLOGY

## 2019-01-04 PROCEDURE — 81002 URINALYSIS NONAUTO W/O SCOPE: CPT | Performed by: UROLOGY

## 2019-01-04 PROCEDURE — 51798 US URINE CAPACITY MEASURE: CPT | Performed by: UROLOGY

## 2019-01-04 PROCEDURE — 87086 URINE CULTURE/COLONY COUNT: CPT | Performed by: UROLOGY

## 2019-01-04 NOTE — PROGRESS NOTES
Referring Physician: Amilcar Campo MD  A copy of this note was sent to the referring physician  Diagnoses and all orders for this visit:    Urinary frequency  -     Urine culture    Benign prostatic hyperplasia with urinary frequency  -     POCT Measure PVR  -     POCT urine dip            Assessment and plan:     BPH with medically refractory lower urinary tract symptoms  - status post Uro lift  - persistent bothersome frequency and urgency    Cande Ortiz does have persistent storage symptoms including urgency frequency and occasional urge urinary incontinence following the Uro lift  He continues on maximum dual modality medication for his overactive bladder including Toviaz and Myrbetriq  At this point is bladder outlet appears to be optimized with the procedure  His postvoid residual obtained today is negligible    Discussed tertiary options for his overactive bladder  I discussed cystoscopy with bladder Botox injection  Risks and benefits were discussed with risks including but not limited to infection, need for ongoing catheterization, damage to bladder, hematuria, to the medication, and need for additional procedures  We discussed that in general Botox can be expected to work for up to 1 year, but sometimes less  Cande Ortiz would like to move forward this procedure  I would like to speak with his son 1st   I will do so by phone  If they are amenable procedure can be [facility] with simple IV sedation (100 units)  A preoperative urine culture was obtained today should he elect to move forward with this intervention      Adolfo Shepherd MD      Chief Complaint     Uro lift follow-up      History of Present Illness     Danica Byrd is a 81 y o  male with a longstanding history of obstructive lower urinary tract symptoms  He is status post Uro lift procedure    Although his urinary flow is improved, he states he continues to be bothered by high volume frequency and urgency despite combination anticholinergic and beta 3 agonist     He is symptomatic both during the day and at night  He has had no interval UTIs  He did have a recent episode of gross hematuria status post negative cystoscopy and has had no further bother from this  Detailed Urologic History     - please refer to HPI    Review of Systems     Review of Systems   Constitutional: Negative for activity change and fatigue  HENT: Negative for congestion  Eyes: Negative for visual disturbance  Respiratory: Negative for shortness of breath and wheezing  Cardiovascular: Negative for chest pain and leg swelling  Gastrointestinal: Negative for abdominal pain  Endocrine: Positive for polyuria  Genitourinary: Positive for dysuria, frequency and urgency  Negative for flank pain and hematuria  Musculoskeletal: Negative for back pain  Allergic/Immunologic: Negative for immunocompromised state  Neurological: Negative for dizziness and numbness  Psychiatric/Behavioral: Negative for dysphoric mood  All other systems reviewed and are negative  Allergies     Allergies   Allergen Reactions    Meperidine And Related     Morphine And Related     Demerol [Meperidine] Rash     Pt  Unsure of reaction, states is was years ago         Physical Exam     Physical Exam   Constitutional: He is oriented to person, place, and time  He appears well-developed and well-nourished  No distress  HENT:   Head: Normocephalic and atraumatic  Eyes: EOM are normal    Neck: Normal range of motion  Prior tracheostomy site noted   Cardiovascular:   Negative lower extremity edema   Pulmonary/Chest: Effort normal and breath sounds normal    Abdominal: Soft  Genitourinary: Penis normal    Musculoskeletal: Normal range of motion  Neurological: He is alert and oriented to person, place, and time  Skin: Skin is warm  Psychiatric: He has a normal mood and affect   His behavior is normal  Vital Signs  Vitals:    01/04/19 0926   BP: 132/88   BP Location: Right arm   Patient Position: Sitting   Cuff Size: Adult   Pulse: 71   Weight: 82 6 kg (182 lb 3 2 oz)   Height: 5' 11" (1 803 m)         Current Medications       Current Outpatient Prescriptions:     ACCU-CHEK FASTCLIX LANCETS MISC, by Does not apply route 2 (two) times a day, Disp: , Rfl:     amLODIPine (NORVASC) 5 mg tablet, Take by mouth, Disp: , Rfl:     atorvastatin (LIPITOR) 10 mg tablet, Take by mouth, Disp: , Rfl:     celecoxib (CELEBREX) 100 mg capsule, Take by mouth, Disp: , Rfl:     famotidine (PEPCID) 20 mg tablet, TAKE 1 TABLET BY MOUTH TWO  TIMES DAILY, Disp: 180 tablet, Rfl: 3    Fesoterodine Fumarate ER (TOVIAZ) 4 MG TB24, Take by mouth, Disp: , Rfl:     folic acid (FOLVITE) 1 mg tablet, TAKE 1 TABLET BY MOUTH  DAILY, Disp: 90 tablet, Rfl: 3    furosemide (LASIX) 20 mg tablet, TAKE 2 TABLETS BY MOUTH IN  THE MORNING, Disp: 180 tablet, Rfl: 3    furosemide (LASIX) 40 mg tablet, Take 40 mg by mouth every other day  , Disp: , Rfl:     gabapentin (NEURONTIN) 400 mg capsule, Take 400 mg by mouth 3 (three) times a day  , Disp: , Rfl:     HYDROcodone-acetaminophen (NORCO) 5-325 mg per tablet, , Disp: , Rfl:     ibuprofen (MOTRIN) 200 mg tablet, Take 3 tablets (600 mg total) by mouth every 6 (six) hours as needed for mild pain, Disp: , Rfl: 0    insulin glargine (LANTUS SOLOSTAR) 100 units/mL injection pen, Inject 18 Units under the skin daily, Disp: 5 pen, Rfl: 3    levalbuterol (XOPENEX) 1 25 mg/3 mL nebulizer solution, Inhale, Disp: , Rfl:     lidocaine (LIDODERM) 5 %, Place 1 patch on the skin every 24 hours Remove & Discard patch within 12 hours or as directed by MD (Patient taking differently: Place 1 patch on the skin every 24 hours Remove & Discard patch within 12 hours or as directed by MD ), Disp: 10 patch, Rfl: 0    loperamide (IMODIUM A-D) 2 MG tablet, Take 1 tablet by mouth 4 (four) times a day as needed, Disp: , Rfl:     metFORMIN (GLUCOPHAGE-XR) 500 mg 24 hr tablet, , Disp: , Rfl:     metoprolol tartrate (LOPRESSOR) 25 mg tablet, Take 12 5 mg by mouth daily at bedtime  , Disp: , Rfl:     Mirabegron ER (MYRBETRIQ) 50 MG TB24, Take by mouth, Disp: , Rfl:     mirtazapine (REMERON) 7 5 MG tablet, Take 1 tablet (7 5 mg total) by mouth daily at bedtime, Disp: 30 tablet, Rfl: 1    Multiple Vitamin (MULTIVITAMIN) tablet, Take 1 tablet by mouth daily, Disp: , Rfl:     nitroglycerin (NITROLINGUAL) 0 4 mg/spray spray, 1 spray every 5 (five) minutes as needed  , Disp: , Rfl:     nystatin (MYCOSTATIN) powder, Apply to affected area 2-3 times daily until resolved , Disp: 15 g, Rfl: 0    ONE TOUCH ULTRA TEST test strip, TEST TWICE DAILY E 11 9, Disp: , Rfl: 0    pantoprazole (PROTONIX) 40 mg tablet, Take 1 tablet every other day, Disp: 90 tablet, Rfl: 0    Psyllium (METAMUCIL) 28 3 % POWD, Take by mouth, Disp: , Rfl:     sodium chloride 0 9 % nebulizer solution, Inhale every 4 (four) hours as needed  , Disp: , Rfl:     traMADol (ULTRAM) 50 mg tablet, TAKE 1 TABLET BY MOUTH TWO  TIMES DAILY, Disp: 60 tablet, Rfl: 0    Triamcinolone Acetonide (NASACORT ALLERGY 24HR) 55 MCG/ACT AERO, 1 puff into each nostril as needed  , Disp: , Rfl:     warfarin (COUMADIN) 5 mg tablet, Take one tablet daily as directed by physician, Disp: 30 tablet, Rfl: 2    docusate sodium (COLACE) 100 mg capsule, Take 1 capsule (100 mg total) by mouth 2 (two) times a day for 15 days, Disp: 30 capsule, Rfl: 0      Active Problems     Patient Active Problem List   Diagnosis    CH[facility]congestive heart failure) ([facility] )    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, without long-term current use of insulin (HCC)    Chronic osteomyelitis of spine (HCC)    [facility] response [facility]    Coronary atherosclerosis    Cancer, colon ([facility] )    [facility]    Pure hypercholesterolemia    Hemorrhoids    History of DVT (deep vein thrombosis)    History of malignant neoplasm of large intestine    COLD (chronic obstructive lung disease) (HCC)    Pneumonia of left upper lobe due to infectious organism (HCC)    Chronic right-sided low back pain without sciatica    Allergic rhinitis    Benign essential hypertension    BPH with obstruction/lower urinary tract symptoms    CAD S/P percutaneous coronary angioplasty    Chronic combined systolic and diastolic congestive heart failure (HCC)    Dysphagia    Gastroesophageal reflux disease without esophagitis    Floaters in visual field    Insomnia    Leukocytosis    Lumbar compression fracture (HCC)    OAB (overactive bladder)    Gross hematuria    Diverticulosis of large intestine    Mixed hyperlipidemia         Past Medical History     Past Medical History:   Diagnosis Date    Arthritis     Atrial fibrillation (HCC)     BPH (benign prostatic hyperplasia)     CAD (coronary artery disease)     Chronic back pain     Chronic diastolic (congestive) heart failure (HCC)     COPD (chronic obstructive pulmonary disease) (HCC)     Critical illness polyneuropathy (HCC)     Diabetes mellitus (HCC)     type 2    Dysphagia     GERD (gastroesophageal reflux disease)     Hemorrhoids     History of colon cancer     History of DVT (deep vein thrombosis)     Hyperlipidemia     Hypertension     Hyponatremia     Malignant neoplasm of colon (HCC)     Myocardial infarct (HCC)     Osteomyelitis ([address] )     Pneumonia     Sepsis [facility] )     Urinary retention          Surgical History     Past Surgical History:   Procedure Laterality Date    APPENDECTOMY      BACK SURGERY      BRONCHOSCOPY N/A 6/30/2016    Procedure: BRONCHOSCOPY FLEXIBLE with bronchial lavage to the left lower lobe; Surgeon: Venus Linares MD;  [facility]: [facility];   Service:     CATARACT EXTRACTION      CHOLECYSTECTOMY      COLECTOMY      COLON SURGERY      polypectomy for cancerous lesion    COLONOSCOPY      CORONARY ANGIOPLASTY WITH STENT PLACEMENT      x5  pt unsure of where    GASTROSTOMY TUBE PLACEMENT      percutaneous placement of gastrostomy tube placed 4/16 - dc 8/16    HEMORROIDECTOMY      LUMBAR LAMINECTOMY      LUNG SURGERY      PEG TUBE REMOVAL      [place] CYSTOURETHRO W/IMPLANT N/A 8/27/2018    Procedure: CYSTOSCOPY WITH INSERTION Marley Craw;  Surgeon: Yessi Sheth MD;  [facility];  Service: Urology    TRACHEOSTOMY           Family History     Family History   Problem Relation Age of Onset    Heart attack Mother          Social History     Social History     History   Smoking Status    Former Smoker    Packs/day: 1 00    Years: 20 00    Types: Cigarettes    Quit date: 8/29/1978   Smokeless Tobacco    Never Used     Comment: quit 40 years ago         Pertinent Lab Values     Lab Results   Component Value Date    CREATININE 1 36 (H) 11/21/2018       No results found for: PSA    @RESULTRCNT(1H])@      Pertinent Imaging      - n/a    Portions of the record may have been created with voice recognition software   Occasional wrong word or "sound a like" substitutions may have occurred due to the inherent limitations of voice recognition software   Read the chart carefully and recognize, using context, where substitutions have occurred

## 2019-01-05 LAB — BACTERIA UR CULT: NORMAL

## 2019-01-07 ENCOUNTER — ANTICOAG VISIT (OUTPATIENT)
Dept: CARDIOLOGY CLINIC | Facility: CLINIC | Age: 84
End: 2019-01-07

## 2019-01-07 DIAGNOSIS — I48.91 ATRIAL FIBRILLATION WITH SLOW VENTRICULAR RESPONSE (HCC): ICD-10-CM

## 2019-01-07 DIAGNOSIS — Z86.718 HISTORY OF DVT (DEEP VEIN THROMBOSIS): ICD-10-CM

## 2019-01-07 NOTE — PROGRESS NOTES
1/7/19, tc to April, personal nurse, left message on answering machine, continue current dose, inr due 1 week  Faxed to dara bingham

## 2019-01-09 ENCOUNTER — OFFICE VISIT (OUTPATIENT)
Dept: CARDIOLOGY CLINIC | Facility: CLINIC | Age: 84
End: 2019-01-09
Payer: MEDICARE

## 2019-01-09 VITALS
HEIGHT: 71 IN | SYSTOLIC BLOOD PRESSURE: 110 MMHG | HEART RATE: 88 BPM | BODY MASS INDEX: 24.78 KG/M2 | DIASTOLIC BLOOD PRESSURE: 68 MMHG | WEIGHT: 177 LBS

## 2019-01-09 DIAGNOSIS — I10 BENIGN ESSENTIAL HTN: ICD-10-CM

## 2019-01-09 DIAGNOSIS — E78.5 DYSLIPIDEMIA: ICD-10-CM

## 2019-01-09 DIAGNOSIS — I42.8 NICM (NONISCHEMIC CARDIOMYOPATHY) (HCC): Primary | ICD-10-CM

## 2019-01-09 PROBLEM — N40.1 BENIGN PROSTATIC HYPERPLASIA WITH URINARY FREQUENCY: Status: ACTIVE | Noted: 2019-01-09

## 2019-01-09 PROBLEM — R35.0 BENIGN PROSTATIC HYPERPLASIA WITH URINARY FREQUENCY: Status: ACTIVE | Noted: 2019-01-09

## 2019-01-09 PROCEDURE — 1123F ACP DISCUSS/DSCN MKR DOCD: CPT | Performed by: UROLOGY

## 2019-01-09 PROCEDURE — 99214 OFFICE O/P EST MOD 30 MIN: CPT | Performed by: INTERNAL MEDICINE

## 2019-01-09 RX ORDER — NITROFURANTOIN 25; 75 MG/1; MG/1
CAPSULE ORAL
Refills: 1 | COMMUNITY
Start: 2019-01-05 | End: 2019-02-04

## 2019-01-09 NOTE — PROGRESS NOTES
Cardiology Follow Up        Jesica Seward      1935      [id number]      Discussion/Summary:    1  CAD status post remote myocardial infarction, prior PCI and coronary stenting with unknown details  2  Benign essential hypertension  3  Chronic diastolic heart failure  4  Permanent atrial fibrillation      · Patient denies symptoms of angina, continue atorvastatin, metoprolol  No aspirin while on anticoagulation with warfarin  · Blood pressure controlled, continue metoprolol, amlodipine  · Euvolemic by examination, continue furosemide  · Heart rate adequately controlled on exam, continue metoprolol and warfarin anticoagulation  Prior INR 1 7 on 01/04/2019  Next INR scheduled for 2 days later  Goal INR 1 8-2 2 in light of intermittent hematuria (sees urology)  Interval History: This is an 58-year-old male with a history of CAD status post PCI in the remote past, unknown details, permanent atrial fibrillation, prior cardiomyopathy with ejection fraction 20% with subsequent echocardiogram 08/2016 revealing ejection fraction about 55%  He has maintained on warfarin anticoagulation  He has a history of obstructive lower urinary tract symptoms, and underwent year uro lift procedure in the past   He has had hematuria with negative cystoscopy in the past   He sees Dr Steven Mata (urology)  He presents today for follow-up  He has no complaints for me today other than some weakness in his legs  He denies chest pain, shortness of breath, dizziness, palpitations  May be scheduled for cystoscopy and bladder Botox injection in the near future for his overactive bladder      Vitals:  Vitals:    01/09/19 1429   BP: 110/68   BP Location: Left arm   Patient Position: Sitting   Cuff Size: Adult   Pulse: 88   Weight: 80 3 kg (177 lb)   Height: 5' 11" (1 803 m)         Past Medical History:   Diagnosis Date    Arthritis     Atrial fibrillation (HCC)     BPH (benign prostatic hyperplasia)     CAD (coronary artery disease)     Chronic back pain     Chronic diastolic (congestive) heart failure (HCC)     COPD (chronic obstructive pulmonary disease) (HCC)     Critical illness polyneuropathy (HCC)     Diabetes mellitus (HCC)     type 2    Dysphagia     GERD (gastroesophageal reflux disease)     Hemorrhoids     History of colon cancer     History of DVT (deep vein thrombosis)     Hyperlipidemia     Hypertension     Hyponatremia     [facility]     [facility]     Osteomyelitis ([facility] )     Pneumonia     Sepsis ([facility] )     Urinary retention      Social History     Social History    Marital status:      Spouse name: N/A    Number of children: N/A    Years of education: N/A     Occupational History     - retired      Social History Main Topics    Smoking status: Former Smoker     Packs/day: 1 00     Years: 20 00     Types: Cigarettes     Quit date: 8/29/1978    Smokeless tobacco: Never Used      Comment: quit 40 years ago    Alcohol use No    Drug use: No    Sexual activity: Not on file     Other Topics Concern    Not on file     Social History Narrative    Lives independently until recent episode of sepsis 3/16 - now in assisted living      Family History   Problem Relation Age of Onset    Heart attack Mother      Past Surgical History:   Procedure Laterality Date    APPENDECTOMY      BACK SURGERY      BRONCHOSCOPY N/A 6/30/2016    Procedure: BRONCHOSCOPY FLEXIBLE with bronchial lavage to the left lower lobe; Surgeon: Joaquin Art MD;  [facility];   Service:     CATARACT EXTRACTION      CHOLECYSTECTOMY      COLECTOMY      COLON SURGERY      polypectomy for cancerous lesion    COLONOSCOPY      CORONARY ANGIOPLASTY WITH STENT PLACEMENT      x5  pt unsure of where    GASTROSTOMY TUBE PLACEMENT      percutaneous placement of gastrostomy tube placed 4/16 - dc 8/16    HEMORROIDECTOMY      LUMBAR LAMINECTOMY      LUNG SURGERY      PEG TUBE REMOVAL      [place] CYSTOURETHRO W/IMPLANT N/A 8/27/2018    Procedure: CYSTOSCOPY WITH INSERTION UROLIFT;  Surgeon: Mushtaq Arias MD;  [facility];  Service: Urology    TRACHEOSTOMY         Current Outpatient Prescriptions:     ACCU-CHEK FASTCLIX LANCETS MISC, by Does not apply route 2 (two) times a day, Disp: , Rfl:     amLODIPine (NORVASC) 5 mg tablet, Take by mouth, Disp: , Rfl:     atorvastatin (LIPITOR) 10 mg tablet, Take by mouth, Disp: , Rfl:     celecoxib (CELEBREX) 100 mg capsule, Take by mouth, Disp: , Rfl:     docusate sodium (COLACE) 100 mg capsule, Take 1 capsule (100 mg total) by mouth 2 (two) times a day for 15 days, Disp: 30 capsule, Rfl: 0    famotidine (PEPCID) 20 mg tablet, TAKE 1 TABLET BY MOUTH TWO  TIMES DAILY, Disp: 180 tablet, Rfl: 3    Fesoterodine Fumarate ER (TOVIAZ) 4 MG TB24, Take by mouth, Disp: , Rfl:     folic acid (FOLVITE) 1 mg tablet, TAKE 1 TABLET BY MOUTH  DAILY, Disp: 90 tablet, Rfl: 3    furosemide (LASIX) 20 mg tablet, TAKE 2 TABLETS BY MOUTH IN  THE MORNING, Disp: 180 tablet, Rfl: 3    furosemide (LASIX) 40 mg tablet, Take 40 mg by mouth every other day  , Disp: , Rfl:     gabapentin (NEURONTIN) 400 mg capsule, Take 400 mg by mouth 3 (three) times a day  , Disp: , Rfl:     HYDROcodone-acetaminophen (NORCO) 5-325 mg per tablet, , Disp: , Rfl:     ibuprofen (MOTRIN) 200 mg tablet, Take 3 tablets (600 mg total) by mouth every 6 (six) hours as needed for mild pain, Disp: , Rfl: 0    insulin glargine (LANTUS SOLOSTAR) 100 units/mL injection pen, Inject 18 Units under the skin daily, Disp: 5 pen, Rfl: 3    levalbuterol (XOPENEX) 1 25 mg/3 mL nebulizer solution, Inhale, Disp: , Rfl:     lidocaine (LIDODERM) 5 %, Place 1 patch on the skin every 24 hours Remove & Discard patch within 12 hours or as directed by MD (Patient taking differently: Place 1 patch on the skin every 24 hours Remove & Discard patch within 12 hours or as directed by MD ), Disp: 10 patch, Rfl: 0    loperamide (IMODIUM A-D) 2 MG tablet, Take 1 tablet by mouth 4 (four) times a day as needed, Disp: , Rfl:     metFORMIN (GLUCOPHAGE-XR) 500 mg 24 hr tablet, , Disp: , Rfl:     metoprolol tartrate (LOPRESSOR) 25 mg tablet, Take 12 5 mg by mouth daily at bedtime  , Disp: , Rfl:     Mirabegron ER (MYRBETRIQ) 50 MG TB24, Take by mouth, Disp: , Rfl:     mirtazapine (REMERON) 7 5 MG tablet, Take 1 tablet (7 5 mg total) by mouth daily at bedtime, Disp: 30 tablet, Rfl: 1    Multiple Vitamin (MULTIVITAMIN) tablet, Take 1 tablet by mouth daily, Disp: , Rfl:     nitrofurantoin (MACROBID) 100 mg capsule, TAKE 1 CAPSULE BY MOUTH TWICE DAILY FOR 5 DAYS, Disp: , Rfl: 1    nitroglycerin (NITROLINGUAL) 0 4 mg/spray spray, 1 spray every 5 (five) minutes as needed  , Disp: , Rfl:     nystatin (MYCOSTATIN) powder, Apply to affected area 2-3 times daily until resolved , Disp: 15 g, Rfl: 0    ONE TOUCH ULTRA TEST test strip, TEST TWICE DAILY E 11 9, Disp: , Rfl: 0    pantoprazole (PROTONIX) 40 mg tablet, Take 1 tablet every other day, Disp: 90 tablet, Rfl: 0    Psyllium (METAMUCIL) 28 3 % POWD, Take by mouth, Disp: , Rfl:     sodium chloride 0 9 % nebulizer solution, Inhale every 4 (four) hours as needed  , Disp: , Rfl:     traMADol (ULTRAM) 50 mg tablet, TAKE 1 TABLET BY MOUTH TWO  TIMES DAILY, Disp: 60 tablet, Rfl: 0    Triamcinolone Acetonide (NASACORT ALLERGY 24HR) 55 MCG/ACT AERO, 1 puff into each nostril as needed  , Disp: , Rfl:     warfarin (COUMADIN) 5 mg tablet, Take one tablet daily as directed by physician, Disp: 30 tablet, Rfl: 2        Review of Systems:  Review of Systems   Constitutional: Negative  HENT: Negative  Respiratory: Negative for chest tightness, shortness of breath and wheezing  Cardiovascular: Negative for chest pain, palpitations and leg swelling     Gastrointestinal: Negative for blood in stool, diarrhea and nausea  Genitourinary: Positive for difficulty urinating  Musculoskeletal: Positive for gait problem  Psychiatric/Behavioral: Negative for agitation and confusion  Physical Exam:  Physical Exam   Constitutional: He is oriented to person, place, and time  He appears well-developed and well-nourished  No distress  HENT:   Head: Normocephalic and atraumatic  Eyes: Pupils are equal, round, and reactive to light  EOM are normal  Right eye exhibits no discharge  No scleral icterus  Neck: Normal range of motion  Neck supple  No thyromegaly present  Cardiovascular: Normal rate, regular rhythm and normal heart sounds  Exam reveals no gallop and no friction rub  No murmur heard  Pulmonary/Chest: Effort normal and breath sounds normal    Abdominal: He exhibits no distension  There is no tenderness  There is no rebound and no guarding  Musculoskeletal: Normal range of motion  He exhibits no edema  Neurological: He is alert and oriented to person, place, and time  Coordination normal    Skin: Skin is warm and dry  No rash noted  He is not diaphoretic  No erythema  No pallor  Psychiatric: He has a normal mood and affect  His behavior is normal  Judgment and thought content normal        This note was completed in part utilizing [employer] Flu[employer] Direct Software  Grammatical errors, random word insertions, spelling mistakes, and incomplete sentences can be an occasional consequence of this system secondary to software limitations, ambient noise, and hardware issues  If you have any questions or concerns about the content, text, or information contained within the body of this dictation, please contact the provider for clarification

## 2019-01-11 ENCOUNTER — ANTICOAG VISIT (OUTPATIENT)
Dept: CARDIOLOGY CLINIC | Facility: CLINIC | Age: 84
End: 2019-01-11

## 2019-01-11 DIAGNOSIS — Z86.718 HISTORY OF DVT (DEEP VEIN THROMBOSIS): ICD-10-CM

## 2019-01-11 DIAGNOSIS — I48.91 ATRIAL FIBRILLATION WITH SLOW VENTRICULAR RESPONSE (HCC): ICD-10-CM

## 2019-01-11 LAB — INR PPP: 1.2 (ref 0.86–1.17)

## 2019-01-11 NOTE — PROGRESS NOTES
1/11/19, tc to nurse, April, left message to increase dose 5 mg m/w/f, 2 5 mg other days of there week, inr due 1 week  Faxed to dara bingham

## 2019-01-17 ENCOUNTER — ANTICOAG VISIT (OUTPATIENT)
Dept: CARDIOLOGY CLINIC | Facility: CLINIC | Age: 84
End: 2019-01-17

## 2019-01-17 DIAGNOSIS — I48.91 ATRIAL FIBRILLATION WITH SLOW VENTRICULAR RESPONSE (HCC): ICD-10-CM

## 2019-01-17 DIAGNOSIS — Z86.718 HISTORY OF DVT (DEEP VEIN THROMBOSIS): ICD-10-CM

## 2019-01-17 LAB — INR PPP: 1.2 (ref 0.86–1.17)

## 2019-01-17 NOTE — PROGRESS NOTES
1/17/19, tc to pt's personal nurse, April April reports she noted patient did miss a dose last week  She reports pt has been eating salads and drinking cranberry juice  Pt also had metformin dose changed  Will increase dose, 2 5 mg sun/tues/fri, 5 mg other days of the week  She will administer medication daily to ensure he does not miss doses  Will check inr in one week  Faxed to [facility]   otilia

## 2019-01-21 DIAGNOSIS — IMO0002 UNCONTROLLED TYPE 2 DIABETES MELLITUS WITH DIABETIC POLYNEUROPATHY, WITHOUT LONG-TERM CURRENT USE OF INSULIN: Chronic | ICD-10-CM

## 2019-01-21 RX ORDER — INSULIN GLARGINE 100 [IU]/ML
INJECTION, SOLUTION SUBCUTANEOUS
Qty: 30 ML | Refills: 11 | Status: SHIPPED | OUTPATIENT
Start: 2019-01-21 | End: 2020-02-20

## 2019-01-22 DIAGNOSIS — I48.91 ATRIAL FIBRILLATION, UNSPECIFIED TYPE (HCC): ICD-10-CM

## 2019-01-22 RX ORDER — WARFARIN SODIUM 5 MG/1
TABLET ORAL
Qty: 90 TABLET | Refills: 1 | Status: SHIPPED | OUTPATIENT
Start: 2019-01-22 | End: 2019-07-17 | Stop reason: ALTCHOICE

## 2019-01-24 ENCOUNTER — ANTICOAG VISIT (OUTPATIENT)
Dept: CARDIOLOGY CLINIC | Facility: CLINIC | Age: 84
End: 2019-01-24

## 2019-01-24 ENCOUNTER — ANESTHESIA EVENT (OUTPATIENT)
Dept: PERIOP | Facility: AMBULARY SURGERY CENTER | Age: 84
End: 2019-01-24
Payer: MEDICARE

## 2019-01-24 DIAGNOSIS — Z86.718 HISTORY OF DVT (DEEP VEIN THROMBOSIS): ICD-10-CM

## 2019-01-24 DIAGNOSIS — I48.91 ATRIAL FIBRILLATION WITH SLOW VENTRICULAR RESPONSE (HCC): ICD-10-CM

## 2019-01-24 LAB — INR PPP: 3.2 (ref 0.86–1.17)

## 2019-01-24 NOTE — PROGRESS NOTES
1/24/19, tc to April, will hold x 2 days, then decrease dose, 5 mg m/th, 2 5 mg other days of the week, inr due 1 week  Faxed to dara bingham

## 2019-01-30 ENCOUNTER — TELEPHONE (OUTPATIENT)
Dept: UROLOGY | Facility: MEDICAL CENTER | Age: 84
End: 2019-01-30

## 2019-01-30 NOTE — TELEPHONE ENCOUNTER
Patients nurse April would like a call back to clarify procedure scheduled for 2/8/19 with Dr Romulo Aburto

## 2019-01-31 ENCOUNTER — ANTICOAG VISIT (OUTPATIENT)
Dept: CARDIOLOGY CLINIC | Facility: CLINIC | Age: 84
End: 2019-01-31

## 2019-01-31 DIAGNOSIS — Z86.718 HISTORY OF DVT (DEEP VEIN THROMBOSIS): ICD-10-CM

## 2019-01-31 DIAGNOSIS — I48.91 ATRIAL FIBRILLATION WITH SLOW VENTRICULAR RESPONSE (HCC): ICD-10-CM

## 2019-01-31 LAB — INR PPP: 2 (ref 0.86–1.17)

## 2019-01-31 NOTE — PROGRESS NOTES
1/31/19, tc to patient's personal nurse, April  She reports patient's  scheduled for cysto procedure for botox injection on 2/8  Reviewed chart, could not find clearance from dr Nilam Guardado regarding warfarin hold  Requested she has urology office call our office regarding warfarin hold  April reports urology had requested 7 day hold  Will continue current dose of warfarin, will hold warfarin as directed when this is determined  instructed April when urology clears patient ot restart warfarin, restart at current dose, inr due 1 week after restarting  Will fax to dara bingham

## 2019-02-04 NOTE — PRE-PROCEDURE INSTRUCTIONS
Pre-Surgery Instructions:   Medication Instructions    docusate sodium (COLACE) 100 mg capsule Instructed patient per Anesthesia Guidelines   famotidine (PEPCID) 20 mg tablet Instructed patient per Anesthesia Guidelines   folic acid (FOLVITE) 1 mg tablet Instructed patient per Anesthesia Guidelines   furosemide (LASIX) 20 mg tablet Instructed patient per Anesthesia Guidelines   gabapentin (NEURONTIN) 400 mg capsule Instructed patient per Anesthesia Guidelines   LANTUS SOLOSTAR 100 units/mL injection pen Patient was instructed by Physician and understands   lidocaine (LIDODERM) 5 % Instructed patient per Anesthesia Guidelines   loperamide (IMODIUM A-D) 2 MG tablet Patient was instructed by Physician and understands   metFORMIN (GLUCOPHAGE-XR) 500 mg 24 hr tablet Patient was instructed by Physician and understands   metoprolol tartrate (LOPRESSOR) 25 mg tablet Instructed patient per Anesthesia Guidelines   Mirabegron ER (MYRBETRIQ) 50 MG TB24 Instructed patient per Anesthesia Guidelines   mirtazapine (REMERON) 7 5 MG tablet Instructed patient per Anesthesia Guidelines   Multiple Vitamin (MULTIVITAMIN) tablet Patient was instructed by Physician and understands   nitroglycerin (NITROLINGUAL) 0 4 mg/spray spray Instructed patient per Anesthesia Guidelines   pantoprazole (PROTONIX) 40 mg tablet Instructed patient per Anesthesia Guidelines   Psyllium (METAMUCIL) 28 3 % POWD Instructed patient per Anesthesia Guidelines   traMADol (ULTRAM) 50 mg tablet Instructed patient per Anesthesia Guidelines   warfarin (COUMADIN) 5 mg tablet Patient was instructed by Physician and understands  Pre op and bathing instructions reviewed  Pt will use antibacterial   Soap

## 2019-02-05 ENCOUNTER — HOSPITAL ENCOUNTER (OUTPATIENT)
Dept: NON INVASIVE DIAGNOSTICS | Facility: HOSPITAL | Age: 84
Discharge: HOME | End: 2019-02-05
Attending: UROLOGY
Payer: MEDICARE

## 2019-02-05 DIAGNOSIS — N40.1 BENIGN PROSTATIC HYPERPLASIA WITH LOWER URINARY TRACT SYMPTOMS, SYMPTOM DETAILS UNSPECIFIED: ICD-10-CM

## 2019-02-05 LAB
ATRIAL RATE: 68 BPM
QRS AXIS: -45 DEGREES
QRSD INTERVAL: 84 MS
QT INTERVAL: 400 MS
QTC INTERVAL: 425 MS
T WAVE AXIS: 74 DEGREES
VENTRICULAR RATE: 68 BPM

## 2019-02-05 PROCEDURE — 93005 ELECTROCARDIOGRAM TRACING: CPT

## 2019-02-05 PROCEDURE — 93010 ELECTROCARDIOGRAM REPORT: CPT | Performed by: INTERNAL MEDICINE

## 2019-02-08 ENCOUNTER — HOSPITAL ENCOUNTER (OUTPATIENT)
Facility: AMBULARY SURGERY CENTER | Age: 84
Setting detail: OUTPATIENT SURGERY
Discharge: HOME | End: 2019-02-08
Attending: UROLOGY | Admitting: UROLOGY
Payer: MEDICARE

## 2019-02-08 ENCOUNTER — ANESTHESIA (OUTPATIENT)
Dept: PERIOP | Facility: AMBULARY SURGERY CENTER | Age: 84
End: 2019-02-08
Payer: MEDICARE

## 2019-02-08 ENCOUNTER — TELEPHONE (OUTPATIENT)
Dept: UROLOGY | Facility: CLINIC | Age: 84
End: 2019-02-08

## 2019-02-08 VITALS
HEART RATE: 71 BPM | OXYGEN SATURATION: 96 % | SYSTOLIC BLOOD PRESSURE: 174 MMHG | BODY MASS INDEX: 24.48 KG/M2 | TEMPERATURE: 98.2 F | RESPIRATION RATE: 18 BRPM | DIASTOLIC BLOOD PRESSURE: 81 MMHG | WEIGHT: 171 LBS | HEIGHT: 70 IN

## 2019-02-08 DIAGNOSIS — N32.81 OAB (OVERACTIVE BLADDER): Primary | ICD-10-CM

## 2019-02-08 LAB
GLUCOSE SERPL-MCNC: 220 MG/DL (ref 65–140)
GLUCOSE SERPL-MCNC: 229 MG/DL (ref 65–140)

## 2019-02-08 PROCEDURE — 82948 REAGENT STRIP/BLOOD GLUCOSE: CPT

## 2019-02-08 PROCEDURE — 52287 CYSTOSCOPY CHEMODENERVATION: CPT | Performed by: UROLOGY

## 2019-02-08 PROCEDURE — C1769 GUIDE WIRE: HCPCS | Performed by: UROLOGY

## 2019-02-08 RX ORDER — PROPOFOL 10 MG/ML
INJECTION, EMULSION INTRAVENOUS AS NEEDED
Status: DISCONTINUED | OUTPATIENT
Start: 2019-02-08 | End: 2019-02-08 | Stop reason: SURG

## 2019-02-08 RX ORDER — FENTANYL CITRATE/PF 50 MCG/ML
12.5 SYRINGE (ML) INJECTION
Status: DISCONTINUED | OUTPATIENT
Start: 2019-02-08 | End: 2019-02-08 | Stop reason: HOSPADM

## 2019-02-08 RX ORDER — CEFAZOLIN SODIUM 2 G/50ML
1000 SOLUTION INTRAVENOUS ONCE
Status: DISCONTINUED | OUTPATIENT
Start: 2019-02-08 | End: 2019-02-08 | Stop reason: HOSPADM

## 2019-02-08 RX ORDER — OXYCODONE HYDROCHLORIDE 5 MG/1
5 TABLET ORAL EVERY 4 HOURS PRN
Status: DISCONTINUED | OUTPATIENT
Start: 2019-02-08 | End: 2019-02-08 | Stop reason: HOSPADM

## 2019-02-08 RX ORDER — HYDROCODONE BITARTRATE AND ACETAMINOPHEN 5; 325 MG/1; MG/1
1 TABLET ORAL EVERY 6 HOURS PRN
Qty: 5 TABLET | Refills: 0 | Status: SHIPPED | OUTPATIENT
Start: 2019-02-08 | End: 2019-02-18

## 2019-02-08 RX ORDER — ONDANSETRON 2 MG/ML
INJECTION INTRAMUSCULAR; INTRAVENOUS AS NEEDED
Status: DISCONTINUED | OUTPATIENT
Start: 2019-02-08 | End: 2019-02-08 | Stop reason: SURG

## 2019-02-08 RX ORDER — SODIUM CHLORIDE 9 MG/ML
INJECTION, SOLUTION INTRAVENOUS CONTINUOUS PRN
Status: DISCONTINUED | OUTPATIENT
Start: 2019-02-08 | End: 2019-02-08 | Stop reason: SURG

## 2019-02-08 RX ORDER — PHENAZOPYRIDINE HYDROCHLORIDE 100 MG/1
100 TABLET, FILM COATED ORAL 3 TIMES DAILY PRN
Qty: 10 TABLET | Refills: 0 | Status: SHIPPED | OUTPATIENT
Start: 2019-02-08 | End: 2019-02-11

## 2019-02-08 RX ORDER — FENTANYL CITRATE 50 UG/ML
INJECTION, SOLUTION INTRAMUSCULAR; INTRAVENOUS AS NEEDED
Status: DISCONTINUED | OUTPATIENT
Start: 2019-02-08 | End: 2019-02-08 | Stop reason: SURG

## 2019-02-08 RX ORDER — MAGNESIUM HYDROXIDE 1200 MG/15ML
LIQUID ORAL AS NEEDED
Status: DISCONTINUED | OUTPATIENT
Start: 2019-02-08 | End: 2019-02-08 | Stop reason: HOSPADM

## 2019-02-08 RX ORDER — LIDOCAINE HYDROCHLORIDE 10 MG/ML
INJECTION, SOLUTION INFILTRATION; PERINEURAL AS NEEDED
Status: DISCONTINUED | OUTPATIENT
Start: 2019-02-08 | End: 2019-02-08 | Stop reason: SURG

## 2019-02-08 RX ORDER — PROPOFOL 10 MG/ML
INJECTION, EMULSION INTRAVENOUS CONTINUOUS PRN
Status: DISCONTINUED | OUTPATIENT
Start: 2019-02-08 | End: 2019-02-08 | Stop reason: SURG

## 2019-02-08 RX ORDER — DOCUSATE SODIUM 100 MG/1
100 CAPSULE, LIQUID FILLED ORAL 2 TIMES DAILY
Qty: 30 CAPSULE | Refills: 0 | Status: SHIPPED | OUTPATIENT
Start: 2019-02-08 | End: 2019-06-24 | Stop reason: ALTCHOICE

## 2019-02-08 RX ORDER — SODIUM CHLORIDE 9 MG/ML
50 INJECTION, SOLUTION INTRAVENOUS CONTINUOUS
Status: DISCONTINUED | OUTPATIENT
Start: 2019-02-08 | End: 2019-02-08 | Stop reason: HOSPADM

## 2019-02-08 RX ORDER — CEFAZOLIN SODIUM 1 G/50ML
SOLUTION INTRAVENOUS AS NEEDED
Status: DISCONTINUED | OUTPATIENT
Start: 2019-02-08 | End: 2019-02-08 | Stop reason: SURG

## 2019-02-08 RX ADMIN — ONDANSETRON 4 MG: 2 INJECTION INTRAMUSCULAR; INTRAVENOUS at 10:21

## 2019-02-08 RX ADMIN — LIDOCAINE HYDROCHLORIDE ANHYDROUS 50 MG: 10 INJECTION, SOLUTION INFILTRATION at 10:16

## 2019-02-08 RX ADMIN — PROPOFOL 50 MG: 10 INJECTION, EMULSION INTRAVENOUS at 10:16

## 2019-02-08 RX ADMIN — CEFAZOLIN SODIUM 1000 MG: 1 SOLUTION INTRAVENOUS at 10:12

## 2019-02-08 RX ADMIN — FENTANYL CITRATE 25 MCG: 50 INJECTION, SOLUTION INTRAMUSCULAR; INTRAVENOUS at 10:26

## 2019-02-08 RX ADMIN — FENTANYL CITRATE 25 MCG: 50 INJECTION, SOLUTION INTRAMUSCULAR; INTRAVENOUS at 10:31

## 2019-02-08 RX ADMIN — PROPOFOL 80 MCG/KG/MIN: 10 INJECTION, EMULSION INTRAVENOUS at 10:16

## 2019-02-08 RX ADMIN — SODIUM CHLORIDE: 0.9 INJECTION, SOLUTION INTRAVENOUS at 09:41

## 2019-02-08 RX ADMIN — FENTANYL CITRATE 50 MCG: 50 INJECTION, SOLUTION INTRAMUSCULAR; INTRAVENOUS at 10:16

## 2019-02-08 NOTE — ANESTHESIA POSTPROCEDURE EVALUATION
Post-Op Assessment Note      CV Status:  Stable    Mental Status:  Lethargic and awake    Hydration Status:  Euvolemic    PONV Controlled:  Controlled    Airway Patency:  Patent    Post Op Vitals Reviewed: Yes          Staff: CRNA           BP (P) 114/66 (02/08/19 1042)    Temp (!) (P) 97 3 °F (36 3 °C) (02/08/19 1042)    Pulse (P) 77 (02/08/19 1042)   Resp (P) 16 (02/08/19 1042)    SpO2 (P) 98 % (02/08/19 1042)

## 2019-02-08 NOTE — OP NOTE
Operative Note     PATIENT:  Enid Stark (MRN [id number])    DATE OF PROCEDURE:   2/8/2019    PRE-OP DIAGNOSES:   1) medically refractory overactive bladder  2) BPH     POST-OP DIAGNOSES AND OPERATIVE FINDINGS:   1) medically refractory overactive bladder  2) BPH    PROCEDURES:  1) cystoscopy  2) injection of onabotulumtoxin A (botox), 75 units      SURGEON:   Bhumi Ellis MD    ANESTHESIA TYPE:  MAC    ESTIMATED BLOOD LOSS:   Minimal    COMPLICATIONS:   None    ANTIBIOTICS:  Cefazolin    INTRAOPERATIVE THROMBOEMBOLISM PROPHYLAXIS:  Pneumatic compression stockings      INDICATIONS:  Enid Stark is a patient with medically refractory overactive bladder  He also has a history of BPH and has undergone the Uro lift procedure  His storage symptoms have been managed with a combination of anticholinergic and beta 3 agonist but he remains highly symptomatic with extreme daytime frequency  After discussing the options they have elected to proceed to the operating room for cystoscopy with bladder Botox  We discussed the procedure in detail, the alternatives, the risks, and the expected postoperative course, and he provided informed consent and elected to proceed  PROCEDURE SUMMARY:    The patient was brought to the operating room and anesthesia obtained  The patient was then placed in the lithotomy position and prepped and draped using standard sterile technique  All pressure points were carefully padded  A surgical time-out occurred, antibiotics were administered, and thromboembolism prophylaxis was given  A 21 [place] rigid cystoscope was introduced per urethra  Pan cystoscopy was performed  The prostatic fossa was nice and wide open status post prior Uro lift procedure  There is a somewhat high bladder neck that remains  Heavy sediment noted in the bladder  This was evacuated with multiple cycles of filling and emptying  The lumen of the bladder was then inspected    Again noted is grade 4 trabeculation  There were no abnormal lesions  Next the agent was diluted into a total of 10 mL of normal saline according to standard procedures  The total of 75 units were injected  Majority was given as a bolus injection in the midline at the level of the trigone just above the level of the ureteral orifice  Three additional injection sites were introduced just above this, avoiding any injections into the dome due to high risk of urinary retention  Due to the trabeculated nature of the bladder there was some hematuria, focal, from 1 of the injection site, as well as the level the bladder neck  This was managed with targeted Bugbee electrocautery  Bladder was then observed multiple cycles of filling and emptying and hemostasis was excellent the end the procedure  The bladder was emptied and the scope removed  DISPOSITION:   PACU - hemodynamically stable  PLAN:  Patient will undergo a trial of void in the recovery room  Will continue current overactive bladder medications at the current time    We will reassess symptoms in a few weeks

## 2019-02-08 NOTE — TELEPHONE ENCOUNTER
Please schedule postop with MD in 3 months to assess for redo Botox  Will follow-up by phone with him in the interim    No need for additional office visits between now and then

## 2019-02-08 NOTE — DISCHARGE INSTRUCTIONS
You underwent cystoscopy today  It is expected to have burning when you urinate for 1-2 days afterwards  You may also see some blood in the urine  If you have fevers >101 5, chills, nausea or vomiting, or persistent burning with urination that lasts longer than 2 days, please call the office at [phone]

## 2019-02-08 NOTE — H&P
UROLOGY HISTORY AND PHYSICAL     Patient Identifiers: Santiago Bowser (MRN [id number])  Medically    Date of Service: 2/8/2019        ASSESSMENT:     81 y o  old male with  medically refractory BPH and overactive bladder presents for bladder Botox  PLAN:     To OR for cystoscopy and bladder Botox, we will plan for 75 units administration      History of Present Illness:     Santiago Bowser is a 81 y o  old with a history of BPH and medically refractory overactive bladder presents for bladder Botox    Past Medical, Past Surgical History:     Past Medical History:   Diagnosis Date    Anxiety     Arthritis     Atrial fibrillation (HCC)     BPH (benign prostatic hyperplasia)     CAD (coronary artery disease)     Chronic back pain     Chronic diastolic (congestive) heart failure (HCC)     COPD (chronic obstructive pulmonary disease) (HCC)     [facility] illness polyneuropathy [facility] [address] [facility]     Diabetes mellitus (HCC)     type 2    Dysphagia     GERD (gastroesophageal reflux disease)     Hemorrhoids     History of colon cancer     History of DVT (deep vein thrombosis)     Hyperlipidemia     Hypertension     Hyponatremia     Malignant neoplasm of colon (HCC)     Myocardial infarct ([facility] [address] )     Osteomyelitis ([facility] [address] )     Pneumonia     Sepsis ([facility] [address] )     Urinary retention    :    Past Surgical History:   Procedure Laterality Date    APPENDECTOMY      BACK SURGERY      BRONCHOSCOPY N/A 6/30/2016    Procedure: BRONCHOSCOPY FLEXIBLE with bronchial lavage to the left lower lobe; Surgeon: Darci Castillo MD;  Location: [facility];   Service:     CATARACT EXTRACTION      CHOLECYSTECTOMY      COLECTOMY      COLON SURGERY      polypectomy for cancerous lesion    COLONOSCOPY      CORONARY ANGIOPLASTY WITH STENT PLACEMENT      x5  pt unsure of where    GASTROSTOMY TUBE PLACEMENT      percutaneous placement of gastrostomy tube placed 4/16 - dc 8/16    HEMORROIDECTOMY      LUMBAR LAMINECTOMY      LUNG SURGERY      PEG TUBE REMOVAL      CT CYSTOURETHRO W/IMPLANT N/A 8/27/2018    Procedure: CYSTOSCOPY WITH INSERTION Jazz Rack;  Surgeon: Pardeep Matt MD;  Location: [facility];  Service: Urology    TRACHEOSTOMY     :    Medications, Allergies:     Current Facility-Administered Medications:     ceFAZolin (ANCEF) IVPB (premix) 1,000 mg, 1,000 mg, Intravenous, Once, Pardeep Matt MD    onabotulinumtoxin A (BOTOX) injection 100 Units, 100 Units, Intramuscular, On Call To OR, Pardeep Matt MD    Allergies:  No Known Allergies:    Social and Family History:   Social History:   Social History   Substance Use Topics    Smoking status: Former Smoker     Packs/day: 1 00     Years: 20 00     Types: Cigarettes     Quit date: 8/29/1978    Smokeless tobacco: Never Used      Comment: quit 40 years ago    Alcohol use No        History   Smoking Status    Former Smoker    Packs/day: 1 00    Years: 20 00    Types: Cigarettes    Quit date: 8/29/1978   Smokeless Tobacco    Never Used     Comment: quit 40 years ago       Family History:  Family History   Problem Relation Age of Onset    Heart attack Mother    :     Review of Systems:     General: Fever, chills, or night sweats: negative  Cardiac: Negative for chest pain  Pulmonary: Negative for shortness of breath  Gastrointestinal: Abdominal pain negative  Nausea, vomiting, or diarrhea negative  Genitourinary: See HPI above  Patient does nothave hematuria  All other systems queried were negative  Physical Exam:   General: Patient is pleasant and in NAD  Awake and alert  Ht 5' 10" (1 778 m)   Wt 77 6 kg (171 lb)   BMI 24 54 kg/m²   Cardiac: Peripheral edema: negative  Pulmonary: Non-labored breathing  Abdomen: Soft, non-tender, non-distended  No surgical scars  No masses, tenderness, hernias noted  Genitourinary: negative CVA tenderness, negative suprapubic tenderness        Labs:     Lab Results   Component Value Date    HGB 15 7 11/21/2018    HCT 48 6 11/21/2018    WBC 8 16 11/21/2018     11/21/2018   ]    Lab Results   Component Value Date    K 4 1 11/21/2018     11/21/2018    CO2 31 11/21/2018    BUN 31 (H) 11/21/2018    CREATININE 1 36 (H) 11/21/2018    CALCIUM 9 4 11/21/2018    GLUCOSE 411 (H) 04/23/2018   ]    Imaging:   I personally reviewed the images and report of the following studies, and reviewed them with the patient:        Thank you for allowing me to participate in this patients care  Please do not hesitate to call with any additional questions    Steven North MD

## 2019-02-08 NOTE — ANESTHESIA PREPROCEDURE EVALUATION
Review of Systems/Medical History  Patient summary reviewed        Cardiovascular  EKG reviewed, Exercise tolerance (METS): >4,  Hyperlipidemia, Hypertension controlled, Past MI > 6 months, CAD , CHF ,   Comment: 08/30/16,  Pulmonary  Pneumonia, COPD mild- PRN medicaiton ,        GI/Hepatic    GERD ,             Endo/Other  Diabetes well controlled type 2 Insulin,      GYN       Hematology   Musculoskeletal    Arthritis     Neurology  Negative neurology ROS      Psychology   Anxiety,              Physical Exam    Airway    Mallampati score: I  TM Distance: >3 FB  Neck ROM: full     Dental   upper dentures,     Cardiovascular  Cardiovascular exam normal    Pulmonary  Pulmonary exam normal     Other Findings        Anesthesia Plan  ASA Score- 3     Anesthesia Type- IV sedation with anesthesia with ASA Monitors  Additional Monitors:   Airway Plan:         Plan Factors-  Patient did not smoke on day of surgery  Induction- intravenous  Postoperative Plan-     Informed Consent- Anesthetic plan and risks discussed with patient  I personally reviewed this patient with the CRNA  Discussed and agreed on the Anesthesia Plan with the NIKI Hatfield

## 2019-02-11 ENCOUNTER — OFFICE VISIT (OUTPATIENT)
Dept: FAMILY MEDICINE CLINIC | Facility: CLINIC | Age: 84
End: 2019-02-11
Payer: MEDICARE

## 2019-02-11 VITALS
RESPIRATION RATE: 18 BRPM | WEIGHT: 180 LBS | HEIGHT: 70 IN | BODY MASS INDEX: 25.77 KG/M2 | SYSTOLIC BLOOD PRESSURE: 130 MMHG | DIASTOLIC BLOOD PRESSURE: 88 MMHG | HEART RATE: 80 BPM | OXYGEN SATURATION: 96 %

## 2019-02-11 DIAGNOSIS — N32.81 OAB (OVERACTIVE BLADDER): ICD-10-CM

## 2019-02-11 DIAGNOSIS — G47.00 INSOMNIA, UNSPECIFIED TYPE: ICD-10-CM

## 2019-02-11 DIAGNOSIS — R31.0 GROSS HEMATURIA: ICD-10-CM

## 2019-02-11 DIAGNOSIS — R79.89 LOW VITAMIN D LEVEL: ICD-10-CM

## 2019-02-11 DIAGNOSIS — E21.1 HYPERPARATHYROIDISM DUE TO 1,25(0H)2D3 (HCC): ICD-10-CM

## 2019-02-11 DIAGNOSIS — IMO0002 UNCONTROLLED TYPE 2 DIABETES MELLITUS WITH DIABETIC POLYNEUROPATHY, WITHOUT LONG-TERM CURRENT USE OF INSULIN: Chronic | ICD-10-CM

## 2019-02-11 DIAGNOSIS — C18.9 MALIGNANT NEOPLASM OF COLON, UNSPECIFIED PART OF COLON (HCC): ICD-10-CM

## 2019-02-11 DIAGNOSIS — Z00.00 MEDICARE ANNUAL WELLNESS VISIT, SUBSEQUENT: Primary | ICD-10-CM

## 2019-02-11 DIAGNOSIS — I10 BENIGN ESSENTIAL HYPERTENSION: ICD-10-CM

## 2019-02-11 DIAGNOSIS — R31.9 HEMATURIA, UNSPECIFIED TYPE: ICD-10-CM

## 2019-02-11 DIAGNOSIS — I48.91 ATRIAL FIBRILLATION WITH SLOW VENTRICULAR RESPONSE (HCC): Chronic | ICD-10-CM

## 2019-02-11 DIAGNOSIS — M54.50 CHRONIC RIGHT-SIDED LOW BACK PAIN WITHOUT SCIATICA: ICD-10-CM

## 2019-02-11 DIAGNOSIS — G89.29 CHRONIC RIGHT-SIDED LOW BACK PAIN WITHOUT SCIATICA: ICD-10-CM

## 2019-02-11 DIAGNOSIS — K21.9 GASTROESOPHAGEAL REFLUX DISEASE WITHOUT ESOPHAGITIS: ICD-10-CM

## 2019-02-11 DIAGNOSIS — J44.9 CHRONIC OBSTRUCTIVE PULMONARY DISEASE, UNSPECIFIED COPD TYPE (HCC): ICD-10-CM

## 2019-02-11 DIAGNOSIS — R13.10 SWALLOWING DYSFUNCTION: ICD-10-CM

## 2019-02-11 DIAGNOSIS — I82.511 CHRONIC DEEP VEIN THROMBOSIS (DVT) OF FEMORAL VEIN OF RIGHT LOWER EXTREMITY (HCC): ICD-10-CM

## 2019-02-11 DIAGNOSIS — M46.20: ICD-10-CM

## 2019-02-11 DIAGNOSIS — R29.898 BILATERAL LEG WEAKNESS: ICD-10-CM

## 2019-02-11 DIAGNOSIS — F32.A MILD DEPRESSION: ICD-10-CM

## 2019-02-11 DIAGNOSIS — E78.00 PURE HYPERCHOLESTEROLEMIA: ICD-10-CM

## 2019-02-11 DIAGNOSIS — I50.42 CHRONIC COMBINED SYSTOLIC AND DIASTOLIC CONGESTIVE HEART FAILURE (HCC): ICD-10-CM

## 2019-02-11 LAB
BACTERIA UR QL AUTO: ABNORMAL /HPF
BILIRUB UR QL STRIP: NEGATIVE
CLARITY UR: ABNORMAL
COLOR UR: YELLOW
GLUCOSE UR STRIP-MCNC: NEGATIVE MG/DL
HGB UR QL STRIP.AUTO: ABNORMAL
HYALINE CASTS #/AREA URNS LPF: ABNORMAL /LPF
KETONES UR STRIP-MCNC: NEGATIVE MG/DL
LEUKOCYTE ESTERASE UR QL STRIP: ABNORMAL
NITRITE UR QL STRIP: NEGATIVE
NON-SQ EPI CELLS URNS QL MICRO: ABNORMAL /HPF
PH UR STRIP.AUTO: 5.5 [PH] (ref 4.5–8)
PROT UR STRIP-MCNC: ABNORMAL MG/DL
RBC #/AREA URNS AUTO: ABNORMAL /HPF
SL AMB  POCT GLUCOSE, UA: NEGATIVE
SL AMB LEUKOCYTE ESTERASE,UA: ABNORMAL
SL AMB POCT BILIRUBIN,UA: NEGATIVE
SL AMB POCT BLOOD,UA: ABNORMAL
SL AMB POCT CLARITY,UA: ABNORMAL
SL AMB POCT COLOR,UA: YELLOW
SL AMB POCT KETONES,UA: NEGATIVE
SL AMB POCT NITRITE,UA: NEGATIVE
SL AMB POCT PH,UA: 6
SL AMB POCT SPECIFIC GRAVITY,UA: 1.01
SL AMB POCT URINE PROTEIN: ABNORMAL
SL AMB POCT UROBILINOGEN: 0.2
SP GR UR STRIP.AUTO: 1.02 (ref 1–1.03)
UROBILINOGEN UR QL STRIP.AUTO: 0.2 E.U./DL
WBC #/AREA URNS AUTO: ABNORMAL /HPF

## 2019-02-11 PROCEDURE — 81002 URINALYSIS NONAUTO W/O SCOPE: CPT | Performed by: FAMILY MEDICINE

## 2019-02-11 PROCEDURE — 99214 OFFICE O/P EST MOD 30 MIN: CPT | Performed by: FAMILY MEDICINE

## 2019-02-11 PROCEDURE — 81001 URINALYSIS AUTO W/SCOPE: CPT | Performed by: FAMILY MEDICINE

## 2019-02-11 PROCEDURE — 87086 URINE CULTURE/COLONY COUNT: CPT | Performed by: FAMILY MEDICINE

## 2019-02-11 PROCEDURE — G0439 PPPS, SUBSEQ VISIT: HCPCS | Performed by: FAMILY MEDICINE

## 2019-02-11 RX ORDER — MIRTAZAPINE 15 MG/1
15 TABLET, FILM COATED ORAL
Qty: 90 TABLET | Refills: 3
Start: 2019-02-11 | End: 2019-02-11 | Stop reason: SDUPTHER

## 2019-02-11 RX ORDER — TRAMADOL HYDROCHLORIDE 50 MG/1
50 TABLET ORAL 2 TIMES DAILY
Qty: 90 TABLET | Refills: 1 | Status: SHIPPED | OUTPATIENT
Start: 2019-02-11 | End: 2019-08-07 | Stop reason: SDUPTHER

## 2019-02-11 RX ORDER — FAMOTIDINE 20 MG/1
20 TABLET, FILM COATED ORAL 2 TIMES DAILY
Qty: 180 TABLET | Refills: 3
Start: 2019-02-11 | End: 2020-06-01

## 2019-02-11 RX ORDER — FUROSEMIDE 20 MG/1
TABLET ORAL
Qty: 180 TABLET | Refills: 3
Start: 2019-02-11 | End: 2020-02-20

## 2019-02-11 RX ORDER — MIRTAZAPINE 30 MG/1
30 TABLET, FILM COATED ORAL
Qty: 90 TABLET | Refills: 3
Start: 2019-02-11 | End: 2019-03-21 | Stop reason: SDUPTHER

## 2019-02-11 RX ORDER — LEVALBUTEROL INHALATION SOLUTION 1.25 MG/3ML
1.25 SOLUTION RESPIRATORY (INHALATION) EVERY 4 HOURS PRN
Qty: 75 VIAL | Refills: 3 | Status: SHIPPED | OUTPATIENT
Start: 2019-02-11 | End: 2019-06-24 | Stop reason: SDUPTHER

## 2019-02-11 NOTE — ASSESSMENT & PLAN NOTE
Check UA C&S  Continue with Urology follow-up  Urine dip today did reveal large leukocytes and blood as well as 30+ protein    He is on Macrobid from his son, Dr Yenni Siegel

## 2019-02-11 NOTE — PROGRESS NOTES
Assessment and Plan:    Problem List Items Addressed This Visit        Digestive    Cancer, colon ([facility] )    Relevant Orders    Occult Blood, Fecal Immunochemical    Gastroesophageal reflux disease without esophagitis    Relevant Medications    famotidine (PEPCID) 20 mg tablet       Endocrine    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, without long-term current use of insulin ([facility]) (Chronic)    Relevant Medications    ONE TOUCH ULTRA TEST test strip    Insulin Pen Needle 32G X 4 MM MISC    Other Relevant Orders    Comprehensive metabolic panel    Hemoglobin A1C    Hyperparathyroidism due to 1,25(0H)2d3 ([facility])       Respiratory    COLD (chronic obstructive lung disease) ([facility])    Relevant Medications    levalbuterol (XOPENEX) 1 25 mg/3 mL nebulizer solution       Cardiovascular and Mediastinum    Atrial fibrillation with slow ventricular response ([facility]) (Chronic)    CHF (congestive heart failure) ([facility])    Relevant Medications    furosemide (LASIX) 20 mg tablet    Chronic deep vein thrombosis (DVT) of femoral vein of right lower extremity ([facility] )     He remains on Eliquis mostly for his atrial fibrillation  No signs of recurrent clinically significant femoral DVT  Nervous and Auditory    Bilateral leg weakness     Trial of physical therapy  Relevant Orders    Ambulatory referral to Physical Therapy       Musculoskeletal and Integument    Chronic osteomyelitis of spine ([facility] )     He is currently clinically stable  Genitourinary    Gross hematuria     Check UA C&S  Continue with Urology follow-up  Urine dip today did reveal large leukocytes and blood as well as 30+ protein    He is on Macrobid from his son, Dr Kilpatrickbrad Blandondler    UA (URINE) with reflex to Microscopic (Completed)    Urine Microscopic (Completed)       Other    Chronic right-sided low back pain without sciatica    Relevant Medications    traMADol (ULTRAM) 50 mg tablet    Insomnia Relevant Medications    mirtazapine (REMERON) 30 mg tablet    Mild depression ([facility])     Bump up mirtazapine if 30 mg daily  Relevant Medications    mirtazapine (REMERON) 30 mg tablet    Swallowing dysfunction     He has done quite well on thickened liquids but is not always compliant  I would like to get a swallow study and speech consult to see if he still needs this limitation or if we should try to be more diligent about it  Relevant Orders    Ambulatory referral to Speech Therapy      Other Visit Diagnoses     Medicare annual wellness visit, subsequent    -  Primary    Hematuria, unspecified type        Relevant Orders    POCT urine dip (Completed)    Urine culture (Completed)    CBC and differential    Low vitamin D level        Relevant Orders    Vitamin D 25 hydroxy        Health Maintenance Due   Topic Date Due    URINE MICROALBUMIN  04/15/1945    BMI: Followup Plan  04/15/1954    HEPATITIS B VACCINES (1 of 3 - Risk 3-dose series) 04/15/1955    DM Eye Exam  04/25/2018    PT PLAN OF CARE  11/07/2018        HPI:  Patient presents today for follow-up for chronic health issues in addition to his Medicare wellness visit today  Overall, he feels he is stable  He continues to suffer from chronic low back pain  He does now follow with pain management for this routinely  He has chronic neck pain as well  The pain is neck is going to be evaluated in the near future by his pain management physician  He is accompanied by his caregiver, April, who is very knowledgeable about his condition  He has history of type 2 diabetes  His control has been improving  He denies any hypoglycemia  He denies polyuria or polydipsia  He has history of reflux and does not really appear to need pantoprazole any more  He denies any heartburn or dysphagia  He has history of COPD and denies any current problems with coughing or wheezing  He has not smoked in many years    He has a prior history of aspiration pneumonia but currently denies any cough, fever or chills  He has atrial fibrillation and denies any problems with chest pain, shortness of breath, palpitations or lightheadedness    Patient Active Problem List   Diagnosis    CHF (congestive heart failure) ([facility] )    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, without long-term current use of insulin ([facility])    Chronic osteomyelitis of spine ([facility])    Atrial fibrillation with slow ventricular response ([facility])    Coronary atherosclerosis    [facility] ([facility] )    Chronic deep vein thrombosis (DVT) of femoral vein of right lower extremity ([facility])    Pure hypercholesterolemia    Hemorrhoids    History of malignant neoplasm of large intestine    COLD (chronic obstructive lung disease) ([facility])    Pneumonia of left upper lobe due to infectious organism ([facility] )    Chronic right-sided low back pain without sciatica    Allergic rhinitis    Benign essential hypertension    BPH with obstruction/lower urinary tract symptoms    CAD S/P percutaneous coronary angioplasty    Chronic combined systolic and diastolic congestive heart failure ([facility])    Dysphagia    Gastroesophageal reflux disease without esophagitis    Floaters in visual field    Insomnia    Leukocytosis    Lumbar compression fracture ([facility])    OAB (overactive bladder)    Gross hematuria    Diverticulosis of large intestine    Mixed hyperlipidemia    Benign prostatic hyperplasia with urinary frequency    Hyperparathyroidism due to 1,25(0H)2d3 ([facility])    Bilateral leg weakness    Mild depression ([facility] )    Swallowing dysfunction     Past Medical History:   Diagnosis Date    Anxiety     Arthritis     Atrial fibrillation ([facility])     BPH (benign prostatic hyperplasia)     CAD (coronary artery disease)     Chronic back pain     Chronic diastolic (congestive) heart failure ([facility])     COPD (chronic obstructive pulmonary disease) ([facility] )     Critical illness polyneuropathy ([facility] )     Diabetes mellitus ([facility] )     type 2    Dysphagia     GERD (gastroesophageal reflux disease)     Hemorrhoids     History of colon cancer     History of DVT (deep vein thrombosis)     Hyperlipidemia     Hypertension     Hyponatremia     Malignant neoplasm of colon (HCC)     Myocardial infarct ([facility] )     Osteomyelitis ([facility] )     Pneumonia     Sepsis ([place] [address] )     Urinary retention      Past Surgical History:   Procedure Laterality Date    APPENDECTOMY      BACK SURGERY      BOTOX INJECTION N/A 2019    Procedure: BOTOX INJECTION 100 UNITS;  Surgeon: Nathanael Santos MD;  [facility];  Service: Urology    BRONCHOSCOPY N/A 2016    Procedure: BRONCHOSCOPY FLEXIBLE with bronchial lavage to the left lower lobe; Surgeon: Shivani Quintanilla MD;  Location: [facility];   Service:     CATARACT EXTRACTION      CHOLECYSTECTOMY      COLECTOMY      COLON SURGERY      polypectomy for cancerous lesion    COLONOSCOPY      CORONARY ANGIOPLASTY WITH STENT PLACEMENT      x5  pt unsure of where    GASTROSTOMY TUBE PLACEMENT      percutaneous placement of gastrostomy tube placed  - dc     HEMORROIDECTOMY      LUMBAR LAMINECTOMY      LUNG SURGERY      PEG TUBE REMOVAL      ND CYSTOURETHRO W/IMPLANT N/A 2018    Procedure: CYSTOSCOPY WITH INSERTION Earnesteen Ba;  Surgeon: Nathanael Santos MD;  [facility];  Service: Urology    ND CYSTOURETHROSCOPY N/A 2019    Procedure: Rebecca Mckeon;  Surgeon: Nathanael Santos MD;  [facility];  Service: Urology    TRACHEOSTOMY       Family History   Problem Relation Age of Onset    Heart attack Mother      Social History     Tobacco Use   Smoking Status Former Smoker    Packs/day: 1 00    Years: 20 00    Pack years: 20 00    Types: Cigarettes    Last attempt to quit: 1978    Years since quittin 4   Smokeless Tobacco Never Used   Tobacco Comment    quit 40 years ago     Social History     Substance and Sexual Activity   Alcohol Use No      Social History     Substance and Sexual Activity   Drug Use No       Current Outpatient Medications   Medication Sig Dispense Refill    ACCU-CHEK FASTCLIX LANCETS MISC by Does not apply route 2 (two) times a day      docusate sodium (COLACE) 100 mg capsule Take 1 capsule (100 mg total) by mouth 2 (two) times a day for 15 days 30 capsule 0    famotidine (PEPCID) 20 mg tablet Take 1 tablet (20 mg total) by mouth 2 (two) times a day 180 tablet 3    furosemide (LASIX) 20 mg tablet 20 mg alternating with 40 mg, add additional 20 mg for weight gain more than 3 lb 180 tablet 3    gabapentin (NEURONTIN) 400 mg capsule Take 400 mg by mouth 3 (three) times a day        HYDROcodone-acetaminophen (NORCO) 5-325 mg per tablet Take 1 tablet by mouth every 6 (six) hours as needed for pain (For severe pain) for up to 10 days Max Daily Amount: 4 tablets 5 tablet 0    LANTUS SOLOSTAR 100 units/mL injection pen INJECT SUBCUTANEOUSLY 18  UNITS DAILY (Patient taking differently: INJECT SUBCUTANEOUSLY 18  UNITS DAILY IN AM) 30 mL 11    levalbuterol (XOPENEX) 1 25 mg/3 mL nebulizer solution Take 1 vial (1 25 mg total) by nebulization every 4 (four) hours as needed for wheezing or shortness of breath 75 vial 3    lidocaine (LIDODERM) 5 % Place 1 patch on the skin every 24 hours Remove & Discard patch within 12 hours or as directed by MD (Patient taking differently: Place 1 patch on the skin every 24 hours Remove & Discard patch within 12 hours or as directed by MD ) 10 patch 0    loperamide (IMODIUM A-D) 2 MG tablet Take 1 tablet by mouth 4 (four) times a day as needed      metFORMIN (GLUCOPHAGE-XR) 500 mg 24 hr tablet Take 1,000 mg by mouth daily with dinner       metoprolol tartrate (LOPRESSOR) 25 mg tablet Take 12 5 mg by mouth daily at bedtime        Mirabegron ER (MYRBETRIQ) 50 MG TB24 Take 50 mg by mouth daily        mirtazapine (REMERON) 30 mg tablet Take 1 tablet (30 mg total) by mouth daily at bedtime 90 tablet 3    Multiple Vitamin (MULTIVITAMIN) tablet Take 1 tablet by mouth daily      nitroglycerin (NITROLINGUAL) 0 4 mg/spray spray 1 spray every 5 (five) minutes as needed        nystatin (MYCOSTATIN) powder Apply to affected area 2-3 times daily until resolved  15 g 0    ONE TOUCH ULTRA TEST test strip Test once daily 200 each 3    Psyllium (METAMUCIL) 28 3 % POWD Take by mouth      sodium chloride 0 9 % nebulizer solution Inhale every 4 (four) hours as needed        traMADol (ULTRAM) 50 mg tablet Take 1 tablet (50 mg total) by mouth 2 (two) times a day 90 tablet 1    warfarin (COUMADIN) 5 mg tablet Take one tablet daily as directed by physician (Patient taking differently: Take 5 mg by mouth once Take one tablet daily as directed by physician ) 90 tablet 1    Insulin Pen Needle 32G X 4 MM MISC by Does not apply route daily Dx: E11 9 100 each 3     No current facility-administered medications for this visit  No Known Allergies  Immunization History   Administered Date(s) Administered     Influenza (IM) Preservative Free 08/29/2013    INFLUENZA 11/05/1994, 11/09/1995, 10/27/1997, 10/09/1998, 11/09/2000, 11/28/2001, 11/20/2002, 11/06/2003, 10/21/2005, 11/07/2006, 11/19/2007, 10/18/2018    Influenza Split High Dose Preservative Free IM 11/08/2010, 10/28/2011, 09/24/2012, 10/21/2014, 09/29/2015, 08/24/2016, 10/19/2017    Influenza TIV (IM) 11/05/2008, 10/22/2009    Pneumococcal Conjugate 13-Valent 10/13/2016    Pneumococcal Polysaccharide PPV23 10/21/2005    Tdap 10/22/2009       Patient Care Team:  Mercy Clark MD as PCP - General  MD Shivani Lopez MD Minna Katayama, DO    Medicare Screening Tests and Risk Assessments:      Health Risk Assessment:  Patient rates overall health as fair  Patient feels that their physical health rating is Much better  Eyesight was rated as Same  Hearing was rated as Same   Patient feels that their emotional and mental health rating is Slightly better  Pain experienced by patient in the last 7 days has been Some  Patient's pain rating has been 6/10  Emotional/Mental Health:  Patient has been feeling nervous/anxious  PHQ-9 Depression Screening:    Frequency of the following problems over the past two weeks:      1  Little interest or pleasure in doing things: 0 - not at all      2  Feeling down, depressed, or hopeless: 0 - not at all  PHQ-2 Score: 0          Broken Bones/Falls: Fall Risk Assessment:    In the past year, patient has experienced: History of falling in past year     Number of falls: 1          Bladder/Bowel:  Patient has leaked urine accidently in the last six months  Patient reports loss of bowel control  Immunizations:  Patient has had a flu vaccination within the last year  Patient has received a pneumonia shot  Patient has not received a shingles shot  Patient has received tetanus/diphtheria shot  Date of tetanus/diphtheria shot: 10/22/2009    Home Safety:  Patient does not have trouble with stairs inside or outside of their home  Patient currently reports that there are no safety hazards present in home, working smoke alarms, working carbon monoxide detectors  Preventative Screenings:   prostate cancer screen performed, colon cancer screen completed, cholesterol screen completed, glaucoma eye exam completed,     Nutrition:  Current diet: Regular and Limited junk food with servings of the following:    Medications:  Patient is not currently taking any over-the-counter supplements  Patient is able to manage medications  Lifestyle Choices:  Patient reports no tobacco use  Patient has smoked or used tobacco in the past   Patient has stopped his tobacco use  Patient reports alcohol use  Patient does not drive a vehicle  Patient wears seat belt          Activities of Daily Living:  Can get out of bed by his or her self, able to dress self, able to make own meals, able to do own shopping, able to bathe self, can do own laundry/housekeeping, unable to manage own money and other related tasks    Previous Hospitalizations:  Hospitalization or ED visit in past 12 months        Advanced Directives:  Patient has decided on a power of   Patient has spoken to designated power of   Patient has completed advanced directive  Preventative Screening/Counseling:      Cardiovascular:      General: Screening Current          Diabetes:      General: Screening Current      Comments: Patient has diabetes        Colorectal Cancer:      General: Screening Not Indicated      Comments: Patient has a prior history of colon cancer        Prostate Cancer:      General: Screening Not Indicated      Comments: He is following with Urology        Osteoporosis:      General: Screening Not Indicated          AAA:      General: Screening Not Indicated          Glaucoma:      General: Screening Current          HIV:      General: Screening Not Indicated          Hepatitis C:      General: Screening Not Indicated        Advanced Directives:   Patient has living will for healthcare, has durable POA for healthcare, Information on ACP and/or AD provided

## 2019-02-11 NOTE — ASSESSMENT & PLAN NOTE
He has done quite well on thickened liquids but is not always compliant  I would like to get a swallow study and speech consult to see if he still needs this limitation or if we should try to be more diligent about it

## 2019-02-11 NOTE — PROGRESS NOTES
Assessment/Plan:       Problem List Items Addressed This Visit        Digestive    Cancer, colon ([facility] )    Relevant Orders    Occult Blood, Fecal Immunochemical    Gastroesophageal reflux disease without esophagitis    Relevant Medications    famotidine (PEPCID) 20 mg tablet       Endocrine    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, without long-term current use of insulin ([facility]) (Chronic)    Relevant Medications    ONE TOUCH ULTRA TEST test strip    Insulin Pen Needle 32G X 4 MM MISC    Other Relevant Orders    Comprehensive metabolic panel    Hemoglobin A1C    Hyperparathyroidism due to 1,25(0H)2d3 ([facility])       Respiratory    COLD (chronic obstructive lung disease) ([facility])    Relevant Medications    levalbuterol (XOPENEX) 1 25 mg/3 mL nebulizer solution       Cardiovascular and Mediastinum    Atrial fibrillation with slow ventricular response ([facility]) (Chronic)    CHF (congestive heart failure) ([facility])    Relevant Medications    furosemide (LASIX) 20 mg tablet    Chronic deep vein thrombosis (DVT) of femoral vein of right lower extremity ([facility] )     He remains on Eliquis mostly for his atrial fibrillation  No signs of recurrent clinically significant femoral DVT  Nervous and Auditory    Bilateral leg weakness     Trial of physical therapy  Relevant Orders    Ambulatory referral to Physical Therapy       Musculoskeletal and Integument    Chronic osteomyelitis of spine ([facility] )     He is currently clinically stable  Other    Chronic right-sided low back pain without sciatica    Relevant Medications    traMADol (ULTRAM) 50 mg tablet    Insomnia    Relevant Medications    mirtazapine (REMERON) 30 mg tablet    Mild depression ([facility])     Bump up mirtazapine if 30 mg daily  Relevant Medications    mirtazapine (REMERON) 30 mg tablet    Swallowing dysfunction     He has done quite well on thickened liquids but is not always compliant    I would like to get a swallow study and speech consult to see if he still needs this limitation or if we should try to be more diligent about it  Relevant Orders    Ambulatory referral to Speech Therapy      Other Visit Diagnoses     Medicare annual wellness visit, subsequent    -  Primary    Hematuria, unspecified type        Relevant Orders    POCT urine dip    Urine culture    CBC and differential    Low vitamin D level        Relevant Orders    Vitamin D 25 hydroxy            Subjective:      Patient ID: Janet Samaniego is a 81 y o  male  Patient presents today with his caregiver, April for a Medicare wellness visit as well as a follow-up for his chronic health issues  The patient notes he feels relatively well  His main complaints sent around his chronic pain of his neck and his lumbar spine  He follows routinely with pain management  He takes tramadol intermittently as needed  He also has received some epidural injections in the past and they are considering some type of injection for his cervical spine in the near future  He does have some chronic urinary incontinence which he finds bothersome  He had some Botox injections recently by Urology and is having some cloudy urine  His son, who is a physician, placed him on Macrobid  Urine today does reveal leukocytes as well as blood  He denies dysuria but does have some urinary pressure  He has history of atrial fibrillation and denies any current problems chest pain, palpitations or lightheadedness  He remains on anticoagulation without excessive bruising or bleeding  He has history of CHF and denies current problems with palpitations or chest pain  He has history of DVT and does not have any current calf pain or recurrent lower extremity swelling  He has a history of severe osteomyelitis of the spine developing a sepsis in 2016  He does have chronic low back pain but denies any current problems with fevers or chills  He does admit to some depressed mood on occasion  This seems to be improving since he is on mirtazapine 15 mg daily  He recently had a cystoscopy done due to chronic urinary incontinence  He was having some discolored urine and is now on mirtazapine  He admits to some urinary pressure but no dysuria hematuria  The following portions of the patient's history were reviewed and updated as appropriate: allergies, current medications, past family history, past medical history, past social history, past surgical history and problem list     Review of Systems   Constitutional: Negative for appetite change, chills, fatigue, fever and unexpected weight change  HENT: Negative for trouble swallowing  Eyes: Negative for visual disturbance  Respiratory: Negative for cough, chest tightness, shortness of breath and wheezing  Cardiovascular: Negative for chest pain, palpitations and leg swelling  Gastrointestinal: Negative for abdominal distention, abdominal pain, blood in stool, constipation and diarrhea  Endocrine: Negative for polyuria  Genitourinary: Negative for difficulty urinating and flank pain  Musculoskeletal: Positive for arthralgias, back pain and gait problem  Negative for joint swelling and myalgias  Skin: Negative for rash  Neurological: Negative for dizziness and light-headedness  Hematological: Negative for adenopathy  Does not bruise/bleed easily  Psychiatric/Behavioral: Positive for dysphoric mood  Negative for self-injury, sleep disturbance and suicidal ideas  The patient is not nervous/anxious  Objective:      /88   Pulse 80   Resp 18   Ht 5' 10" (1 778 m)   Wt 81 6 kg (180 lb)   SpO2 96%   BMI 25 83 kg/m²          Physical Exam   Constitutional: He is oriented to person, place, and time  He appears well-developed and well-nourished  No distress  HENT:   Head: Normocephalic  Eyes: Pupils are equal, round, and reactive to light  Right eye exhibits no discharge  Left eye exhibits no discharge     Neck: No tracheal deviation present  No thyromegaly present  Cardiovascular: Normal rate, regular rhythm and normal heart sounds  No murmur heard  Pulmonary/Chest: Effort normal  No respiratory distress  He has no wheezes  He has no rales  Abdominal: Soft  He exhibits no distension  There is no tenderness  Musculoskeletal: Normal range of motion  He exhibits no edema  Lymphadenopathy:     He has no cervical adenopathy  Neurological: He is alert and oriented to person, place, and time  No cranial nerve deficit  Skin: Skin is warm  He is not diaphoretic  No erythema  Psychiatric: He has a normal mood and affect   Judgment and thought content normal          Dorinda Fuchs MD

## 2019-02-11 NOTE — ASSESSMENT & PLAN NOTE
He remains on Eliquis mostly for his atrial fibrillation  No signs of recurrent clinically significant femoral DVT

## 2019-02-12 LAB — BACTERIA UR CULT: NORMAL

## 2019-02-13 PROBLEM — R31.0 GROSS HEMATURIA: Status: RESOLVED | Noted: 2018-11-06 | Resolved: 2019-02-13

## 2019-02-13 NOTE — ASSESSMENT & PLAN NOTE
Currently well controlled  Continue with rate control as well as anticoagulation  Continue with routine cardiology follow-up

## 2019-02-13 NOTE — ASSESSMENT & PLAN NOTE
He currently is not on a statin  There was concern for intolerance in the past   I certainly would like to see him on a statin in the future and we agreed to discuss this at a follow-up visit    I did reach out to his son to see what type of intolerance he had in the past

## 2019-02-15 ENCOUNTER — ANTICOAG VISIT (OUTPATIENT)
Dept: CARDIOLOGY CLINIC | Facility: CLINIC | Age: 84
End: 2019-02-15

## 2019-02-15 DIAGNOSIS — I48.91 ATRIAL FIBRILLATION WITH SLOW VENTRICULAR RESPONSE (HCC): ICD-10-CM

## 2019-02-15 LAB — INR PPP: 1.2 (ref 0.86–1.17)

## 2019-02-15 NOTE — TELEPHONE ENCOUNTER
I spoke to April (caregiver) and she did not think a 3 month fu was the appropriate next step  She said she would talk to the patient's son and call back

## 2019-02-15 NOTE — PROGRESS NOTES
2/15/19, tc to April, [id number], nurse, she reports pt had bladder surgery 2/8, he held warfarin 1 week prior  Then he continued to hold warfarin until restart on 2/11  Will take 5 mg today in place of 2 5 mg , then resume previous dose, inr due 1 week  Will fax to dara bingham

## 2019-02-22 ENCOUNTER — ANTICOAG VISIT (OUTPATIENT)
Dept: CARDIOLOGY CLINIC | Facility: CLINIC | Age: 84
End: 2019-02-22

## 2019-02-22 DIAGNOSIS — I48.91 ATRIAL FIBRILLATION WITH SLOW VENTRICULAR RESPONSE (HCC): ICD-10-CM

## 2019-02-22 LAB — INR PPP: 1.4 (ref 0.86–1.17)

## 2019-02-22 NOTE — PROGRESS NOTES
2/22/19, tc to pt's personal nurse April, left message on her cell phone, take 5 mg today, then 5 mg m/w/f, 2 5 mg other days of the week, inr due thurs  Faxed to dara bingham

## 2019-02-28 ENCOUNTER — ANTICOAG VISIT (OUTPATIENT)
Dept: CARDIOLOGY CLINIC | Facility: CLINIC | Age: 84
End: 2019-02-28

## 2019-02-28 DIAGNOSIS — I48.91 ATRIAL FIBRILLATION WITH SLOW VENTRICULAR RESPONSE (HCC): ICD-10-CM

## 2019-02-28 LAB — INR PPP: 2.1 (ref 0.86–1.17)

## 2019-03-06 ENCOUNTER — TELEPHONE (OUTPATIENT)
Dept: CARDIOLOGY CLINIC | Facility: CLINIC | Age: 84
End: 2019-03-06

## 2019-03-06 LAB — INR PPP: 1.5 (ref 0.86–1.17)

## 2019-03-06 NOTE — TELEPHONE ENCOUNTER
Phone call from April, [phone]  She report patient has ov with pain management for injection  Stating pt will need to hold warfarin  Requested she have  pain management contact our office if medication hold is requested  She will contact pain management

## 2019-03-07 ENCOUNTER — TELEPHONE (OUTPATIENT)
Dept: CARDIOLOGY CLINIC | Facility: CLINIC | Age: 84
End: 2019-03-07

## 2019-03-07 ENCOUNTER — ANTICOAG VISIT (OUTPATIENT)
Dept: CARDIOLOGY CLINIC | Facility: CLINIC | Age: 84
End: 2019-03-07

## 2019-03-07 DIAGNOSIS — I48.91 ATRIAL FIBRILLATION WITH SLOW VENTRICULAR RESPONSE (HCC): ICD-10-CM

## 2019-03-07 NOTE — PROGRESS NOTES
3/7/19, tc to April, she reports patient held warfarin x 5 days for back injection done today  Will restart warfarin at previous dose, inr due 1 week  Faxed to [facility]

## 2019-03-07 NOTE — TELEPHONE ENCOUNTER
Phone call to April , personal nurse, [phone]  patient's pt/inr from 3/6 /19 was 1 5  April reports pt held warfarin x 5 days  Had back steroid and epidural back injection done today  Explained to have physician  office notify dr Michael Daniel when warfarin needs to be held for procedures  Will restart warfarin today at previous dose, inr due 1 week  Will notify dr Michael Daniel of call

## 2019-03-12 DIAGNOSIS — I48.91 ATRIAL FIBRILLATION WITH SLOW VENTRICULAR RESPONSE (HCC): Primary | Chronic | ICD-10-CM

## 2019-03-14 ENCOUNTER — ANTICOAG VISIT (OUTPATIENT)
Dept: CARDIOLOGY CLINIC | Facility: CLINIC | Age: 84
End: 2019-03-14

## 2019-03-14 DIAGNOSIS — I48.91 ATRIAL FIBRILLATION WITH SLOW VENTRICULAR RESPONSE (HCC): ICD-10-CM

## 2019-03-14 LAB — INR PPP: 1.4 (ref 0.86–1.17)

## 2019-03-14 NOTE — PROGRESS NOTES
3/14/19, tc to nurse, April, left message on answering machine, take 5 mg today in place of 2 5 mg, then resume previous dose, inr due 1 week  Faxed to [facility]

## 2019-03-15 ENCOUNTER — EVALUATION (OUTPATIENT)
Dept: PHYSICAL THERAPY | Facility: CLINIC | Age: 84
End: 2019-03-15
Payer: MEDICARE

## 2019-03-15 DIAGNOSIS — R29.898 BILATERAL LEG WEAKNESS: Primary | ICD-10-CM

## 2019-03-15 PROCEDURE — 97161 PT EVAL LOW COMPLEX 20 MIN: CPT | Performed by: PHYSICAL THERAPIST

## 2019-03-15 NOTE — PROGRESS NOTES
PT Evaluation     Today's date: 3/15/2019  Patient name: Jose Heard  : 1935  MRN: [id number]  Referring provider: Mark Goodpasture , *  Dx:   Encounter Diagnosis     ICD-10-CM    1  Bilateral leg weakness R29 898 Ambulatory referral to Physical Therapy                  Assessment  Assessment details: Pt is a pleasant 81 y o  male presenting to outpatient physical therapy with Bilateral leg weakness  (primary encounter diagnosis)   Pt presents decreased strength, abnormal gait, decreased balance, and decreased tolerance to activity  Pt is a good candidate for outpatient physical therapy and would benefit from skilled physical therapy to address limitations and to achieve goals  Thank you for this referral      Impairments: abnormal coordination, abnormal gait, abnormal or restricted ROM, impaired balance, impaired physical strength, pain with function and weight-bearing intolerance  Understanding of Dx/Px/POC: good   Prognosis: good    Goals  ST  Patient will report </= 1 fall in 4 weeks  2  Patient will demonstrate 25% improvement in balance in 4 weeks  3  Patient will demonstrate 1/2 grade improvement in strength in 4 weeks  LT  Patient will be able to perform IADLS without restriction or pain by discharge  2  Patient will be independent in HEP by discharge  3  Patient will be able to return to recreational/work duties without restriction or pain by discharge        Plan  Patient would benefit from: PT eval and skilled PT  Planned therapy interventions: IADL retraining, body mechanics training, flexibility, functional ROM exercises, home exercise program, neuromuscular re-education, manual therapy, postural training, strengthening, stretching, therapeutic activities, therapeutic exercise, abdominal trunk stabilization, balance/weight bearing training, gait training, transfer training and self care  Frequency: 2x week  Duration in visits: 8  Duration in weeks: 4  Treatment plan discussed with: patient        Subjective Evaluation    History of Present Illness  Mechanism of injury: 3/15: Patient comes to therapy reporting gradual progression of bilateral LE weakness  Reports he feels weak with walking  States he lives in an apartment complex, therefore, does not need to take stairs  Reports he utilizes motorized cart in grocery story due to LE weakness  Pt states he utilizes SPC and RW intermittently for balance  Denies falls int he past year, however, reports feeling unsteady on feet, requiring assistance of AD or furniture when walking in the home     Pain  No pain reported    Social Support  Steps to enter house: no  Stairs in house: no   Lives in: apartment  Lives with: alone    Patient Goals  Patient goals for therapy: increased motion, increased strength and independence with ADLs/IADLs          Objective     Strength/Myotome Testing     Left Hip   Planes of Motion   Flexion: 4-  Abduction: 4  External rotation: 4-  Internal rotation: 4    Right Hip   Planes of Motion   Flexion: 4-  Abduction: 4  External rotation: 4  Internal rotation: 4    Left Knee   Flexion: 4+  Extension: 4+    Right Knee   Flexion: 4  Extension: 4+    Left Ankle/Foot   Dorsiflexion: 4+  Plantar flexion: 4+    Right Ankle/Foot   Dorsiflexion: 4+  Plantar flexion: 5    General Comments:      Hip Comments   3/15:   5x sit to stand: 33 sec, with fatigue by third rep and max use of UE    TUG: Trial 1 = 17 sec w/UE assist and no AD, Trial 2 = 17 sec w/UE assist and no AD    Balance assessment (30-second ceiling effect):  Feet together, eyes open, firm: 30 sec, no sway, CS  Feet together, eyes closed, firm: 8 sec & 25 sec, with mod sway  Feet together, eyes open, foam: 15 sec, mod sway  Feet together, eyes closed, foam: 1-2 sec    Tandem balance, eyes open, firm: R-back 30 sec, 2-finger assist  L-back 30 sec, 2-finger assist  Tandem balance, eyes open, foam: able to get into position, unable to let go of UE support    Single leg balance, eyes open, firm: R 3 sec with bilateral UE support, L 3 sec with bilateral UE support    Gait: decreased stance time on left LE, wide VILMA, increased forward flexed posture      Flowsheet Rows      Most Recent Value   PT/OT G-Codes   Current Score  56   Projected Score  61          Precautions: a-fib, CAD, CHF, COPD, DM II, GERD, h/o colon CA, h/o DVT, HTN, h/o MI    Daily Treatment Diary     Manual                                                                                   Exercise Diary              bike                          Mini squats             Standing marches on foam             Feet together, eyes open, firm             Feet together, eyes closed, firm             Feet together, eyes open, foam             Feet together, eyes closed, foam                          Tandem balance, firm             SLS, firm                          Heel slide to march             bridges             TB hip abd                                                                                  Modalities

## 2019-03-21 ENCOUNTER — OFFICE VISIT (OUTPATIENT)
Dept: PHYSICAL THERAPY | Facility: CLINIC | Age: 84
End: 2019-03-21
Payer: MEDICARE

## 2019-03-21 ENCOUNTER — ANTICOAG VISIT (OUTPATIENT)
Dept: CARDIOLOGY CLINIC | Facility: CLINIC | Age: 84
End: 2019-03-21

## 2019-03-21 DIAGNOSIS — R29.898 BILATERAL LEG WEAKNESS: Primary | ICD-10-CM

## 2019-03-21 DIAGNOSIS — I48.91 ATRIAL FIBRILLATION WITH SLOW VENTRICULAR RESPONSE (HCC): ICD-10-CM

## 2019-03-21 DIAGNOSIS — G47.00 INSOMNIA, UNSPECIFIED TYPE: ICD-10-CM

## 2019-03-21 LAB — INR PPP: 1.5 (ref 0.86–1.17)

## 2019-03-21 PROCEDURE — 97530 THERAPEUTIC ACTIVITIES: CPT

## 2019-03-21 NOTE — PROGRESS NOTES
3/21/19, tc to nurse,april, left message on answering machine, 2 5 mg sun/tues/fri, 5 mg other days of the week, rech in one week  inr due   Faxed to [facility]ara [facility]

## 2019-03-21 NOTE — PROGRESS NOTES
Daily Note     Today's date: 3/21/2019  Patient name: J Luis Santillan  : 1935  MRN: [id number]  Referring provider: Severo Sauceda , *  Dx:   Encounter Diagnosis     ICD-10-CM    1  Bilateral leg weakness R29 898                   Subjective: Pt denies changes since LV but notes neck pain upon arrival, which he states has been bothering him for a few days  He states "this happens from time to time"  He notes that he has an hour-long exercise class at [facility] following this  Date 3/15 3/21           FOTO 64            Re-eval IE              Objective: See treatment diary below    Precautions: a-fib, CAD, CHF, COPD, DM II, GERD, h/o colon CA, h/o DVT, HTN, h/o MI    Daily Treatment Diary   Exercise Diary  3/21            bike L1 5'                         Mini squats 2x10            Standing marches on foam 15x ea            Feet together, eyes open, firm 30" CS            Feet together, eyes closed, firm 15"x2 CG            Feet together, eyes open, foam -            Feet together, eyes closed, foam -                         Tandem balance, firm 30" ea            SLS, firm -                         Heel slide to march -            bridges 5"x15            TB hip abd -            Supine clamshells GTB 5"x15                                                                  Assessment: Tolerated treatment well  Patient demonstrated fatigue post treatment, exhibited good technique with therapeutic exercises and would benefit from continued PT to improve BLE strength and balance as well as improve endurance  Plan: Continue per plan of care  Progress treatment as tolerated        Manuela Baldwin PTA

## 2019-03-22 RX ORDER — MIRTAZAPINE 30 MG/1
30 TABLET, FILM COATED ORAL
Qty: 90 TABLET | Refills: 3 | Status: SHIPPED | OUTPATIENT
Start: 2019-03-22 | End: 2020-02-20

## 2019-03-25 ENCOUNTER — APPOINTMENT (OUTPATIENT)
Dept: PHYSICAL THERAPY | Facility: CLINIC | Age: 84
End: 2019-03-25
Payer: MEDICARE

## 2019-03-28 ENCOUNTER — ANTICOAG VISIT (OUTPATIENT)
Dept: CARDIOLOGY CLINIC | Facility: CLINIC | Age: 84
End: 2019-03-28

## 2019-03-28 DIAGNOSIS — I48.91 ATRIAL FIBRILLATION WITH SLOW VENTRICULAR RESPONSE (HCC): ICD-10-CM

## 2019-03-28 LAB — INR PPP: 2 (ref 0.86–1.17)

## 2019-03-28 NOTE — PROGRESS NOTES
3/28/19, tc to nurse, April, [phone], continue current dose, inr due 1 week  April denies any changes in health, medication or bleeding  Faxed to [facility]

## 2019-03-29 ENCOUNTER — APPOINTMENT (OUTPATIENT)
Dept: PHYSICAL THERAPY | Facility: CLINIC | Age: 84
End: 2019-03-29
Payer: MEDICARE

## 2019-04-01 ENCOUNTER — OFFICE VISIT (OUTPATIENT)
Dept: PHYSICAL THERAPY | Facility: CLINIC | Age: 84
End: 2019-04-01
Payer: MEDICARE

## 2019-04-01 DIAGNOSIS — R29.898 BILATERAL LEG WEAKNESS: Primary | ICD-10-CM

## 2019-04-01 PROCEDURE — 97112 NEUROMUSCULAR REEDUCATION: CPT

## 2019-04-01 PROCEDURE — 97110 THERAPEUTIC EXERCISES: CPT

## 2019-04-04 ENCOUNTER — ANTICOAG VISIT (OUTPATIENT)
Dept: CARDIOLOGY CLINIC | Facility: CLINIC | Age: 84
End: 2019-04-04

## 2019-04-04 DIAGNOSIS — I48.91 ATRIAL FIBRILLATION WITH SLOW VENTRICULAR RESPONSE (HCC): ICD-10-CM

## 2019-04-04 LAB — INR PPP: 2 (ref 0.86–1.17)

## 2019-04-08 ENCOUNTER — APPOINTMENT (OUTPATIENT)
Dept: PHYSICAL THERAPY | Facility: CLINIC | Age: 84
End: 2019-04-08
Payer: MEDICARE

## 2019-04-11 ENCOUNTER — ANTICOAG VISIT (OUTPATIENT)
Dept: CARDIOLOGY CLINIC | Facility: CLINIC | Age: 84
End: 2019-04-11

## 2019-04-11 DIAGNOSIS — I48.91 ATRIAL FIBRILLATION WITH SLOW VENTRICULAR RESPONSE (HCC): ICD-10-CM

## 2019-04-11 LAB — INR PPP: 1.5 (ref 0.86–1.17)

## 2019-04-12 ENCOUNTER — OFFICE VISIT (OUTPATIENT)
Dept: PHYSICAL THERAPY | Facility: CLINIC | Age: 84
End: 2019-04-12
Payer: MEDICARE

## 2019-04-12 DIAGNOSIS — R29.898 BILATERAL LEG WEAKNESS: Primary | ICD-10-CM

## 2019-04-12 PROCEDURE — 97110 THERAPEUTIC EXERCISES: CPT

## 2019-04-12 PROCEDURE — 97112 NEUROMUSCULAR REEDUCATION: CPT

## 2019-04-15 ENCOUNTER — OFFICE VISIT (OUTPATIENT)
Dept: PHYSICAL THERAPY | Facility: CLINIC | Age: 84
End: 2019-04-15
Payer: MEDICARE

## 2019-04-15 DIAGNOSIS — R29.898 BILATERAL LEG WEAKNESS: Primary | ICD-10-CM

## 2019-04-15 PROCEDURE — 97110 THERAPEUTIC EXERCISES: CPT | Performed by: PHYSICAL THERAPIST

## 2019-04-15 PROCEDURE — 97112 NEUROMUSCULAR REEDUCATION: CPT | Performed by: PHYSICAL THERAPIST

## 2019-04-18 ENCOUNTER — ANTICOAG VISIT (OUTPATIENT)
Dept: CARDIOLOGY CLINIC | Facility: CLINIC | Age: 84
End: 2019-04-18

## 2019-04-18 DIAGNOSIS — I48.91 ATRIAL FIBRILLATION WITH SLOW VENTRICULAR RESPONSE (HCC): ICD-10-CM

## 2019-04-18 LAB — INR PPP: 1.2 (ref 0.86–1.17)

## 2019-04-22 ENCOUNTER — OFFICE VISIT (OUTPATIENT)
Dept: PHYSICAL THERAPY | Facility: CLINIC | Age: 84
End: 2019-04-22
Payer: MEDICARE

## 2019-04-22 DIAGNOSIS — R29.898 BILATERAL LEG WEAKNESS: Primary | ICD-10-CM

## 2019-04-22 PROCEDURE — 97112 NEUROMUSCULAR REEDUCATION: CPT

## 2019-04-22 PROCEDURE — 97110 THERAPEUTIC EXERCISES: CPT

## 2019-04-25 ENCOUNTER — ANTICOAG VISIT (OUTPATIENT)
Dept: CARDIOLOGY CLINIC | Facility: CLINIC | Age: 84
End: 2019-04-25

## 2019-04-25 DIAGNOSIS — I48.91 ATRIAL FIBRILLATION WITH SLOW VENTRICULAR RESPONSE (HCC): ICD-10-CM

## 2019-04-25 LAB — INR PPP: 1.3 (ref 0.86–1.17)

## 2019-04-26 ENCOUNTER — OFFICE VISIT (OUTPATIENT)
Dept: PHYSICAL THERAPY | Facility: CLINIC | Age: 84
End: 2019-04-26
Payer: MEDICARE

## 2019-04-26 DIAGNOSIS — R29.898 BILATERAL LEG WEAKNESS: Primary | ICD-10-CM

## 2019-04-26 PROCEDURE — 97110 THERAPEUTIC EXERCISES: CPT

## 2019-04-26 PROCEDURE — 97112 NEUROMUSCULAR REEDUCATION: CPT

## 2019-04-29 ENCOUNTER — OFFICE VISIT (OUTPATIENT)
Dept: PHYSICAL THERAPY | Facility: CLINIC | Age: 84
End: 2019-04-29
Payer: MEDICARE

## 2019-04-29 DIAGNOSIS — R29.898 BILATERAL LEG WEAKNESS: Primary | ICD-10-CM

## 2019-04-29 PROCEDURE — 97112 NEUROMUSCULAR REEDUCATION: CPT | Performed by: PHYSICAL THERAPY ASSISTANT

## 2019-04-29 PROCEDURE — 97110 THERAPEUTIC EXERCISES: CPT | Performed by: PHYSICAL THERAPY ASSISTANT

## 2019-05-02 ENCOUNTER — ANTICOAG VISIT (OUTPATIENT)
Dept: CARDIOLOGY CLINIC | Facility: CLINIC | Age: 84
End: 2019-05-02

## 2019-05-02 DIAGNOSIS — I48.91 ATRIAL FIBRILLATION WITH SLOW VENTRICULAR RESPONSE (HCC): ICD-10-CM

## 2019-05-02 LAB — INR PPP: 1.3 (ref 0.86–1.17)

## 2019-05-03 ENCOUNTER — EVALUATION (OUTPATIENT)
Dept: PHYSICAL THERAPY | Facility: CLINIC | Age: 84
End: 2019-05-03
Payer: MEDICARE

## 2019-05-03 DIAGNOSIS — M54.2 CERVICAL SPINE PAIN: ICD-10-CM

## 2019-05-03 DIAGNOSIS — R29.898 BILATERAL LEG WEAKNESS: Primary | ICD-10-CM

## 2019-05-03 PROCEDURE — 97140 MANUAL THERAPY 1/> REGIONS: CPT | Performed by: PHYSICAL THERAPIST

## 2019-05-03 PROCEDURE — 97012 MECHANICAL TRACTION THERAPY: CPT

## 2019-05-03 PROCEDURE — 97110 THERAPEUTIC EXERCISES: CPT | Performed by: PHYSICAL THERAPIST

## 2019-05-06 ENCOUNTER — OFFICE VISIT (OUTPATIENT)
Dept: PHYSICAL THERAPY | Facility: CLINIC | Age: 84
End: 2019-05-06
Payer: MEDICARE

## 2019-05-06 DIAGNOSIS — M54.2 CERVICAL SPINE PAIN: ICD-10-CM

## 2019-05-06 DIAGNOSIS — R29.898 BILATERAL LEG WEAKNESS: Primary | ICD-10-CM

## 2019-05-06 PROCEDURE — 97110 THERAPEUTIC EXERCISES: CPT | Performed by: PHYSICAL THERAPIST

## 2019-05-06 PROCEDURE — 97140 MANUAL THERAPY 1/> REGIONS: CPT | Performed by: PHYSICAL THERAPIST

## 2019-05-07 ENCOUNTER — ANTICOAG VISIT (OUTPATIENT)
Dept: CARDIOLOGY CLINIC | Facility: CLINIC | Age: 84
End: 2019-05-07

## 2019-05-07 DIAGNOSIS — I48.91 ATRIAL FIBRILLATION WITH SLOW VENTRICULAR RESPONSE (HCC): ICD-10-CM

## 2019-05-07 LAB — INR PPP: 2.6 (ref 0.86–1.17)

## 2019-05-08 ENCOUNTER — OFFICE VISIT (OUTPATIENT)
Dept: PHYSICAL THERAPY | Facility: CLINIC | Age: 84
End: 2019-05-08
Payer: MEDICARE

## 2019-05-08 DIAGNOSIS — R29.898 BILATERAL LEG WEAKNESS: Primary | ICD-10-CM

## 2019-05-08 DIAGNOSIS — M54.2 CERVICAL SPINE PAIN: ICD-10-CM

## 2019-05-08 PROCEDURE — 97140 MANUAL THERAPY 1/> REGIONS: CPT | Performed by: PHYSICAL THERAPIST

## 2019-05-08 PROCEDURE — 97110 THERAPEUTIC EXERCISES: CPT | Performed by: PHYSICAL THERAPIST

## 2019-05-09 DIAGNOSIS — S22.49XA RIB FRACTURES: ICD-10-CM

## 2019-05-09 RX ORDER — LIDOCAINE 50 MG/G
1 PATCH TOPICAL EVERY 24 HOURS
Qty: 30 PATCH | Refills: 11 | Status: SHIPPED | OUTPATIENT
Start: 2019-05-09 | End: 2020-01-06

## 2019-05-13 ENCOUNTER — APPOINTMENT (OUTPATIENT)
Dept: PHYSICAL THERAPY | Facility: CLINIC | Age: 84
End: 2019-05-13
Payer: MEDICARE

## 2019-05-14 ENCOUNTER — ANTICOAG VISIT (OUTPATIENT)
Dept: CARDIOLOGY CLINIC | Facility: CLINIC | Age: 84
End: 2019-05-14

## 2019-05-14 DIAGNOSIS — I48.91 ATRIAL FIBRILLATION WITH SLOW VENTRICULAR RESPONSE (HCC): ICD-10-CM

## 2019-05-14 LAB — INR PPP: 1.8 (ref 0.86–1.17)

## 2019-05-17 ENCOUNTER — APPOINTMENT (OUTPATIENT)
Dept: PHYSICAL THERAPY | Facility: CLINIC | Age: 84
End: 2019-05-17
Payer: MEDICARE

## 2019-05-20 ENCOUNTER — APPOINTMENT (OUTPATIENT)
Dept: PHYSICAL THERAPY | Facility: CLINIC | Age: 84
End: 2019-05-20
Payer: MEDICARE

## 2019-05-21 LAB — INR PPP: 2.8 (ref 0.86–1.17)

## 2019-05-22 ENCOUNTER — ANTICOAG VISIT (OUTPATIENT)
Dept: CARDIOLOGY CLINIC | Facility: CLINIC | Age: 84
End: 2019-05-22

## 2019-05-22 DIAGNOSIS — I48.91 ATRIAL FIBRILLATION WITH SLOW VENTRICULAR RESPONSE (HCC): ICD-10-CM

## 2019-05-24 ENCOUNTER — APPOINTMENT (OUTPATIENT)
Dept: PHYSICAL THERAPY | Facility: CLINIC | Age: 84
End: 2019-05-24
Payer: MEDICARE

## 2019-05-29 ENCOUNTER — APPOINTMENT (OUTPATIENT)
Dept: PHYSICAL THERAPY | Facility: CLINIC | Age: 84
End: 2019-05-29
Payer: MEDICARE

## 2019-05-29 ENCOUNTER — ANTICOAG VISIT (OUTPATIENT)
Dept: CARDIOLOGY CLINIC | Facility: CLINIC | Age: 84
End: 2019-05-29

## 2019-05-29 DIAGNOSIS — I48.91 ATRIAL FIBRILLATION WITH SLOW VENTRICULAR RESPONSE (HCC): ICD-10-CM

## 2019-05-29 LAB — INR PPP: 1.5 (ref 0.86–1.17)

## 2019-05-31 ENCOUNTER — APPOINTMENT (OUTPATIENT)
Dept: PHYSICAL THERAPY | Facility: CLINIC | Age: 84
End: 2019-05-31
Payer: MEDICARE

## 2019-06-12 ENCOUNTER — ANTICOAG VISIT (OUTPATIENT)
Dept: CARDIOLOGY CLINIC | Facility: CLINIC | Age: 84
End: 2019-06-12

## 2019-06-12 DIAGNOSIS — I48.91 ATRIAL FIBRILLATION WITH SLOW VENTRICULAR RESPONSE (HCC): ICD-10-CM

## 2019-06-12 LAB — INR PPP: 1.1 (ref 0.86–1.17)

## 2019-06-19 ENCOUNTER — ANTICOAG VISIT (OUTPATIENT)
Dept: CARDIOLOGY CLINIC | Facility: CLINIC | Age: 84
End: 2019-06-19

## 2019-06-19 DIAGNOSIS — I48.91 ATRIAL FIBRILLATION WITH SLOW VENTRICULAR RESPONSE (HCC): ICD-10-CM

## 2019-06-19 LAB — INR PPP: 1.1 (ref 0.86–1.17)

## 2019-06-24 ENCOUNTER — OFFICE VISIT (OUTPATIENT)
Dept: FAMILY MEDICINE CLINIC | Facility: CLINIC | Age: 84
End: 2019-06-24
Payer: MEDICARE

## 2019-06-24 VITALS
SYSTOLIC BLOOD PRESSURE: 138 MMHG | OXYGEN SATURATION: 97 % | HEIGHT: 70 IN | RESPIRATION RATE: 18 BRPM | BODY MASS INDEX: 25.05 KG/M2 | WEIGHT: 175 LBS | HEART RATE: 76 BPM | DIASTOLIC BLOOD PRESSURE: 88 MMHG

## 2019-06-24 DIAGNOSIS — I50.42 CHRONIC COMBINED SYSTOLIC AND DIASTOLIC CONGESTIVE HEART FAILURE (HCC): ICD-10-CM

## 2019-06-24 DIAGNOSIS — IMO0002 UNCONTROLLED TYPE 2 DIABETES MELLITUS WITH DIABETIC POLYNEUROPATHY, WITHOUT LONG-TERM CURRENT USE OF INSULIN: Primary | Chronic | ICD-10-CM

## 2019-06-24 DIAGNOSIS — D72.829 LEUKOCYTOSIS, UNSPECIFIED TYPE: ICD-10-CM

## 2019-06-24 DIAGNOSIS — E66.3 OVERWEIGHT WITH BODY MASS INDEX (BMI) 25.0-29.9: ICD-10-CM

## 2019-06-24 DIAGNOSIS — J18.9 PNEUMONIA OF LEFT UPPER LOBE DUE TO INFECTIOUS ORGANISM: ICD-10-CM

## 2019-06-24 DIAGNOSIS — R35.0 FREQUENT URINATION: ICD-10-CM

## 2019-06-24 DIAGNOSIS — N30.00 ACUTE CYSTITIS WITHOUT HEMATURIA: ICD-10-CM

## 2019-06-24 DIAGNOSIS — I48.91 ATRIAL FIBRILLATION WITH SLOW VENTRICULAR RESPONSE (HCC): Chronic | ICD-10-CM

## 2019-06-24 DIAGNOSIS — R31.9 HEMATURIA, UNSPECIFIED TYPE: ICD-10-CM

## 2019-06-24 DIAGNOSIS — E21.1 HYPERPARATHYROIDISM DUE TO 1,25(0H)2D3 (HCC): ICD-10-CM

## 2019-06-24 DIAGNOSIS — I10 BENIGN ESSENTIAL HYPERTENSION: ICD-10-CM

## 2019-06-24 DIAGNOSIS — E78.00 PURE HYPERCHOLESTEROLEMIA: ICD-10-CM

## 2019-06-24 DIAGNOSIS — F32.A MILD DEPRESSION: ICD-10-CM

## 2019-06-24 DIAGNOSIS — J44.9 CHRONIC OBSTRUCTIVE PULMONARY DISEASE, UNSPECIFIED COPD TYPE (HCC): ICD-10-CM

## 2019-06-24 LAB
BACTERIA UR QL AUTO: ABNORMAL /HPF
BILIRUB UR QL STRIP: NEGATIVE
CLARITY UR: ABNORMAL
COLOR UR: YELLOW
GLUCOSE UR STRIP-MCNC: ABNORMAL MG/DL
HGB UR QL STRIP.AUTO: ABNORMAL
KETONES UR STRIP-MCNC: NEGATIVE MG/DL
LEUKOCYTE ESTERASE UR QL STRIP: ABNORMAL
NITRITE UR QL STRIP: NEGATIVE
NON-SQ EPI CELLS URNS QL MICRO: ABNORMAL /HPF
PH UR STRIP.AUTO: 5.5 [PH]
PROT UR STRIP-MCNC: ABNORMAL MG/DL
RBC #/AREA URNS AUTO: ABNORMAL /HPF
SL AMB  POCT GLUCOSE, UA: ABNORMAL
SL AMB LEUKOCYTE ESTERASE,UA: ABNORMAL
SL AMB POCT BILIRUBIN,UA: NEGATIVE
SL AMB POCT BLOOD,UA: ABNORMAL
SL AMB POCT CLARITY,UA: ABNORMAL
SL AMB POCT COLOR,UA: YELLOW
SL AMB POCT KETONES,UA: NEGATIVE
SL AMB POCT NITRITE,UA: NEGATIVE
SL AMB POCT PH,UA: 5
SL AMB POCT SPECIFIC GRAVITY,UA: 10.15
SL AMB POCT URINE PROTEIN: ABNORMAL
SL AMB POCT UROBILINOGEN: 0.2
SP GR UR STRIP.AUTO: 1.02 (ref 1–1.03)
UROBILINOGEN UR QL STRIP.AUTO: 0.2 E.U./DL
WBC #/AREA URNS AUTO: ABNORMAL /HPF

## 2019-06-24 PROCEDURE — 81001 URINALYSIS AUTO W/SCOPE: CPT | Performed by: FAMILY MEDICINE

## 2019-06-24 PROCEDURE — 99214 OFFICE O/P EST MOD 30 MIN: CPT | Performed by: FAMILY MEDICINE

## 2019-06-24 PROCEDURE — 1123F ACP DISCUSS/DSCN MKR DOCD: CPT | Performed by: FAMILY MEDICINE

## 2019-06-24 PROCEDURE — 87086 URINE CULTURE/COLONY COUNT: CPT | Performed by: FAMILY MEDICINE

## 2019-06-24 PROCEDURE — 81002 URINALYSIS NONAUTO W/O SCOPE: CPT | Performed by: FAMILY MEDICINE

## 2019-06-24 RX ORDER — LEVALBUTEROL INHALATION SOLUTION 1.25 MG/3ML
1.25 SOLUTION RESPIRATORY (INHALATION) EVERY 4 HOURS PRN
Qty: 75 VIAL | Refills: 3 | Status: SHIPPED | OUTPATIENT
Start: 2019-06-24

## 2019-06-24 RX ORDER — LEVALBUTEROL INHALATION SOLUTION 1.25 MG/3ML
1.25 SOLUTION RESPIRATORY (INHALATION) EVERY 4 HOURS PRN
Qty: 75 VIAL | Refills: 3 | Status: CANCELLED | OUTPATIENT
Start: 2019-06-24

## 2019-06-24 RX ORDER — SULFAMETHOXAZOLE AND TRIMETHOPRIM 800; 160 MG/1; MG/1
1 TABLET ORAL EVERY 12 HOURS SCHEDULED
Qty: 14 TABLET | Refills: 0 | Status: SHIPPED | OUTPATIENT
Start: 2019-06-24 | End: 2019-07-01

## 2019-06-24 RX ORDER — LANCETS
EACH MISCELLANEOUS 2 TIMES DAILY
Qty: 100 EACH | Refills: 5 | Status: CANCELLED | OUTPATIENT
Start: 2019-06-24

## 2019-06-24 RX ORDER — LANCETS
EACH MISCELLANEOUS
Qty: 100 EACH | Refills: 5 | Status: SHIPPED | OUTPATIENT
Start: 2019-06-24 | End: 2020-06-04 | Stop reason: SDUPTHER

## 2019-06-25 ENCOUNTER — APPOINTMENT (OUTPATIENT)
Dept: LAB | Facility: HOSPITAL | Age: 84
End: 2019-06-25
Payer: MEDICARE

## 2019-06-25 ENCOUNTER — HOSPITAL ENCOUNTER (OUTPATIENT)
Dept: RADIOLOGY | Facility: HOSPITAL | Age: 84
Discharge: HOME | End: 2019-06-25
Payer: MEDICARE

## 2019-06-25 DIAGNOSIS — E21.1 HYPERPARATHYROIDISM DUE TO 1,25(0H)2D3 (HCC): ICD-10-CM

## 2019-06-25 DIAGNOSIS — I48.91 ATRIAL FIBRILLATION WITH SLOW VENTRICULAR RESPONSE (HCC): Chronic | ICD-10-CM

## 2019-06-25 DIAGNOSIS — IMO0002 UNCONTROLLED TYPE 2 DIABETES MELLITUS WITH DIABETIC POLYNEUROPATHY, WITHOUT LONG-TERM CURRENT USE OF INSULIN: Chronic | ICD-10-CM

## 2019-06-25 DIAGNOSIS — J44.9 CHRONIC OBSTRUCTIVE PULMONARY DISEASE, UNSPECIFIED COPD TYPE (HCC): ICD-10-CM

## 2019-06-25 DIAGNOSIS — J18.9 PNEUMONIA OF LEFT UPPER LOBE DUE TO INFECTIOUS ORGANISM: ICD-10-CM

## 2019-06-25 DIAGNOSIS — R31.9 HEMATURIA, UNSPECIFIED TYPE: ICD-10-CM

## 2019-06-25 LAB
25(OH)D3 SERPL-MCNC: 17.4 NG/ML (ref 30–100)
ALBUMIN SERPL BCP-MCNC: 3.8 G/DL (ref 3.5–5)
ALP SERPL-CCNC: 131 U/L (ref 46–116)
ALT SERPL W P-5'-P-CCNC: 26 U/L (ref 12–78)
ANION GAP SERPL CALCULATED.3IONS-SCNC: 9 MMOL/L (ref 4–13)
AST SERPL W P-5'-P-CCNC: 20 U/L (ref 5–45)
BACTERIA UR CULT: NORMAL
BASOPHILS # BLD AUTO: 0.06 THOUSANDS/ΜL (ref 0–0.1)
BASOPHILS NFR BLD AUTO: 1 % (ref 0–1)
BILIRUB SERPL-MCNC: 0.74 MG/DL (ref 0.2–1)
BUN SERPL-MCNC: 22 MG/DL (ref 5–25)
CALCIUM SERPL-MCNC: 9.5 MG/DL (ref 8.3–10.1)
CHLORIDE SERPL-SCNC: 99 MMOL/L (ref 100–108)
CHOLEST SERPL-MCNC: 194 MG/DL (ref 50–200)
CO2 SERPL-SCNC: 30 MMOL/L (ref 21–32)
CREAT SERPL-MCNC: 1 MG/DL (ref 0.6–1.3)
EOSINOPHIL # BLD AUTO: 0.2 THOUSAND/ΜL (ref 0–0.61)
EOSINOPHIL NFR BLD AUTO: 2 % (ref 0–6)
ERYTHROCYTE [DISTWIDTH] IN BLOOD BY AUTOMATED COUNT: 13.7 % (ref 11.6–15.1)
EST. AVERAGE GLUCOSE BLD GHB EST-MCNC: 203 MG/DL
GFR SERPL CREATININE-BSD FRML MDRD: 69 ML/MIN/1.73SQ M
GLUCOSE SERPL-MCNC: 315 MG/DL (ref 65–140)
HBA1C MFR BLD: 8.7 % (ref 4.2–6.3)
HCT VFR BLD AUTO: 50 % (ref 36.5–49.3)
HDLC SERPL-MCNC: 36 MG/DL (ref 40–60)
HGB BLD-MCNC: 16.3 G/DL (ref 12–17)
IMM GRANULOCYTES # BLD AUTO: 0.28 THOUSAND/UL (ref 0–0.2)
IMM GRANULOCYTES NFR BLD AUTO: 2 % (ref 0–2)
INR PPP: 1.07 (ref 0.84–1.19)
LDLC SERPL CALC-MCNC: 118 MG/DL (ref 0–100)
LYMPHOCYTES # BLD AUTO: 3.94 THOUSANDS/ΜL (ref 0.6–4.47)
LYMPHOCYTES NFR BLD AUTO: 32 % (ref 14–44)
MCH RBC QN AUTO: 32.1 PG (ref 26.8–34.3)
MCHC RBC AUTO-ENTMCNC: 32.6 G/DL (ref 31.4–37.4)
MCV RBC AUTO: 99 FL (ref 82–98)
MONOCYTES # BLD AUTO: 0.88 THOUSAND/ΜL (ref 0.17–1.22)
MONOCYTES NFR BLD AUTO: 7 % (ref 4–12)
NEUTROPHILS # BLD AUTO: 7.02 THOUSANDS/ΜL (ref 1.85–7.62)
NEUTS SEG NFR BLD AUTO: 56 % (ref 43–75)
NRBC BLD AUTO-RTO: 0 /100 WBCS
PLATELET # BLD AUTO: 149 THOUSANDS/UL (ref 149–390)
PMV BLD AUTO: 11.9 FL (ref 8.9–12.7)
POTASSIUM SERPL-SCNC: 4.2 MMOL/L (ref 3.5–5.3)
PROT SERPL-MCNC: 8.3 G/DL (ref 6.4–8.2)
PROTHROMBIN TIME: 14 SECONDS (ref 11.6–14.5)
PTH-INTACT SERPL-MCNC: 59.6 PG/ML (ref 18.4–80.1)
RBC # BLD AUTO: 5.07 MILLION/UL (ref 3.88–5.62)
SODIUM SERPL-SCNC: 138 MMOL/L (ref 136–145)
TRIGL SERPL-MCNC: 201 MG/DL
WBC # BLD AUTO: 12.38 THOUSAND/UL (ref 4.31–10.16)

## 2019-06-25 PROCEDURE — 71046 X-RAY EXAM CHEST 2 VIEWS: CPT

## 2019-06-25 PROCEDURE — 83036 HEMOGLOBIN GLYCOSYLATED A1C: CPT

## 2019-06-25 PROCEDURE — 83970 ASSAY OF PARATHORMONE: CPT

## 2019-06-25 PROCEDURE — 85610 PROTHROMBIN TIME: CPT

## 2019-06-25 PROCEDURE — 36415 COLL VENOUS BLD VENIPUNCTURE: CPT

## 2019-06-25 PROCEDURE — 80053 COMPREHEN METABOLIC PANEL: CPT

## 2019-06-25 PROCEDURE — 82306 VITAMIN D 25 HYDROXY: CPT

## 2019-06-25 PROCEDURE — 80061 LIPID PANEL: CPT

## 2019-06-25 PROCEDURE — 85025 COMPLETE CBC W/AUTO DIFF WBC: CPT

## 2019-06-26 ENCOUNTER — ANTICOAG VISIT (OUTPATIENT)
Dept: FAMILY MEDICINE CLINIC | Facility: CLINIC | Age: 84
End: 2019-06-26

## 2019-06-26 ENCOUNTER — TELEPHONE (OUTPATIENT)
Dept: CARDIOLOGY CLINIC | Facility: CLINIC | Age: 84
End: 2019-06-26

## 2019-06-26 ENCOUNTER — TELEPHONE (OUTPATIENT)
Dept: FAMILY MEDICINE CLINIC | Facility: CLINIC | Age: 84
End: 2019-06-26

## 2019-06-26 ENCOUNTER — ANTICOAG VISIT (OUTPATIENT)
Dept: CARDIOLOGY CLINIC | Facility: CLINIC | Age: 84
End: 2019-06-26

## 2019-06-26 DIAGNOSIS — I48.91 ATRIAL FIBRILLATION WITH SLOW VENTRICULAR RESPONSE (HCC): ICD-10-CM

## 2019-06-26 DIAGNOSIS — Z79.01 ANTICOAGULANT LONG-TERM USE: Primary | ICD-10-CM

## 2019-06-26 DIAGNOSIS — I48.91 ATRIAL FIBRILLATION WITH SLOW VENTRICULAR RESPONSE (HCC): Chronic | ICD-10-CM

## 2019-06-26 DIAGNOSIS — Z79.01 ANTICOAGULANT LONG-TERM USE: ICD-10-CM

## 2019-06-26 DIAGNOSIS — E55.9 VITAMIN D DEFICIENCY: Primary | ICD-10-CM

## 2019-06-28 LAB — INR PPP: 1.2 (ref 0.84–1.19)

## 2019-06-30 RX ORDER — ERGOCALCIFEROL 1.25 MG/1
50000 CAPSULE ORAL WEEKLY
Qty: 12 CAPSULE | Refills: 0 | Status: SHIPPED | OUTPATIENT
Start: 2019-06-30 | End: 2020-01-06

## 2019-06-30 RX ORDER — WARFARIN SODIUM 1 MG/1
TABLET ORAL
Qty: 60 TABLET | Refills: 5
Start: 2019-06-30 | End: 2019-07-17 | Stop reason: ALTCHOICE

## 2019-07-01 ENCOUNTER — ANTICOAG VISIT (OUTPATIENT)
Dept: FAMILY MEDICINE CLINIC | Facility: CLINIC | Age: 84
End: 2019-07-01

## 2019-07-01 NOTE — PROGRESS NOTES
Notes recorded by Rena Mcclure PA-C on 6/30/2019 at 2:15 AM EDT  Please direct the patient to increase Coumadin to 7 5 mg daily and recheck INR in 3 days  April notified  Orders faxed to Dru Perdomo at [facility]

## 2019-07-03 ENCOUNTER — ANTICOAG VISIT (OUTPATIENT)
Dept: FAMILY MEDICINE CLINIC | Facility: CLINIC | Age: 84
End: 2019-07-03

## 2019-07-03 LAB — INR PPP: 3.7 (ref 0.84–1.19)

## 2019-07-05 ENCOUNTER — ANTICOAG VISIT (OUTPATIENT)
Dept: FAMILY MEDICINE CLINIC | Facility: CLINIC | Age: 84
End: 2019-07-05

## 2019-07-08 ENCOUNTER — ANTICOAG VISIT (OUTPATIENT)
Dept: FAMILY MEDICINE CLINIC | Facility: CLINIC | Age: 84
End: 2019-07-08

## 2019-07-08 LAB
INR PPP: 1.4 (ref 0.84–1.19)
INR PPP: 1.4 (ref 0.84–1.19)

## 2019-07-09 ENCOUNTER — ANTICOAG VISIT (OUTPATIENT)
Dept: FAMILY MEDICINE CLINIC | Facility: CLINIC | Age: 84
End: 2019-07-09

## 2019-07-10 ENCOUNTER — HOSPITAL ENCOUNTER (OUTPATIENT)
Dept: NON INVASIVE DIAGNOSTICS | Facility: HOSPITAL | Age: 84
Discharge: HOME | End: 2019-07-10
Attending: INTERNAL MEDICINE
Payer: MEDICARE

## 2019-07-10 ENCOUNTER — OFFICE VISIT (OUTPATIENT)
Dept: CARDIOLOGY CLINIC | Facility: CLINIC | Age: 84
End: 2019-07-10
Payer: MEDICARE

## 2019-07-10 VITALS
DIASTOLIC BLOOD PRESSURE: 60 MMHG | BODY MASS INDEX: 25.2 KG/M2 | HEART RATE: 75 BPM | WEIGHT: 176 LBS | SYSTOLIC BLOOD PRESSURE: 100 MMHG | OXYGEN SATURATION: 96 % | HEIGHT: 70 IN

## 2019-07-10 DIAGNOSIS — I10 BENIGN ESSENTIAL HTN: ICD-10-CM

## 2019-07-10 DIAGNOSIS — I42.8 NICM (NONISCHEMIC CARDIOMYOPATHY) (HCC): ICD-10-CM

## 2019-07-10 DIAGNOSIS — I48.91 ATRIAL FIBRILLATION, UNSPECIFIED TYPE (HCC): ICD-10-CM

## 2019-07-10 DIAGNOSIS — I25.10 CORONARY ARTERY DISEASE INVOLVING NATIVE CORONARY ARTERY OF NATIVE HEART WITHOUT ANGINA PECTORIS: Primary | ICD-10-CM

## 2019-07-10 DIAGNOSIS — E78.5 DYSLIPIDEMIA: ICD-10-CM

## 2019-07-10 PROCEDURE — C8929 TTE W OR WO FOL WCON,DOPPLER: HCPCS

## 2019-07-10 PROCEDURE — 99214 OFFICE O/P EST MOD 30 MIN: CPT | Performed by: INTERNAL MEDICINE

## 2019-07-10 PROCEDURE — 93306 TTE W/DOPPLER COMPLETE: CPT | Performed by: INTERNAL MEDICINE

## 2019-07-10 RX ADMIN — PERFLUTREN 0.4 ML/MIN: 6.52 INJECTION, SUSPENSION INTRAVENOUS at 14:59

## 2019-07-10 NOTE — PROGRESS NOTES
Cardiology Follow Up        Nitin Ricci      1935      [id number]      Discussion/Summary:    1  CAD status post remote myocardial infarction, prior PCI and coronary stenting, unknown details  2  Hypertension  3  Chronic diastolic heart failure  4  Atrial fibrillation    · No symptoms of angina, remain off aspirin while on anticoagulation  · Blood pressure controlled, continue metoprolol  · Euvolemic by examination, continue furosemide  · Would consider adding statin in the future, LDL > 100 mg/dL and prior h/o CAD  · As per discussion with patient's son over the phone, he has requested his father to be seen by our office only as needed from here on, and wishes his PCP to continue care for this patient including following his INR levels  Management of his INR levels has been transferred over to Dr Luis Sanders office  Patient will be see PRN  I have welcomed Barber Renee to call me anytime if he needs our services  Interval History: This is an 28-year-old male with a history of CAD status post PCI in the remote past, unknown details, permanent atrial fibrillation, prior cardiomyopathy with ejection fraction 20% with subsequent echocardiogram 08/2016 revealing ejection fraction about 55%  He has maintained on warfarin anticoagulation  Last month, we had some trouble reaching this patient's aide to adjust warfarin dosing  After discussion with his son, who had a discussion with Dr Lexx Vogt, it was decided that he would take over his INR levels, and consider changing him to a direct anticoagulant in the future  The patient's son also expressed a wish to minimize his father's doctor's appointments, for me to see him only as needed  Barber Renee presents today for follow-up with no complaints  He denies chest pain, shortness of breath  He admits to weakness in his legs  He ambulates with a cane  He denies lightheadedness, palpitations  Vitals:  Vitals:    07/10/19 1504   BP: 100/60   BP Location: Right arm   Patient Position: Sitting   Cuff Size: Adult   Pulse: 75   SpO2: 96%   Weight: 79 8 kg (176 lb)   Height: 5' 10" (1 778 m)         Past Medical History:   Diagnosis Date    Anxiety     Arthritis     Atrial fibrillation (HCC)     BPH (benign prostatic hyperplasia)     CAD (coronary artery disease)     Chronic back pain     Chronic diastolic (congestive) heart failure (HCC)     COPD (chronic obstructive pulmonary disease) (HCC)     Critical illness polyneuropathy (HCC)     Diabetes mellitus (HCC)     type 2    Dysphagia     GERD (gastroesophageal reflux disease)     Hemorrhoids     History of colon cancer     History of DVT (deep vein thrombosis)     Hyperlipidemia     Hypertension     Hyponatremia     [facility]     Myocardial infarct [facility] )     Osteomyelitis ([facility] )     Pneumonia     Sepsis ([place] [address] )     Urinary retention      Social History     Socioeconomic History    Marital status:       Spouse name: Not on file    Number of children: Not on file    Years of education: Not on file    Highest education level: Not on file   Occupational History    Occupation:  - retired   Social Needs    Financial resource strain: Not on file    Food insecurity:     Worry: Not on file     Inability: Not on file   [employer] needs:     Medical: Not on file     Non-medical: Not on file   Tobacco Use    Smoking status: Former Smoker     Packs/day: 1 00     Years: 20 00     Pack years: 20 00     Types: Cigarettes     Last attempt to quit: 1978     Years since quittin 8    Smokeless tobacco: Never Used    Tobacco comment: quit 40 years ago   Substance and Sexual Activity    Alcohol use: No    Drug use: No    Sexual activity: Not on file   Lifestyle    Physical activity:     Days per week: Not on file     Minutes per session: Not on file    Stress: Not on file   Relationships  Social connections:     Talks on phone: Not on file     Gets together: Not on file     Attends [employer] witness service: Not on file     Active member of club or organization: Not on file     Attends meetings of clubs or organizations: Not on file     Relationship status: Not on file    Intimate partner violence:     Fear of current or ex partner: Not on file     Emotionally abused: Not on file     Physically abused: Not on file     Forced sexual activity: Not on file   Other Topics Concern    Not on file   Social History Narrative    Lives independently until recent episode of sepsis 3/16 - now in assisted living      Family History   Problem Relation Age of Onset    Heart attack Mother      Past Surgical History:   Procedure Laterality Date    APPENDECTOMY      BACK SURGERY      BOTOX INJECTION N/A 2/8/2019    Procedure: BOTOX INJECTION 100 UNITS;  Surgeon: Anival Lance MD;  [facility];  Service: Urology    BRONCHOSCOPY N/A 6/30/2016    Procedure: BRONCHOSCOPY FLEXIBLE with bronchial lavage to the left lower lobe; Surgeon: Jessica Castro MD;  Location: [facility];   Service:     CATARACT EXTRACTION      CHOLECYSTECTOMY      COLECTOMY      COLON SURGERY      polypectomy for cancerous lesion    COLONOSCOPY      CORONARY ANGIOPLASTY WITH STENT PLACEMENT      x5  pt unsure of where    GASTROSTOMY TUBE PLACEMENT      percutaneous placement of gastrostomy tube placed 4/16 - dc 8/16    HEMORROIDECTOMY      LUMBAR LAMINECTOMY      LUNG SURGERY      PEG TUBE REMOVAL      RI CYSTOURETHRO W/IMPLANT N/A 8/27/2018    Procedure: CYSTOSCOPY WITH INSERTION UROLIFT;  Surgeon: Anival Lance MD;  [facility];  Service: Urology    RI CYSTOURETHROSCOPY N/A 2/8/2019    Procedure: Ike Mckeon;  Surgeon: Anival Lance MD;  [facility];  Service: Urology    TRACHEOSTOMY         Current Outpatient Medications:     ACCU-CHEK FASTCLIX LANCETS MISC, Test once daily, Disp: 100 each, Rfl: 5    ergocalciferol (VITAMIN D2) 50,000 units, Take 1 capsule (50,000 Units total) by mouth once a week, Disp: 12 capsule, Rfl: 0    famotidine (PEPCID) 20 mg tablet, Take 1 tablet (20 mg total) by mouth 2 (two) times a day, Disp: 180 tablet, Rfl: 3    furosemide (LASIX) 20 mg tablet, 20 mg alternating with 40 mg, add additional 20 mg for weight gain more than 3 lb, Disp: 180 tablet, Rfl: 3    gabapentin (NEURONTIN) 400 mg capsule, Take 400 mg by mouth 3 (three) times a day  , Disp: , Rfl:     Insulin Pen Needle 32G X 4 MM MISC, Test once daily, Disp: 100 each, Rfl: 5    LANTUS SOLOSTAR 100 units/mL injection pen, INJECT SUBCUTANEOUSLY 18  UNITS DAILY (Patient taking differently: INJECT SUBCUTANEOUSLY 18  UNITS DAILY IN AM), Disp: 30 mL, Rfl: 11    levalbuterol (XOPENEX) 1 25 mg/3 mL nebulizer solution, Take 1 vial (1 25 mg total) by nebulization every 4 (four) hours as needed for wheezing or shortness of breath, Disp: 75 vial, Rfl: 3    lidocaine (LIDODERM) 5 %, Apply 1 patch topically every 24 hours Remove & Discard patch within 12 hours or as directed by MD, Disp: 30 patch, Rfl: 11    metFORMIN (GLUCOPHAGE-XR) 500 mg 24 hr tablet, Take 1,000 mg by mouth daily with dinner , Disp: , Rfl:     metoprolol tartrate (LOPRESSOR) 25 mg tablet, TAKE ONE-HALF TABLET BY  MOUTH DAILY, Disp: 45 tablet, Rfl: 5    Mirabegron ER (MYRBETRIQ) 50 MG TB24, Take 50 mg by mouth daily  , Disp: , Rfl:     mirtazapine (REMERON) 30 mg tablet, Take 1 tablet (30 mg total) by mouth daily at bedtime, Disp: 90 tablet, Rfl: 3    Multiple Vitamin (MULTIVITAMIN) tablet, Take 1 tablet by mouth daily, Disp: , Rfl:     nitroglycerin (NITROLINGUAL) 0 4 mg/spray spray, 1 spray every 5 (five) minutes as needed  , Disp: , Rfl:     nystatin (MYCOSTATIN) powder, Apply to affected area 2-3 times daily until resolved , Disp: 15 g, Rfl: 0    ONE TOUCH ULTRA TEST test strip, Test once daily, Disp: 200 each, Rfl: 3   sodium chloride 0 9 % nebulizer solution, Inhale every 4 (four) hours as needed  , Disp: , Rfl:     traMADol (ULTRAM) 50 mg tablet, Take 1 tablet (50 mg total) by mouth 2 (two) times a day (Patient taking differently: Take 50 mg by mouth 3 (three) times a day ), Disp: 90 tablet, Rfl: 1    warfarin (COUMADIN) 1 mg tablet, Take 2 tabs daily or as directed by physician, Disp: 60 tablet, Rfl: 5    warfarin (COUMADIN) 5 mg tablet, Take one tablet daily as directed by physician (Patient taking differently: Take 5 mg by mouth once Take one tablet daily as directed by physician ), Disp: 90 tablet, Rfl: 1  No current facility-administered medications for this visit  Review of Systems:  Review of Systems   Respiratory: Negative  Cardiovascular: Negative  Musculoskeletal: Positive for gait problem  All other systems reviewed and are negative  Physical Exam:  Physical Exam   Constitutional: He is oriented to person, place, and time  He appears well-developed and well-nourished  No distress  HENT:   Head: Normocephalic and atraumatic  Eyes: Pupils are equal, round, and reactive to light  EOM are normal  Right eye exhibits no discharge  No scleral icterus  Neck: Normal range of motion  Neck supple  No thyromegaly present  Cardiovascular: Normal rate, regular rhythm and normal heart sounds  Exam reveals no gallop and no friction rub  No murmur heard  Pulmonary/Chest: Effort normal and breath sounds normal    Abdominal: He exhibits no distension  There is no tenderness  There is no rebound and no guarding  Musculoskeletal: Normal range of motion  He exhibits no edema  Neurological: He is alert and oriented to person, place, and time  Coordination normal    Skin: Skin is warm and dry  No rash noted  He is not diaphoretic  No erythema  No pallor  Psychiatric: He has a normal mood and affect   His behavior is normal  Judgment and thought content normal        This note was completed in part utilizing M-Modal Fluency Direct Software  Grammatical errors, random word insertions, spelling mistakes, and incomplete sentences can be an occasional consequence of this system secondary to software limitations, ambient noise, and hardware issues  If you have any questions or concerns about the content, text, or information contained within the body of this dictation, please contact the provider for clarification

## 2019-07-15 ENCOUNTER — OFFICE VISIT (OUTPATIENT)
Dept: FAMILY MEDICINE CLINIC | Facility: CLINIC | Age: 84
End: 2019-07-15
Payer: MEDICARE

## 2019-07-15 VITALS
SYSTOLIC BLOOD PRESSURE: 120 MMHG | WEIGHT: 180 LBS | RESPIRATION RATE: 18 BRPM | BODY MASS INDEX: 25.77 KG/M2 | TEMPERATURE: 98.8 F | DIASTOLIC BLOOD PRESSURE: 72 MMHG | HEIGHT: 70 IN | OXYGEN SATURATION: 90 % | HEART RATE: 68 BPM

## 2019-07-15 DIAGNOSIS — E66.3 OVERWEIGHT WITH BODY MASS INDEX (BMI) 25.0-29.9: ICD-10-CM

## 2019-07-15 DIAGNOSIS — R35.0 FREQUENT URINATION: ICD-10-CM

## 2019-07-15 DIAGNOSIS — I50.42 CHRONIC COMBINED SYSTOLIC AND DIASTOLIC CONGESTIVE HEART FAILURE (HCC): ICD-10-CM

## 2019-07-15 DIAGNOSIS — I48.91 ATRIAL FIBRILLATION WITH SLOW VENTRICULAR RESPONSE (HCC): Chronic | ICD-10-CM

## 2019-07-15 DIAGNOSIS — N41.0 ACUTE PROSTATITIS: ICD-10-CM

## 2019-07-15 DIAGNOSIS — E21.1 HYPERPARATHYROIDISM DUE TO 1,25(0H)2D3 (HCC): ICD-10-CM

## 2019-07-15 DIAGNOSIS — IMO0002 UNCONTROLLED TYPE 2 DIABETES MELLITUS WITH DIABETIC POLYNEUROPATHY, WITHOUT LONG-TERM CURRENT USE OF INSULIN: Primary | ICD-10-CM

## 2019-07-15 PROBLEM — N40.1 BENIGN PROSTATIC HYPERPLASIA WITH URINARY FREQUENCY: Status: RESOLVED | Noted: 2019-01-09 | Resolved: 2019-07-15

## 2019-07-15 LAB
CREAT UR-MCNC: 47.5 MG/DL
MICROALBUMIN UR-MCNC: 182 MG/L (ref 0–20)
MICROALBUMIN/CREAT 24H UR: 383 MG/G CREATININE (ref 0–30)
SL AMB  POCT GLUCOSE, UA: 100
SL AMB LEUKOCYTE ESTERASE,UA: ABNORMAL
SL AMB POCT BILIRUBIN,UA: NEGATIVE
SL AMB POCT BLOOD,UA: ABNORMAL
SL AMB POCT CLARITY,UA: ABNORMAL
SL AMB POCT COLOR,UA: YELLOW
SL AMB POCT KETONES,UA: NEGATIVE
SL AMB POCT NITRITE,UA: NEGATIVE
SL AMB POCT PH,UA: 5
SL AMB POCT SPECIFIC GRAVITY,UA: 1.01
SL AMB POCT URINE PROTEIN: ABNORMAL
SL AMB POCT UROBILINOGEN: 0.2

## 2019-07-15 PROCEDURE — 82043 UR ALBUMIN QUANTITATIVE: CPT | Performed by: FAMILY MEDICINE

## 2019-07-15 PROCEDURE — 82570 ASSAY OF URINE CREATININE: CPT | Performed by: FAMILY MEDICINE

## 2019-07-15 PROCEDURE — 81002 URINALYSIS NONAUTO W/O SCOPE: CPT | Performed by: FAMILY MEDICINE

## 2019-07-15 PROCEDURE — 99214 OFFICE O/P EST MOD 30 MIN: CPT | Performed by: FAMILY MEDICINE

## 2019-07-15 PROCEDURE — 87106 FUNGI IDENTIFICATION YEAST: CPT | Performed by: FAMILY MEDICINE

## 2019-07-15 PROCEDURE — 87086 URINE CULTURE/COLONY COUNT: CPT | Performed by: FAMILY MEDICINE

## 2019-07-15 RX ORDER — SULFAMETHOXAZOLE AND TRIMETHOPRIM 800; 160 MG/1; MG/1
1 TABLET ORAL EVERY 12 HOURS SCHEDULED
Qty: 28 TABLET | Refills: 0 | Status: SHIPPED | OUTPATIENT
Start: 2019-07-15 | End: 2019-07-29

## 2019-07-15 NOTE — ASSESSMENT & PLAN NOTE
Heart rate is controlled  He remains on anticoagulation  I would like to switch him to 1 of the newer anticoagulants and I reached out to his son as well as April, who helps with some of his care to see if we can switch him to a newer anticoagulant so he does not have to get some any INR readings done  I am also concerned that he is rarely within the right range for protection from the warfarin

## 2019-07-15 NOTE — ASSESSMENT & PLAN NOTE
Wt Readings from Last 3 Encounters:   07/15/19 81 6 kg (180 lb)   07/10/19 79 8 kg (176 lb)   06/24/19 79 4 kg (175 lb)       Weight is up a bit but he notes that is stable at home  He does appear to be clinically stable without fluid overload

## 2019-07-15 NOTE — ASSESSMENT & PLAN NOTE
Lab Results   Component Value Date    HGBA1C 8 7 (H) 06/25/2019     Last A1 8 7  He does continue to check his sugars at home in a remains somewhat elevated  c we are being cautious to avoid hypoglycemia  Continue on 18 units of Lantus daily

## 2019-07-15 NOTE — PATIENT INSTRUCTIONS
Chronic combined systolic and diastolic congestive heart failure (HCC)  Wt Readings from Last 3 Encounters:   07/15/19 81 6 kg (180 lb)   07/10/19 79 8 kg (176 lb)   06/24/19 79 4 kg (175 lb)       Weight is up a bit but he notes that is stable at home  He does appear to be clinically stable without fluid overload  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, without long-term current use of insulin (HCC)  Lab Results   Component Value Date    HGBA1C 8 7 (H) 06/25/2019     Last A1 8 7  He does continue to check his sugars at home in a remains somewhat elevated  c we are being cautious to avoid hypoglycemia  Continue on 18 units of Lantus daily  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)  Continue on vitamin-D supplementation  Atrial fibrillation with slow ventricular response (HCC)  Heart rate is controlled  He remains on anticoagulation  I would like to switch him to 1 of the newer anticoagulants and I reached out to his son as well as April, who helps with some of his care to see if we can switch him to a newer anticoagulant so he does not have to get some any INR readings done  I am also concerned that he is rarely within the right range for protection from the warfarin

## 2019-07-15 NOTE — PROGRESS NOTES
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 ([facility] [address] )     Continue on vitamin-D supplementation  Cardiovascular and Mediastinum    Atrial fibrillation with slow ventricular response (HCC) (Chronic)     Heart rate is controlled  He remains on anticoagulation  I would like to switch him to 1 of the newer anticoagulants and I reached out to his son as well as April, who helps with some of his care to see if we can switch him to a newer anticoagulant so he does not have to get some any INR readings done  I am also concerned that he is rarely within the right range for protection from the warfarin  Chronic combined systolic and diastolic congestive heart failure (HCC)     Wt Readings from Last 3 Encounters:   07/15/19 81 6 kg (180 lb)   07/10/19 79 8 kg (176 lb)   06/24/19 79 4 kg (175 lb)       Weight is up a bit but he notes that is stable at home  He does appear to be clinically stable without fluid overload  Other Visit Diagnoses     Overweight with body mass index (BMI) 25 0-29 9        Frequent urination        Relevant Orders    POCT urine dip (Completed)    Urine culture    Acute prostatitis        Right lobe of prostate is boggy and tender  Trial of Bactrim DS twice daily for 2 weeks  Urology consultation if he is not improving  Check culture  Depression Screening Follow-up Plan: Patient's depression screening was positive with a PHQ-2 score of 2  Their PHQ-9 score was 9  Patient assessed for underlying major depression  They have no active suicidal ideations  Brief counseling provided and recommend additional follow-up/re-evaluation next office visit  He does remain on mirtazapine  Continue to follow this closely  Consider switching his warfarin to Eliquis as he is very rarely therapeutic on warfarin  I will be discussing this further with his son and caregiver  Addendum July 17, 2019-the patient and his son agree with switching back over to Eliquis  I am awaiting his INR and as long as his below 2, this will be initiated today  Subjective:      Patient ID: Sheba Mir is a 81 y o  male  HPI     The patient presents today for follow-up for his chronic health issues  He has congestive heart failure  He has some chronic mild shortness of breath with exertion but seems stable  He notes his weights at home a been stable  He is not having any excessive swelling  Unfortunately, he continues to have a lot of burning with voiding as well as polyuria  He did not have relief with his last round of Bactrim  He was on a x1 week  Urine culture actually came back negative  He does have a history of a rather large prostate  His history of atrial fibrillation and continues to tolerate anticoagulation  Unfortunately, in light of his recurrent epidurals as well as his intermittent need for antibiotics, he very often is not in good control with his Coumadin  He does have some mild depressed mood as evidence by his 8 on his PHQ-9 today  He feels is mostly due to social isolation the fact that he is not have a car        The following portions of the patient's history were reviewed and updated as appropriate: allergies, current medications, past family history, past medical history, past social history, past surgical history and problem list       Current Outpatient Medications:     ACCU-CHEK FASTCLIX LANCETS MISC, Test once daily, Disp: 100 each, Rfl: 5    ergocalciferol (VITAMIN D2) 50,000 units, Take 1 capsule (50,000 Units total) by mouth once a week, Disp: 12 capsule, Rfl: 0    famotidine (PEPCID) 20 mg tablet, Take 1 tablet (20 mg total) by mouth 2 (two) times a day, Disp: 180 tablet, Rfl: 3    furosemide (LASIX) 20 mg tablet, 20 mg alternating with 40 mg, add additional 20 mg for weight gain more than 3 lb, Disp: 180 tablet, Rfl: 3    gabapentin (NEURONTIN) 400 mg capsule, Take 400 mg by mouth 3 (three) times a day  , Disp: , Rfl:     Insulin Pen Needle 32G X 4 MM MISC, Test once daily, Disp: 100 each, Rfl: 5    LANTUS SOLOSTAR 100 units/mL injection pen, INJECT SUBCUTANEOUSLY 18  UNITS DAILY (Patient taking differently: INJECT SUBCUTANEOUSLY 18  UNITS DAILY IN AM), Disp: 30 mL, Rfl: 11    levalbuterol (XOPENEX) 1 25 mg/3 mL nebulizer solution, Take 1 vial (1 25 mg total) by nebulization every 4 (four) hours as needed for wheezing or shortness of breath, Disp: 75 vial, Rfl: 3    lidocaine (LIDODERM) 5 %, Apply 1 patch topically every 24 hours Remove & Discard patch within 12 hours or as directed by MD, Disp: 30 patch, Rfl: 11    metFORMIN (GLUCOPHAGE-XR) 500 mg 24 hr tablet, Take 1,000 mg by mouth daily with dinner , Disp: , Rfl:     metoprolol tartrate (LOPRESSOR) 25 mg tablet, TAKE ONE-HALF TABLET BY  MOUTH DAILY, Disp: 45 tablet, Rfl: 5    Mirabegron ER (MYRBETRIQ) 50 MG TB24, Take 50 mg by mouth daily  , Disp: , Rfl:     mirtazapine (REMERON) 30 mg tablet, Take 1 tablet (30 mg total) by mouth daily at bedtime, Disp: 90 tablet, Rfl: 3    Multiple Vitamin (MULTIVITAMIN) tablet, Take 1 tablet by mouth daily, Disp: , Rfl:     nitroglycerin (NITROLINGUAL) 0 4 mg/spray spray, 1 spray every 5 (five) minutes as needed  , Disp: , Rfl:     nystatin (MYCOSTATIN) powder, Apply to affected area 2-3 times daily until resolved , Disp: 15 g, Rfl: 0    ONE TOUCH ULTRA TEST test strip, Test once daily, Disp: 200 each, Rfl: 3    sodium chloride 0 9 % nebulizer solution, Inhale every 4 (four) hours as needed  , Disp: , Rfl:     traMADol (ULTRAM) 50 mg tablet, Take 1 tablet (50 mg total) by mouth 2 (two) times a day (Patient taking differently: Take 50 mg by mouth 3 (three) times a day ), Disp: 90 tablet, Rfl: 1    warfarin (COUMADIN) 1 mg tablet, Take 2 tabs daily or as directed by physician, Disp: 60 tablet, Rfl: 5    warfarin (COUMADIN) 5 mg tablet, Take one tablet daily as directed by physician (Patient taking differently: Take 5 mg by mouth once Take one tablet daily as directed by physician ), Disp: 90 tablet, Rfl: 1     Review of Systems      Objective:      /72   Pulse 68   Temp 98 8 °F (37 1 °C)   Resp 18   Ht 5' 10" (1 778 m)   Wt 81 6 kg (180 lb)   SpO2 90%   BMI 25 83 kg/m²          Physical Exam      Jerilyn Marcum MD

## 2019-07-16 ENCOUNTER — ANTICOAG VISIT (OUTPATIENT)
Dept: FAMILY MEDICINE CLINIC | Facility: CLINIC | Age: 84
End: 2019-07-16

## 2019-07-16 LAB — INR PPP: 2.5 (ref 0.84–1.19)

## 2019-07-17 ENCOUNTER — ANTICOAG VISIT (OUTPATIENT)
Dept: FAMILY MEDICINE CLINIC | Facility: CLINIC | Age: 84
End: 2019-07-17

## 2019-07-17 LAB
BACTERIA UR CULT: ABNORMAL
BACTERIA UR CULT: ABNORMAL

## 2019-07-17 NOTE — PROGRESS NOTES
Notes recorded by Megan Duran MD on 7/17/2019 at 12:35 PM EDT  Call patient regarding test  Tony Davila is 2 5   Hold on warfarin and repeat INR in 2 days as we are going to initiate Eliquis once his INR is below 2  I am unable to reach pt or April- get recording that says -"number you are calling has restrictions that have prevented completion of your call"  Orders faxed to Mason Pena at [facility] updated

## 2019-07-19 ENCOUNTER — TELEPHONE (OUTPATIENT)
Dept: FAMILY MEDICINE CLINIC | Facility: CLINIC | Age: 84
End: 2019-07-19

## 2019-07-19 LAB — INR PPP: 1.6 (ref 0.84–1.19)

## 2019-07-19 NOTE — TELEPHONE ENCOUNTER
Okay to start Eliquis 5 mg twice daily  He does not need blood work done any further for his warfarin levels

## 2019-07-22 ENCOUNTER — ANTICOAG VISIT (OUTPATIENT)
Dept: FAMILY MEDICINE CLINIC | Facility: CLINIC | Age: 84
End: 2019-07-22

## 2019-07-22 NOTE — PROGRESS NOTES
Notes recorded by Angelique Rendon MD on 7/22/2019 at 2:13 PM EDT  Call patient regarding test   Please make sure the patient was contacted last week  [facility] can start Eliquis and stop the warfarin  [facility] does not need INR testing done any further    Pt notified  He has been holding warfarin  Unable to get hold of April,  Her phone is still out of contact  [place] [phone] [place] [facility]

## 2019-07-23 ENCOUNTER — TELEPHONE (OUTPATIENT)
Dept: FAMILY MEDICINE CLINIC | Facility: CLINIC | Age: 84
End: 2019-07-23

## 2019-07-23 DIAGNOSIS — I48.91 ATRIAL FIBRILLATION WITH SLOW VENTRICULAR RESPONSE (HCC): Chronic | ICD-10-CM

## 2019-07-23 NOTE — TELEPHONE ENCOUNTER
April called requesting pt's eliquis be sent to mail order pharmacy    New order entered pending approval

## 2019-08-07 DIAGNOSIS — G89.29 CHRONIC RIGHT-SIDED LOW BACK PAIN WITHOUT SCIATICA: ICD-10-CM

## 2019-08-07 DIAGNOSIS — M54.50 CHRONIC RIGHT-SIDED LOW BACK PAIN WITHOUT SCIATICA: ICD-10-CM

## 2019-08-07 RX ORDER — TRAMADOL HYDROCHLORIDE 50 MG/1
TABLET ORAL
Qty: 60 TABLET | Refills: 1 | Status: SHIPPED | OUTPATIENT
Start: 2019-08-07 | End: 2020-01-03 | Stop reason: SDUPTHER

## 2019-09-06 ENCOUNTER — HOSPITAL ENCOUNTER (INPATIENT)
Facility: HOSPITAL | Age: 84
LOS: 4 days | Discharge: HOME HEALTH | DRG: 871 | End: 2019-09-10
Attending: EMERGENCY MEDICINE | Admitting: INTERNAL MEDICINE
Payer: MEDICARE

## 2019-09-06 ENCOUNTER — APPOINTMENT (EMERGENCY)
Dept: RADIOLOGY | Facility: HOSPITAL | Age: 84
DRG: 871 | End: 2019-09-06
Payer: MEDICARE

## 2019-09-06 ENCOUNTER — APPOINTMENT (INPATIENT)
Dept: CT IMAGING | Facility: HOSPITAL | Age: 84
DRG: 871 | End: 2019-09-06
Payer: MEDICARE

## 2019-09-06 DIAGNOSIS — A41.9 SEPSIS (HCC): ICD-10-CM

## 2019-09-06 DIAGNOSIS — N30.00 ACUTE CYSTITIS WITHOUT HEMATURIA: ICD-10-CM

## 2019-09-06 DIAGNOSIS — N39.0 URINARY TRACT INFECTION: Primary | ICD-10-CM

## 2019-09-06 PROBLEM — G93.40 ENCEPHALOPATHY: Status: ACTIVE | Noted: 2019-09-06

## 2019-09-06 LAB
ALBUMIN SERPL BCP-MCNC: 3.6 G/DL (ref 3.5–5)
ALP SERPL-CCNC: 114 U/L (ref 46–116)
ALT SERPL W P-5'-P-CCNC: 28 U/L (ref 12–78)
ANION GAP SERPL CALCULATED.3IONS-SCNC: 9 MMOL/L (ref 4–13)
AST SERPL W P-5'-P-CCNC: 30 U/L (ref 5–45)
ATRIAL RATE: 300 BPM
BACTERIA UR QL AUTO: ABNORMAL /HPF
BASOPHILS # BLD AUTO: 0.04 THOUSANDS/ΜL (ref 0–0.1)
BASOPHILS NFR BLD AUTO: 0 % (ref 0–1)
BILIRUB SERPL-MCNC: 1.43 MG/DL (ref 0.2–1)
BILIRUB UR QL STRIP: NEGATIVE
BUN SERPL-MCNC: 15 MG/DL (ref 5–25)
CALCIUM SERPL-MCNC: 9.2 MG/DL (ref 8.3–10.1)
CHLORIDE SERPL-SCNC: 98 MMOL/L (ref 100–108)
CLARITY UR: ABNORMAL
CLARITY, POC: NORMAL
CO2 SERPL-SCNC: 31 MMOL/L (ref 21–32)
COLOR UR: YELLOW
COLOR, POC: YELLOW
CREAT SERPL-MCNC: 1.11 MG/DL (ref 0.6–1.3)
EOSINOPHIL # BLD AUTO: 0.03 THOUSAND/ΜL (ref 0–0.61)
EOSINOPHIL NFR BLD AUTO: 0 % (ref 0–6)
ERYTHROCYTE [DISTWIDTH] IN BLOOD BY AUTOMATED COUNT: 14.2 % (ref 11.6–15.1)
GFR SERPL CREATININE-BSD FRML MDRD: 61 ML/MIN/1.73SQ M
GLUCOSE SERPL-MCNC: 239 MG/DL (ref 65–140)
GLUCOSE UR STRIP-MCNC: NEGATIVE MG/DL
HCT VFR BLD AUTO: 51.5 % (ref 36.5–49.3)
HGB BLD-MCNC: 16.9 G/DL (ref 12–17)
HGB UR QL STRIP.AUTO: ABNORMAL
IMM GRANULOCYTES # BLD AUTO: 0.14 THOUSAND/UL (ref 0–0.2)
IMM GRANULOCYTES NFR BLD AUTO: 1 % (ref 0–2)
KETONES UR STRIP-MCNC: NEGATIVE MG/DL
LACTATE SERPL-SCNC: 1.9 MMOL/L (ref 0.5–2)
LEUKOCYTE ESTERASE UR QL STRIP: ABNORMAL
LYMPHOCYTES # BLD AUTO: 3.88 THOUSANDS/ΜL (ref 0.6–4.47)
LYMPHOCYTES NFR BLD AUTO: 24 % (ref 14–44)
MCH RBC QN AUTO: 32.1 PG (ref 26.8–34.3)
MCHC RBC AUTO-ENTMCNC: 32.8 G/DL (ref 31.4–37.4)
MCV RBC AUTO: 98 FL (ref 82–98)
MONOCYTES # BLD AUTO: 1.68 THOUSAND/ΜL (ref 0.17–1.22)
MONOCYTES NFR BLD AUTO: 10 % (ref 4–12)
NEUTROPHILS # BLD AUTO: 10.4 THOUSANDS/ΜL (ref 1.85–7.62)
NEUTS SEG NFR BLD AUTO: 65 % (ref 43–75)
NITRITE UR QL STRIP: POSITIVE
NON-SQ EPI CELLS URNS QL MICRO: ABNORMAL /HPF
NRBC BLD AUTO-RTO: 0 /100 WBCS
NT-PROBNP SERPL-MCNC: 1691 PG/ML
PH UR STRIP.AUTO: 6 [PH] (ref 4.5–8)
PLATELET # BLD AUTO: 203 THOUSANDS/UL (ref 149–390)
PMV BLD AUTO: 12.7 FL (ref 8.9–12.7)
POTASSIUM SERPL-SCNC: 4 MMOL/L (ref 3.5–5.3)
PROCALCITONIN SERPL-MCNC: 0.08 NG/ML
PROT SERPL-MCNC: 8.4 G/DL (ref 6.4–8.2)
PROT UR STRIP-MCNC: ABNORMAL MG/DL
QRS AXIS: -60 DEGREES
QRSD INTERVAL: 80 MS
QT INTERVAL: 336 MS
QTC INTERVAL: 420 MS
RBC # BLD AUTO: 5.27 MILLION/UL (ref 3.88–5.62)
RBC #/AREA URNS AUTO: ABNORMAL /HPF
SODIUM SERPL-SCNC: 138 MMOL/L (ref 136–145)
SP GR UR STRIP.AUTO: 1.02 (ref 1–1.03)
T WAVE AXIS: 94 DEGREES
TROPONIN I SERPL-MCNC: <0.02 NG/ML
UROBILINOGEN UR QL STRIP.AUTO: 0.2 E.U./DL
VENTRICULAR RATE: 94 BPM
WBC # BLD AUTO: 16.17 THOUSAND/UL (ref 4.31–10.16)
WBC #/AREA URNS AUTO: ABNORMAL /HPF

## 2019-09-06 PROCEDURE — 87086 URINE CULTURE/COLONY COUNT: CPT

## 2019-09-06 PROCEDURE — 36415 COLL VENOUS BLD VENIPUNCTURE: CPT | Performed by: EMERGENCY MEDICINE

## 2019-09-06 PROCEDURE — 87186 SC STD MICRODIL/AGAR DIL: CPT

## 2019-09-06 PROCEDURE — 93005 ELECTROCARDIOGRAM TRACING: CPT

## 2019-09-06 PROCEDURE — 93010 ELECTROCARDIOGRAM REPORT: CPT | Performed by: INTERNAL MEDICINE

## 2019-09-06 PROCEDURE — 80053 COMPREHEN METABOLIC PANEL: CPT | Performed by: EMERGENCY MEDICINE

## 2019-09-06 PROCEDURE — 99223 1ST HOSP IP/OBS HIGH 75: CPT | Performed by: INTERNAL MEDICINE

## 2019-09-06 PROCEDURE — 87040 BLOOD CULTURE FOR BACTERIA: CPT | Performed by: INTERNAL MEDICINE

## 2019-09-06 PROCEDURE — 99285 EMERGENCY DEPT VISIT HI MDM: CPT

## 2019-09-06 PROCEDURE — 70450 CT HEAD/BRAIN W/O DYE: CPT

## 2019-09-06 PROCEDURE — 83605 ASSAY OF LACTIC ACID: CPT | Performed by: EMERGENCY MEDICINE

## 2019-09-06 PROCEDURE — 81001 URINALYSIS AUTO W/SCOPE: CPT

## 2019-09-06 PROCEDURE — 99291 CRITICAL CARE FIRST HOUR: CPT | Performed by: EMERGENCY MEDICINE

## 2019-09-06 PROCEDURE — 87040 BLOOD CULTURE FOR BACTERIA: CPT | Performed by: EMERGENCY MEDICINE

## 2019-09-06 PROCEDURE — 96365 THER/PROPH/DIAG IV INF INIT: CPT

## 2019-09-06 PROCEDURE — 96361 HYDRATE IV INFUSION ADD-ON: CPT

## 2019-09-06 PROCEDURE — 85025 COMPLETE CBC W/AUTO DIFF WBC: CPT | Performed by: EMERGENCY MEDICINE

## 2019-09-06 PROCEDURE — 84145 PROCALCITONIN (PCT): CPT | Performed by: EMERGENCY MEDICINE

## 2019-09-06 PROCEDURE — 87077 CULTURE AEROBIC IDENTIFY: CPT

## 2019-09-06 PROCEDURE — 71046 X-RAY EXAM CHEST 2 VIEWS: CPT

## 2019-09-06 PROCEDURE — 84484 ASSAY OF TROPONIN QUANT: CPT | Performed by: EMERGENCY MEDICINE

## 2019-09-06 PROCEDURE — 83880 ASSAY OF NATRIURETIC PEPTIDE: CPT | Performed by: EMERGENCY MEDICINE

## 2019-09-06 RX ORDER — OXYBUTYNIN CHLORIDE 5 MG/1
5 TABLET, EXTENDED RELEASE ORAL DAILY
Status: DISCONTINUED | OUTPATIENT
Start: 2019-09-07 | End: 2019-09-08

## 2019-09-06 RX ORDER — TRAMADOL HYDROCHLORIDE 50 MG/1
50 TABLET ORAL EVERY 8 HOURS PRN
Status: DISCONTINUED | OUTPATIENT
Start: 2019-09-06 | End: 2019-09-10 | Stop reason: HOSPADM

## 2019-09-06 RX ORDER — FAMOTIDINE 20 MG/1
20 TABLET, FILM COATED ORAL 2 TIMES DAILY
Status: DISCONTINUED | OUTPATIENT
Start: 2019-09-06 | End: 2019-09-10 | Stop reason: HOSPADM

## 2019-09-06 RX ORDER — INSULIN GLARGINE 100 [IU]/ML
18 INJECTION, SOLUTION SUBCUTANEOUS
Status: DISCONTINUED | OUTPATIENT
Start: 2019-09-06 | End: 2019-09-10 | Stop reason: HOSPADM

## 2019-09-06 RX ORDER — GABAPENTIN 400 MG/1
400 CAPSULE ORAL 3 TIMES DAILY
Status: DISCONTINUED | OUTPATIENT
Start: 2019-09-06 | End: 2019-09-10 | Stop reason: HOSPADM

## 2019-09-06 RX ORDER — ERGOCALCIFEROL 1.25 MG/1
50000 CAPSULE ORAL WEEKLY
Status: DISCONTINUED | OUTPATIENT
Start: 2019-09-06 | End: 2019-09-10 | Stop reason: HOSPADM

## 2019-09-06 RX ORDER — SODIUM CHLORIDE 9 MG/ML
75 INJECTION, SOLUTION INTRAVENOUS CONTINUOUS
Status: DISCONTINUED | OUTPATIENT
Start: 2019-09-06 | End: 2019-09-08

## 2019-09-06 RX ORDER — LEVALBUTEROL 1.25 MG/.5ML
1.25 SOLUTION, CONCENTRATE RESPIRATORY (INHALATION) EVERY 4 HOURS PRN
Status: DISCONTINUED | OUTPATIENT
Start: 2019-09-06 | End: 2019-09-10 | Stop reason: HOSPADM

## 2019-09-06 RX ORDER — MIRTAZAPINE 15 MG/1
30 TABLET, FILM COATED ORAL
Status: DISCONTINUED | OUTPATIENT
Start: 2019-09-06 | End: 2019-09-10 | Stop reason: HOSPADM

## 2019-09-06 RX ADMIN — SODIUM CHLORIDE 500 ML: 0.9 INJECTION, SOLUTION INTRAVENOUS at 15:31

## 2019-09-06 RX ADMIN — MIRTAZAPINE 30 MG: 15 TABLET, FILM COATED ORAL at 21:44

## 2019-09-06 RX ADMIN — SODIUM CHLORIDE 75 ML/HR: 0.9 INJECTION, SOLUTION INTRAVENOUS at 19:16

## 2019-09-06 RX ADMIN — GABAPENTIN 400 MG: 400 CAPSULE ORAL at 21:44

## 2019-09-06 RX ADMIN — INSULIN GLARGINE 18 UNITS: 100 INJECTION, SOLUTION SUBCUTANEOUS at 21:44

## 2019-09-06 RX ADMIN — CEFEPIME HYDROCHLORIDE 2000 MG: 2 INJECTION, POWDER, FOR SOLUTION INTRAVENOUS at 14:22

## 2019-09-06 NOTE — PLAN OF CARE
Problem: Potential for Falls  Goal: Patient will remain free of falls  Description  INTERVENTIONS:  - Assess patient frequently for physical needs  -  Identify cognitive and physical deficits and behaviors that affect risk of falls    -  [place] fall precautions as indicated by assessment   - Educate patient/family on patient safety including physical limitations  - Instruct patient to call for assistance with activity based on assessment  - Modify environment to reduce risk of injury  - Consider OT/PT consult to assist with strengthening/mobility  Outcome: Progressing     Problem: Prexisting or High Potential for Compromised Skin Integrity  Goal: Skin integrity is maintained or improved  Description  INTERVENTIONS:  - Identify patients at risk for skin breakdown  - Assess and monitor skin integrity  - Assess and monitor nutrition and hydration status  - Monitor labs   - Assess for incontinence   - Turn and reposition patient  - Assist with mobility/ambulation  - Relieve pressure over bony prominences  - Avoid friction and shearing  - Provide appropriate hygiene as needed including keeping skin clean and dry  - Evaluate need for skin moisturizer/barrier cream  - Collaborate with interdisciplinary team   - Patient/family teaching  - Consider wound care consult   Outcome: Progressing     Problem: NEUROSENSORY - ADULT  Goal: Achieves stable or improved neurological status  Description  INTERVENTIONS  - Monitor and report changes in neurological status  - Monitor vital signs such as temperature, blood pressure, glucose, and any other labs ordered   - Initiate measures to prevent increased intracranial pressure  - Monitor for seizure activity and implement precautions if appropriate      Outcome: Progressing  Goal: Remains free of injury related to seizures activity  Description  INTERVENTIONS  - Maintain airway, patient safety  and administer oxygen as ordered  - Monitor patient for seizure activity, document and report duration and description of seizure to physician/advanced practitioner  - If seizure occurs,  ensure patient safety during seizure  - Reorient patient post seizure  - Seizure pads on all 4 side rails  - Instruct patient/family to notify RN of any seizure activity including if an aura is experienced  - Instruct patient/family to call for assistance with activity based on nursing assessment  - Administer anti-seizure medications if ordered    Outcome: Progressing  Goal: Achieves maximal functionality and self care  Description  INTERVENTIONS  - Monitor swallowing and airway patency with patient fatigue and changes in neurological status  - Encourage and assist patient to increase activity and self care     - Encourage visually impaired, hearing impaired and aphasic patients to use assistive/communication devices  Outcome: Progressing     Problem: RESPIRATORY - ADULT  Goal: Achieves optimal ventilation and oxygenation  Description  INTERVENTIONS:  - Assess for changes in respiratory status  - Assess for changes in mentation and behavior  - Position to facilitate oxygenation and minimize respiratory effort  - Oxygen administered by appropriate delivery if ordered  - Initiate smoking cessation education as indicated  - Encourage broncho-pulmonary hygiene including cough, deep breathe, Incentive Spirometry  - Assess the need for suctioning and aspirate as needed  - Assess and instruct to report SOB or any respiratory difficulty  - Respiratory Therapy support as indicated  Outcome: Progressing     Problem: GENITOURINARY - ADULT  Goal: Maintains or returns to baseline urinary function  Description  INTERVENTIONS:  - Assess urinary function  - Encourage oral fluids to ensure adequate hydration if ordered  - Administer IV fluids as ordered to ensure adequate hydration  - Administer ordered medications as needed  - Offer frequent toileting  - Follow urinary retention protocol if ordered  Outcome: Progressing  Goal: Absence of urinary retention  Description  INTERVENTIONS:  - Assess patients ability to void and empty bladder  - Monitor I/O  - Bladder scan as needed  - Discuss with physician/AP medications to alleviate retention as needed  - Discuss catheterization for long term situations as appropriate  Outcome: Progressing  Goal: Urinary catheter remains patent  Description  INTERVENTIONS:  - Assess patency of urinary catheter  - If patient has a chronic [place], consider changing catheter if non-functioning  - Follow guidelines for intermittent irrigation of non-functioning urinary catheter  Outcome: Progressing     Problem: METABOLIC, FLUID AND ELECTROLYTES - ADULT  Goal: Electrolytes maintained within normal limits  Description  INTERVENTIONS:  - Monitor labs and assess patient for signs and symptoms of electrolyte imbalances  - Administer electrolyte replacement as ordered  - Monitor response to electrolyte replacements, including repeat lab results as appropriate  - Instruct patient on fluid and nutrition as appropriate  Outcome: Progressing  Goal: Fluid balance maintained  Description  INTERVENTIONS:  - Monitor labs   - Monitor I/O and WT  - Instruct patient on fluid and nutrition as appropriate  - Assess for signs & symptoms of volume excess or deficit  Outcome: Progressing  Goal: Glucose maintained within target range  Description  INTERVENTIONS:  - Monitor Blood Glucose as ordered  - Assess for signs and symptoms of hyperglycemia and hypoglycemia  - Administer ordered medications to maintain glucose within target range  - Assess nutritional intake and initiate nutrition service referral as needed  Outcome: Progressing     Problem: SKIN/TISSUE INTEGRITY - ADULT  Goal: Skin integrity remains intact  Description  INTERVENTIONS  - Identify patients at risk for skin breakdown  - Assess and monitor skin integrity  - Assess and monitor nutrition and hydration status  - Monitor labs (i e  albumin)  - Assess for incontinence - Turn and reposition patient  - Assist with mobility/ambulation  - Relieve pressure over bony prominences  - Avoid friction and shearing  - Provide appropriate hygiene as needed including keeping skin clean and dry  - Evaluate need for skin moisturizer/barrier cream  - Collaborate with interdisciplinary team (i e  Nutrition, Rehabilitation, etc )   - Patient/family teaching  Outcome: Progressing     Problem: HEMATOLOGIC - ADULT  Goal: Maintains hematologic stability  Description  INTERVENTIONS  - Assess for signs and symptoms of bleeding or hemorrhage  - Monitor labs  - Administer supportive blood products/factors as ordered and appropriate  Outcome: Progressing     Problem: MUSCULOSKELETAL - ADULT  Goal: Maintain or return mobility to safest level of function  Description  INTERVENTIONS:  - Assess patient's ability to carry out ADLs; assess patient's baseline for ADL function and identify physical deficits which impact ability to perform ADLs (bathing, care of mouth/teeth, toileting, grooming, dressing, etc )  - Assess/evaluate cause of self-care deficits   - Assess range of motion  - Assess patient's mobility  - Assess patient's need for assistive devices and provide as appropriate  - Encourage maximum independence but intervene and supervise when necessary  - Involve family in performance of ADLs  - Assess for home care needs following discharge   - Consider OT consult to assist with ADL evaluation and planning for discharge  - Provide patient education as appropriate  Outcome: Progressing     Problem: PAIN - ADULT  Goal: Verbalizes/displays adequate comfort level or baseline comfort level  Description  Interventions:  - Encourage patient to monitor pain and request assistance  - Assess pain using appropriate pain scale  - Administer analgesics based on type and severity of pain and evaluate response  - Implement non-pharmacological measures as appropriate and evaluate response  - Consider cultural and social influences on pain and pain management  - Notify physician/advanced practitioner if interventions unsuccessful or patient reports new pain  Outcome: Progressing     Problem: INFECTION - ADULT  Goal: Absence or prevention of progression during hospitalization  Description  INTERVENTIONS:  - Assess and monitor for signs and symptoms of infection  - Monitor lab/diagnostic results  - Monitor all insertion sites, i e  indwelling lines, tubes, and drains  - Monitor endotracheal if appropriate and nasal secretions for changes in amount and color  - [place] appropriate cooling/warming therapies per order  - Administer medications as ordered  - Instruct and encourage patient and family to use good hand hygiene technique  - Identify and instruct in appropriate isolation precautions for identified infection/condition  Outcome: Progressing     Problem: SAFETY ADULT  Goal: Maintain or return to baseline ADL function  Description  INTERVENTIONS:  -  Assess patient's ability to carry out ADLs; assess patient's baseline for ADL function and identify physical deficits which impact ability to perform ADLs (bathing, care of mouth/teeth, toileting, grooming, dressing, etc )  - Assess/evaluate cause of self-care deficits   - Assess range of motion  - Assess patient's mobility; develop plan if impaired  - Assess patient's need for assistive devices and provide as appropriate  - Encourage maximum independence but intervene and supervise when necessary  - Involve family in performance of ADLs  - Assess for home care needs following discharge   - Consider OT consult to assist with ADL evaluation and planning for discharge  - Provide patient education as appropriate  Outcome: Progressing  Goal: Maintain or return mobility status to optimal level  Description  INTERVENTIONS:  - Assess patient's baseline mobility status (ambulation, transfers, stairs, etc )    - Identify cognitive and physical deficits and behaviors that affect mobility  - Identify mobility aids required to assist with transfers and/or ambulation (gait belt, sit-to-stand, lift, walker, cane, etc )  - [place] fall precautions as indicated by assessment  - Record patient progress and toleration of activity level on Mobility SBAR; progress patient to next Phase/Stage  - Instruct patient to call for assistance with activity based on assessment  - Consider rehabilitation consult to assist with strengthening/weightbearing, etc   Outcome: Progressing     Problem: DISCHARGE PLANNING  Goal: Discharge to home or other facility with appropriate resources  Description  INTERVENTIONS:  - Identify barriers to discharge w/patient and caregiver  - Arrange for needed discharge resources and transportation as appropriate  - Identify discharge learning needs (meds, wound care, etc )  - Arrange for interpretive services to assist at discharge as needed  - Refer to Case Management Department for coordinating discharge planning if the patient needs post-hospital services based on physician/advanced practitioner order or complex needs related to functional status, cognitive ability, or social support system  Outcome: Progressing     Problem: Knowledge Deficit  Goal: Patient/family/caregiver demonstrates understanding of disease process, treatment plan, medications, and discharge instructions  Description  Complete learning assessment and assess knowledge base    Interventions:  - Provide teaching at level of understanding  - Provide teaching via preferred learning methods  Outcome: Progressing

## 2019-09-06 NOTE — H&P
H&P- Sarmad Bowen 1935, 81 y o  male MRN: [id number]    Unit/Bed#: [facility] Encounter: [id number]    Primary Care Provider: Jerilyn Marcum MD   Date and time admitted to hospital: 9/6/2019  1:26 PM        UTI (urinary tract infection)  Assessment & Plan  With sepsis  Cultures pending  On cefepime  Will trend [address] and vitals  Sepsis ([address] )  Assessment & Plan  Lactic normal    Will give IVF  Will monitor I/O carefully  Benign essential hypertension  Assessment & Plan  BP is 157/107  Will c/w home meds  Atrial fibrillation with slow ventricular response (HCC)  Assessment & Plan  On eliquis   Can c/w this  Rate controlled currently  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, without long-term current use of insulin (HCC)  Assessment & Plan  Lab Results   Component Value Date    HGBA1C 8 7 (H) 06/25/2019       No results for input(s): POCGLU in the last 72 hours  Blood Sugar Average: Last 72 hrs: Will start with ssi  * Encephalopathy  Assessment & Plan  Patient presents with altered mental status  Likely secondary to urosepsis  Delirium on dementia  VTE Prophylaxis: Heparin  / sequential compression device   Code Status: full code  POLST: There is no POLST form on file for this patient (pre-hospital)  Discussion with family: discussed with patient's son  Anticipated Length of Stay:  Patient will be admitted on an Inpatient basis with an anticipated length of stay of  More than 2 midnights  Justification for Hospital Stay: urosepsis  Total Time for Visit, including Counseling / Coordination of Care: 45 minutes  Greater than 50% of this total time spent on direct patient counseling and coordination of care  Chief Complaint:     ams     History of Present Illness:    Sarmad Bowen is a 81 y o  male who presents with altered mental status  As per his son, who is a physician the patient is usually alert and oriented x3  He does have some short-term memory loss but is mostly at baseline not demented/confused  He is fulfilling SIRS criteria with tachycardia as well as a white cell count of 16  His urine is positive for UTI this point  Normal saline in the ED and his heart rate is in the 80s current    Patient has background of AFib and is anticoagulated with Eliquis as well as on metoprolol for rate control  Patient also has a background of COPD as well as type 2 diabetes  He has been started on cefepime in the emergency department and is hemodynamically stable currently  He received 500 mL of        Review of Systems:    Review of Systems   Unable to perform ROS: Mental status change       Past Medical and Surgical History:     Past Medical History:   Diagnosis Date    Anxiety     Arthritis     Atrial fibrillation (HCC)     BPH (benign prostatic hyperplasia)     CAD (coronary artery disease)     Chronic back pain     Chronic diastolic (congestive) heart failure (HCC)     COPD (chronic obstructive pulmonary disease) (HCC)     Critical illness polyneuropathy (HCC)     Diabetes mellitus (HCC)     type 2    Dysphagia     GERD (gastroesophageal reflux disease)     Hemorrhoids     History of colon cancer     History of DVT (deep vein thrombosis)     Hyperlipidemia     Hypertension     Hyponatremia     Malignant neoplasm of colon ([facility] )     Myocardial infarct ([facility] )     Osteomyelitis ([facility] )     Pneumonia     Sepsis ([facility] )     Urinary retention        Past Surgical History:   Procedure Laterality Date    APPENDECTOMY      BACK SURGERY      BOTOX INJECTION N/A 2/8/2019    Procedure: BOTOX INJECTION 100 UNITS;  Surgeon: Omar Baker MD;  Location: [facility];  Service: Urology    BRONCHOSCOPY N/A 6/30/2016    Procedure: BRONCHOSCOPY FLEXIBLE with bronchial lavage to the left lower lobe; Surgeon: Hugh Bonilla MD;  Location: [facility];   Service:     CATARACT EXTRACTION      CHOLECYSTECTOMY  COLECTOMY      COLON SURGERY      polypectomy for cancerous lesion    COLONOSCOPY      CORONARY ANGIOPLASTY WITH STENT PLACEMENT      x5  pt unsure of where    GASTROSTOMY TUBE PLACEMENT      percutaneous placement of gastrostomy tube placed 4/16 - dc 8/16    HEMORROIDECTOMY      LUMBAR LAMINECTOMY      LUNG SURGERY      PEG TUBE REMOVAL      TN CYSTOURETHRO W/IMPLANT N/A 8/27/2018    Procedure: CYSTOSCOPY WITH INSERTION Germaine Page;  Surgeon: Abe Mabry MD;  [facility];  Service: Urology    TN CYSTOURETHROSCOPY N/A 2/8/2019    Procedure: Keara Gray;  Surgeon: Abe Mabry MD;  [facility];  Service: Urology    TRACHEOSTOMY         Meds/Allergies:    Prior to Admission medications    Medication Sig Start Date End Date Taking?  Authorizing Provider   ACCU-CHEK FASTCLIX LANCETS MISC Test once daily 6/24/19  Yes Chung Mendez MD   [facility]) 5 mg Take 1 tablet (5 mg total) by mouth 2 (two) times a day 7/23/19  Yes Chung Mendez MD   ergocalciferol (VITAMIN D2) 50,000 units Take 1 capsule (50,000 Units total) by mouth once a week 6/30/19  Yes Chung Mendez MD   famotidine (PEPCID) 20 mg tablet Take 1 tablet (20 mg total) by mouth 2 (two) times a day 2/11/19  Yes Chung Mendez MD   furosemide (LASIX) 20 mg tablet 20 mg alternating with 40 mg, add additional 20 mg for weight gain more than 3 lb  Patient taking differently: Take 40 mg by mouth daily  2/11/19  Yes Chung Mendez MD   gabapentin (NEURONTIN) 400 mg capsule Take 400 mg by mouth 3 (three) times a day   8/4/16  Yes Historical Provider, MD   Insulin Pen Needle 32G X 4 MM MISC Test once daily 6/24/19  Yes Chung Mendez MD   LANTUS SOLOSTAR 100 units/mL injection pen INJECT SUBCUTANEOUSLY 18  UNITS DAILY  Patient taking differently: INJECT SUBCUTANEOUSLY 18  UNITS DAILY IN AM 1/21/19  Yes Chung Mendez MD   levalbuterol (XOPENEX) 1 25 mg/3 mL nebulizer solution Take 1 vial (1 25 mg total) by nebulization every 4 (four) hours as needed for wheezing or shortness of breath 6/24/19  Yes Chung Mendez MD   lidocaine (LIDODERM) 5 % Apply 1 patch topically every 24 hours Remove & Discard patch within 12 hours or as directed by MD 5/9/19  Yes Chung Mendez MD   metoprolol tartrate (LOPRESSOR) 25 mg tablet TAKE ONE-HALF TABLET BY  MOUTH DAILY 3/12/19  Yes Chung Mendez MD   Mirabegron ER (MYRBETRIQ) 50 MG TB24 Take 50 mg by mouth daily     Yes Historical Provider, MD   mirtazapine (REMERON) 30 mg tablet Take 1 tablet (30 mg total) by mouth daily at bedtime 3/22/19  Yes Chung Mendez MD   Multiple Vitamin (MULTIVITAMIN) tablet Take 1 tablet by mouth daily   Yes Historical Provider, MD   nitroglycerin (NITROLINGUAL) 0 4 mg/spray spray 1 spray every 5 (five) minutes as needed   1/20/17  Yes Historical Provider, MD   ONE TOUCH ULTRA TEST test strip Test once daily 2/11/19  Yes Chung Mendez MD   sodium chloride 0 9 % nebulizer solution Inhale every 4 (four) hours as needed     Yes Historical Provider, MD   traMADol (ULTRAM) 50 mg tablet TAKE 1 TABLET BY MOUTH TWO  TIMES DAILY  Patient taking differently: every 8 (eight) hours as needed  8/7/19  Yes Chung Mendez MD   metFORMIN (GLUCOPHAGE-XR) 500 mg 24 hr tablet Take 1,000 mg by mouth daily with dinner  9/6/18 9/6/19  Historical Provider, MD   nystatin (MYCOSTATIN) powder Apply to affected area 2-3 times daily until resolved  6/22/18 9/6/19  Chung Mendez MD     I have reviewed home medications with patient personally  Allergies: No Known Allergies    Social History:     Marital Status:    Occupation: retired  Patient Pre-hospital Living Situation: lives at home  Patient Pre-hospital Level of Mobility: fully  Patient Pre-hospital Diet Restrictions: none     Substance Use History:   Social History     Substance and Sexual Activity   Alcohol Use No     Social History     Tobacco Use   Smoking Status Former Smoker    Packs/day: 1 00    Years: 20 00    Pack years: 20 00    Types: Cigarettes    Last attempt to quit: 1978    Years since quittin 0   Smokeless Tobacco Never Used   Tobacco Comment    quit 40 years ago     Social History     Substance and Sexual Activity   Drug Use No       Family History:    Family History   Problem Relation Age of Onset    Heart attack Mother        Physical Exam:     Vitals:   Blood Pressure: (!) 157/107 (19 1800)  Pulse: 83 (19 1800)  Temperature: 97 6 °F (36 4 °C) (19 1326)  Temp Source: Oral (19 1326)  Respirations: 18 (19 1800)  SpO2: 94 % (19 1800)    Physical Exam   Constitutional: No distress  Elderly, frail  HENT:   Head: Normocephalic and atraumatic  Mouth/Throat: No oropharyngeal exudate  Eyes: Pupils are equal, round, and reactive to light  Right eye exhibits no discharge  Left eye exhibits no discharge  No scleral icterus  Neck: Normal range of motion  No JVD present  No tracheal deviation present  No thyromegaly present  Cardiovascular: Normal rate  Exam reveals no friction rub  No murmur heard  Pulmonary/Chest: Effort normal  No stridor  No respiratory distress  He has no wheezes  He has no rales  He exhibits no tenderness  Abdominal: Soft  He exhibits no distension and no mass  There is tenderness  There is no rebound and no guarding  No hernia  Musculoskeletal: He exhibits no edema, tenderness or deformity  Lymphadenopathy:     He has no cervical adenopathy  Neurological: He is alert  He displays normal reflexes  No cranial nerve deficit or sensory deficit  He exhibits normal muscle tone  Coordination normal    Skin: Skin is warm  Capillary refill takes less than 2 seconds  No rash noted  He is not diaphoretic  No erythema  There is pallor  Psychiatric: He has a normal mood and affect             Additional Data:     Lab Results: I have personally reviewed pertinent reports  Results from last 7 days   Lab Units 09/06/19  1357   WBC Thousand/uL 16 17*   HEMOGLOBIN g/dL 16 9   HEMATOCRIT % 51 5*   PLATELETS Thousands/uL 203   NEUTROS PCT % 65   LYMPHS PCT % 24   MONOS PCT % 10   EOS PCT % 0     Results from last 7 days   Lab Units 09/06/19  1357   SODIUM mmol/L 138   POTASSIUM mmol/L 4 0   CHLORIDE mmol/L 98*   CO2 mmol/L 31   BUN mg/dL 15   CREATININE mg/dL 1 11   ANION GAP mmol/L 9   CALCIUM mg/dL 9 2   ALBUMIN g/dL 3 6   TOTAL BILIRUBIN mg/dL 1 43*   ALK PHOS U/L 114   ALT U/L 28   AST U/L 30   GLUCOSE RANDOM mg/dL 239*                 Results from last 7 days   Lab Units 09/06/19  1406 09/06/19  1357   LACTIC ACID mmol/L 1 9  --    PROCALCITONIN ng/ml  --  0 08       Imaging: I have personally reviewed pertinent reports  XR chest 2 views   Final Result by Mayo Wolf MD (09/06 1622)      Enlargement of cardiac silhouette  Shallow depth of inspiration  Mild pulmonary vascular congestion without overt pulmonary edema  Workstation performed: [id number]             EKG, Pathology, and Other Studies Reviewed on Admission:   · EKG: nsr    Allscripts / Epic Records Reviewed: Yes     ** Please Note: This note has been constructed using a voice recognition system   **

## 2019-09-06 NOTE — ED NOTES
Admitting came out of room and stated pt is trying to get out of bed  RN asked admitting if pt is to be 1:1 on floor and admitting stated she would put in the order  RN informed admitting pt has not been attempting to leave bed since she assumed care at 1500        Toya Bell RN  09/06/19 8028

## 2019-09-06 NOTE — ED NOTES
Pts POA is refusing CTA at this time, Dr Vimal Faulkner made aware        Maulik Garcia, RN  09/06/19 9416

## 2019-09-06 NOTE — PROGRESS NOTES
Patient arrived on floor without caregiver or family at bedside  Unable to complete navigator fully, as patient confused at time and is not reliable historian  Patient resting in bed, vital signs stable  Not trying to attempt to get out of bed at time but will ask attending if 1:1 is needed      Maurice Mims RN 9/6/2019 6:20 PM

## 2019-09-06 NOTE — ASSESSMENT & PLAN NOTE
Lab Results   Component Value Date    HGBA1C 8 7 (H) 06/25/2019       No results for input(s): POCGLU in the last 72 hours  Blood Sugar Average: Last 72 hrs: Will start with ssi

## 2019-09-07 LAB
ALBUMIN SERPL BCP-MCNC: 2.7 G/DL (ref 3.5–5)
ALP SERPL-CCNC: 89 U/L (ref 46–116)
ALT SERPL W P-5'-P-CCNC: 21 U/L (ref 12–78)
ANION GAP SERPL CALCULATED.3IONS-SCNC: 9 MMOL/L (ref 4–13)
AST SERPL W P-5'-P-CCNC: 26 U/L (ref 5–45)
BILIRUB SERPL-MCNC: 1.59 MG/DL (ref 0.2–1)
BUN SERPL-MCNC: 13 MG/DL (ref 5–25)
CALCIUM SERPL-MCNC: 8.6 MG/DL (ref 8.3–10.1)
CHLORIDE SERPL-SCNC: 104 MMOL/L (ref 100–108)
CO2 SERPL-SCNC: 28 MMOL/L (ref 21–32)
CREAT SERPL-MCNC: 0.88 MG/DL (ref 0.6–1.3)
ERYTHROCYTE [DISTWIDTH] IN BLOOD BY AUTOMATED COUNT: 14.3 % (ref 11.6–15.1)
GFR SERPL CREATININE-BSD FRML MDRD: 79 ML/MIN/1.73SQ M
GLUCOSE SERPL-MCNC: 134 MG/DL (ref 65–140)
GLUCOSE SERPL-MCNC: 134 MG/DL (ref 65–140)
GLUCOSE SERPL-MCNC: 170 MG/DL (ref 65–140)
GLUCOSE SERPL-MCNC: 251 MG/DL (ref 65–140)
HCT VFR BLD AUTO: 47.3 % (ref 36.5–49.3)
HGB BLD-MCNC: 15.5 G/DL (ref 12–17)
MCH RBC QN AUTO: 32 PG (ref 26.8–34.3)
MCHC RBC AUTO-ENTMCNC: 32.8 G/DL (ref 31.4–37.4)
MCV RBC AUTO: 98 FL (ref 82–98)
PLATELET # BLD AUTO: 166 THOUSANDS/UL (ref 149–390)
PMV BLD AUTO: 12.2 FL (ref 8.9–12.7)
POTASSIUM SERPL-SCNC: 3.6 MMOL/L (ref 3.5–5.3)
PROT SERPL-MCNC: 6.8 G/DL (ref 6.4–8.2)
RBC # BLD AUTO: 4.85 MILLION/UL (ref 3.88–5.62)
SODIUM SERPL-SCNC: 141 MMOL/L (ref 136–145)
WBC # BLD AUTO: 14.02 THOUSAND/UL (ref 4.31–10.16)

## 2019-09-07 PROCEDURE — 82948 REAGENT STRIP/BLOOD GLUCOSE: CPT

## 2019-09-07 PROCEDURE — 85027 COMPLETE CBC AUTOMATED: CPT | Performed by: INTERNAL MEDICINE

## 2019-09-07 PROCEDURE — 99232 SBSQ HOSP IP/OBS MODERATE 35: CPT | Performed by: HOSPITALIST

## 2019-09-07 PROCEDURE — 80053 COMPREHEN METABOLIC PANEL: CPT | Performed by: INTERNAL MEDICINE

## 2019-09-07 RX ORDER — FUROSEMIDE 10 MG/ML
20 INJECTION INTRAMUSCULAR; INTRAVENOUS ONCE
Status: COMPLETED | OUTPATIENT
Start: 2019-09-07 | End: 2019-09-07

## 2019-09-07 RX ORDER — FUROSEMIDE 40 MG/1
40 TABLET ORAL DAILY
Status: DISCONTINUED | OUTPATIENT
Start: 2019-09-08 | End: 2019-09-10 | Stop reason: HOSPADM

## 2019-09-07 RX ADMIN — INSULIN LISPRO 2 UNITS: 100 INJECTION, SOLUTION INTRAVENOUS; SUBCUTANEOUS at 16:32

## 2019-09-07 RX ADMIN — APIXABAN 5 MG: 5 TABLET, FILM COATED ORAL at 16:32

## 2019-09-07 RX ADMIN — METOPROLOL TARTRATE 37.5 MG: 25 TABLET ORAL at 09:13

## 2019-09-07 RX ADMIN — INSULIN LISPRO 1 UNITS: 100 INJECTION, SOLUTION INTRAVENOUS; SUBCUTANEOUS at 21:10

## 2019-09-07 RX ADMIN — CEFEPIME HYDROCHLORIDE 2000 MG: 2 INJECTION, POWDER, FOR SOLUTION INTRAVENOUS at 13:16

## 2019-09-07 RX ADMIN — GABAPENTIN 400 MG: 400 CAPSULE ORAL at 09:13

## 2019-09-07 RX ADMIN — FAMOTIDINE 20 MG: 20 TABLET ORAL at 09:13

## 2019-09-07 RX ADMIN — GABAPENTIN 400 MG: 400 CAPSULE ORAL at 21:10

## 2019-09-07 RX ADMIN — FAMOTIDINE 20 MG: 20 TABLET ORAL at 16:32

## 2019-09-07 RX ADMIN — MIRTAZAPINE 30 MG: 15 TABLET, FILM COATED ORAL at 21:10

## 2019-09-07 RX ADMIN — CEFEPIME HYDROCHLORIDE 2000 MG: 2 INJECTION, POWDER, FOR SOLUTION INTRAVENOUS at 02:05

## 2019-09-07 RX ADMIN — APIXABAN 5 MG: 5 TABLET, FILM COATED ORAL at 09:13

## 2019-09-07 RX ADMIN — GABAPENTIN 400 MG: 400 CAPSULE ORAL at 16:32

## 2019-09-07 RX ADMIN — FUROSEMIDE 20 MG: 10 INJECTION, SOLUTION INTRAMUSCULAR; INTRAVENOUS at 13:46

## 2019-09-07 RX ADMIN — INSULIN GLARGINE 18 UNITS: 100 INJECTION, SOLUTION SUBCUTANEOUS at 21:10

## 2019-09-07 NOTE — ASSESSMENT & PLAN NOTE
With sepsis  Urine with innumerable RBCs and WBCs with bacteria  Cultures pending  On cefepime#2  WBC today 14 02    No fever  Whipple placed-plan to attempt a void trial tomorrow-may accept a higher PVR of around 250-300-since patient has had a recent Botox injection

## 2019-09-07 NOTE — ASSESSMENT & PLAN NOTE
Secondary to UTI  Patient presented with acute encephalopathy, leukocytosis, cloudy urine-patient had Botox injection to the bladder on 08/08/2019 for urge incontinence failing multiple interventions  Son who is a surgeon-noticed that his urine was cloudy following the procedure and was started on macrobid  The patient developed acute encephalopathy yesterday with very cloudy urine and was brought to the ED    Currently hemodynamically stable

## 2019-09-07 NOTE — ASSESSMENT & PLAN NOTE
Wt Readings from Last 3 Encounters:   09/07/19 78 5 kg (173 lb 1 oz)   07/15/19 81 6 kg (180 lb)   07/10/19 79 8 kg (176 lb)     Patient on Lasix 40 mg daily at home  Currently with some crackles lung bases  Will given 1 dose of IV Lasix 20 mg and assess respiratory status tomorrow

## 2019-09-07 NOTE — ASSESSMENT & PLAN NOTE
Toxic encephalopathy secondary to UTI with sepsis in addition to Delirium on dementia  This is now resolved  Patient's mentation is back to baseline

## 2019-09-07 NOTE — SPEECH THERAPY NOTE
Speech Language/Pathology  Screen only  Pt admitted w/ encephalopathy and was lethargic  Now alert and tolerating regular diet w/ nectar liquids  Has 1:1  Previous VBS in 2016 showed kasey silent aspiration of thin liquids  The pt is on regular and nectar thick  No pulmonary issues at this time  Will d/c order

## 2019-09-07 NOTE — PLAN OF CARE
Problem: Potential for Falls  Goal: Patient will remain free of falls  Description  INTERVENTIONS:  - Assess patient frequently for physical needs  -  Identify cognitive and physical deficits and behaviors that affect risk of falls    -  [place] fall precautions as indicated by assessment   - Educate patient/family on patient safety including physical limitations  - Instruct patient to call for assistance with activity based on assessment  - Modify environment to reduce risk of injury  - Consider OT/PT consult to assist with strengthening/mobility  Outcome: Progressing     Problem: Prexisting or High Potential for Compromised Skin Integrity  Goal: Skin integrity is maintained or improved  Description  INTERVENTIONS:  - Identify patients at risk for skin breakdown  - Assess and monitor skin integrity  - Assess and monitor nutrition and hydration status  - Monitor labs   - Assess for incontinence   - Turn and reposition patient  - Assist with mobility/ambulation  - Relieve pressure over bony prominences  - Avoid friction and shearing  - Provide appropriate hygiene as needed including keeping skin clean and dry  - Evaluate need for skin moisturizer/barrier cream  - Collaborate with interdisciplinary team   - Patient/family teaching  - Consider wound care consult   Outcome: Progressing     Problem: NEUROSENSORY - ADULT  Goal: Achieves stable or improved neurological status  Description  INTERVENTIONS  - Monitor and report changes in neurological status  - Monitor vital signs such as temperature, blood pressure, glucose, and any other labs ordered   - Initiate measures to prevent increased intracranial pressure  - Monitor for seizure activity and implement precautions if appropriate      Outcome: Progressing  Goal: Remains free of injury related to seizures activity  Description  INTERVENTIONS  - Maintain airway, patient safety  and administer oxygen as ordered  - Monitor patient for seizure activity, document and report duration and description of seizure to physician/advanced practitioner  - If seizure occurs,  ensure patient safety during seizure  - Reorient patient post seizure  - Seizure pads on all 4 side rails  - Instruct patient/family to notify RN of any seizure activity including if an aura is experienced  - Instruct patient/family to call for assistance with activity based on nursing assessment  - Administer anti-seizure medications if ordered    Outcome: Progressing  Goal: Achieves maximal functionality and self care  Description  INTERVENTIONS  - Monitor swallowing and airway patency with patient fatigue and changes in neurological status  - Encourage and assist patient to increase activity and self care     - Encourage visually impaired, hearing impaired and aphasic patients to use assistive/communication devices  Outcome: Progressing     Problem: RESPIRATORY - ADULT  Goal: Achieves optimal ventilation and oxygenation  Description  INTERVENTIONS:  - Assess for changes in respiratory status  - Assess for changes in mentation and behavior  - Position to facilitate oxygenation and minimize respiratory effort  - Oxygen administered by appropriate delivery if ordered  - Initiate smoking cessation education as indicated  - Encourage broncho-pulmonary hygiene including cough, deep breathe, Incentive Spirometry  - Assess the need for suctioning and aspirate as needed  - Assess and instruct to report SOB or any respiratory difficulty  - Respiratory Therapy support as indicated  Outcome: Progressing     Problem: GENITOURINARY - ADULT  Goal: Maintains or returns to baseline urinary function  Description  INTERVENTIONS:  - Assess urinary function  - Encourage oral fluids to ensure adequate hydration if ordered  - Administer IV fluids as ordered to ensure adequate hydration  - Administer ordered medications as needed  - Offer frequent toileting  - Follow urinary retention protocol if ordered  Outcome: Progressing  Goal: Absence of urinary retention  Description  INTERVENTIONS:  - Assess patients ability to void and empty bladder  - Monitor I/O  - Bladder scan as needed  - Discuss with physician/AP medications to alleviate retention as needed  - Discuss catheterization for long term situations as appropriate  Outcome: Progressing  Goal: Urinary catheter remains patent  Description  INTERVENTIONS:  - Assess patency of urinary catheter  - If patient has a chronic hollingsworth, consider changing catheter if non-functioning  - Follow guidelines for intermittent irrigation of non-functioning urinary catheter  Outcome: Progressing     Problem: METABOLIC, FLUID AND ELECTROLYTES - ADULT  Goal: Electrolytes maintained within normal limits  Description  INTERVENTIONS:  - Monitor labs and assess patient for signs and symptoms of electrolyte imbalances  - Administer electrolyte replacement as ordered  - Monitor response to electrolyte replacements, including repeat lab results as appropriate  - Instruct patient on fluid and nutrition as appropriate  Outcome: Progressing  Goal: Fluid balance maintained  Description  INTERVENTIONS:  - Monitor labs   - Monitor I/O and WT  - Instruct patient on fluid and nutrition as appropriate  - Assess for signs & symptoms of volume excess or deficit  Outcome: Progressing  Goal: Glucose maintained within target range  Description  INTERVENTIONS:  - Monitor Blood Glucose as ordered  - Assess for signs and symptoms of hyperglycemia and hypoglycemia  - Administer ordered medications to maintain glucose within target range  - Assess nutritional intake and initiate nutrition service referral as needed  Outcome: Progressing     Problem: SKIN/TISSUE INTEGRITY - ADULT  Goal: Skin integrity remains intact  Description  INTERVENTIONS  - Identify patients at risk for skin breakdown  - Assess and monitor skin integrity  - Assess and monitor nutrition and hydration status  - Monitor labs (i e  albumin)  - Assess for incontinence - Turn and reposition patient  - Assist with mobility/ambulation  - Relieve pressure over bony prominences  - Avoid friction and shearing  - Provide appropriate hygiene as needed including keeping skin clean and dry  - Evaluate need for skin moisturizer/barrier cream  - Collaborate with interdisciplinary team (i e  Nutrition, Rehabilitation, etc )   - Patient/family teaching  Outcome: Progressing     Problem: HEMATOLOGIC - ADULT  Goal: Maintains hematologic stability  Description  INTERVENTIONS  - Assess for signs and symptoms of bleeding or hemorrhage  - Monitor labs  - Administer supportive blood products/factors as ordered and appropriate  Outcome: Progressing     Problem: MUSCULOSKELETAL - ADULT  Goal: Maintain or return mobility to safest level of function  Description  INTERVENTIONS:  - Assess patient's ability to carry out ADLs; assess patient's baseline for ADL function and identify physical deficits which impact ability to perform ADLs (bathing, care of mouth/teeth, toileting, grooming, dressing, etc )  - Assess/evaluate cause of self-care deficits   - Assess range of motion  - Assess patient's mobility  - Assess patient's need for assistive devices and provide as appropriate  - Encourage maximum independence but intervene and supervise when necessary  - Involve family in performance of ADLs  - Assess for home care needs following discharge   - Consider OT consult to assist with ADL evaluation and planning for discharge  - Provide patient education as appropriate  Outcome: Progressing     Problem: PAIN - ADULT  Goal: Verbalizes/displays adequate comfort level or baseline comfort level  Description  Interventions:  - Encourage patient to monitor pain and request assistance  - Assess pain using appropriate pain scale  - Administer analgesics based on type and severity of pain and evaluate response  - Implement non-pharmacological measures as appropriate and evaluate response  - Consider cultural and social influences on pain and pain management  - Notify physician/advanced practitioner if interventions unsuccessful or patient reports new pain  Outcome: Progressing     Problem: INFECTION - ADULT  Goal: Absence or prevention of progression during hospitalization  Description  INTERVENTIONS:  - Assess and monitor for signs and symptoms of infection  - Monitor lab/diagnostic results  - Monitor all insertion sites, i e  indwelling lines, tubes, and drains  - Monitor endotracheal if appropriate and nasal secretions for changes in amount and color  - [place] appropriate cooling/warming therapies per order  - Administer medications as ordered  - Instruct and encourage patient and family to use good hand hygiene technique  - Identify and instruct in appropriate isolation precautions for identified infection/condition  Outcome: Progressing     Problem: SAFETY ADULT  Goal: Maintain or return to baseline ADL function  Description  INTERVENTIONS:  -  Assess patient's ability to carry out ADLs; assess patient's baseline for ADL function and identify physical deficits which impact ability to perform ADLs (bathing, care of mouth/teeth, toileting, grooming, dressing, etc )  - Assess/evaluate cause of self-care deficits   - Assess range of motion  - Assess patient's mobility; develop plan if impaired  - Assess patient's need for assistive devices and provide as appropriate  - Encourage maximum independence but intervene and supervise when necessary  - Involve family in performance of ADLs  - Assess for home care needs following discharge   - Consider OT consult to assist with ADL evaluation and planning for discharge  - Provide patient education as appropriate  Outcome: Progressing  Goal: Maintain or return mobility status to optimal level  Description  INTERVENTIONS:  - Assess patient's baseline mobility status (ambulation, transfers, stairs, etc )    - Identify cognitive and physical deficits and behaviors that affect mobility  - Identify mobility aids required to assist with transfers and/or ambulation (gait belt, sit-to-stand, lift, walker, cane, etc )  - [place] fall precautions as indicated by assessment  - Record patient progress and toleration of activity level on Mobility SBAR; progress patient to next Phase/Stage  - Instruct patient to call for assistance with activity based on assessment  - Consider rehabilitation consult to assist with strengthening/weightbearing, etc   Outcome: Progressing     Problem: DISCHARGE PLANNING  Goal: Discharge to home or other facility with appropriate resources  Description  INTERVENTIONS:  - Identify barriers to discharge w/patient and caregiver  - Arrange for needed discharge resources and transportation as appropriate  - Identify discharge learning needs (meds, wound care, etc )  - Arrange for interpretive services to assist at discharge as needed  - Refer to Case Management Department for coordinating discharge planning if the patient needs post-hospital services based on physician/advanced practitioner order or complex needs related to functional status, cognitive ability, or social support system  Outcome: Progressing     Problem: Knowledge Deficit  Goal: Patient/family/caregiver demonstrates understanding of disease process, treatment plan, medications, and discharge instructions  Description  Complete learning assessment and assess knowledge base    Interventions:  - Provide teaching at level of understanding  - Provide teaching via preferred learning methods  Outcome: Progressing

## 2019-09-07 NOTE — PROGRESS NOTES
Progress Note - Kristin Barnes 1935, 81 y o  male MRN: [id number]    Unit/Bed#: [facility] [address] 202[address]01 Encounter: [id number]    Primary Care Provider: Traci Fish MD   Date and time admitted to hospital: 9/6/2019  1:26 PM        Sepsis [facility])  Assessment & Plan  Secondary to UTI  Patient presented with acute encephalopathy, leukocytosis, cloudy urine-patient had Botox injection to the bladder on 08/08/2019 for urge incontinence failing multiple interventions  Son who is a surgeon-noticed that his urine was cloudy following the procedure and was started on macrobid  The patient developed acute encephalopathy yesterday with very cloudy urine and was brought to the ED  Currently hemodynamically stable      UTI (urinary tract infection)  Assessment & Plan  With sepsis  Urine with innumerable RBCs and WBCs with bacteria  Was very cloudy yesterday  Currently Whipple draining relatively clear urine  Cultures pending  On cefepime#2  WBC today 14 02  No fever  Whipple placed-plan to attempt a void trial tomorrow-may accept a higher PVR of around 250-300-since patient has had a recent Botox injection      * Encephalopathy  Assessment & Plan  Toxic encephalopathy secondary to UTI with sepsis in addition to Delirium on dementia  This is now resolved  Patient's mentation is back to baseline  Benign essential hypertension  Assessment & Plan  BP is 157/107 on admission which is now trended down to 124/89  Will c/w home meds  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, without long-term current use of insulin (HCC)  Assessment & Plan  Lab Results   Component Value Date    HGBA1C 8 7 (H) 06/25/2019       No results for input(s): POCGLU in the last 72 hours  Blood Sugar Average: Last 72 hrs: Will start with ssi  Atrial fibrillation with slow ventricular response (HCC)  Assessment & Plan  On eliquis   Can c/w this  Rate controlled currently         CHF (congestive heart failure) ([facility] [address] )  Assessment & Plan  Wt Readings from Last 3 Encounters:   09/07/19 78 5 kg (173 lb 1 oz)   07/15/19 81 6 kg (180 lb)   07/10/19 79 8 kg (176 lb)     Patient on Lasix 40 mg daily at home  Currently with some crackles lung bases  Will given 1 dose of IV Lasix 20 mg and start Lasix 40 mg p o  Daily from tomorrow which is his home dose      Plan  Continue cefepime 2 cultures back  Continue Whipple for now  Can attempt a void trial on Sunday  Has bilateral lung crackles  Will give 1 dose of Lasix today IV 20 mg  Restart his Lasix 40 mg daily p o  From tomorrow  Recheck a procalcitonin  Can decrease fluids to 50 mL/hour from tomorrow or DC if his mentation is normal and he is taking adequate p o  Subjective:  Patient's mentation is now back to baseline  Denies any complaints  Awaiting urine culture  Whipple was placed in the ED  Provisional plan to DC and attempt a void trial tomorrow  Physical Exam:   Vitals: Blood pressure 124/89, pulse 78, temperature 97 6 °F (36 4 °C), temperature source Temporal, resp  rate 18, weight 78 5 kg (173 lb 1 oz), SpO2 94 %  ,Body mass index is 24 83 kg/m²  Gen:  Pleasant, non-tachypnic, non-dyspnic  Conversant  Heart: regular rate and rhythm, S1S2 present, no murmur, rub or gallop  Lungs: clear to ausculatation bilaterally  No wheezing, crackless, or rhonchi  No accessory muscle use or respiratory distress  Abd: soft, non-tender, non-distended  NABS, no guarding, rebound or peritoneal signs  Whipple draining clear yellow urine  Extremities: no clubbing, cyanosis or edema  2+pedal pulses bilaterally  Full range of motion  Neuro: awake, alert and oriented  Cranial nerves 2-12 intact  Strength and sensation grossly intact  Skin: warm and dry: no petechiae, purpura and rash      LABS:   Results from last 7 days   Lab Units 09/07/19  0547 09/06/19  1357   WBC Thousand/uL 14 02* 16 17*   HEMOGLOBIN g/dL 15 5 16 9   HEMATOCRIT % 47 3 51 5* PLATELETS Thousands/uL 166 203     Results from last 7 days   Lab Units 09/07/19  0547 09/06/19  1357   POTASSIUM mmol/L 3 6 4 0   CHLORIDE mmol/L 104 98*   CO2 mmol/L 28 31   BUN mg/dL 13 15   CREATININE mg/dL 0 88 1 11   CALCIUM mg/dL 8 6 9 2       Intake/Output Summary (Last 24 hours) at 9/7/2019 1059  Last data filed at 9/7/2019 0548  Gross per 24 hour   Intake 611 67 ml   Output 1600 ml   Net -988  33 ml           Current Facility-Administered Medications:  apixaban 5 mg Oral BID Morales Rosales MD    cefepime 2,000 mg Intravenous Q12H Morales Rosales MD Last Rate: 2,000 mg (09/07/19 0205)   ergocalciferol 50,000 Units Oral Weekly Morales Rosales MD    famotidine 20 mg Oral BID Morales Rosales MD    furosemide 20 mg Intravenous Once Kayy Diehl MD    gabapentin 400 mg Oral TID Morales Rosales MD    insulin glargine 18 Units Subcutaneous HS Morales Rosales MD    levalbuterol 1 25 mg Nebulization Q4H PRN Morales Rosales MD    metoprolol tartrate 37 5 mg Oral Daily Morales Rosales MD    mirtazapine 30 mg Oral HS Morales Rosales MD    oxybutynin 5 mg Oral Daily Morales Rosales MD    sodium chloride 75 mL/hr Intravenous Continuous Morales Rosales MD Last Rate: 75 mL/hr (09/06/19 1916)   traMADol 50 mg Oral Q8H PRN Morales Rosales MD        Family update- long discussion with son Dr Beth Palomo

## 2019-09-08 LAB
ANION GAP SERPL CALCULATED.3IONS-SCNC: 7 MMOL/L (ref 4–13)
BACTERIA UR CULT: ABNORMAL
BASOPHILS # BLD AUTO: 0.06 THOUSANDS/ΜL (ref 0–0.1)
BASOPHILS NFR BLD AUTO: 1 % (ref 0–1)
BUN SERPL-MCNC: 14 MG/DL (ref 5–25)
CALCIUM SERPL-MCNC: 8.8 MG/DL (ref 8.3–10.1)
CHLORIDE SERPL-SCNC: 107 MMOL/L (ref 100–108)
CO2 SERPL-SCNC: 29 MMOL/L (ref 21–32)
CREAT SERPL-MCNC: 0.97 MG/DL (ref 0.6–1.3)
EOSINOPHIL # BLD AUTO: 0.17 THOUSAND/ΜL (ref 0–0.61)
EOSINOPHIL NFR BLD AUTO: 2 % (ref 0–6)
ERYTHROCYTE [DISTWIDTH] IN BLOOD BY AUTOMATED COUNT: 14.5 % (ref 11.6–15.1)
GFR SERPL CREATININE-BSD FRML MDRD: 72 ML/MIN/1.73SQ M
GLUCOSE SERPL-MCNC: 115 MG/DL (ref 65–140)
GLUCOSE SERPL-MCNC: 119 MG/DL (ref 65–140)
GLUCOSE SERPL-MCNC: 147 MG/DL (ref 65–140)
GLUCOSE SERPL-MCNC: 244 MG/DL (ref 65–140)
GLUCOSE SERPL-MCNC: 270 MG/DL (ref 65–140)
HCT VFR BLD AUTO: 50.2 % (ref 36.5–49.3)
HGB BLD-MCNC: 16.2 G/DL (ref 12–17)
IMM GRANULOCYTES # BLD AUTO: 0.25 THOUSAND/UL (ref 0–0.2)
IMM GRANULOCYTES NFR BLD AUTO: 2 % (ref 0–2)
LYMPHOCYTES # BLD AUTO: 3.63 THOUSANDS/ΜL (ref 0.6–4.47)
LYMPHOCYTES NFR BLD AUTO: 33 % (ref 14–44)
MCH RBC QN AUTO: 32 PG (ref 26.8–34.3)
MCHC RBC AUTO-ENTMCNC: 32.3 G/DL (ref 31.4–37.4)
MCV RBC AUTO: 99 FL (ref 82–98)
MONOCYTES # BLD AUTO: 1.08 THOUSAND/ΜL (ref 0.17–1.22)
MONOCYTES NFR BLD AUTO: 10 % (ref 4–12)
NEUTROPHILS # BLD AUTO: 5.78 THOUSANDS/ΜL (ref 1.85–7.62)
NEUTS SEG NFR BLD AUTO: 52 % (ref 43–75)
NRBC BLD AUTO-RTO: 0 /100 WBCS
PLATELET # BLD AUTO: 179 THOUSANDS/UL (ref 149–390)
PMV BLD AUTO: 13.2 FL (ref 8.9–12.7)
POTASSIUM SERPL-SCNC: 3.6 MMOL/L (ref 3.5–5.3)
PROCALCITONIN SERPL-MCNC: 0.09 NG/ML
RBC # BLD AUTO: 5.07 MILLION/UL (ref 3.88–5.62)
SODIUM SERPL-SCNC: 143 MMOL/L (ref 136–145)
WBC # BLD AUTO: 10.97 THOUSAND/UL (ref 4.31–10.16)

## 2019-09-08 PROCEDURE — 99232 SBSQ HOSP IP/OBS MODERATE 35: CPT | Performed by: INTERNAL MEDICINE

## 2019-09-08 PROCEDURE — 80048 BASIC METABOLIC PNL TOTAL CA: CPT | Performed by: HOSPITALIST

## 2019-09-08 PROCEDURE — 82948 REAGENT STRIP/BLOOD GLUCOSE: CPT

## 2019-09-08 PROCEDURE — 84145 PROCALCITONIN (PCT): CPT | Performed by: HOSPITALIST

## 2019-09-08 PROCEDURE — 85025 COMPLETE CBC W/AUTO DIFF WBC: CPT | Performed by: HOSPITALIST

## 2019-09-08 RX ORDER — LANOLIN ALCOHOL/MO/W.PET/CERES
3 CREAM (GRAM) TOPICAL
Status: DISCONTINUED | OUTPATIENT
Start: 2019-09-08 | End: 2019-09-10 | Stop reason: HOSPADM

## 2019-09-08 RX ADMIN — INSULIN LISPRO 2 UNITS: 100 INJECTION, SOLUTION INTRAVENOUS; SUBCUTANEOUS at 12:40

## 2019-09-08 RX ADMIN — GABAPENTIN 400 MG: 400 CAPSULE ORAL at 21:21

## 2019-09-08 RX ADMIN — INSULIN GLARGINE 18 UNITS: 100 INJECTION, SOLUTION SUBCUTANEOUS at 21:23

## 2019-09-08 RX ADMIN — MIRTAZAPINE 30 MG: 15 TABLET, FILM COATED ORAL at 21:21

## 2019-09-08 RX ADMIN — GABAPENTIN 400 MG: 400 CAPSULE ORAL at 08:56

## 2019-09-08 RX ADMIN — APIXABAN 5 MG: 5 TABLET, FILM COATED ORAL at 17:19

## 2019-09-08 RX ADMIN — GABAPENTIN 400 MG: 400 CAPSULE ORAL at 17:19

## 2019-09-08 RX ADMIN — CEFEPIME HYDROCHLORIDE 2000 MG: 2 INJECTION, POWDER, FOR SOLUTION INTRAVENOUS at 02:06

## 2019-09-08 RX ADMIN — FAMOTIDINE 20 MG: 20 TABLET ORAL at 08:56

## 2019-09-08 RX ADMIN — METOPROLOL TARTRATE 37.5 MG: 25 TABLET ORAL at 08:56

## 2019-09-08 RX ADMIN — FAMOTIDINE 20 MG: 20 TABLET ORAL at 17:18

## 2019-09-08 RX ADMIN — INSULIN LISPRO 2 UNITS: 100 INJECTION, SOLUTION INTRAVENOUS; SUBCUTANEOUS at 21:21

## 2019-09-08 RX ADMIN — CEFEPIME HYDROCHLORIDE 2000 MG: 2 INJECTION, POWDER, FOR SOLUTION INTRAVENOUS at 15:41

## 2019-09-08 RX ADMIN — TRAMADOL HYDROCHLORIDE 50 MG: 50 TABLET, FILM COATED ORAL at 19:15

## 2019-09-08 RX ADMIN — FUROSEMIDE 40 MG: 40 TABLET ORAL at 08:56

## 2019-09-08 RX ADMIN — MELATONIN 3 MG: 3 TAB ORAL at 21:21

## 2019-09-08 RX ADMIN — APIXABAN 5 MG: 5 TABLET, FILM COATED ORAL at 08:56

## 2019-09-08 NOTE — ASSESSMENT & PLAN NOTE
Patient has been significantly immobile for the past several days, consult PT OT to help with discharge planning

## 2019-09-08 NOTE — ASSESSMENT & PLAN NOTE
With sepsis  Urine with innumerable RBCs and WBCs with bacteria  Cultures pending     On cefepime#3    White blood cell count trending down    Whipple placed-start voiding trial today, may accept a higher PVR of around 250-300-since patient has had a recent Botox injection

## 2019-09-08 NOTE — PROGRESS NOTES
Progress Note - Douglasolean Fly 1935, 81 y o  male MRN: [id number]    Unit/Bed#: Gabriela Earl [address] 202[address]01 Encounter: [id number]    Primary Care Provider: Flor Dye MD   Date and time admitted to hospital: 9/6/2019  1:26 PM        * Encephalopathy  Assessment & Plan  Toxic encephalopathy secondary to UTI with sepsis in addition to Delirium on dementia  This is now resolved  Patient's mentation is back to baseline  Discontinue one-to-one observation and continue supportive care  Sepsis [facility])  Assessment & Plan  Secondary to UTI  Patient presented with acute encephalopathy, leukocytosis, cloudy urine-patient had Botox injection to the bladder on 08/08/2019 for urge incontinence failing multiple interventions  Son who is a surgeon-noticed that his urine was cloudy following the procedure and was started on macrobid  The patient developed acute encephalopathy prior to arrival with very cloudy urine and was brought to the ED  Currently hemodynamically stable      UTI (urinary tract infection)  Assessment & Plan  With sepsis  Urine with innumerable RBCs and WBCs with bacteria  Cultures pending  On cefepime#3    White blood cell count trending down    Whipple placed-start voiding trial today, may accept a higher PVR of around 250-300-since patient has had a recent Botox injection      Benign essential hypertension  Assessment & Plan  Blood pressure well controlled  Will c/w home meds  Lasix restarted, discontinue IV fluid    Atrial fibrillation with slow ventricular response ([address] )  Assessment & Plan  On eliquis   Can c/w this  Rate controlled currently         Bilateral leg weakness  Assessment & Plan  Patient has been significantly immobile for the past several days, consult PT OT to help with discharge planning    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, without long-term current use of insulin [facility])  Assessment & Plan  Lab Results   Component Value Date HGBA1C 8 7 (H) 2019       Recent Labs     19  1251 19  1613 19  2105 19  0706   POCGLU 134 251* 170* 115       Blood Sugar Average: Last 72 hrs: Will start with ssi  CHF (congestive heart failure) ([facility])  Assessment & Plan  Wt Readings from Last 3 Encounters:   19 82 3 kg (181 lb 7 oz)   07/15/19 81 6 kg (180 lb)   07/10/19 79 8 kg (176 lb)     Patient with history of chronic diastolic CHF    Lasix restarted, discontinue IV fluid  VTE Pharmacologic Prophylaxis:   Pharmacologic: Apixaban (Eliquis)  Mechanical VTE Prophylaxis in Place: Yes    Patient Centered Rounds: I have performed bedside rounds with nursing staff today  Education and Discussions with Family / Patient:  I contacted the patient's son at number listed, left message requesting a call back to discuss plan of care  Discharge Plan / Estimated Discharge Date:  2019      Code Status: Level 1 - Full Code      Subjective:   Patient has no physical complaints, tolerating diet, denies any pain, patient seems less confused compared to prior documentation    Objective:     Vitals:   Temp (24hrs), Av 3 °F (36 8 °C), Min:97 4 °F (36 3 °C), Max:99 3 °F (37 4 °C)    Temp:  [97 4 °F (36 3 °C)-99 3 °F (37 4 °C)] 97 4 °F (36 3 °C)  HR:  [67-76] 72  Resp:  [16-20] 20  BP: (120-139)/(82-91) 139/91  SpO2:  [92 %-95 %] 92 %  Body mass index is 26 03 kg/m²  Input and Output Summary (last 24 hours): Intake/Output Summary (Last 24 hours) at 2019 1008  Last data filed at 2019 0456  Gross per 24 hour   Intake 50 ml   Output 1500 ml   Net -1450 ml       Physical Exam:     Physical Exam   Constitutional: He appears well-developed and well-nourished  No distress  HENT:   Head: Normocephalic and atraumatic  Right Ear: External ear normal    Left Ear: External ear normal    Eyes: Pupils are equal, round, and reactive to light  Conjunctivae and EOM are normal  Right eye exhibits no discharge   Left eye exhibits no discharge  Cardiovascular: Normal rate, regular rhythm, normal heart sounds and intact distal pulses  Pulmonary/Chest: Effort normal and breath sounds normal  No stridor  No respiratory distress  Abdominal: Soft  Bowel sounds are normal  He exhibits no distension  There is no tenderness  Skin: Skin is warm and dry  No rash noted  He is not diaphoretic  No erythema  Nursing note and vitals reviewed  Additional Data:     Labs:    Results from last 7 days   Lab Units 09/08/19  0450   WBC Thousand/uL 10 97*   HEMOGLOBIN g/dL 16 2   HEMATOCRIT % 50 2*   PLATELETS Thousands/uL 179   NEUTROS PCT % 52   LYMPHS PCT % 33   MONOS PCT % 10   EOS PCT % 2     Results from last 7 days   Lab Units 09/08/19  0450 09/07/19  0547   POTASSIUM mmol/L 3 6 3 6   CHLORIDE mmol/L 107 104   CO2 mmol/L 29 28   BUN mg/dL 14 13   CREATININE mg/dL 0 97 0 88   CALCIUM mg/dL 8 8 8 6   ALK PHOS U/L  --  89   ALT U/L  --  21   AST U/L  --  26           * I Have Reviewed All Lab Data Listed Above  * Additional Pertinent Lab Tests Reviewed: [facility] Reviewed      Recent Cultures (last 7 days):     Results from last 7 days   Lab Units 09/06/19  1842 09/06/19  1841 09/06/19  1448 09/06/19  1407   BLOOD CULTURE  No Growth at 24 hrs  No Growth at 24 hrs   --  No Growth at 24 hrs  No Growth at 24 hrs  URINE CULTURE   --   --  Culture results to follow    --        Last 24 Hours Medication List:     Current Facility-Administered Medications:  apixaban 5 mg Oral BID Jory Paul MD    cefepime 2,000 mg Intravenous Q12H Jory Paul MD Last Rate: 2,000 mg (09/08/19 0206)   ergocalciferol 50,000 Units Oral Weekly Jory Paul MD    famotidine 20 mg Oral BID Jory Paul MD    furosemide 40 mg Oral Daily Phani Valdovinos MD    gabapentin 400 mg Oral TID Jory Paul MD    insulin glargine 18 Units Subcutaneous HS Jory Paul MD    insulin lispro 1-5 Units Subcutaneous TID AC Josie Kramer MD    insulin lispro 1-5 Units Subcutaneous HS Josie Kramer MD    levalbuterol 1 25 mg Nebulization Q4H PRN Kerline Huertas MD    metoprolol tartrate 37 5 mg Oral Daily Kerline Huertas MD    mirtazapine 30 mg Oral HS Kerline Huertas MD    traMADol 50 mg Oral Q8H PRN Kerline Huertas MD         Today, Patient Was Seen By: Lisa Bustamante DO

## 2019-09-08 NOTE — ASSESSMENT & PLAN NOTE
Secondary to UTI  Patient presented with acute encephalopathy, leukocytosis, cloudy urine-patient had Botox injection to the bladder on 08/08/2019 for urge incontinence failing multiple interventions  Son who is a surgeon-noticed that his urine was cloudy following the procedure and was started on macrobid  The patient developed acute encephalopathy prior to arrival with very cloudy urine and was brought to the ED      Currently hemodynamically stable

## 2019-09-08 NOTE — ASSESSMENT & PLAN NOTE
Toxic encephalopathy secondary to UTI with sepsis in addition to Delirium on dementia  This is now resolved  Patient's mentation is back to baseline  Discontinue one-to-one observation and continue supportive care

## 2019-09-08 NOTE — ASSESSMENT & PLAN NOTE
Lab Results   Component Value Date    HGBA1C 8 7 (H) 06/25/2019       Recent Labs     09/07/19  1251 09/07/19  1613 09/07/19  2105 09/08/19  0706   POCGLU 134 251* 170* 115       Blood Sugar Average: Last 72 hrs: Will start with ssi

## 2019-09-08 NOTE — ASSESSMENT & PLAN NOTE
Wt Readings from Last 3 Encounters:   09/08/19 82 3 kg (181 lb 7 oz)   07/15/19 81 6 kg (180 lb)   07/10/19 79 8 kg (176 lb)     Patient with history of chronic diastolic CHF    Lasix restarted, discontinue IV fluid

## 2019-09-08 NOTE — PLAN OF CARE
Problem: Potential for Falls  Goal: Patient will remain free of falls  Description  INTERVENTIONS:  - Assess patient frequently for physical needs  -  Identify cognitive and physical deficits and behaviors that affect risk of falls    -  [place] fall precautions as indicated by assessment   - Educate patient/family on patient safety including physical limitations  - Instruct patient to call for assistance with activity based on assessment  - Modify environment to reduce risk of injury  - Consider OT/PT consult to assist with strengthening/mobility  Outcome: Progressing     Problem: Prexisting or High Potential for Compromised Skin Integrity  Goal: Skin integrity is maintained or improved  Description  INTERVENTIONS:  - Identify patients at risk for skin breakdown  - Assess and monitor skin integrity  - Assess and monitor nutrition and hydration status  - Monitor labs   - Assess for incontinence   - Turn and reposition patient  - Assist with mobility/ambulation  - Relieve pressure over bony prominences  - Avoid friction and shearing  - Provide appropriate hygiene as needed including keeping skin clean and dry  - Evaluate need for skin moisturizer/barrier cream  - Collaborate with interdisciplinary team   - Patient/family teaching  - Consider wound care consult   Outcome: Progressing     Problem: NEUROSENSORY - ADULT  Goal: Achieves stable or improved neurological status  Description  INTERVENTIONS  - Monitor and report changes in neurological status  - Monitor vital signs such as temperature, blood pressure, glucose, and any other labs ordered   - Initiate measures to prevent increased intracranial pressure  - Monitor for seizure activity and implement precautions if appropriate      Outcome: Progressing  Goal: Remains free of injury related to seizures activity  Description  INTERVENTIONS  - Maintain airway, patient safety  and administer oxygen as ordered  - Monitor patient for seizure activity, document and report duration and description of seizure to physician/advanced practitioner  - If seizure occurs,  ensure patient safety during seizure  - Reorient patient post seizure  - Seizure pads on all 4 side rails  - Instruct patient/family to notify RN of any seizure activity including if an aura is experienced  - Instruct patient/family to call for assistance with activity based on nursing assessment  - Administer anti-seizure medications if ordered    Outcome: Progressing  Goal: Achieves maximal functionality and self care  Description  INTERVENTIONS  - Monitor swallowing and airway patency with patient fatigue and changes in neurological status  - Encourage and assist patient to increase activity and self care     - Encourage visually impaired, hearing impaired and aphasic patients to use assistive/communication devices  Outcome: Progressing     Problem: RESPIRATORY - ADULT  Goal: Achieves optimal ventilation and oxygenation  Description  INTERVENTIONS:  - Assess for changes in respiratory status  - Assess for changes in mentation and behavior  - Position to facilitate oxygenation and minimize respiratory effort  - Oxygen administered by appropriate delivery if ordered  - Initiate smoking cessation education as indicated  - Encourage broncho-pulmonary hygiene including cough, deep breathe, Incentive Spirometry  - Assess the need for suctioning and aspirate as needed  - Assess and instruct to report SOB or any respiratory difficulty  - Respiratory Therapy support as indicated  Outcome: Progressing     Problem: GENITOURINARY - ADULT  Goal: Maintains or returns to baseline urinary function  Description  INTERVENTIONS:  - Assess urinary function  - Encourage oral fluids to ensure adequate hydration if ordered  - Administer IV fluids as ordered to ensure adequate hydration  - Administer ordered medications as needed  - Offer frequent toileting  - Follow urinary retention protocol if ordered  Outcome: Progressing  Goal: Absence of urinary retention  Description  INTERVENTIONS:  - Assess patients ability to void and empty bladder  - Monitor I/O  - Bladder scan as needed  - Discuss with physician/AP medications to alleviate retention as needed  - Discuss catheterization for long term situations as appropriate  Outcome: Progressing  Goal: Urinary catheter remains patent  Description  INTERVENTIONS:  - Assess patency of urinary catheter  - If patient has a chronic [place], consider changing catheter if non-functioning  - Follow guidelines for intermittent irrigation of non-functioning urinary catheter  Outcome: Progressing     Problem: METABOLIC, FLUID AND ELECTROLYTES - ADULT  Goal: Electrolytes maintained within normal limits  Description  INTERVENTIONS:  - Monitor labs and assess patient for signs and symptoms of electrolyte imbalances  - Administer electrolyte replacement as ordered  - Monitor response to electrolyte replacements, including repeat lab results as appropriate  - Instruct patient on fluid and nutrition as appropriate  Outcome: Progressing  Goal: Fluid balance maintained  Description  INTERVENTIONS:  - Monitor labs   - Monitor I/O and WT  - Instruct patient on fluid and nutrition as appropriate  - Assess for signs & symptoms of volume excess or deficit  Outcome: Progressing  Goal: Glucose maintained within target range  Description  INTERVENTIONS:  - Monitor Blood Glucose as ordered  - Assess for signs and symptoms of hyperglycemia and hypoglycemia  - Administer ordered medications to maintain glucose within target range  - Assess nutritional intake and initiate nutrition service referral as needed  Outcome: Progressing     Problem: SKIN/TISSUE INTEGRITY - ADULT  Goal: Skin integrity remains intact  Description  INTERVENTIONS  - Identify patients at risk for skin breakdown  - Assess and monitor skin integrity  - Assess and monitor nutrition and hydration status  - Monitor labs (i e  albumin)  - Assess for incontinence - Turn and reposition patient  - Assist with mobility/ambulation  - Relieve pressure over bony prominences  - Avoid friction and shearing  - Provide appropriate hygiene as needed including keeping skin clean and dry  - Evaluate need for skin moisturizer/barrier cream  - Collaborate with interdisciplinary team (i e  Nutrition, Rehabilitation, etc )   - Patient/family teaching  Outcome: Progressing     Problem: HEMATOLOGIC - ADULT  Goal: Maintains hematologic stability  Description  INTERVENTIONS  - Assess for signs and symptoms of bleeding or hemorrhage  - Monitor labs  - Administer supportive blood products/factors as ordered and appropriate  Outcome: Progressing     Problem: MUSCULOSKELETAL - ADULT  Goal: Maintain or return mobility to safest level of function  Description  INTERVENTIONS:  - Assess patient's ability to carry out ADLs; assess patient's baseline for ADL function and identify physical deficits which impact ability to perform ADLs (bathing, care of mouth/teeth, toileting, grooming, dressing, etc )  - Assess/evaluate cause of self-care deficits   - Assess range of motion  - Assess patient's mobility  - Assess patient's need for assistive devices and provide as appropriate  - Encourage maximum independence but intervene and supervise when necessary  - Involve family in performance of ADLs  - Assess for home care needs following discharge   - Consider OT consult to assist with ADL evaluation and planning for discharge  - Provide patient education as appropriate  Outcome: Progressing     Problem: PAIN - ADULT  Goal: Verbalizes/displays adequate comfort level or baseline comfort level  Description  Interventions:  - Encourage patient to monitor pain and request assistance  - Assess pain using appropriate pain scale  - Administer analgesics based on type and severity of pain and evaluate response  - Implement non-pharmacological measures as appropriate and evaluate response  - Consider cultural and social influences on pain and pain management  - Notify physician/advanced practitioner if interventions unsuccessful or patient reports new pain  Outcome: Progressing     Problem: INFECTION - ADULT  Goal: Absence or prevention of progression during hospitalization  Description  INTERVENTIONS:  - Assess and monitor for signs and symptoms of infection  - Monitor lab/diagnostic results  - Monitor all insertion sites, i e  indwelling lines, tubes, and drains  - Monitor endotracheal if appropriate and nasal secretions for changes in amount and color  - [place] appropriate cooling/warming therapies per order  - Administer medications as ordered  - Instruct and encourage patient and family to use good hand hygiene technique  - Identify and instruct in appropriate isolation precautions for identified infection/condition  Outcome: Progressing     Problem: SAFETY ADULT  Goal: Maintain or return to baseline ADL function  Description  INTERVENTIONS:  -  Assess patient's ability to carry out ADLs; assess patient's baseline for ADL function and identify physical deficits which impact ability to perform ADLs (bathing, care of mouth/teeth, toileting, grooming, dressing, etc )  - Assess/evaluate cause of self-care deficits   - Assess range of motion  - Assess patient's mobility; develop plan if impaired  - Assess patient's need for assistive devices and provide as appropriate  - Encourage maximum independence but intervene and supervise when necessary  - Involve family in performance of ADLs  - Assess for home care needs following discharge   - Consider OT consult to assist with ADL evaluation and planning for discharge  - Provide patient education as appropriate  Outcome: Progressing  Goal: Maintain or return mobility status to optimal level  Description  INTERVENTIONS:  - Assess patient's baseline mobility status (ambulation, transfers, stairs, etc )    - Identify cognitive and physical deficits and behaviors that affect mobility  - Identify mobility aids required to assist with transfers and/or ambulation (gait belt, sit-to-stand, lift, walker, cane, etc )  - [place] fall precautions as indicated by assessment  - Record patient progress and toleration of activity level on Mobility SBAR; progress patient to next Phase/Stage  - Instruct patient to call for assistance with activity based on assessment  - Consider rehabilitation consult to assist with strengthening/weightbearing, etc   Outcome: Progressing     Problem: DISCHARGE PLANNING  Goal: Discharge to home or other facility with appropriate resources  Description  INTERVENTIONS:  - Identify barriers to discharge w/patient and caregiver  - Arrange for needed discharge resources and transportation as appropriate  - Identify discharge learning needs (meds, wound care, etc )  - Arrange for interpretive services to assist at discharge as needed  - Refer to Case Management Department for coordinating discharge planning if the patient needs post-hospital services based on physician/advanced practitioner order or complex needs related to functional status, cognitive ability, or social support system  Outcome: Progressing     Problem: Knowledge Deficit  Goal: Patient/family/caregiver demonstrates understanding of disease process, treatment plan, medications, and discharge instructions  Description  Complete learning assessment and assess knowledge base  Interventions:  - Provide teaching at level of understanding  - Provide teaching via preferred learning methods  Outcome: Progressing     Problem: Nutrition/Hydration-ADULT  Goal: Nutrient/Hydration intake appropriate for improving, restoring or maintaining nutritional needs  Description  Monitor and assess patient's nutrition/hydration status for malnutrition  Collaborate with interdisciplinary team and initiate plan and interventions as ordered  Monitor patient's weight and dietary intake as ordered or per policy   Utilize nutrition screening tool and intervene as necessary  Determine patient's food preferences and provide high-protein, high-caloric foods as appropriate       INTERVENTIONS:  - Monitor oral intake, urinary output, labs, and treatment plans  - Assess nutrition and hydration status and recommend course of action  - Evaluate amount of meals eaten  - Assist patient with eating if necessary   - Allow adequate time for meals  - Recommend/ encourage appropriate diets, oral nutritional supplements, and vitamin/mineral supplements  - Order, calculate, and assess calorie counts as needed  - Recommend, monitor, and adjust tube feedings and TPN/PPN based on assessed needs  - Assess need for intravenous fluids  - Provide specific nutrition/hydration education as appropriate  - Include patient/family/caregiver in decisions related to nutrition  Outcome: Progressing

## 2019-09-09 LAB
GLUCOSE SERPL-MCNC: 132 MG/DL (ref 65–140)
GLUCOSE SERPL-MCNC: 212 MG/DL (ref 65–140)
GLUCOSE SERPL-MCNC: 219 MG/DL (ref 65–140)
GLUCOSE SERPL-MCNC: 232 MG/DL (ref 65–140)

## 2019-09-09 PROCEDURE — G8979 MOBILITY GOAL STATUS: HCPCS

## 2019-09-09 PROCEDURE — 82948 REAGENT STRIP/BLOOD GLUCOSE: CPT

## 2019-09-09 PROCEDURE — 97163 PT EVAL HIGH COMPLEX 45 MIN: CPT

## 2019-09-09 PROCEDURE — 99232 SBSQ HOSP IP/OBS MODERATE 35: CPT | Performed by: INTERNAL MEDICINE

## 2019-09-09 PROCEDURE — G8978 MOBILITY CURRENT STATUS: HCPCS

## 2019-09-09 RX ORDER — INSULIN GLARGINE 100 [IU]/ML
15 INJECTION, SOLUTION SUBCUTANEOUS
Status: DISCONTINUED | OUTPATIENT
Start: 2019-09-09 | End: 2019-09-09

## 2019-09-09 RX ADMIN — INSULIN LISPRO 1 UNITS: 100 INJECTION, SOLUTION INTRAVENOUS; SUBCUTANEOUS at 12:00

## 2019-09-09 RX ADMIN — MELATONIN 3 MG: 3 TAB ORAL at 21:22

## 2019-09-09 RX ADMIN — GABAPENTIN 400 MG: 400 CAPSULE ORAL at 15:38

## 2019-09-09 RX ADMIN — FAMOTIDINE 20 MG: 20 TABLET ORAL at 08:16

## 2019-09-09 RX ADMIN — CEFEPIME HYDROCHLORIDE 2000 MG: 2 INJECTION, POWDER, FOR SOLUTION INTRAVENOUS at 13:54

## 2019-09-09 RX ADMIN — INSULIN LISPRO 1 UNITS: 100 INJECTION, SOLUTION INTRAVENOUS; SUBCUTANEOUS at 21:22

## 2019-09-09 RX ADMIN — TRAMADOL HYDROCHLORIDE 50 MG: 50 TABLET, FILM COATED ORAL at 07:14

## 2019-09-09 RX ADMIN — FUROSEMIDE 40 MG: 40 TABLET ORAL at 08:16

## 2019-09-09 RX ADMIN — INSULIN LISPRO 2 UNITS: 100 INJECTION, SOLUTION INTRAVENOUS; SUBCUTANEOUS at 16:45

## 2019-09-09 RX ADMIN — GABAPENTIN 400 MG: 400 CAPSULE ORAL at 08:16

## 2019-09-09 RX ADMIN — FAMOTIDINE 20 MG: 20 TABLET ORAL at 17:29

## 2019-09-09 RX ADMIN — MIRTAZAPINE 30 MG: 15 TABLET, FILM COATED ORAL at 21:22

## 2019-09-09 RX ADMIN — CEFEPIME HYDROCHLORIDE 2000 MG: 2 INJECTION, POWDER, FOR SOLUTION INTRAVENOUS at 02:51

## 2019-09-09 RX ADMIN — APIXABAN 5 MG: 5 TABLET, FILM COATED ORAL at 17:29

## 2019-09-09 RX ADMIN — INSULIN GLARGINE 18 UNITS: 100 INJECTION, SOLUTION SUBCUTANEOUS at 21:23

## 2019-09-09 RX ADMIN — GABAPENTIN 400 MG: 400 CAPSULE ORAL at 21:22

## 2019-09-09 RX ADMIN — METOPROLOL TARTRATE 37.5 MG: 25 TABLET ORAL at 08:16

## 2019-09-09 RX ADMIN — APIXABAN 5 MG: 5 TABLET, FILM COATED ORAL at 08:16

## 2019-09-09 NOTE — ASSESSMENT & PLAN NOTE
Secondary to UTI  Patient presented with acute encephalopathy, leukocytosis, cloudy urine-patient had Botox injection to the bladder on 08/08/2019 for urge incontinence failing multiple interventions  Son who is a surgeon-noticed that his urine was cloudy following the procedure and was started on macrobid  The patient developed acute encephalopathy prior to arrival with very cloudy urine and was brought to the ED        -patient afebrile, WBC within normal limits

## 2019-09-09 NOTE — ASSESSMENT & PLAN NOTE
Wt Readings from Last 3 Encounters:   09/09/19 82 3 kg (181 lb 7 oz)   07/15/19 81 6 kg (180 lb)   07/10/19 79 8 kg (176 lb)     History of chronic diastolic heart failure    -patient appears euvolemic, continue with Lasix

## 2019-09-09 NOTE — ASSESSMENT & PLAN NOTE
Lab Results   Component Value Date    HGBA1C 8 7 (H) 06/25/2019       Recent Labs     09/08/19  1054 09/08/19  1550 09/08/19  2104 09/09/19  0719   POCGLU 244* 147* 270* 132       Blood Sugar Average: Last 72 hrs:    -continue with Lantus 18 units at bedtime  -monitor Accu-Cheks, sliding scale for coverage

## 2019-09-09 NOTE — PLAN OF CARE
Problem: Potential for Falls  Goal: Patient will remain free of falls  Description  INTERVENTIONS:  - Assess patient frequently for physical needs  -  Identify cognitive and physical deficits and behaviors that affect risk of falls    -  [place] fall precautions as indicated by assessment   - Educate patient/family on patient safety including physical limitations  - Instruct patient to call for assistance with activity based on assessment  - Modify environment to reduce risk of injury  - Consider OT/PT consult to assist with strengthening/mobility  Outcome: Progressing     Problem: Prexisting or High Potential for Compromised Skin Integrity  Goal: Skin integrity is maintained or improved  Description  INTERVENTIONS:  - Identify patients at risk for skin breakdown  - Assess and monitor skin integrity  - Assess and monitor nutrition and hydration status  - Monitor labs   - Assess for incontinence   - Turn and reposition patient  - Assist with mobility/ambulation  - Relieve pressure over bony prominences  - Avoid friction and shearing  - Provide appropriate hygiene as needed including keeping skin clean and dry  - Evaluate need for skin moisturizer/barrier cream  - Collaborate with interdisciplinary team   - Patient/family teaching  - Consider wound care consult   Outcome: Progressing     Problem: NEUROSENSORY - ADULT  Goal: Achieves stable or improved neurological status  Description  INTERVENTIONS  - Monitor and report changes in neurological status  - Monitor vital signs such as temperature, blood pressure, glucose, and any other labs ordered   - Initiate measures to prevent increased intracranial pressure  - Monitor for seizure activity and implement precautions if appropriate      Outcome: Progressing  Goal: Remains free of injury related to seizures activity  Description  INTERVENTIONS  - Maintain airway, patient safety  and administer oxygen as ordered  - Monitor patient for seizure activity, document and report duration and description of seizure to physician/advanced practitioner  - If seizure occurs,  ensure patient safety during seizure  - Reorient patient post seizure  - Seizure pads on all 4 side rails  - Instruct patient/family to notify RN of any seizure activity including if an aura is experienced  - Instruct patient/family to call for assistance with activity based on nursing assessment  - Administer anti-seizure medications if ordered    Outcome: Progressing  Goal: Achieves maximal functionality and self care  Description  INTERVENTIONS  - Monitor swallowing and airway patency with patient fatigue and changes in neurological status  - Encourage and assist patient to increase activity and self care     - Encourage visually impaired, hearing impaired and aphasic patients to use assistive/communication devices  Outcome: Progressing     Problem: RESPIRATORY - ADULT  Goal: Achieves optimal ventilation and oxygenation  Description  INTERVENTIONS:  - Assess for changes in respiratory status  - Assess for changes in mentation and behavior  - Position to facilitate oxygenation and minimize respiratory effort  - Oxygen administered by appropriate delivery if ordered  - Initiate smoking cessation education as indicated  - Encourage broncho-pulmonary hygiene including cough, deep breathe, Incentive Spirometry  - Assess the need for suctioning and aspirate as needed  - Assess and instruct to report SOB or any respiratory difficulty  - Respiratory Therapy support as indicated  Outcome: Progressing     Problem: GENITOURINARY - ADULT  Goal: Maintains or returns to baseline urinary function  Description  INTERVENTIONS:  - Assess urinary function  - Encourage oral fluids to ensure adequate hydration if ordered  - Administer IV fluids as ordered to ensure adequate hydration  - Administer ordered medications as needed  - Offer frequent toileting  - Follow urinary retention protocol if ordered  Outcome: Progressing  Goal: Absence of urinary retention  Description  INTERVENTIONS:  - Assess patients ability to void and empty bladder  - Monitor I/O  - Bladder scan as needed  - Discuss with physician/AP medications to alleviate retention as needed  - Discuss catheterization for long term situations as appropriate  Outcome: Progressing  Goal: Urinary catheter remains patent  Description  INTERVENTIONS:  - Assess patency of urinary catheter  - If patient has a chronic hollingsworth, consider changing catheter if non-functioning  - Follow guidelines for intermittent irrigation of non-functioning urinary catheter  Outcome: Progressing     Problem: METABOLIC, FLUID AND ELECTROLYTES - ADULT  Goal: Electrolytes maintained within normal limits  Description  INTERVENTIONS:  - Monitor labs and assess patient for signs and symptoms of electrolyte imbalances  - Administer electrolyte replacement as ordered  - Monitor response to electrolyte replacements, including repeat lab results as appropriate  - Instruct patient on fluid and nutrition as appropriate  Outcome: Progressing  Goal: Fluid balance maintained  Description  INTERVENTIONS:  - Monitor labs   - Monitor I/O and WT  - Instruct patient on fluid and nutrition as appropriate  - Assess for signs & symptoms of volume excess or deficit  Outcome: Progressing  Goal: Glucose maintained within target range  Description  INTERVENTIONS:  - Monitor Blood Glucose as ordered  - Assess for signs and symptoms of hyperglycemia and hypoglycemia  - Administer ordered medications to maintain glucose within target range  - Assess nutritional intake and initiate nutrition service referral as needed  Outcome: Progressing     Problem: SKIN/TISSUE INTEGRITY - ADULT  Goal: Skin integrity remains intact  Description  INTERVENTIONS  - Identify patients at risk for skin breakdown  - Assess and monitor skin integrity  - Assess and monitor nutrition and hydration status  - Monitor labs (i e  albumin)  - Assess for incontinence - Turn and reposition patient  - Assist with mobility/ambulation  - Relieve pressure over bony prominences  - Avoid friction and shearing  - Provide appropriate hygiene as needed including keeping skin clean and dry  - Evaluate need for skin moisturizer/barrier cream  - Collaborate with interdisciplinary team (i e  Nutrition, Rehabilitation, etc )   - Patient/family teaching  Outcome: Progressing     Problem: HEMATOLOGIC - ADULT  Goal: Maintains hematologic stability  Description  INTERVENTIONS  - Assess for signs and symptoms of bleeding or hemorrhage  - Monitor labs  - Administer supportive blood products/factors as ordered and appropriate  Outcome: Progressing     Problem: MUSCULOSKELETAL - ADULT  Goal: Maintain or return mobility to safest level of function  Description  INTERVENTIONS:  - Assess patient's ability to carry out ADLs; assess patient's baseline for ADL function and identify physical deficits which impact ability to perform ADLs (bathing, care of mouth/teeth, toileting, grooming, dressing, etc )  - Assess/evaluate cause of self-care deficits   - Assess range of motion  - Assess patient's mobility  - Assess patient's need for assistive devices and provide as appropriate  - Encourage maximum independence but intervene and supervise when necessary  - Involve family in performance of ADLs  - Assess for home care needs following discharge   - Consider OT consult to assist with ADL evaluation and planning for discharge  - Provide patient education as appropriate  Outcome: Progressing     Problem: PAIN - ADULT  Goal: Verbalizes/displays adequate comfort level or baseline comfort level  Description  Interventions:  - Encourage patient to monitor pain and request assistance  - Assess pain using appropriate pain scale  - Administer analgesics based on type and severity of pain and evaluate response  - Implement non-pharmacological measures as appropriate and evaluate response  - Consider cultural and social influences on pain and pain management  - Notify physician/advanced practitioner if interventions unsuccessful or patient reports new pain  Outcome: Progressing     Problem: INFECTION - ADULT  Goal: Absence or prevention of progression during hospitalization  Description  INTERVENTIONS:  - Assess and monitor for signs and symptoms of infection  - Monitor lab/diagnostic results  - Monitor all insertion sites, i e  indwelling lines, tubes, and drains  - Monitor endotracheal if appropriate and nasal secretions for changes in amount and color  - [place] appropriate cooling/warming therapies per order  - Administer medications as ordered  - Instruct and encourage patient and family to use good hand hygiene technique  - Identify and instruct in appropriate isolation precautions for identified infection/condition  Outcome: Progressing     Problem: SAFETY ADULT  Goal: Maintain or return to baseline ADL function  Description  INTERVENTIONS:  -  Assess patient's ability to carry out ADLs; assess patient's baseline for ADL function and identify physical deficits which impact ability to perform ADLs (bathing, care of mouth/teeth, toileting, grooming, dressing, etc )  - Assess/evaluate cause of self-care deficits   - Assess range of motion  - Assess patient's mobility; develop plan if impaired  - Assess patient's need for assistive devices and provide as appropriate  - Encourage maximum independence but intervene and supervise when necessary  - Involve family in performance of ADLs  - Assess for home care needs following discharge   - Consider OT consult to assist with ADL evaluation and planning for discharge  - Provide patient education as appropriate  Outcome: Progressing  Goal: Maintain or return mobility status to optimal level  Description  INTERVENTIONS:  - Assess patient's baseline mobility status (ambulation, transfers, stairs, etc )    - Identify cognitive and physical deficits and behaviors that affect mobility  - Identify mobility aids required to assist with transfers and/or ambulation (gait belt, sit-to-stand, lift, walker, cane, etc )  - [place] fall precautions as indicated by assessment  - Record patient progress and toleration of activity level on Mobility SBAR; progress patient to next Phase/Stage  - Instruct patient to call for assistance with activity based on assessment  - Consider rehabilitation consult to assist with strengthening/weightbearing, etc   Outcome: Progressing     Problem: DISCHARGE PLANNING  Goal: Discharge to home or other facility with appropriate resources  Description  INTERVENTIONS:  - Identify barriers to discharge w/patient and caregiver  - Arrange for needed discharge resources and transportation as appropriate  - Identify discharge learning needs (meds, wound care, etc )  - Arrange for interpretive services to assist at discharge as needed  - Refer to Case Management Department for coordinating discharge planning if the patient needs post-hospital services based on physician/advanced practitioner order or complex needs related to functional status, cognitive ability, or social support system  Outcome: Progressing     Problem: Knowledge Deficit  Goal: Patient/family/caregiver demonstrates understanding of disease process, treatment plan, medications, and discharge instructions  Description  Complete learning assessment and assess knowledge base  Interventions:  - Provide teaching at level of understanding  - Provide teaching via preferred learning methods  Outcome: Progressing     Problem: Nutrition/Hydration-ADULT  Goal: Nutrient/Hydration intake appropriate for improving, restoring or maintaining nutritional needs  Description  Monitor and assess patient's nutrition/hydration status for malnutrition  Collaborate with interdisciplinary team and initiate plan and interventions as ordered  Monitor patient's weight and dietary intake as ordered or per policy   Utilize nutrition screening tool and intervene as necessary  Determine patient's food preferences and provide high-protein, high-caloric foods as appropriate       INTERVENTIONS:  - Monitor oral intake, urinary output, labs, and treatment plans  - Assess nutrition and hydration status and recommend course of action  - Evaluate amount of meals eaten  - Assist patient with eating if necessary   - Allow adequate time for meals  - Recommend/ encourage appropriate diets, oral nutritional supplements, and vitamin/mineral supplements  - Order, calculate, and assess calorie counts as needed  - Recommend, monitor, and adjust tube feedings and TPN/PPN based on assessed needs  - Assess need for intravenous fluids  - Provide specific nutrition/hydration education as appropriate  - Include patient/family/caregiver in decisions related to nutrition  Outcome: Progressing

## 2019-09-09 NOTE — PROGRESS NOTES
Progress Note - Yovani Morel 1935, 81 y o  male MRN: [id number]    Unit/Bed#: [facility] [id number] Encounter: [id number]    Primary Care Provider: Daniela Coleman MD   Date and time admitted to hospital: 9/6/2019  1:26 PM        Sepsis [facility])  Assessment & Plan  Secondary to UTI  Patient presented with acute encephalopathy, leukocytosis, cloudy urine-patient had Botox injection to the bladder on 08/08/2019 for urge incontinence failing multiple interventions  Son who is a surgeon-noticed that his urine was cloudy following the procedure and was started on macrobid  The patient developed acute encephalopathy prior to arrival with very cloudy urine and was brought to the ED      -patient afebrile, WBC within normal limits      UTI (urinary tract infection)  Assessment & Plan  -urine culture reveals Klebsiella sensitive to cephalosporin  -blood culture negative thus far  -will continue with cefepime day 4    Bilateral leg weakness  Assessment & Plan  Patient has been significantly immobile for the past several days, -awaiting PT OT eval  -patient lives in independent living facility at [facility]    Benign essential hypertension  Assessment & Plan  Blood pressure well controlled  Continue with metoprolol    Atrial fibrillation with slow ventricular response ([facility] [address] )  Assessment & Plan  -on metoprolol for rate control  -Eliquis for anticoagulation     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, without long-term current use of insulin [facility])  Assessment & Plan  Lab Results   Component Value Date    HGBA1C 8 7 (H) 06/25/2019       Recent Labs     09/08/19  1054 09/08/19  1550 09/08/19  2104 09/09/19  0719   POCGLU 244* 147* 270* 132       Blood Sugar Average: Last 72 hrs:    -continue with Lantus 18 units at bedtime  -monitor Accu-Cheks, sliding scale for coverage    CHF (congestive heart failure) (HCC)  Assessment & Plan  Wt Readings from Last 3 Encounters:   09/09/19 82 3 kg (181 lb 7 oz)   07/15/19 81 6 kg (180 lb)   07/10/19 79 8 kg (176 lb)     History of chronic diastolic heart failure    -patient appears euvolemic, continue with Lasix    * Encephalopathy  Assessment & Plan  Toxic encephalopathy secondary to UTI with sepsis in addition to Delirium on dementia  This is now resolved  Patient's mentation is back to baseline  VTE Pharmacologic Prophylaxis:   Pharmacologic:  Eliquis    Patient Centered Rounds: I have performed bedside rounds with nursing staff today  Education and Discussions with Family / Patient: son, Dr Elizabeth Ashraf for Care: 20 minutes  More than 50% of total time spent on counseling and coordination of care as described above  Current Length of Stay: 3 day(s)    Current Patient Status: Inpatient   Certification Statement: The patient will continue to require additional inpatient hospital stay due to UTI, sepsis    Discharge Plan / Estimated Discharge Date:  2-3 days    Code Status: Level 1 - Full Code      Subjective:   Patient seen and examined at bedside, currently denies any chest pain, palpitations, dyspnea, abdominal pain    Objective:     Vitals:   Temp (24hrs), Av 3 °F (36 3 °C), Min:97 °F (36 1 °C), Max:97 8 °F (36 6 °C)    Temp:  [97 °F (36 1 °C)-97 8 °F (36 6 °C)] 97 °F (36 1 °C)  HR:  [67-75] 67  Resp:  [18-20] 18  BP: (136-161)/(89-93) 143/89  SpO2:  [92 %-95 %] 92 %  Body mass index is 26 03 kg/m²  Input and Output Summary (last 24 hours): Intake/Output Summary (Last 24 hours) at 2019 1126  Last data filed at 2019 0716  Gross per 24 hour   Intake    Output 224 ml   Net -224 ml       Physical Exam:    Constitutional: Patient is oriented to person, place and time, no acute distress  HEENT:  Normocephalic, atraumatic, EOMI, PERRLA, no scleral icterus, no pallor, moist oral mucosa  Neck:  Supple, no masses, no thyromegaly, no bruits Normal range of motion     Lymph nodes:  No lymphadenopathy  Cardiovascular: Normal S1S2, RRR, No murmurs/rubs/gallops appreciated  Pulmonary:  Bilateral air entry, No rhonchi/rales/wheezing appreciated  Abdominal: Soft, Bowel sounds present, Non-tender, Non-distended, No rebound/guarding, no hepatomegaly   Musculoskeletal: No tenderness/abnormality   Extremities:  No cyanosis, clubbing or edema  Peripheral pulses palpable and equal bilaterally  Neurological: Cranial nerves II-XII grossly intact, sensation intact, otherwise no focal neurological symptoms  Skin: Skin is warm and dry, no rashes  Additional Data:     Labs:    Results from last 7 days   Lab Units 09/08/19  0450   WBC Thousand/uL 10 97*   HEMOGLOBIN g/dL 16 2   HEMATOCRIT % 50 2*   PLATELETS Thousands/uL 179   NEUTROS PCT % 52   LYMPHS PCT % 33   MONOS PCT % 10   EOS PCT % 2     Results from last 7 days   Lab Units 09/08/19  0450 09/07/19  0547   POTASSIUM mmol/L 3 6 3 6   CHLORIDE mmol/L 107 104   CO2 mmol/L 29 28   BUN mg/dL 14 13   CREATININE mg/dL 0 97 0 88   CALCIUM mg/dL 8 8 8 6   ALK PHOS U/L  --  89   ALT U/L  --  21   AST U/L  --  26            I Have Reviewed All Lab Data Listed Above  Recent Cultures (last 7 days):     Results from last 7 days   Lab Units 09/06/19  1842 09/06/19  1841 09/06/19  1448 09/06/19  1407   BLOOD CULTURE  No Growth at 48 hrs  No Growth at 48 hrs  --  No Growth at 48 hrs  No Growth at 48 hrs     URINE CULTURE   --   --  >100,000 cfu/ml Klebsiella pneumoniae*  --        Last 24 Hours Medication List:     Current Facility-Administered Medications:  apixaban 5 mg Oral BID Jory Paul MD    cefepime 2,000 mg Intravenous Q12H Jory Paul MD Last Rate: 2,000 mg (09/09/19 0251)   ergocalciferol 50,000 Units Oral Weekly Jory Paul MD    famotidine 20 mg Oral BID Jory Paul MD    furosemide 40 mg Oral Daily Phani Valdovinos MD    gabapentin 400 mg Oral TID Jory Paul MD    insulin glargine 18 Units Subcutaneous HS Jory Paul MD    insulin lispro 1-5 Units Subcutaneous TID AC Dorina Denver, MD    insulin lispro 1-5 Units Subcutaneous HS Dorina Denver, MD    levalbuterol 1 25 mg Nebulization Q4H PRN Amee Kelley MD    melatonin 3 mg Oral HS Abdon Garcia DO    metoprolol tartrate 37 5 mg Oral Daily Amee Kelley MD    mirtazapine 30 mg Oral HS Amee Kelley MD    traMADol 50 mg Oral Q8H PRN Amee Kelley MD         Today, Patient Was Seen By: Dwight Adamson MD

## 2019-09-09 NOTE — PHYSICAL THERAPY NOTE
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, without long-term current use of insulin (HCC)    Chronic osteomyelitis of spine (HCC)    Atrial fibrillation with slow ventricular response (HCC)    Coronary atherosclerosis    Cancer, colon ([facility] )    Chronic deep vein thrombosis (DVT) of femoral vein of right lower extremity ([facility]    Pure hypercholesterolemia    Hemorrhoids    History of malignant neoplasm of large intestine    COLD (chronic obstructive lung disease) ([facility]    Chronic right-sided low back pain without sciatica    Allergic rhinitis    Benign essential hypertension    BPH with obstruction/lower urinary tract symptoms    CAD S/P percutaneous coronary angioplasty    Dysphagia    Gastroesophageal reflux disease without esophagitis    Floaters in visual field    Insomnia    Leukocytosis    Lumbar compression fracture ([facility]    OAB (overactive bladder)    Diverticulosis of large intestine    Hyperparathyroidism due to 1,25(0H)2d3 ([facility] )    Bilateral leg weakness    Mild depression ([facility]    Swallowing dysfunction    Sepsis ([facility]    UTI (urinary tract infection)    Encephalopathy       Admitting Diagnoses:    Altered mental status [R41 82]  Urinary tract infection [N39 0]  Sepsis ([facility] ) [A41 9]    Past Medical History:   Diagnosis Date    Anxiety     Arthritis     Atrial fibrillation ([facility]     BPH (benign prostatic hyperplasia)     CAD (coronary artery disease)     Chronic back pain     Chronic diastolic (congestive) heart failure (HCC)     COPD (chronic obstructive pulmonary disease) ([facility]     Critical illness polyneuropathy ([facility]     Diabetes mellitus ([facility] )     type 2    Dysphagia     GERD (gastroesophageal reflux disease)     Hemorrhoids     History of colon cancer     History of DVT (deep vein thrombosis)     Hyperlipidemia     Hypertension     Hyponatremia     Malignant neoplasm of colon (HCC)     Myocardial infarct ([facility]     Osteomyelitis ([facility] )     Pneumonia     Sepsis ([facility] )     Urinary retention        Past Surgical History:   Procedure Laterality Date    APPENDECTOMY      BACK SURGERY      BOTOX INJECTION N/A 2/8/2019    Procedure: BOTOX INJECTION 100 UNITS;  Surgeon: Soledad Goodell, MD;  [facility];  Service: Urology    BRONCHOSCOPY N/A 6/30/2016    Procedure: BRONCHOSCOPY FLEXIBLE with bronchial lavage to the left lower lobe; Surgeon: Clyde Devine MD;  Location: [facility]; Service:     CATARACT EXTRACTION      CHOLECYSTECTOMY      COLECTOMY      COLON SURGERY      polypectomy for cancerous lesion    COLONOSCOPY      CORONARY ANGIOPLASTY WITH STENT PLACEMENT      x5  pt unsure of where    GASTROSTOMY TUBE PLACEMENT      percutaneous placement of gastrostomy tube placed 4/16 - dc 8/16    HEMORROIDECTOMY      LUMBAR LAMINECTOMY      LUNG SURGERY      PEG TUBE REMOVAL      WA CYSTOURETHRO W/IMPLANT N/A 8/27/2018    Procedure: CYSTOSCOPY WITH INSERTION UROLIFT;  Surgeon: Soledad Goodell, MD;  [facility];  Service: Urology    WA CYSTOURETHROSCOPY N/A 2/8/2019    Procedure: Roberto Carlos First;  Surgeon: Soledad Goodell, MD;  [facility];  Service: Urology    TRACHEOSTOMY         Imaging Studies:  CT head wo contrast   Final Result by Jonnathan Kaplan MD (09/06 2031)      No acute intracranial abnormality  Generalized atrophy with mild cerebral chronic microangiopathic disease  Progressive sphenoid sinus disease with redemonstrated near total opacification of the right sphenoid sinus which contains heterogeneous soft tissue  Underlying polypoid mass lesion not excluded  Consider ENT consultation                    Workstation performed: [id number]         XR chest 2 views   Final Result by Shravan Hollis MD (09/06 6862)      Enlargement of cardiac silhouette  Shallow depth of inspiration  Mild pulmonary vascular congestion without overt pulmonary edema  Workstation performed: [id number]              09/09/19 1208   Note Type   Note type Eval only   Pain Assessment   Pain Score No Pain   Home Living   Type of Home Apartment  ([facility])   Home Layout One level;Elevator   Bathroom Shower/Tub Walk-in shower   Bathroom Equipment Grab bars in shower; Shower chair;Grab bars around toilet   Home Equipment Walker;Cane   Prior Function   Level of [place] Independent with ADLs and functional mobility  (w/ SPC)   Lives With Alone   Receives Help From Family;Home health   ADL Assistance Independent   Falls in the last 6 months 0   Comments Pt reports that he has cleaning lady 1x/wk; has a private nurse for [employer]; son visits everyday   Restrictions/Precautions   Weight Bearing Precautions Per Order No   Other Precautions Fall Risk   General   Family/Caregiver Present No   Cognition   Arousal/Participation Alert   Orientation Level Oriented to person;Oriented to place; Disoriented to time   Following Commands Follows one step commands without difficulty   RUE Assessment   RUE Assessment WFL  (4+/5 grossly)   LUE Assessment   LUE Assessment WFL  (4+/5 grossly)   RLE Assessment   RLE Assessment WFL  (4+/5 grossly)   LLE Assessment   LLE Assessment WFL  (4+/5 grossly)   Coordination   Movements are Fluid and Coordinated 1   Sensation WFL   Bed Mobility   Supine to Sit 6  Modified independent   Sit to Supine 6  Modified independent   Transfers   Sit to Stand 6  Modified independent   Stand to Sit 6  Modified independent   Ambulation/Elevation   Gait pattern Decreased foot clearance; Excessively slow   Gait Assistance 5  Supervision   Assistive Device Straight cane   Distance 150'x1   Balance   Static Sitting Normal   Static Standing Fair +   Ambulatory Fair   Endurance Deficit   Endurance Deficit No   Activity Tolerance   Activity Tolerance Patient tolerated treatment well   Nurse Made Aware RN Aby   Assessment   Prognosis Good   Problem List Impaired balance   Assessment Pt  84 y o male presented with acute encephalopathy, leukocytosis, cloudy urine-patient s/p Botox injection to the bladder on 08/08/2019 for urge incontinence failing multiple interventions  Pt admitted for acuate Encephalopathy w/ sepsis 2* to UTI; afib w/ slow ventricular response  Pt referred to PT for mobility assessment & D/C planning  PTA, pt reports being fairly I w/ SPC; has a cleaning lady 1x/wk; private nurse for med mgt  Pt lives alone in [place] w/ elevator access  On eval, pt demonstrate dec mobility, balance, endurance & amb  Appears to be functioning close to his baseline function  Pt modified I w/ bed mobility & transfers; S for amb w/ SPC + cues for techniques  Gait deviations as above, slow w/ dec foot clearance but no gross LOB noted  Pt disoriented to time/date but pt state that's baseline for him as he doesn't track date anymore  No dizziness reported t/o session  Nsg staff most recent vital signs as follows: /89 (BP Location: Left arm)   Pulse 67   Temp (!) 97 °F (36 1 °C) (Temporal)   Resp 18   Ht 5' 10" (1 778 m)   Wt 82 3 kg (181 lb 7 oz)   SpO2 92%   BMI 26 03 kg/m²   Pt feels he functioning at his baseline  Pt state no concerns about going home  At end of session, pt back in bed w/o issues, call bell & phone in reach  Fall precautions reinforced w/ good understanding  Pt functioning minimally below baseline hence will continue skilled PT 2-3x/wk to improve function & safety  At this time, will anticipate good progress in PT for safe D/C to home  Will recommend HHPT & family support at D/C  CM to follow  Nsg staff to continue to mobilized pt (OOB in chair for all meals & ambulate in room/unit) as tolerated to prevent further decline in function  Nsg notified      Barriers to Discharge Decreased caregiver support   Barriers to Discharge Comments pt home alone   Goals   Patient Goals to go home soon   STG Expiration Date 09/16/19   Short Term Goal #1 Goals to be met in 7 days; pt will be able to: 1) inc strength & balance by 1/2 grade to improve overall functional mobility & dec fall risk; 2) inc bed mobility to I for pt to be able to get in/OOB safely w/ proper techniques 100% of the time, to dec caregiver assistance & safely function at home; 3) inc transfers to I for pt to transition safely from one surface to another w/o % of the time, to dec caregiver assistance & safely function at home; 4) inc amb w/ SPC  approx  >350' w/ modified I for pt to ambulate community distances w/o any % of the time, to dec caregiver assistance & safely function at home; 5) inc barthel score to 85 to decrease overall risk for falls; 6)  pt/caregiver ed   Treatment Day 0   Plan   Treatment/Interventions Functional transfer training;LE strengthening/ROM; Therapeutic exercise; Endurance training;Patient/family training;Bed mobility;Gait training;Spoke to nursing;OT   PT Frequency 2-3x/wk   Recommendation   Recommendation Home with family support;Home PT; Other (Comment)  (pt feels he doesn't need HHPT)   Equipment Recommended Cane   PT - OK to Discharge Yes  (when medically cleared)   Barthel Index   Feeding 10   Bathing 0   Grooming Score 5   Dressing Score 5   Bladder Score 10   Bowels Score 10   Toilet Use Score 5   Transfers (Bed/Chair) Score 10   Mobility (Level Surface) Score 10   Stairs Score 0   Barthel Index Score 65   Hx/personal factors: co-morbidities, home alone, advanced age, telemetry, use of AD, dec cognition, fall risk and assist w/ ADL's  Examination: dec mobility, dec balance, dec amb, moderate fall risk, dec cognition  Clinical: unpredictable (ongoing medical status, abnormal lab values and moderate fall risk)  Complexity: high     Velinda Bullocks, PT

## 2019-09-09 NOTE — PLAN OF CARE
Problem: PHYSICAL THERAPY ADULT  Goal: Performs mobility at highest level of function for planned discharge setting  See evaluation for individualized goals  Description  Treatment/Interventions: Functional transfer training, LE strengthening/ROM, Therapeutic exercise, Endurance training, Patient/family training, Bed mobility, Gait training, Spoke to nursing, OT  Equipment Recommended: Johanna Grady       See flowsheet documentation for full assessment, interventions and recommendations  Note:   Prognosis: Good  Problem List: Impaired balance  Assessment: Pt  84 y o male presented with acute encephalopathy, leukocytosis, cloudy urine-patient s/p Botox injection to the bladder on 08/08/2019 for urge incontinence failing multiple interventions  Pt admitted for acuate Encephalopathy w/ sepsis 2* to UTI; afib w/ slow ventricular response  Pt referred to PT for mobility assessment & D/C planning  PTA, pt reports being fairly I w/ SPC; has a cleaning lady 1x/wk; private nurse for med mgt  Pt lives alone in [place] w/ elevator access  On eval, pt demonstrate dec mobility, balance, endurance & amb  Appears to be functioning close to his baseline function  Pt modified I w/ bed mobility & transfers; S for amb w/ SPC + cues for techniques  Gait deviations as above, slow w/ dec foot clearance but no gross LOB noted  Pt disoriented to time/date but pt state that's baseline for him as he doesn't track date anymore  No dizziness reported t/o session  Nsg staff most recent vital signs as follows: /89 (BP Location: Left arm)   Pulse 67   Temp (!) 97 °F (36 1 °C) (Temporal)   Resp 18   Ht 5' 10" (1 778 m)   Wt 82 3 kg (181 lb 7 oz)   SpO2 92%   BMI 26 03 kg/m²   Pt feels he functioning at his baseline  Pt state no concerns about going home  At end of session, pt back in bed w/o issues, call bell & phone in reach  Fall precautions reinforced w/ good understanding   Pt functioning minimally below baseline hence will continue skilled PT 2-3x/wk to improve function & safety  At this time, will anticipate good progress in PT for safe D/C to home  Will recommend HHPT & family support at D/C  CM to follow  Nsg staff to continue to mobilized pt (OOB in chair for all meals & ambulate in room/unit) as tolerated to prevent further decline in function  Nsg notified  Barriers to Discharge: Decreased caregiver support  Barriers to Discharge Comments: pt home alone  Recommendation: Home with family support, Home PT, Other (Comment)(pt feels he doesn't need HHPT)     PT - OK to Discharge: Yes(when medically cleared)    See flowsheet documentation for full assessment

## 2019-09-09 NOTE — PLAN OF CARE
Problem: Potential for Falls  Goal: Patient will remain free of falls  Description  INTERVENTIONS:  - Assess patient frequently for physical needs  -  Identify cognitive and physical deficits and behaviors that affect risk of falls    -  [place] fall precautions as indicated by assessment   - Educate patient/family on patient safety including physical limitations  - Instruct patient to call for assistance with activity based on assessment  - Modify environment to reduce risk of injury  - Consider OT/PT consult to assist with strengthening/mobility  Outcome: Progressing     Problem: Prexisting or High Potential for Compromised Skin Integrity  Goal: Skin integrity is maintained or improved  Description  INTERVENTIONS:  - Identify patients at risk for skin breakdown  - Assess and monitor skin integrity  - Assess and monitor nutrition and hydration status  - Monitor labs   - Assess for incontinence   - Turn and reposition patient  - Assist with mobility/ambulation  - Relieve pressure over bony prominences  - Avoid friction and shearing  - Provide appropriate hygiene as needed including keeping skin clean and dry  - Evaluate need for skin moisturizer/barrier cream  - Collaborate with interdisciplinary team   - Patient/family teaching  - Consider wound care consult   Outcome: Progressing     Problem: NEUROSENSORY - ADULT  Goal: Achieves stable or improved neurological status  Description  INTERVENTIONS  - Monitor and report changes in neurological status  - Monitor vital signs such as temperature, blood pressure, glucose, and any other labs ordered   - Initiate measures to prevent increased intracranial pressure  - Monitor for seizure activity and implement precautions if appropriate      Outcome: Progressing  Goal: Remains free of injury related to seizures activity  Description  INTERVENTIONS  - Maintain airway, patient safety  and administer oxygen as ordered  - Monitor patient for seizure activity, document and report duration and description of seizure to physician/advanced practitioner  - If seizure occurs,  ensure patient safety during seizure  - Reorient patient post seizure  - Seizure pads on all 4 side rails  - Instruct patient/family to notify RN of any seizure activity including if an aura is experienced  - Instruct patient/family to call for assistance with activity based on nursing assessment  - Administer anti-seizure medications if ordered    Outcome: Progressing  Goal: Achieves maximal functionality and self care  Description  INTERVENTIONS  - Monitor swallowing and airway patency with patient fatigue and changes in neurological status  - Encourage and assist patient to increase activity and self care     - Encourage visually impaired, hearing impaired and aphasic patients to use assistive/communication devices  Outcome: Progressing     Problem: RESPIRATORY - ADULT  Goal: Achieves optimal ventilation and oxygenation  Description  INTERVENTIONS:  - Assess for changes in respiratory status  - Assess for changes in mentation and behavior  - Position to facilitate oxygenation and minimize respiratory effort  - Oxygen administered by appropriate delivery if ordered  - Initiate smoking cessation education as indicated  - Encourage broncho-pulmonary hygiene including cough, deep breathe, Incentive Spirometry  - Assess the need for suctioning and aspirate as needed  - Assess and instruct to report SOB or any respiratory difficulty  - Respiratory Therapy support as indicated  Outcome: Progressing     Problem: GENITOURINARY - ADULT  Goal: Maintains or returns to baseline urinary function  Description  INTERVENTIONS:  - Assess urinary function  - Encourage oral fluids to ensure adequate hydration if ordered  - Administer IV fluids as ordered to ensure adequate hydration  - Administer ordered medications as needed  - Offer frequent toileting  - Follow urinary retention protocol if ordered  Outcome: Progressing  Goal: Absence of urinary retention  Description  INTERVENTIONS:  - Assess patients ability to void and empty bladder  - Monitor I/O  - Bladder scan as needed  - Discuss with physician/AP medications to alleviate retention as needed  - Discuss catheterization for long term situations as appropriate  Outcome: Progressing  Goal: Urinary catheter remains patent  Description  INTERVENTIONS:  - Assess patency of urinary catheter  - If patient has a chronic [place], consider changing catheter if non-functioning  - Follow guidelines for intermittent irrigation of non-functioning urinary catheter  Outcome: Progressing     Problem: METABOLIC, FLUID AND ELECTROLYTES - ADULT  Goal: Electrolytes maintained within normal limits  Description  INTERVENTIONS:  - Monitor labs and assess patient for signs and symptoms of electrolyte imbalances  - Administer electrolyte replacement as ordered  - Monitor response to electrolyte replacements, including repeat lab results as appropriate  - Instruct patient on fluid and nutrition as appropriate  Outcome: Progressing  Goal: Fluid balance maintained  Description  INTERVENTIONS:  - Monitor labs   - Monitor I/O and WT  - Instruct patient on fluid and nutrition as appropriate  - Assess for signs & symptoms of volume excess or deficit  Outcome: Progressing  Goal: Glucose maintained within target range  Description  INTERVENTIONS:  - Monitor Blood Glucose as ordered  - Assess for signs and symptoms of hyperglycemia and hypoglycemia  - Administer ordered medications to maintain glucose within target range  - Assess nutritional intake and initiate nutrition service referral as needed  Outcome: Progressing     Problem: SKIN/TISSUE INTEGRITY - ADULT  Goal: Skin integrity remains intact  Description  INTERVENTIONS  - Identify patients at risk for skin breakdown  - Assess and monitor skin integrity  - Assess and monitor nutrition and hydration status  - Monitor labs (i e  albumin)  - Assess for incontinence - Turn and reposition patient  - Assist with mobility/ambulation  - Relieve pressure over bony prominences  - Avoid friction and shearing  - Provide appropriate hygiene as needed including keeping skin clean and dry  - Evaluate need for skin moisturizer/barrier cream  - Collaborate with interdisciplinary team (i e  Nutrition, Rehabilitation, etc )   - Patient/family teaching  Outcome: Progressing     Problem: HEMATOLOGIC - ADULT  Goal: Maintains hematologic stability  Description  INTERVENTIONS  - Assess for signs and symptoms of bleeding or hemorrhage  - Monitor labs  - Administer supportive blood products/factors as ordered and appropriate  Outcome: Progressing     Problem: MUSCULOSKELETAL - ADULT  Goal: Maintain or return mobility to safest level of function  Description  INTERVENTIONS:  - Assess patient's ability to carry out ADLs; assess patient's baseline for ADL function and identify physical deficits which impact ability to perform ADLs (bathing, care of mouth/teeth, toileting, grooming, dressing, etc )  - Assess/evaluate cause of self-care deficits   - Assess range of motion  - Assess patient's mobility  - Assess patient's need for assistive devices and provide as appropriate  - Encourage maximum independence but intervene and supervise when necessary  - Involve family in performance of ADLs  - Assess for home care needs following discharge   - Consider OT consult to assist with ADL evaluation and planning for discharge  - Provide patient education as appropriate  Outcome: Progressing     Problem: PAIN - ADULT  Goal: Verbalizes/displays adequate comfort level or baseline comfort level  Description  Interventions:  - Encourage patient to monitor pain and request assistance  - Assess pain using appropriate pain scale  - Administer analgesics based on type and severity of pain and evaluate response  - Implement non-pharmacological measures as appropriate and evaluate response  - Consider cultural and social influences on pain and pain management  - Notify physician/advanced practitioner if interventions unsuccessful or patient reports new pain  Outcome: Progressing     Problem: INFECTION - ADULT  Goal: Absence or prevention of progression during hospitalization  Description  INTERVENTIONS:  - Assess and monitor for signs and symptoms of infection  - Monitor lab/diagnostic results  - Monitor all insertion sites, i e  indwelling lines, tubes, and drains  - Monitor endotracheal if appropriate and nasal secretions for changes in amount and color  - [place] appropriate cooling/warming therapies per order  - Administer medications as ordered  - Instruct and encourage patient and family to use good hand hygiene technique  - Identify and instruct in appropriate isolation precautions for identified infection/condition  Outcome: Progressing     Problem: SAFETY ADULT  Goal: Maintain or return to baseline ADL function  Description  INTERVENTIONS:  -  Assess patient's ability to carry out ADLs; assess patient's baseline for ADL function and identify physical deficits which impact ability to perform ADLs (bathing, care of mouth/teeth, toileting, grooming, dressing, etc )  - Assess/evaluate cause of self-care deficits   - Assess range of motion  - Assess patient's mobility; develop plan if impaired  - Assess patient's need for assistive devices and provide as appropriate  - Encourage maximum independence but intervene and supervise when necessary  - Involve family in performance of ADLs  - Assess for home care needs following discharge   - Consider OT consult to assist with ADL evaluation and planning for discharge  - Provide patient education as appropriate  Outcome: Progressing  Goal: Maintain or return mobility status to optimal level  Description  INTERVENTIONS:  - Assess patient's baseline mobility status (ambulation, transfers, stairs, etc )    - Identify cognitive and physical deficits and behaviors that affect mobility  - Identify mobility aids required to assist with transfers and/or ambulation (gait belt, sit-to-stand, lift, walker, cane, etc )  - [place] fall precautions as indicated by assessment  - Record patient progress and toleration of activity level on Mobility SBAR; progress patient to next Phase/Stage  - Instruct patient to call for assistance with activity based on assessment  - Consider rehabilitation consult to assist with strengthening/weightbearing, etc   Outcome: Progressing     Problem: DISCHARGE PLANNING  Goal: Discharge to home or other facility with appropriate resources  Description  INTERVENTIONS:  - Identify barriers to discharge w/patient and caregiver  - Arrange for needed discharge resources and transportation as appropriate  - Identify discharge learning needs (meds, wound care, etc )  - Arrange for interpretive services to assist at discharge as needed  - Refer to Case Management Department for coordinating discharge planning if the patient needs post-hospital services based on physician/advanced practitioner order or complex needs related to functional status, cognitive ability, or social support system  Outcome: Progressing     Problem: Knowledge Deficit  Goal: Patient/family/caregiver demonstrates understanding of disease process, treatment plan, medications, and discharge instructions  Description  Complete learning assessment and assess knowledge base  Interventions:  - Provide teaching at level of understanding  - Provide teaching via preferred learning methods  Outcome: Progressing     Problem: Nutrition/Hydration-ADULT  Goal: Nutrient/Hydration intake appropriate for improving, restoring or maintaining nutritional needs  Description  Monitor and assess patient's nutrition/hydration status for malnutrition  Collaborate with interdisciplinary team and initiate plan and interventions as ordered  Monitor patient's weight and dietary intake as ordered or per policy   Utilize nutrition screening tool and intervene as necessary  Determine patient's food preferences and provide high-protein, high-caloric foods as appropriate       INTERVENTIONS:  - Monitor oral intake, urinary output, labs, and treatment plans  - Assess nutrition and hydration status and recommend course of action  - Evaluate amount of meals eaten  - Assist patient with eating if necessary   - Allow adequate time for meals  - Recommend/ encourage appropriate diets, oral nutritional supplements, and vitamin/mineral supplements  - Order, calculate, and assess calorie counts as needed  - Recommend, monitor, and adjust tube feedings and TPN/PPN based on assessed needs  - Assess need for intravenous fluids  - Provide specific nutrition/hydration education as appropriate  - Include patient/family/caregiver in decisions related to nutrition  Outcome: Progressing

## 2019-09-09 NOTE — ASSESSMENT & PLAN NOTE
-urine culture reveals Klebsiella sensitive to cephalosporin  -blood culture negative thus far  -will continue with cefepime day 4

## 2019-09-09 NOTE — UTILIZATION REVIEW
Initial Clinical Review    Admission: Date/Time/Statement: Inpatient Admission Orders (From admission, onward)     Ordered        09/06/19 1654  Inpatient Admission  Once                   Orders Placed This Encounter   Procedures    Inpatient Admission     Standing Status:   Standing     Number of Occurrences:   1     Order Specific Question:   Admitting Physician     Answer:   Luis Temple     Order Specific Question:   Level of Care     Answer:   Med Surg [16]     Order Specific Question:   Estimated length of stay     Answer:   More than 2 Midnights     Order Specific Question:   Certification     Answer:   I certify that inpatient services are medically necessary for this patient for a duration of greater than two midnights  See H&P and MD Progress Notes for additional information about the patient's course of treatment  ED Arrival Information     Expected Arrival Acuity Means of Arrival Escorted By Service Admission Type    - 9/6/2019 13:17 Urgent Wheelchair Family Member Hospitalist Urgent    Arrival Complaint    mental status change        Chief Complaint   Patient presents with    Altered Mental Status     Pt coming in with caregiver, reports "acute mental status change  He was confused this morning but could be reoriented " Reports, "Shakey and chills but no fever " Caregiver reports urinary history and today has nitrates, leuks, and protein  States, "Not himself, but he's out of it " Caregiver is a nurse, reports, "His urine is thick and cloudy " Reports finished course of macrobid  Assessment/Plan: 81 yr old male presents with altered mental status from home via w/c   The son states he is usually alert and oriented , has short term memory loss but not demented or confused  He is tachycardic a, with wbc 16 and his urine is + uti  Has hx of afib on eliquis and metoprolol  plan is  Treat sepsis, cultures pending, cefepime iv, trend wbc  Iv fluids    His encephalopathy is likely secondary to urosepsis   He will be admitted as an inpatient with urosepsis     ED Triage Vitals [09/06/19 1326]   Temperature Pulse Respirations Blood Pressure SpO2   97 6 °F (36 4 °C) 103 16 142/66 94 %      Temp Source Heart Rate Source Patient Position - Orthostatic VS BP Location FiO2 (%)   Oral Monitor Sitting Right arm --      Pain Score       No Pain        Wt Readings from Last 1 Encounters:   09/09/19 82 3 kg (181 lb 7 oz)     Additional Vital Signs:   09/09/19 1533  97 5 °F (36 4 °C)  62  18  125/82  92 %  None (Room air)  Lying   09/09/19 0730            None (Room air)     09/09/19 0717  97 °F (36 1 °C)Abnormal   67  18  143/89  92 %  None (Room air)  Lying   09/08/19 2219  97 8 °F (36 6 °C)  73  20  136/91  92 %  None (Room air)  Lying   09/08/19 1445  97 2 °F (36 2 °C)Abnormal   75  20  161/93  95 %  None (Room air)  Lying   09/08/19 0700  97 4 °F (36 3 °C)Abnormal   72  20  139/91  92 %  None (Room air)  Lying   09/07/19 2243  99 3 °F (37 4 °C)  76  18  120/84  94 %  None (Room air)  Lying       Pertinent Labs/Diagnostic Test Results:   Results from last 7 days   Lab Units 09/08/19  0450 09/07/19  0547 09/06/19  1357   WBC Thousand/uL 10 97* 14 02* 16 17*   HEMOGLOBIN g/dL 16 2 15 5 16 9   HEMATOCRIT % 50 2* 47 3 51 5*   PLATELETS Thousands/uL 179 166 203   NEUTROS ABS Thousands/µL 5 78  --  10 40*         Results from last 7 days   Lab Units 09/08/19  0450 09/07/19  0547 09/06/19  1357   SODIUM mmol/L 143 141 138   POTASSIUM mmol/L 3 6 3 6 4 0   CHLORIDE mmol/L 107 104 98*   CO2 mmol/L 29 28 31   ANION GAP mmol/L 7 9 9   BUN mg/dL 14 13 15   CREATININE mg/dL 0 97 0 88 1 11   EGFR ml/min/1 73sq m 72 79 61   CALCIUM mg/dL 8 8 8 6 9 2     Results from last 7 days   Lab Units 09/07/19  0547 09/06/19  1357   AST U/L 26 30   ALT U/L 21 28   ALK PHOS U/L 89 114   TOTAL PROTEIN g/dL 6 8 8 4*   ALBUMIN g/dL 2 7* 3 6   TOTAL BILIRUBIN mg/dL 1 59* 1 43*     Results from last 7 days   Lab Units 09/09/19  1130 09/09/19  0719 09/08/19  2104 09/08/19  1550 09/08/19  1054 09/08/19  0706 09/07/19  2105 09/07/19  1613 09/07/19  1251   POC GLUCOSE mg/dl 212* 132 270* 147* 244* 115 170* 251* 134     Results from last 7 days   Lab Units 09/08/19  0450 09/07/19  0547 09/06/19  1357   GLUCOSE RANDOM mg/dL 119 134 239*     Results from last 7 days   Lab Units 09/06/19  1357   TROPONIN I ng/mL <0 02     Results from last 7 days   Lab Units 09/08/19  0450 09/06/19  1357   PROCALCITONIN ng/ml 0 09 0 08     Results from last 7 days   Lab Units 09/06/19  1406   LACTIC ACID mmol/L 1 9             Results from last 7 days   Lab Units 09/06/19  1357   NT-PRO BNP pg/mL 1,691*     Results from last 7 days   Lab Units 09/06/19  1448 09/06/19  1444   CLARITY UA  Cloudy cloudy   COLOR UA  Yellow yellow   SPEC GRAV UA  1 020  --    PH UA  6 0  --    GLUCOSE UA mg/dl Negative  --    KETONES UA mg/dl Negative  --    BLOOD UA  Moderate*  --    PROTEIN UA mg/dl 100 (2+)*  --    NITRITE UA  Positive*  --    BILIRUBIN UA  Negative  --    UROBILINOGEN UA E U /dl 0 2  --    LEUKOCYTES UA  Large*  --    WBC UA /hpf Field obscured, unable to enumerate*  --    RBC UA /hpf Field obscured, unable to enumerate*  --    BACTERIA UA /hpf Field obscured, unable to enumerate*  --    EPITHELIAL CELLS WET PREP /hpf Field obscured, unable to enumerate*  --        Results from last 7 days   Lab Units 09/06/19  1842 09/06/19  1841 09/06/19  1448 09/06/19  1407   BLOOD CULTURE  No Growth at 48 hrs  No Growth at 48 hrs  --  No Growth at 48 hrs  No Growth at 48 hrs  URINE CULTURE   --   --  >100,000 cfu/ml Klebsiella pneumoniae*  --    chest-IMPRESSION:     Enlargement of cardiac silhouette  Shallow depth of inspiration    Mild pulmonary vascular congestion without overt pulmonary edema         ED Treatment:   Medication Administration from 09/06/2019 1317 to 09/06/2019 1808       Date/Time Order Dose Route Action Comments     09/06/2019 1422 cefepime (MAXIPIME) 2 g/50 mL dextrose IVPB 2,000 mg Intravenous New Bag      09/06/2019 1531 sodium chloride 0 9 % bolus 500 mL 500 mL Intravenous New Bag         Past Medical History:   Diagnosis Date    Anxiety     Arthritis     Atrial fibrillation (HCC)     BPH (benign prostatic hyperplasia)     CAD (coronary artery disease)     Chronic back pain     Chronic diastolic [facility]congestive) heart failure [facility])     COPD (chronic obstructive pulmonary disease) ([facility])     Critical illness polyneuropathy ([facility])     Diabetes mellitus ([facility] )     type 2    Dysphagia     GERD (gastroesophageal reflux disease)     Hemorrhoids     History of colon cancer     History of DVT (deep vein thrombosis)     Hyperlipidemia     Hypertension     Hyponatremia     Malignant neoplasm of colon ([facility] )     Myocardial infarct ([place] [address] )     Osteomyelitis ([place] [address] )     Pneumonia     Sepsis ([place] [address] )     Urinary retention      Present on Admission:   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, without long-term current use of insulin ([facility])   Benign essential hypertension   Atrial fibrillation with slow ventricular response ([facility])   UTI (urinary tract infection)   Encephalopathy   CHF (congestive heart failure) ([facility])   Bilateral leg weakness      Admitting Diagnosis: Altered mental status [R41 82]  Urinary tract infection [N39 0]  Sepsis ([facility] ) [A41 9]  Age/Sex: 81 y o  male  Admission Orders:    Current Facility-Administered Medications:  apixaban 5 mg Oral BID    cefepime 2,000 mg Intravenous Q12H Last Rate: 2,000 mg (09/09/19 1354)   ergocalciferol 50,000 Units Oral Weekly    famotidine 20 mg Oral BID    furosemide 40 mg Oral Daily    gabapentin 400 mg Oral TID    insulin glargine 18 Units Subcutaneous HS    insulin lispro 1-5 Units Subcutaneous TID AC    insulin lispro 1-5 Units Subcutaneous HS    levalbuterol 1 25 mg Nebulization Q4H PRN    melatonin 3 mg Oral HS    metoprolol tartrate 37 5 mg Oral Daily    mirtazapine 30 mg Oral HS traMADol 50 mg Oral Q8H PRN            Network Utilization Review Department  Phone: [phone]; Fax [phone]  [email or web address]  ATTENTION: Please call with any questions or concerns to [phone]  and carefully listen to the prompts so that you are directed to the right person  Send all requests for admission clinical reviews, approved or denied determinations and any other requests to fax [phone]   All voicemails are confidential

## 2019-09-09 NOTE — PLAN OF CARE
Problem: Potential for Falls  Goal: Patient will remain free of falls  Description  INTERVENTIONS:  - Assess patient frequently for physical needs  -  Identify cognitive and physical deficits and behaviors that affect risk of falls    -  [place] fall precautions as indicated by assessment   - Educate patient/family on patient safety including physical limitations  - Instruct patient to call for assistance with activity based on assessment  - Modify environment to reduce risk of injury  - Consider OT/PT consult to assist with strengthening/mobility  Outcome: Progressing     Problem: Prexisting or High Potential for Compromised Skin Integrity  Goal: Skin integrity is maintained or improved  Description  INTERVENTIONS:  - Identify patients at risk for skin breakdown  - Assess and monitor skin integrity  - Assess and monitor nutrition and hydration status  - Monitor labs   - Assess for incontinence   - Turn and reposition patient  - Assist with mobility/ambulation  - Relieve pressure over bony prominences  - Avoid friction and shearing  - Provide appropriate hygiene as needed including keeping skin clean and dry  - Evaluate need for skin moisturizer/barrier cream  - Collaborate with interdisciplinary team   - Patient/family teaching  - Consider wound care consult   Outcome: Progressing     Problem: NEUROSENSORY - ADULT  Goal: Achieves stable or improved neurological status  Description  INTERVENTIONS  - Monitor and report changes in neurological status  - Monitor vital signs such as temperature, blood pressure, glucose, and any other labs ordered   - Initiate measures to prevent increased intracranial pressure  - Monitor for seizure activity and implement precautions if appropriate      Outcome: Progressing  Goal: Remains free of injury related to seizures activity  Description  INTERVENTIONS  - Maintain airway, patient safety  and administer oxygen as ordered  - Monitor patient for seizure activity, document and report duration and description of seizure to physician/advanced practitioner  - If seizure occurs,  ensure patient safety during seizure  - Reorient patient post seizure  - Seizure pads on all 4 side rails  - Instruct patient/family to notify RN of any seizure activity including if an aura is experienced  - Instruct patient/family to call for assistance with activity based on nursing assessment  - Administer anti-seizure medications if ordered    Outcome: Progressing  Goal: Achieves maximal functionality and self care  Description  INTERVENTIONS  - Monitor swallowing and airway patency with patient fatigue and changes in neurological status  - Encourage and assist patient to increase activity and self care     - Encourage visually impaired, hearing impaired and aphasic patients to use assistive/communication devices  Outcome: Progressing     Problem: RESPIRATORY - ADULT  Goal: Achieves optimal ventilation and oxygenation  Description  INTERVENTIONS:  - Assess for changes in respiratory status  - Assess for changes in mentation and behavior  - Position to facilitate oxygenation and minimize respiratory effort  - Oxygen administered by appropriate delivery if ordered  - Initiate smoking cessation education as indicated  - Encourage broncho-pulmonary hygiene including cough, deep breathe, Incentive Spirometry  - Assess the need for suctioning and aspirate as needed  - Assess and instruct to report SOB or any respiratory difficulty  - Respiratory Therapy support as indicated  Outcome: Progressing     Problem: GENITOURINARY - ADULT  Goal: Maintains or returns to baseline urinary function  Description  INTERVENTIONS:  - Assess urinary function  - Encourage oral fluids to ensure adequate hydration if ordered  - Administer IV fluids as ordered to ensure adequate hydration  - Administer ordered medications as needed  - Offer frequent toileting  - Follow urinary retention protocol if ordered  Outcome: Progressing  Goal: Absence of urinary retention  Description  INTERVENTIONS:  - Assess patients ability to void and empty bladder  - Monitor I/O  - Bladder scan as needed  - Discuss with physician/AP medications to alleviate retention as needed  - Discuss catheterization for long term situations as appropriate  Outcome: Progressing  Goal: Urinary catheter remains patent  Description  INTERVENTIONS:  - Assess patency of urinary catheter  - If patient has a chronic hollings[place], consider changing catheter if non-functioning  - Follow guidelines for intermittent irrigation of non-functioning urinary catheter  Outcome: Progressing     Problem: METABOLIC, FLUID AND ELECTROLYTES - ADULT  Goal: Electrolytes maintained within normal limits  Description  INTERVENTIONS:  - Monitor labs and assess patient for signs and symptoms of electrolyte imbalances  - Administer electrolyte replacement as ordered  - Monitor response to electrolyte replacements, including repeat lab results as appropriate  - Instruct patient on fluid and nutrition as appropriate  Outcome: Progressing  Goal: Fluid balance maintained  Description  INTERVENTIONS:  - Monitor labs   - Monitor I/O and WT  - Instruct patient on fluid and nutrition as appropriate  - Assess for signs & symptoms of volume excess or deficit  Outcome: Progressing  Goal: Glucose maintained within target range  Description  INTERVENTIONS:  - Monitor Blood Glucose as ordered  - Assess for signs and symptoms of hyperglycemia and hypoglycemia  - Administer ordered medications to maintain glucose within target range  - Assess nutritional intake and initiate nutrition service referral as needed  Outcome: Progressing     Problem: SKIN/TISSUE INTEGRITY - ADULT  Goal: Skin integrity remains intact  Description  INTERVENTIONS  - Identify patients at risk for skin breakdown  - Assess and monitor skin integrity  - Assess and monitor nutrition and hydration status  - Monitor labs (i e  albumin)  - Assess for incontinence - Turn and reposition patient  - Assist with mobility/ambulation  - Relieve pressure over bony prominences  - Avoid friction and shearing  - Provide appropriate hygiene as needed including keeping skin clean and dry  - Evaluate need for skin moisturizer/barrier cream  - Collaborate with interdisciplinary team (i e  Nutrition, Rehabilitation, etc )   - Patient/family teaching  Outcome: Progressing     Problem: HEMATOLOGIC - ADULT  Goal: Maintains hematologic stability  Description  INTERVENTIONS  - Assess for signs and symptoms of bleeding or hemorrhage  - Monitor labs  - Administer supportive blood products/factors as ordered and appropriate  Outcome: Progressing     Problem: MUSCULOSKELETAL - ADULT  Goal: Maintain or return mobility to safest level of function  Description  INTERVENTIONS:  - Assess patient's ability to carry out ADLs; assess patient's baseline for ADL function and identify physical deficits which impact ability to perform ADLs (bathing, care of mouth/teeth, toileting, grooming, dressing, etc )  - Assess/evaluate cause of self-care deficits   - Assess range of motion  - Assess patient's mobility  - Assess patient's need for assistive devices and provide as appropriate  - Encourage maximum independence but intervene and supervise when necessary  - Involve family in performance of ADLs  - Assess for home care needs following discharge   - Consider OT consult to assist with ADL evaluation and planning for discharge  - Provide patient education as appropriate  Outcome: Progressing     Problem: PAIN - ADULT  Goal: Verbalizes/displays adequate comfort level or baseline comfort level  Description  Interventions:  - Encourage patient to monitor pain and request assistance  - Assess pain using appropriate pain scale  - Administer analgesics based on type and severity of pain and evaluate response  - Implement non-pharmacological measures as appropriate and evaluate response  - Consider cultural and social influences on pain and pain management  - Notify physician/advanced practitioner if interventions unsuccessful or patient reports new pain  Outcome: Progressing     Problem: INFECTION - ADULT  Goal: Absence or prevention of progression during hospitalization  Description  INTERVENTIONS:  - Assess and monitor for signs and symptoms of infection  - Monitor lab/diagnostic results  - Monitor all insertion sites, i e  indwelling lines, tubes, and drains  - Monitor endotracheal if appropriate and nasal secretions for changes in amount and color  - [place] appropriate cooling/warming therapies per order  - Administer medications as ordered  - Instruct and encourage patient and family to use good hand hygiene technique  - Identify and instruct in appropriate isolation precautions for identified infection/condition  Outcome: Progressing     Problem: SAFETY ADULT  Goal: Maintain or return to baseline ADL function  Description  INTERVENTIONS:  -  Assess patient's ability to carry out ADLs; assess patient's baseline for ADL function and identify physical deficits which impact ability to perform ADLs (bathing, care of mouth/teeth, toileting, grooming, dressing, etc )  - Assess/evaluate cause of self-care deficits   - Assess range of motion  - Assess patient's mobility; develop plan if impaired  - Assess patient's need for assistive devices and provide as appropriate  - Encourage maximum independence but intervene and supervise when necessary  - Involve family in performance of ADLs  - Assess for home care needs following discharge   - Consider OT consult to assist with ADL evaluation and planning for discharge  - Provide patient education as appropriate  Outcome: Progressing  Goal: Maintain or return mobility status to optimal level  Description  INTERVENTIONS:  - Assess patient's baseline mobility status (ambulation, transfers, stairs, etc )    - Identify cognitive and physical deficits and behaviors that affect mobility  - Identify mobility aids required to assist with transfers and/or ambulation (gait belt, sit-to-stand, lift, walker, cane, etc )  - [place] fall precautions as indicated by assessment  - Record patient progress and toleration of activity level on Mobility SBAR; progress patient to next Phase/Stage  - Instruct patient to call for assistance with activity based on assessment  - Consider rehabilitation consult to assist with strengthening/weightbearing, etc   Outcome: Progressing     Problem: DISCHARGE PLANNING  Goal: Discharge to home or other facility with appropriate resources  Description  INTERVENTIONS:  - Identify barriers to discharge w/patient and caregiver  - Arrange for needed discharge resources and transportation as appropriate  - Identify discharge learning needs (meds, wound care, etc )  - Arrange for interpretive services to assist at discharge as needed  - Refer to Case Management Department for coordinating discharge planning if the patient needs post-hospital services based on physician/advanced practitioner order or complex needs related to functional status, cognitive ability, or social support system  Outcome: Progressing     Problem: Knowledge Deficit  Goal: Patient/family/caregiver demonstrates understanding of disease process, treatment plan, medications, and discharge instructions  Description  Complete learning assessment and assess knowledge base  Interventions:  - Provide teaching at level of understanding  - Provide teaching via preferred learning methods  Outcome: Progressing     Problem: Nutrition/Hydration-ADULT  Goal: Nutrient/Hydration intake appropriate for improving, restoring or maintaining nutritional needs  Description  Monitor and assess patient's nutrition/hydration status for malnutrition  Collaborate with interdisciplinary team and initiate plan and interventions as ordered  Monitor patient's weight and dietary intake as ordered or per policy   Utilize nutrition screening tool and intervene as necessary  Determine patient's food preferences and provide high-protein, high-caloric foods as appropriate       INTERVENTIONS:  - Monitor oral intake, urinary output, labs, and treatment plans  - Assess nutrition and hydration status and recommend course of action  - Evaluate amount of meals eaten  - Assist patient with eating if necessary   - Allow adequate time for meals  - Recommend/ encourage appropriate diets, oral nutritional supplements, and vitamin/mineral supplements  - Order, calculate, and assess calorie counts as needed  - Recommend, monitor, and adjust tube feedings and TPN/PPN based on assessed needs  - Assess need for intravenous fluids  - Provide specific nutrition/hydration education as appropriate  - Include patient/family/caregiver in decisions related to nutrition  Outcome: Progressing

## 2019-09-10 VITALS
HEART RATE: 66 BPM | DIASTOLIC BLOOD PRESSURE: 73 MMHG | RESPIRATION RATE: 16 BRPM | BODY MASS INDEX: 26.26 KG/M2 | TEMPERATURE: 97.4 F | OXYGEN SATURATION: 95 % | WEIGHT: 183.42 LBS | SYSTOLIC BLOOD PRESSURE: 121 MMHG | HEIGHT: 70 IN

## 2019-09-10 PROBLEM — A41.9 SEPSIS (HCC): Status: RESOLVED | Noted: 2019-09-06 | Resolved: 2019-09-10

## 2019-09-10 PROBLEM — G93.40 ENCEPHALOPATHY: Status: RESOLVED | Noted: 2019-09-06 | Resolved: 2019-09-10

## 2019-09-10 LAB
ANION GAP SERPL CALCULATED.3IONS-SCNC: 8 MMOL/L (ref 4–13)
BASOPHILS # BLD MANUAL: 0 THOUSAND/UL (ref 0–0.1)
BASOPHILS NFR MAR MANUAL: 0 % (ref 0–1)
BUN SERPL-MCNC: 13 MG/DL (ref 5–25)
CALCIUM SERPL-MCNC: 9.3 MG/DL (ref 8.3–10.1)
CHLORIDE SERPL-SCNC: 105 MMOL/L (ref 100–108)
CO2 SERPL-SCNC: 30 MMOL/L (ref 21–32)
CREAT SERPL-MCNC: 0.85 MG/DL (ref 0.6–1.3)
EOSINOPHIL # BLD MANUAL: 0.26 THOUSAND/UL (ref 0–0.4)
EOSINOPHIL NFR BLD MANUAL: 3 % (ref 0–6)
ERYTHROCYTE [DISTWIDTH] IN BLOOD BY AUTOMATED COUNT: 14.1 % (ref 11.6–15.1)
GFR SERPL CREATININE-BSD FRML MDRD: 81 ML/MIN/1.73SQ M
GLUCOSE SERPL-MCNC: 128 MG/DL (ref 65–140)
GLUCOSE SERPL-MCNC: 129 MG/DL (ref 65–140)
GLUCOSE SERPL-MCNC: 221 MG/DL (ref 65–140)
GLUCOSE SERPL-MCNC: 230 MG/DL (ref 65–140)
HCT VFR BLD AUTO: 47.6 % (ref 36.5–49.3)
HGB BLD-MCNC: 15.7 G/DL (ref 12–17)
LG PLATELETS BLD QL SMEAR: PRESENT
LYMPHOCYTES # BLD AUTO: 4.2 THOUSAND/UL (ref 0.6–4.47)
LYMPHOCYTES # BLD AUTO: 49 % (ref 14–44)
MCH RBC QN AUTO: 32.1 PG (ref 26.8–34.3)
MCHC RBC AUTO-ENTMCNC: 33 G/DL (ref 31.4–37.4)
MCV RBC AUTO: 97 FL (ref 82–98)
METAMYELOCYTES NFR BLD MANUAL: 1 % (ref 0–1)
MONOCYTES # BLD AUTO: 1.03 THOUSAND/UL (ref 0–1.22)
MONOCYTES NFR BLD: 12 % (ref 4–12)
MYELOCYTES NFR BLD MANUAL: 2 % (ref 0–1)
NEUTROPHILS # BLD MANUAL: 2.66 THOUSAND/UL (ref 1.85–7.62)
NEUTS BAND NFR BLD MANUAL: 1 % (ref 0–8)
NEUTS SEG NFR BLD AUTO: 30 % (ref 43–75)
NRBC BLD AUTO-RTO: 0 /100 WBCS
PLATELET # BLD AUTO: 192 THOUSANDS/UL (ref 149–390)
PLATELET BLD QL SMEAR: ADEQUATE
PMV BLD AUTO: 12.7 FL (ref 8.9–12.7)
POTASSIUM SERPL-SCNC: 3.4 MMOL/L (ref 3.5–5.3)
RBC # BLD AUTO: 4.89 MILLION/UL (ref 3.88–5.62)
SODIUM SERPL-SCNC: 143 MMOL/L (ref 136–145)
TOTAL CELLS COUNTED SPEC: 100
VARIANT LYMPHS # BLD AUTO: 2 %
WBC # BLD AUTO: 8.58 THOUSAND/UL (ref 4.31–10.16)

## 2019-09-10 PROCEDURE — 80048 BASIC METABOLIC PNL TOTAL CA: CPT | Performed by: INTERNAL MEDICINE

## 2019-09-10 PROCEDURE — 85027 COMPLETE CBC AUTOMATED: CPT | Performed by: INTERNAL MEDICINE

## 2019-09-10 PROCEDURE — 82948 REAGENT STRIP/BLOOD GLUCOSE: CPT

## 2019-09-10 PROCEDURE — 99239 HOSP IP/OBS DSCHRG MGMT >30: CPT | Performed by: INTERNAL MEDICINE

## 2019-09-10 PROCEDURE — 85007 BL SMEAR W/DIFF WBC COUNT: CPT | Performed by: INTERNAL MEDICINE

## 2019-09-10 RX ORDER — AMOXICILLIN AND CLAVULANATE POTASSIUM 875; 125 MG/1; MG/1
1 TABLET, FILM COATED ORAL EVERY 12 HOURS SCHEDULED
Qty: 8 TABLET | Refills: 0 | Status: SHIPPED | OUTPATIENT
Start: 2019-09-10 | End: 2019-09-14

## 2019-09-10 RX ADMIN — APIXABAN 5 MG: 5 TABLET, FILM COATED ORAL at 16:15

## 2019-09-10 RX ADMIN — CEFEPIME HYDROCHLORIDE 2000 MG: 2 INJECTION, POWDER, FOR SOLUTION INTRAVENOUS at 13:59

## 2019-09-10 RX ADMIN — GABAPENTIN 400 MG: 400 CAPSULE ORAL at 08:24

## 2019-09-10 RX ADMIN — CEFEPIME HYDROCHLORIDE 2000 MG: 2 INJECTION, POWDER, FOR SOLUTION INTRAVENOUS at 01:38

## 2019-09-10 RX ADMIN — METOPROLOL TARTRATE 37.5 MG: 25 TABLET ORAL at 08:24

## 2019-09-10 RX ADMIN — FAMOTIDINE 20 MG: 20 TABLET ORAL at 16:15

## 2019-09-10 RX ADMIN — INSULIN LISPRO 1 UNITS: 100 INJECTION, SOLUTION INTRAVENOUS; SUBCUTANEOUS at 11:37

## 2019-09-10 RX ADMIN — APIXABAN 5 MG: 5 TABLET, FILM COATED ORAL at 08:24

## 2019-09-10 RX ADMIN — INSULIN LISPRO 2 UNITS: 100 INJECTION, SOLUTION INTRAVENOUS; SUBCUTANEOUS at 16:16

## 2019-09-10 RX ADMIN — FUROSEMIDE 40 MG: 40 TABLET ORAL at 08:24

## 2019-09-10 RX ADMIN — FAMOTIDINE 20 MG: 20 TABLET ORAL at 08:24

## 2019-09-10 RX ADMIN — GABAPENTIN 400 MG: 400 CAPSULE ORAL at 16:15

## 2019-09-10 NOTE — NURSING NOTE
Patient and nurse at [facility] given discharge instructions  IV pulled  Patient and nurse denied any questions  Patient wheeled to vehicle with PCA and ride

## 2019-09-10 NOTE — ASSESSMENT & PLAN NOTE
Wt Readings from Last 3 Encounters:   09/10/19 83 2 kg (183 lb 6 8 oz)   07/15/19 81 6 kg (180 lb)   07/10/19 79 8 kg (176 lb)     History of chronic diastolic heart failure    -patient appears euvolemic, continue with Lasix

## 2019-09-10 NOTE — DISCHARGE SUMMARY
Discharge- [facility] 1935, 81 y o  male MRN: [id number]    Unit/Bed#: [address] 2 [id number] Encounter: [id number]    Primary Care Provider: Jovany Saldana MD   Date and time admitted to hospital: 9/6/2019  1:26 PM        UTI (urinary tract infection)  Assessment & Plan  -urine culture reveals Klebsiella sensitive to cephalosporin  -blood culture negative thus far  -will continue with cefepime day 5  -will discharge home on Augmentin to complete treatment      Bilateral leg weakness  Assessment & Plan  PT OT recommending home PT    Benign essential hypertension  Assessment & Plan  Blood pressure well controlled  Continue with metoprolol    Atrial fibrillation with slow ventricular response (HCC)  Assessment & Plan  -on metoprolol for rate control  -Eliquis for anticoagulation       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, without long-term current use of insulin [facility])  Assessment & Plan  Lab Results   Component Value Date    HGBA1C 8 7 (H) 06/25/2019       Recent Labs     09/09/19  1643 09/09/19  2114 09/10/19  0811 09/10/19  1116   POCGLU 232* 219* 129 221*       Blood Sugar Average: Last 72 hrs:    -continue with Lantus 18 units at bedtime  -monitor Accu-Cheks, sliding scale for coverage    CHF (congestive heart failure) ([facility])  Assessment & Plan  Wt Readings from Last 3 Encounters:   09/10/19 83 2 kg (183 lb 6 8 oz)   07/15/19 81 6 kg (180 lb)   07/10/19 79 8 kg (176 lb)     History of chronic diastolic heart failure    -patient appears euvolemic, continue with Lasix      Disposition:      Other: home    Reason for Admission:  Altered mental status    Discharge Diagnoses:     Principal Problem (Resolved):    Encephalopathy  Active Problems:    UTI (urinary tract infection)    CHF (congestive heart failure) ([facility] [address] )    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, without long-term current use of insulin (HCC)    Atrial fibrillation with slow ventricular response ([facility] )    Benign essential hypertension    Bilateral leg weakness  Resolved Problems:    Sepsis ([facility] )      Consultations During Hospital Stay:  · None    Procedures Performed:     · None    Medication Adjustments and Discharge Medications:  · Medication Dosing Tapers - Please refer to Discharge Medication List for details on any medication dosing tapers (if applicable to patient)  · Discharge Medication List: See after visit summary for reconciled discharge medications  Wound Care Recommendations:  When applicable, please see wound care section of After Visit Summary  Diet Recommendations at Discharge:  Diet -        Diet Orders   (From admission, onward)             Start     Ordered    09/07/19 1907  Diet Farshad/CHO Controlled; Consistent Carbohydrate Diet Level 2 (5 carb servings/75 grams CHO/meal); Nectar Thick Liquid  Diet effective now     Question Answer Comment   Diet Type Farshad/CHO Controlled    Farshad/CHO Controlled Consistent Carbohydrate Diet Level 2 (5 carb servings/75 grams CHO/meal)    Other Restriction(s): Nectar Thick Liquid    RD to adjust diet per protocol? Yes        09/07/19 1907              Fluid Restriction - No Fluid Restriction at Discharge  Significant Findings / Test Results:     CT head:  No acute intracranial abnormality  Chest x-ray: Enlargement of cardiac silhouette  Shallow depth of inspiration  Mild pulmonary vascular congestion without overt pulmonary edema  Hospital Course:     Anna Karimi is a 81 y o  male patient who originally presented to the hospital on 9/6/2019 due to altered mental status  Patient met SIRS criteria with elevated white blood cell count, tachycardia urinalysis positive for UTI patient started on empiric antibiotics  Patient required one-to-one observation for altered mental status  With improvement of UTI patient's mental status gradually improved  He is at his baseline  PT recommending home PT patient and son agreeable    Patient is otherwise hemodynamically stable and will be discharged home today to complete course of antibiotics with Augmentin  Discussed with son via telephone  Condition at Discharge: good     Discharge Day Visit / Exam:     Subjective:  Patient seen examined at bedside, denies any chest pain, palpitations, dyspnea, abdominal pain, nausea, vomiting    Vitals: Blood Pressure: 119/83 (09/10/19 0813)  Pulse: 71 (09/10/19 0813)  Temperature: 97 5 °F (36 4 °C) (09/10/19 0813)  Temp Source: Temporal (09/10/19 0813)  Respirations: 16 (09/10/19 0813)  Height: 5' 10" (177 8 cm) (09/07/19 1856)  Weight - Scale: 83 2 kg (183 lb 6 8 oz) (09/10/19 0600)  SpO2: 94 % (09/10/19 0813)    Physical Exam:    Constitutional: Patient is oriented to person, place and time, no acute distress  HEENT:  Normocephalic, atraumatic, EOMI, PERRLA, no scleral icterus, no pallor, moist oral mucosa  Neck:  Supple, no masses, no thyromegaly, no bruits Normal range of motion  Lymph nodes:  No lymphadenopathy  Cardiovascular: Normal S1S2, RRR, No murmurs/rubs/gallops appreciated  Pulmonary:  Bilateral air entry, No rhonchi/rales/wheezing appreciated  Abdominal: Soft, Bowel sounds present, Non-tender, Non-distended, No rebound/guarding, no hepatomegaly   Musculoskeletal: No tenderness/abnormality   Extremities:  No cyanosis, clubbing or edema  Peripheral pulses palpable and equal bilaterally  Neurological: Cranial nerves II-XII grossly intact, sensation intact, otherwise no focal neurological symptoms  Skin: Skin is warm and dry, no rashes  Discharge instructions/Information to patient and family:   See after visit summary section titled Discharge Instructions for information provided to patient and family  Planned Readmission: no      Discharge Statement:  I spent 30 minutes discharging the patient  This time was spent on the day of discharge  I had direct contact with the patient on the day of discharge   Greater than 50% of the total time was spent examining patient, answering all patient questions, arranging and discussing plan of care with patient as well as directly providing post-discharge instructions  Additional time then spent on discharge activities      ** Please Note: This note has been constructed using a voice recognition system **

## 2019-09-10 NOTE — PLAN OF CARE
Problem: Potential for Falls  Goal: Patient will remain free of falls  Description  INTERVENTIONS:  - Assess patient frequently for physical needs  -  Identify cognitive and physical deficits and behaviors that affect risk of falls    -  [place] fall precautions as indicated by assessment   - Educate patient/family on patient safety including physical limitations  - Instruct patient to call for assistance with activity based on assessment  - Modify environment to reduce risk of injury  - Consider OT/PT consult to assist with strengthening/mobility  Outcome: Progressing     Problem: Prexisting or High Potential for Compromised Skin Integrity  Goal: Skin integrity is maintained or improved  Description  INTERVENTIONS:  - Identify patients at risk for skin breakdown  - Assess and monitor skin integrity  - Assess and monitor nutrition and hydration status  - Monitor labs   - Assess for incontinence   - Turn and reposition patient  - Assist with mobility/ambulation  - Relieve pressure over bony prominences  - Avoid friction and shearing  - Provide appropriate hygiene as needed including keeping skin clean and dry  - Evaluate need for skin moisturizer/barrier cream  - Collaborate with interdisciplinary team   - Patient/family teaching  - Consider wound care consult   Outcome: Progressing     Problem: NEUROSENSORY - ADULT  Goal: Achieves stable or improved neurological status  Description  INTERVENTIONS  - Monitor and report changes in neurological status  - Monitor vital signs such as temperature, blood pressure, glucose, and any other labs ordered   - Initiate measures to prevent increased intracranial pressure  - Monitor for seizure activity and implement precautions if appropriate      Outcome: Progressing  Goal: Remains free of injury related to seizures activity  Description  INTERVENTIONS  - Maintain airway, patient safety  and administer oxygen as ordered  - Monitor patient for seizure activity, document and report duration and description of seizure to physician/advanced practitioner  - If seizure occurs,  ensure patient safety during seizure  - Reorient patient post seizure  - Seizure pads on all 4 side rails  - Instruct patient/family to notify RN of any seizure activity including if an aura is experienced  - Instruct patient/family to call for assistance with activity based on nursing assessment  - Administer anti-seizure medications if ordered    Outcome: Progressing  Goal: Achieves maximal functionality and self care  Description  INTERVENTIONS  - Monitor swallowing and airway patency with patient fatigue and changes in neurological status  - Encourage and assist patient to increase activity and self care     - Encourage visually impaired, hearing impaired and aphasic patients to use assistive/communication devices  Outcome: Progressing     Problem: RESPIRATORY - ADULT  Goal: Achieves optimal ventilation and oxygenation  Description  INTERVENTIONS:  - Assess for changes in respiratory status  - Assess for changes in mentation and behavior  - Position to facilitate oxygenation and minimize respiratory effort  - Oxygen administered by appropriate delivery if ordered  - Initiate smoking cessation education as indicated  - Encourage broncho-pulmonary hygiene including cough, deep breathe, Incentive Spirometry  - Assess the need for suctioning and aspirate as needed  - Assess and instruct to report SOB or any respiratory difficulty  - Respiratory Therapy support as indicated  Outcome: Progressing     Problem: GENITOURINARY - ADULT  Goal: Maintains or returns to baseline urinary function  Description  INTERVENTIONS:  - Assess urinary function  - Encourage oral fluids to ensure adequate hydration if ordered  - Administer IV fluids as ordered to ensure adequate hydration  - Administer ordered medications as needed  - Offer frequent toileting  - Follow urinary retention protocol if ordered  Outcome: Progressing  Goal: Absence of urinary retention  Description  INTERVENTIONS:  - Assess patients ability to void and empty bladder  - Monitor I/O  - Bladder scan as needed  - Discuss with physician/AP medications to alleviate retention as needed  - Discuss catheterization for long term situations as appropriate  Outcome: Progressing  Goal: Urinary catheter remains patent  Description  INTERVENTIONS:  - Assess patency of urinary catheter  - If patient has a chronic [place], consider changing catheter if non-functioning  - Follow guidelines for intermittent irrigation of non-functioning urinary catheter  Outcome: Progressing     Problem: METABOLIC, FLUID AND ELECTROLYTES - ADULT  Goal: Electrolytes maintained within normal limits  Description  INTERVENTIONS:  - Monitor labs and assess patient for signs and symptoms of electrolyte imbalances  - Administer electrolyte replacement as ordered  - Monitor response to electrolyte replacements, including repeat lab results as appropriate  - Instruct patient on fluid and nutrition as appropriate  Outcome: Progressing  Goal: Fluid balance maintained  Description  INTERVENTIONS:  - Monitor labs   - Monitor I/O and WT  - Instruct patient on fluid and nutrition as appropriate  - Assess for signs & symptoms of volume excess or deficit  Outcome: Progressing  Goal: Glucose maintained within target range  Description  INTERVENTIONS:  - Monitor Blood Glucose as ordered  - Assess for signs and symptoms of hyperglycemia and hypoglycemia  - Administer ordered medications to maintain glucose within target range  - Assess nutritional intake and initiate nutrition service referral as needed  Outcome: Progressing     Problem: SKIN/TISSUE INTEGRITY - ADULT  Goal: Skin integrity remains intact  Description  INTERVENTIONS  - Identify patients at risk for skin breakdown  - Assess and monitor skin integrity  - Assess and monitor nutrition and hydration status  - Monitor labs (i e  albumin)  - Assess for incontinence - Turn and reposition patient  - Assist with mobility/ambulation  - Relieve pressure over bony prominences  - Avoid friction and shearing  - Provide appropriate hygiene as needed including keeping skin clean and dry  - Evaluate need for skin moisturizer/barrier cream  - Collaborate with interdisciplinary team (i e  Nutrition, Rehabilitation, etc )   - Patient/family teaching  Outcome: Progressing     Problem: HEMATOLOGIC - ADULT  Goal: Maintains hematologic stability  Description  INTERVENTIONS  - Assess for signs and symptoms of bleeding or hemorrhage  - Monitor labs  - Administer supportive blood products/factors as ordered and appropriate  Outcome: Progressing     Problem: MUSCULOSKELETAL - ADULT  Goal: Maintain or return mobility to safest level of function  Description  INTERVENTIONS:  - Assess patient's ability to carry out ADLs; assess patient's baseline for ADL function and identify physical deficits which impact ability to perform ADLs (bathing, care of mouth/teeth, toileting, grooming, dressing, etc )  - Assess/evaluate cause of self-care deficits   - Assess range of motion  - Assess patient's mobility  - Assess patient's need for assistive devices and provide as appropriate  - Encourage maximum independence but intervene and supervise when necessary  - Involve family in performance of ADLs  - Assess for home care needs following discharge   - Consider OT consult to assist with ADL evaluation and planning for discharge  - Provide patient education as appropriate  Outcome: Progressing     Problem: PAIN - ADULT  Goal: Verbalizes/displays adequate comfort level or baseline comfort level  Description  Interventions:  - Encourage patient to monitor pain and request assistance  - Assess pain using appropriate pain scale  - Administer analgesics based on type and severity of pain and evaluate response  - Implement non-pharmacological measures as appropriate and evaluate response  - Consider cultural and social influences on pain and pain management  - Notify physician/advanced practitioner if interventions unsuccessful or patient reports new pain  Outcome: Progressing     Problem: INFECTION - ADULT  Goal: Absence or prevention of progression during hospitalization  Description  INTERVENTIONS:  - Assess and monitor for signs and symptoms of infection  - Monitor lab/diagnostic results  - Monitor all insertion sites, i e  indwelling lines, tubes, and drains  - Monitor endotracheal if appropriate and nasal secretions for changes in amount and color  - [place] appropriate cooling/warming therapies per order  - Administer medications as ordered  - Instruct and encourage patient and family to use good hand hygiene technique  - Identify and instruct in appropriate isolation precautions for identified infection/condition  Outcome: Progressing     Problem: SAFETY ADULT  Goal: Maintain or return to baseline ADL function  Description  INTERVENTIONS:  -  Assess patient's ability to carry out ADLs; assess patient's baseline for ADL function and identify physical deficits which impact ability to perform ADLs (bathing, care of mouth/teeth, toileting, grooming, dressing, etc )  - Assess/evaluate cause of self-care deficits   - Assess range of motion  - Assess patient's mobility; develop plan if impaired  - Assess patient's need for assistive devices and provide as appropriate  - Encourage maximum independence but intervene and supervise when necessary  - Involve family in performance of ADLs  - Assess for home care needs following discharge   - Consider OT consult to assist with ADL evaluation and planning for discharge  - Provide patient education as appropriate  Outcome: Progressing  Goal: Maintain or return mobility status to optimal level  Description  INTERVENTIONS:  - Assess patient's baseline mobility status (ambulation, transfers, stairs, etc )    - Identify cognitive and physical deficits and behaviors that affect mobility  - Identify mobility aids required to assist with transfers and/or ambulation (gait belt, sit-to-stand, lift, walker, cane, etc )  - [place] fall precautions as indicated by assessment  - Record patient progress and toleration of activity level on Mobility SBAR; progress patient to next Phase/Stage  - Instruct patient to call for assistance with activity based on assessment  - Consider rehabilitation consult to assist with strengthening/weightbearing, etc   Outcome: Progressing     Problem: DISCHARGE PLANNING  Goal: Discharge to home or other facility with appropriate resources  Description  INTERVENTIONS:  - Identify barriers to discharge w/patient and caregiver  - Arrange for needed discharge resources and transportation as appropriate  - Identify discharge learning needs (meds, wound care, etc )  - Arrange for interpretive services to assist at discharge as needed  - Refer to Case Management Department for coordinating discharge planning if the patient needs post-hospital services based on physician/advanced practitioner order or complex needs related to functional status, cognitive ability, or social support system  Outcome: Progressing     Problem: Knowledge Deficit  Goal: Patient/family/caregiver demonstrates understanding of disease process, treatment plan, medications, and discharge instructions  Description  Complete learning assessment and assess knowledge base  Interventions:  - Provide teaching at level of understanding  - Provide teaching via preferred learning methods  Outcome: Progressing     Problem: Nutrition/Hydration-ADULT  Goal: Nutrient/Hydration intake appropriate for improving, restoring or maintaining nutritional needs  Description  Monitor and assess patient's nutrition/hydration status for malnutrition  Collaborate with interdisciplinary team and initiate plan and interventions as ordered  Monitor patient's weight and dietary intake as ordered or per policy   Utilize nutrition screening tool and intervene as necessary  Determine patient's food preferences and provide high-protein, high-caloric foods as appropriate       INTERVENTIONS:  - Monitor oral intake, urinary output, labs, and treatment plans  - Assess nutrition and hydration status and recommend course of action  - Evaluate amount of meals eaten  - Assist patient with eating if necessary   - Allow adequate time for meals  - Recommend/ encourage appropriate diets, oral nutritional supplements, and vitamin/mineral supplements  - Order, calculate, and assess calorie counts as needed  - Recommend, monitor, and adjust tube feedings and TPN/PPN based on assessed needs  - Assess need for intravenous fluids  - Provide specific nutrition/hydration education as appropriate  - Include patient/family/caregiver in decisions related to nutrition  Outcome: Progressing

## 2019-09-10 NOTE — SOCIAL WORK
Pt admitted with MS change 2' UTI  Pt lives alone at [facility]  Where he is independent with all aspects of care, ambulating with SPC or rollator prn  He states he has a walk in shower with grab bars and bars around toilet as well  Pt has a cleaning lady 1x/wk as well as a private paid nurse once a week who manages medication  Pt states his family or the nurse transports him to his appointments  Denies MH hx, D&A nor legal issues  Advance Care Planning      Pt has a POA being his son  Lev Irizarry with copy in chart dated 3/8/2017  PCP is Dr Vanita Cabezas and he uses [facility] on [address] for acute medications and [facility] order for chronic medications  Therapy is recommending Home PT/OT vs OP PT/OT with pt opting for home therapy through [facility]- referral to same made  No further questions/concerns voiced at this time  No further d/c needs identified

## 2019-09-10 NOTE — ASSESSMENT & PLAN NOTE
Lab Results   Component Value Date    HGBA1C 8 7 (H) 06/25/2019       Recent Labs     09/09/19  1643 09/09/19  2114 09/10/19  0811 09/10/19  1116   POCGLU 232* 219* 129 221*       Blood Sugar Average: Last 72 hrs:    -continue with Lantus 18 units at bedtime  -monitor Accu-Cheks, sliding scale for coverage

## 2019-09-11 LAB
BACTERIA BLD CULT: NORMAL

## 2019-09-12 ENCOUNTER — TRANSITIONAL CARE MANAGEMENT (OUTPATIENT)
Dept: FAMILY MEDICINE CLINIC | Facility: CLINIC | Age: 84
End: 2019-09-12

## 2019-09-16 ENCOUNTER — TELEPHONE (OUTPATIENT)
Dept: FAMILY MEDICINE CLINIC | Facility: CLINIC | Age: 84
End: 2019-09-16

## 2019-09-17 ENCOUNTER — TELEPHONE (OUTPATIENT)
Dept: FAMILY MEDICINE CLINIC | Facility: CLINIC | Age: 84
End: 2019-09-17

## 2019-09-17 NOTE — TELEPHONE ENCOUNTER
FYI       After pt's hosp stay he was to start home pt and ot but he has declined ot he will only be doing pt

## 2019-10-07 ENCOUNTER — TRANSCRIBE ORDERS (OUTPATIENT)
Dept: ADMINISTRATIVE | Facility: HOSPITAL | Age: 84
End: 2019-10-07

## 2019-10-07 DIAGNOSIS — M96.1 POSTLAMINECTOMY SYNDROME, LUMBAR REGION: Primary | ICD-10-CM

## 2019-10-11 ENCOUNTER — HOSPITAL ENCOUNTER (OUTPATIENT)
Dept: CT IMAGING | Facility: HOSPITAL | Age: 84
Discharge: HOME | End: 2019-10-11
Payer: MEDICARE

## 2019-10-11 DIAGNOSIS — M96.1 POSTLAMINECTOMY SYNDROME, LUMBAR REGION: ICD-10-CM

## 2019-10-11 PROCEDURE — 72131 CT LUMBAR SPINE W/O DYE: CPT

## 2019-10-31 PROBLEM — I48.20 CHRONIC ATRIAL FIBRILLATION (HCC): Status: ACTIVE | Noted: 2019-10-31

## 2019-10-31 PROBLEM — Z79.4 ENCOUNTER FOR LONG-TERM (CURRENT) USE OF INSULIN (HCC): Status: ACTIVE | Noted: 2019-10-31

## 2019-11-08 ENCOUNTER — OFFICE VISIT (OUTPATIENT)
Dept: FAMILY MEDICINE CLINIC | Facility: CLINIC | Age: 84
End: 2019-11-08
Payer: MEDICARE

## 2019-11-08 VITALS
BODY MASS INDEX: 24.97 KG/M2 | OXYGEN SATURATION: 94 % | WEIGHT: 174 LBS | DIASTOLIC BLOOD PRESSURE: 88 MMHG | HEART RATE: 89 BPM | SYSTOLIC BLOOD PRESSURE: 134 MMHG | TEMPERATURE: 97.7 F

## 2019-11-08 DIAGNOSIS — N13.8 BPH WITH OBSTRUCTION/LOWER URINARY TRACT SYMPTOMS: ICD-10-CM

## 2019-11-08 DIAGNOSIS — F32.A MILD DEPRESSION: ICD-10-CM

## 2019-11-08 DIAGNOSIS — D72.829 LEUKOCYTOSIS, UNSPECIFIED TYPE: ICD-10-CM

## 2019-11-08 DIAGNOSIS — Z98.61 CAD S/P PERCUTANEOUS CORONARY ANGIOPLASTY: ICD-10-CM

## 2019-11-08 DIAGNOSIS — I25.10 CAD S/P PERCUTANEOUS CORONARY ANGIOPLASTY: ICD-10-CM

## 2019-11-08 DIAGNOSIS — E66.3 OVERWEIGHT WITH BODY MASS INDEX (BMI) 25.0-29.9: ICD-10-CM

## 2019-11-08 DIAGNOSIS — IMO0002 UNCONTROLLED TYPE 2 DIABETES MELLITUS WITH DIABETIC POLYNEUROPATHY, WITHOUT LONG-TERM CURRENT USE OF INSULIN: ICD-10-CM

## 2019-11-08 DIAGNOSIS — I48.91 ATRIAL FIBRILLATION WITH SLOW VENTRICULAR RESPONSE (HCC): Chronic | ICD-10-CM

## 2019-11-08 DIAGNOSIS — N30.00 ACUTE CYSTITIS WITHOUT HEMATURIA: Primary | ICD-10-CM

## 2019-11-08 DIAGNOSIS — N40.1 BPH WITH OBSTRUCTION/LOWER URINARY TRACT SYMPTOMS: ICD-10-CM

## 2019-11-08 LAB
SL AMB  POCT GLUCOSE, UA: ABNORMAL
SL AMB LEUKOCYTE ESTERASE,UA: ABNORMAL
SL AMB POCT BILIRUBIN,UA: NEGATIVE
SL AMB POCT BLOOD,UA: ABNORMAL
SL AMB POCT CLARITY,UA: ABNORMAL
SL AMB POCT COLOR,UA: ABNORMAL
SL AMB POCT KETONES,UA: NEGATIVE
SL AMB POCT NITRITE,UA: NEGATIVE
SL AMB POCT PH,UA: 5
SL AMB POCT SPECIFIC GRAVITY,UA: 1.01
SL AMB POCT URINE PROTEIN: ABNORMAL
SL AMB POCT UROBILINOGEN: 0.2

## 2019-11-08 PROCEDURE — 99214 OFFICE O/P EST MOD 30 MIN: CPT | Performed by: FAMILY MEDICINE

## 2019-11-08 PROCEDURE — 87086 URINE CULTURE/COLONY COUNT: CPT | Performed by: FAMILY MEDICINE

## 2019-11-08 PROCEDURE — 81002 URINALYSIS NONAUTO W/O SCOPE: CPT | Performed by: FAMILY MEDICINE

## 2019-11-08 NOTE — ASSESSMENT & PLAN NOTE
The patient is currently on Cipro for what appears to be a recurrent UTI  Urine culture will be sent as he still is symptomatic and the urine is still significantly bloody and very cloudy  Consider ongoing antibiotic treatment after reviewing the culture and after his current treatment course of Cipro

## 2019-11-08 NOTE — PROGRESS NOTES
Assessment/Plan:       Problem List Items Addressed This Visit        Endocrine    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, without long-term current use of insulin (HCC) (Chronic)    Relevant Orders    Comprehensive metabolic panel    Hemoglobin A1C       Cardiovascular and Mediastinum    Atrial fibrillation with slow ventricular response (HCC) (Chronic)    Relevant Orders    CBC and differential    Comprehensive metabolic panel    TSH, 3rd generation with Free T4 reflex    CAD S/P percutaneous coronary angioplasty       Genitourinary    BPH with obstruction/lower urinary tract symptoms    UTI (urinary tract infection) - Primary     The patient is currently on Cipro for what appears to be a recurrent UTI  Urine culture will be sent as he still is symptomatic and the urine is still significantly bloody and very cloudy  Consider ongoing antibiotic treatment after reviewing the culture and after his current treatment course of Cipro  Relevant Orders    Urine culture (Completed)    POCT urine dip (Completed)       Other    Leukocytosis    Relevant Orders    CBC and differential    Mild depression (HCC)    Relevant Orders    CBC and differential    TSH, 3rd generation with Free T4 reflex      Other Visit Diagnoses     Overweight with body mass index (BMI) 25 0-29 9                Subjective:      Patient ID: Myriam Amaya is a 81 y o  male  HPI patient presents today for follow-up for routine health issues  Overall, he notes he feels very poorly today is quite fatigued over the past 3 days  He is on Cipro 500 b i d  As per his son for persistent blood in his urine  He was admitted just 2 months ago for significant UTI with probable sepsis  He notes he has been feeling fatigued and sluggish over the past several days  I did speak with a nurse who helps with his care and she feels he has improved a bit over the past 48 hours  He denies any fever or chills    He has history of type 2 diabetes and denies polyuria polydipsia  He has a history of known coronary disease as well as atrial fibrillation  He denies chest pain or palpitations  He remains on anticoagulation  He denies bruising or bleeding      The following portions of the patient's history were reviewed and updated as appropriate: allergies, current medications, past family history, past medical history, past social history, past surgical history and problem list       Current Outpatient Medications:     ACCU-CHEK FASTCLIX LANCETS MISC, Test once daily, Disp: 100 each, Rfl: 5    apixaban (ELIQUIS) 5 mg, Take 1 tablet (5 mg total) by mouth 2 (two) times a day, Disp: 180 tablet, Rfl: 3    ergocalciferol (VITAMIN D2) 50,000 units, Take 1 capsule (50,000 Units total) by mouth once a week, Disp: 12 capsule, Rfl: 0    famotidine (PEPCID) 20 mg tablet, Take 1 tablet (20 mg total) by mouth 2 (two) times a day, Disp: 180 tablet, Rfl: 3    furosemide (LASIX) 20 mg tablet, 20 mg alternating with 40 mg, add additional 20 mg for weight gain more than 3 lb (Patient taking differently: Take 40 mg by mouth daily ), Disp: 180 tablet, Rfl: 3    gabapentin (NEURONTIN) 400 mg capsule, Take 400 mg by mouth 3 (three) times a day  , Disp: , Rfl:     Insulin Pen Needle 32G X 4 MM MISC, Test once daily, Disp: 100 each, Rfl: 5    LANTUS SOLOSTAR 100 units/mL injection pen, INJECT SUBCUTANEOUSLY 18  UNITS DAILY (Patient taking differently: INJECT SUBCUTANEOUSLY 18  UNITS DAILY IN AM), Disp: 30 mL, Rfl: 11    levalbuterol (XOPENEX) 1 25 mg/3 mL nebulizer solution, Take 1 vial (1 25 mg total) by nebulization every 4 (four) hours as needed for wheezing or shortness of breath, Disp: 75 vial, Rfl: 3    lidocaine (LIDODERM) 5 %, Apply 1 patch topically every 24 hours Remove & Discard patch within 12 hours or as directed by MD, Disp: 30 patch, Rfl: 11    metoprolol tartrate (LOPRESSOR) 25 mg tablet, TAKE ONE-HALF TABLET BY  MOUTH DAILY, Disp: 45 tablet, Rfl: 5    Mirabegron ER (MYRBETRIQ) 50 MG TB24, Take 50 mg by mouth daily  , Disp: , Rfl:     mirtazapine (REMERON) 30 mg tablet, Take 1 tablet (30 mg total) by mouth daily at bedtime, Disp: 90 tablet, Rfl: 3    Multiple Vitamin (MULTIVITAMIN) tablet, Take 1 tablet by mouth daily, Disp: , Rfl:     nitroglycerin (NITROLINGUAL) 0 4 mg/spray spray, 1 spray every 5 (five) minutes as needed  , Disp: , Rfl:     ONE TOUCH ULTRA TEST test strip, Test once daily, Disp: 200 each, Rfl: 3    sodium chloride 0 9 % nebulizer solution, Inhale every 4 (four) hours as needed  , Disp: , Rfl:     traMADol (ULTRAM) 50 mg tablet, TAKE 1 TABLET BY MOUTH TWO  TIMES DAILY (Patient taking differently: every 8 (eight) hours as needed ), Disp: 60 tablet, Rfl: 1     Review of Systems   Constitutional: Positive for fatigue  Negative for appetite change, chills, fever and unexpected weight change  HENT: Negative for trouble swallowing  Eyes: Negative for visual disturbance  Respiratory: Negative for cough, chest tightness, shortness of breath and wheezing  Cardiovascular: Negative for chest pain  Gastrointestinal: Negative for abdominal distention, abdominal pain, blood in stool, constipation and diarrhea  Endocrine: Negative for polyuria  Genitourinary: Negative for difficulty urinating and flank pain  Musculoskeletal: Negative for arthralgias and myalgias  Skin: Negative for rash  Neurological: Negative for dizziness and light-headedness  Hematological: Negative for adenopathy  Does not bruise/bleed easily  Psychiatric/Behavioral: Negative for sleep disturbance  Objective:      /88 (BP Location: Left arm, Patient Position: Sitting, Cuff Size: Standard)   Pulse 89   Temp 97 7 °F (36 5 °C) (Tympanic)   Wt 78 9 kg (174 lb)   SpO2 94%   BMI 24 97 kg/m²          Physical Exam   Constitutional: He is oriented to person, place, and time  He appears well-developed and well-nourished   No distress  HENT:   Head: Normocephalic  Eyes: Pupils are equal, round, and reactive to light  Right eye exhibits no discharge  Left eye exhibits no discharge  Neck: No tracheal deviation present  No thyromegaly present  Cardiovascular: Normal rate, regular rhythm and normal heart sounds  No murmur heard  Pulmonary/Chest: Effort normal  No respiratory distress  He has no wheezes  He has no rales  Abdominal: Soft  He exhibits no distension  There is no tenderness  Musculoskeletal: Normal range of motion  He exhibits no edema  Lymphadenopathy:     He has no cervical adenopathy  Neurological: He is alert and oriented to person, place, and time  No cranial nerve deficit  Skin: Skin is warm  He is not diaphoretic  No erythema  Psychiatric: He has a normal mood and affect   Judgment and thought content normal          Raheem Quintero MD

## 2019-11-09 LAB — BACTERIA UR CULT: NORMAL

## 2019-12-03 DIAGNOSIS — K21.9 GASTROESOPHAGEAL REFLUX DISEASE WITHOUT ESOPHAGITIS: Primary | ICD-10-CM

## 2019-12-03 RX ORDER — PANTOPRAZOLE SODIUM 40 MG/1
TABLET, DELAYED RELEASE ORAL
Qty: 90 TABLET | Refills: 1 | Status: SHIPPED | OUTPATIENT
Start: 2019-12-03 | End: 2020-06-03

## 2019-12-06 LAB — HBA1C MFR BLD HPLC: 10.3 %

## 2019-12-13 ENCOUNTER — TELEPHONE (OUTPATIENT)
Dept: FAMILY MEDICINE CLINIC | Facility: CLINIC | Age: 84
End: 2019-12-13

## 2019-12-13 DIAGNOSIS — N30.00 ACUTE CYSTITIS WITHOUT HEMATURIA: Primary | ICD-10-CM

## 2019-12-13 NOTE — TELEPHONE ENCOUNTER
I received a text from the patient's son  He most likely has UTI again  I will place an order for a UA CNS  The son, who is a physician, requested a cath specimen in light of his recurrent UTIs

## 2020-01-03 DIAGNOSIS — G89.29 CHRONIC RIGHT-SIDED LOW BACK PAIN WITHOUT SCIATICA: ICD-10-CM

## 2020-01-03 DIAGNOSIS — M54.50 CHRONIC RIGHT-SIDED LOW BACK PAIN WITHOUT SCIATICA: ICD-10-CM

## 2020-01-03 RX ORDER — TRAMADOL HYDROCHLORIDE 50 MG/1
50 TABLET ORAL 2 TIMES DAILY PRN
Qty: 90 TABLET | Refills: 1 | Status: SHIPPED | OUTPATIENT
Start: 2020-01-03 | End: 2020-01-06 | Stop reason: SDUPTHER

## 2020-01-06 ENCOUNTER — OFFICE VISIT (OUTPATIENT)
Dept: FAMILY MEDICINE CLINIC | Facility: CLINIC | Age: 85
End: 2020-01-06
Payer: MEDICARE

## 2020-01-06 VITALS
HEART RATE: 77 BPM | HEIGHT: 70 IN | TEMPERATURE: 97.5 F | SYSTOLIC BLOOD PRESSURE: 118 MMHG | BODY MASS INDEX: 25.2 KG/M2 | DIASTOLIC BLOOD PRESSURE: 80 MMHG | WEIGHT: 176 LBS | OXYGEN SATURATION: 95 %

## 2020-01-06 DIAGNOSIS — IMO0002 UNCONTROLLED TYPE 2 DIABETES MELLITUS WITH DIABETIC POLYNEUROPATHY, WITHOUT LONG-TERM CURRENT USE OF INSULIN: Primary | ICD-10-CM

## 2020-01-06 DIAGNOSIS — I82.511 CHRONIC DEEP VEIN THROMBOSIS (DVT) OF FEMORAL VEIN OF RIGHT LOWER EXTREMITY (HCC): ICD-10-CM

## 2020-01-06 DIAGNOSIS — I48.91 ATRIAL FIBRILLATION WITH SLOW VENTRICULAR RESPONSE (HCC): Chronic | ICD-10-CM

## 2020-01-06 DIAGNOSIS — F32.A MILD DEPRESSION: ICD-10-CM

## 2020-01-06 DIAGNOSIS — C18.9 MALIGNANT NEOPLASM OF COLON, UNSPECIFIED PART OF COLON (HCC): ICD-10-CM

## 2020-01-06 DIAGNOSIS — I50.42 HYPERTENSIVE HEART DISEASE WITH CHRONIC COMBINED SYSTOLIC AND DIASTOLIC CONGESTIVE HEART FAILURE (HCC): ICD-10-CM

## 2020-01-06 DIAGNOSIS — I50.42 CHRONIC COMBINED SYSTOLIC AND DIASTOLIC CONGESTIVE HEART FAILURE (HCC): ICD-10-CM

## 2020-01-06 DIAGNOSIS — J44.9 CHRONIC OBSTRUCTIVE PULMONARY DISEASE, UNSPECIFIED COPD TYPE (HCC): ICD-10-CM

## 2020-01-06 DIAGNOSIS — L30.9 DERMATITIS: ICD-10-CM

## 2020-01-06 DIAGNOSIS — I11.0 HYPERTENSIVE HEART DISEASE WITH CHRONIC COMBINED SYSTOLIC AND DIASTOLIC CONGESTIVE HEART FAILURE (HCC): ICD-10-CM

## 2020-01-06 DIAGNOSIS — I48.20 CHRONIC ATRIAL FIBRILLATION (HCC): ICD-10-CM

## 2020-01-06 DIAGNOSIS — E21.1 HYPERPARATHYROIDISM DUE TO 1,25(0H)2D3 (HCC): ICD-10-CM

## 2020-01-06 DIAGNOSIS — M46.20: ICD-10-CM

## 2020-01-06 PROBLEM — N39.0 UTI (URINARY TRACT INFECTION): Status: RESOLVED | Noted: 2019-09-06 | Resolved: 2020-01-06

## 2020-01-06 PROCEDURE — 99214 OFFICE O/P EST MOD 30 MIN: CPT | Performed by: FAMILY MEDICINE

## 2020-01-06 RX ORDER — MULTIVIT WITH MINERALS/LUTEIN
1000 TABLET ORAL DAILY
COMMUNITY
Start: 2019-12-19

## 2020-01-06 RX ORDER — METHENAMINE HIPPURATE 1000 MG/1
1 TABLET ORAL 2 TIMES DAILY WITH MEALS
COMMUNITY
Start: 2019-12-19 | End: 2020-11-23 | Stop reason: SDUPTHER

## 2020-01-06 NOTE — PROGRESS NOTES
Assessment/Plan:       Problem List Items Addressed This Visit        Digestive    Cancer, colon [facility])     He remains clinically stable  He is not a candidate for colonoscopy at this time due to chronic medical problems  Endocrine    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, without long-term current use of insulin (HCC) - Primary (Chronic)    Relevant Orders    Comprehensive metabolic panel    Hemoglobin A1C    Hyperparathyroidism due to 1,25(0H)2d3 [facility])    Relevant Orders    Comprehensive metabolic panel    Vitamin D 25 hydroxy       Respiratory    COLD (chronic obstructive lung disease) ([facility] [address] )    Relevant Orders    CBC and differential       Cardiovascular and Mediastinum    Atrial fibrillation with slow ventricular response (HCC) (Chronic)     The patient is no longer on anticoagulation due to recurrent hematuria  I reached out to his son today, who is Dr Robert Nelson, as well as reviewed this with the patient  His stroke risk is certainly elevated on just aspirin as opposed to anticoagulation  The patient is willing to accept this risk because of recurrent bleeding with anticoagulation  Relevant Orders    CBC and differential    CHF (congestive heart failure) ([facility])    Relevant Orders    CBC and differential    Chronic deep vein thrombosis (DVT) of femoral vein of right lower extremity ([facility])    Relevant Orders    CBC and differential    Chronic atrial fibrillation    Relevant Orders    CBC and differential       Musculoskeletal and Integument    Chronic osteomyelitis of spine ([facility] [address] )     He remains clinically stable at this time  Relevant Medications    methenamine hippurate (HIPREX) 1 g tablet    Other Relevant Orders    CBC and differential       Other    Mild depression ([facility])     Mood seems improved at this time  Continue with mirtazapine           Relevant Orders    CBC and differential      Other Visit Diagnoses     Dermatitis        This appears to be from a hair moist rising so  I placed him on a topical steroid twice daily  Contact me if not improving    Relevant Medications    hydrocortisone 2 5 % cream            Subjective:      Patient ID: Hillary Messer is a 81 y o  male  HPI the patient presents today for follow-up for chronic health issues  He has a history of type 2 diabetes  Glucose readings are improving  He denies excessive polyuria or polydipsia  He has history of COPD  He denies coughing or wheezing currently  He has a history of congestive heart failure  He currently denies orthopnea or shortness of breath at rest   He gets mildly short of breath with exertion  He has some mild intermittent swelling of his lower extremities which remains chronic and stable      The following portions of the patient's history were reviewed and updated as appropriate: allergies, current medications, past family history, past medical history, past social history, past surgical history and problem list       Current Outpatient Medications:     ACCU-CHEK FASTCLIX LANCETS MISC, Test once daily, Disp: 100 each, Rfl: 5    famotidine (PEPCID) 20 mg tablet, Take 1 tablet (20 mg total) by mouth 2 (two) times a day, Disp: 180 tablet, Rfl: 3    furosemide (LASIX) 20 mg tablet, 20 mg alternating with 40 mg, add additional 20 mg for weight gain more than 3 lb (Patient taking differently: Take 40 mg by mouth daily ), Disp: 180 tablet, Rfl: 3    gabapentin (NEURONTIN) 400 mg capsule, Take 400 mg by mouth 3 (three) times a day  , Disp: , Rfl:     Insulin Pen Needle 32G X 4 MM MISC, Test once daily, Disp: 100 each, Rfl: 5    LANTUS SOLOSTAR 100 units/mL injection pen, INJECT SUBCUTANEOUSLY 18  UNITS DAILY (Patient taking differently: INJECT SUBCUTANEOUSLY 18  UNITS DAILY IN AM), Disp: 30 mL, Rfl: 11    levalbuterol (XOPENEX) 1 25 mg/3 mL nebulizer solution, Take 1 vial (1 25 mg total) by nebulization every 4 (four) hours as needed for wheezing or shortness of breath, Disp: 75 vial, Rfl: 3    metoprolol tartrate (LOPRESSOR) 25 mg tablet, TAKE ONE-HALF TABLET BY  MOUTH DAILY, Disp: 45 tablet, Rfl: 5    Mirabegron ER (MYRBETRIQ) 50 MG TB24, Take 50 mg by mouth daily  , Disp: , Rfl:     mirtazapine (REMERON) 30 mg tablet, Take 1 tablet (30 mg total) by mouth daily at bedtime, Disp: 90 tablet, Rfl: 3    Multiple Vitamin (MULTIVITAMIN) tablet, Take 1 tablet by mouth daily, Disp: , Rfl:     nitroglycerin (NITROLINGUAL) 0 4 mg/spray spray, 1 spray every 5 (five) minutes as needed  , Disp: , Rfl:     ONE TOUCH ULTRA TEST test strip, Test once daily, Disp: 200 each, Rfl: 3    pantoprazole (PROTONIX) 40 mg tablet, TAKE 1 TABLET BY MOUTH  DAILY, Disp: 90 tablet, Rfl: 1    sodium chloride 0 9 % nebulizer solution, Inhale every 4 (four) hours as needed  , Disp: , Rfl:     traMADol (ULTRAM) 50 mg tablet, Take 1 tablet (50 mg total) by mouth 2 (two) times a day as needed for moderate pain, Disp: 90 tablet, Rfl: 1    Ascorbic Acid (VITAMIN C) 1000 MG tablet, Take 1,000 mg by mouth daily, Disp: , Rfl:     hydrocortisone 2 5 % cream, Apply topically 2 (two) times a day, Disp: 60 g, Rfl: 1    methenamine hippurate (HIPREX) 1 g tablet, Take 1 g by mouth 2 (two) times a day with meals, Disp: , Rfl:      Review of Systems      Objective:      /80 (BP Location: Left arm, Patient Position: Sitting, Cuff Size: Standard)   Pulse 77   Temp 97 5 °F (36 4 °C) (Tympanic)   Ht 5' 10" (1 778 m)   Wt 79 8 kg (176 lb)   SpO2 95%   BMI 25 25 kg/m²          Physical Exam   Constitutional: He is oriented to person, place, and time  He appears well-developed and well-nourished  No distress  HENT:   Head: Normocephalic  Eyes: Pupils are equal, round, and reactive to light  Right eye exhibits no discharge  Left eye exhibits no discharge  Neck: No tracheal deviation present  No thyromegaly present  Cardiovascular: Normal rate, regular rhythm and normal heart sounds   Pulses are weak pulses  No murmur heard  Pulses:       Dorsalis pedis pulses are 1+ on the right side, and 1+ on the left side  Posterior tibial pulses are 1+ on the right side, and 0 on the left side  Pulmonary/Chest: Effort normal  No respiratory distress  He has no wheezes  He has no rales  Abdominal: Soft  He exhibits no distension  There is no tenderness  Musculoskeletal: Normal range of motion  He exhibits edema (1+ bilateral lower extremities  )  Feet:    Feet:   Right Foot:   Skin Integrity: Positive for dry skin  Negative for ulcer, skin breakdown, erythema, warmth or callus  Left Foot:   Skin Integrity: Positive for dry skin  Negative for ulcer, skin breakdown, erythema, warmth or callus  Lymphadenopathy:     He has no cervical adenopathy  Neurological: He is alert and oriented to person, place, and time  No cranial nerve deficit  Skin: Skin is warm  He is not diaphoretic  No erythema  Psychiatric: He has a normal mood and affect  Judgment and thought content normal          José Miguel Gross MD    Diabetic Foot Exam    Patient's shoes and socks removed  Right Foot/Ankle   Right Foot Inspection  Skin Exam: skin normal and dry skin skin not intact, no warmth, no callus, no erythema, no maceration, no abnormal color, no pre-ulcer, no ulcer and no callus                            Sensory       Monofilament testing: diminished  Vascular  Capillary refills: < 3 seconds  The right DP pulse is 1+  The right PT pulse is 1+  Right Toe  - Comprehensive Exam  Ecchymosis: none  Arch: normal  Hammertoes: absent  Claw Toes: absent  Swelling: none   Tenderness: none         Left Foot/Ankle  Left Foot Inspection  Skin Exam: skin normal and dry skinskin not intact, no warmth, no erythema, no maceration, normal color, no pre-ulcer, no ulcer and no callus                                         Sensory       Monofilament: diminished  Vascular  Capillary refills: < 3 seconds  The left DP pulse is 1+   The left PT pulse is 0  Left Toe  - Comprehensive Exam  Ecchymosis: none  Arch: normal  Hammertoes: absent  Claw toes: absent  Swelling: none   Tenderness: none       Assign Risk Category:  No deformity present;  Loss of protective sensation; Weak pulses       Risk: 1

## 2020-01-06 NOTE — TELEPHONE ENCOUNTER
Received Fax from [facility]  Their no longer accepting oreders more then a 30 day supply for any opiods medication  Quality reduced to 60 tabs   Order updated and put in pending approval

## 2020-01-06 NOTE — ASSESSMENT & PLAN NOTE
Wt Readings from Last 3 Encounters:   01/06/20 79 8 kg (176 lb)   11/08/19 78 9 kg (174 lb)   09/10/19 83 2 kg (183 lb 6 8 oz)       CURRENTLY STABLE AND BLOOD PRESSURE IS WELL CONTROLLED

## 2020-01-07 RX ORDER — TRAMADOL HYDROCHLORIDE 50 MG/1
50 TABLET ORAL 2 TIMES DAILY PRN
Qty: 60 TABLET | Refills: 1 | Status: SHIPPED | OUTPATIENT
Start: 2020-01-07 | End: 2020-06-02 | Stop reason: SDUPTHER

## 2020-01-07 NOTE — ASSESSMENT & PLAN NOTE
The patient is no longer on anticoagulation due to recurrent hematuria  I reached out to his son today, who is Dr Carolina Hernandez, as well as reviewed this with the patient  His stroke risk is certainly elevated on just aspirin as opposed to anticoagulation  The patient is willing to accept this risk because of recurrent bleeding with anticoagulation

## 2020-01-07 NOTE — ASSESSMENT & PLAN NOTE
He remains clinically stable  He is not a candidate for colonoscopy at this time due to chronic medical problems

## 2020-02-18 DIAGNOSIS — G47.00 INSOMNIA, UNSPECIFIED TYPE: ICD-10-CM

## 2020-02-18 DIAGNOSIS — IMO0002 UNCONTROLLED TYPE 2 DIABETES MELLITUS WITH DIABETIC POLYNEUROPATHY, WITHOUT LONG-TERM CURRENT USE OF INSULIN: Chronic | ICD-10-CM

## 2020-02-18 DIAGNOSIS — I50.42 CHRONIC COMBINED SYSTOLIC AND DIASTOLIC CONGESTIVE HEART FAILURE (HCC): ICD-10-CM

## 2020-02-20 RX ORDER — INSULIN GLARGINE 100 [IU]/ML
INJECTION, SOLUTION SUBCUTANEOUS
Qty: 30 ML | Refills: 11 | Status: SHIPPED | OUTPATIENT
Start: 2020-02-20 | End: 2020-10-13 | Stop reason: SDUPTHER

## 2020-02-20 RX ORDER — MIRTAZAPINE 30 MG/1
TABLET, FILM COATED ORAL
Qty: 90 TABLET | Refills: 3 | Status: SHIPPED | OUTPATIENT
Start: 2020-02-20 | End: 2020-12-15

## 2020-02-20 RX ORDER — FUROSEMIDE 20 MG/1
TABLET ORAL
Qty: 180 TABLET | Refills: 3 | Status: SHIPPED | OUTPATIENT
Start: 2020-02-20 | End: 2020-04-09 | Stop reason: SDUPTHER

## 2020-03-15 DIAGNOSIS — R25.1 TREMOR: Primary | ICD-10-CM

## 2020-03-24 LAB — HBA1C MFR BLD HPLC: 7.8 %

## 2020-03-31 PROBLEM — I42.8 OTHER CARDIOMYOPATHIES (HCC): Status: ACTIVE | Noted: 2020-03-31

## 2020-04-09 DIAGNOSIS — I50.42 CHRONIC COMBINED SYSTOLIC AND DIASTOLIC CONGESTIVE HEART FAILURE (HCC): ICD-10-CM

## 2020-04-09 RX ORDER — FUROSEMIDE 20 MG/1
TABLET ORAL
Qty: 270 TABLET | Refills: 3 | Status: ON HOLD | OUTPATIENT
Start: 2020-04-09 | End: 2020-07-05 | Stop reason: SDUPTHER

## 2020-05-29 DIAGNOSIS — K21.9 GASTROESOPHAGEAL REFLUX DISEASE WITHOUT ESOPHAGITIS: ICD-10-CM

## 2020-05-29 DIAGNOSIS — I48.91 ATRIAL FIBRILLATION WITH SLOW VENTRICULAR RESPONSE (HCC): Chronic | ICD-10-CM

## 2020-06-02 DIAGNOSIS — M54.50 CHRONIC RIGHT-SIDED LOW BACK PAIN WITHOUT SCIATICA: ICD-10-CM

## 2020-06-02 DIAGNOSIS — G89.29 CHRONIC RIGHT-SIDED LOW BACK PAIN WITHOUT SCIATICA: ICD-10-CM

## 2020-06-02 RX ORDER — FOLIC ACID 1 MG/1
TABLET ORAL
Qty: 90 TABLET | Refills: 3 | Status: SHIPPED | OUTPATIENT
Start: 2020-06-02

## 2020-06-02 RX ORDER — TRAMADOL HYDROCHLORIDE 50 MG/1
50 TABLET ORAL 2 TIMES DAILY PRN
Qty: 60 TABLET | Refills: 1 | Status: SHIPPED | OUTPATIENT
Start: 2020-06-02 | End: 2020-11-17

## 2020-06-02 RX ORDER — FAMOTIDINE 20 MG/1
TABLET, FILM COATED ORAL
Qty: 180 TABLET | Refills: 3 | Status: SHIPPED | OUTPATIENT
Start: 2020-06-02

## 2020-06-03 DIAGNOSIS — K21.9 GASTROESOPHAGEAL REFLUX DISEASE WITHOUT ESOPHAGITIS: ICD-10-CM

## 2020-06-03 RX ORDER — PANTOPRAZOLE SODIUM 40 MG/1
TABLET, DELAYED RELEASE ORAL
Qty: 90 TABLET | Refills: 1 | Status: SHIPPED | OUTPATIENT
Start: 2020-06-03 | End: 2020-11-23 | Stop reason: SDUPTHER

## 2020-06-04 DIAGNOSIS — IMO0002 UNCONTROLLED TYPE 2 DIABETES MELLITUS WITH DIABETIC POLYNEUROPATHY, WITHOUT LONG-TERM CURRENT USE OF INSULIN: Chronic | ICD-10-CM

## 2020-06-04 RX ORDER — LANCETS
EACH MISCELLANEOUS
Qty: 100 EACH | Refills: 3 | Status: SHIPPED | OUTPATIENT
Start: 2020-06-04 | End: 2020-07-17 | Stop reason: SDUPTHER

## 2020-07-03 ENCOUNTER — APPOINTMENT (EMERGENCY)
Dept: RADIOLOGY | Facility: HOSPITAL | Age: 85
DRG: 682 | End: 2020-07-03
Payer: MEDICARE

## 2020-07-03 ENCOUNTER — APPOINTMENT (EMERGENCY)
Dept: CT IMAGING | Facility: HOSPITAL | Age: 85
DRG: 682 | End: 2020-07-03
Payer: MEDICARE

## 2020-07-03 ENCOUNTER — HOSPITAL ENCOUNTER (INPATIENT)
Facility: HOSPITAL | Age: 85
LOS: 1 days | Discharge: HOME | DRG: 682 | End: 2020-07-05
Attending: EMERGENCY MEDICINE | Admitting: FAMILY MEDICINE
Payer: MEDICARE

## 2020-07-03 DIAGNOSIS — I50.33 ACUTE ON CHRONIC DIASTOLIC CONGESTIVE HEART FAILURE (HCC): ICD-10-CM

## 2020-07-03 DIAGNOSIS — R41.82 ALTERED MENTAL STATUS: ICD-10-CM

## 2020-07-03 DIAGNOSIS — N17.9 AKI (ACUTE KIDNEY INJURY) (HCC): ICD-10-CM

## 2020-07-03 DIAGNOSIS — I50.42 CHRONIC COMBINED SYSTOLIC AND DIASTOLIC CONGESTIVE HEART FAILURE (HCC): ICD-10-CM

## 2020-07-03 DIAGNOSIS — E86.0 DEHYDRATION: Primary | ICD-10-CM

## 2020-07-03 PROBLEM — G31.84 MILD COGNITIVE IMPAIRMENT: Status: ACTIVE | Noted: 2020-07-03

## 2020-07-03 LAB
ALBUMIN SERPL BCP-MCNC: 4 G/DL (ref 3.5–5)
ALP SERPL-CCNC: 103 U/L (ref 46–116)
ALT SERPL W P-5'-P-CCNC: 41 U/L (ref 12–78)
ANION GAP SERPL CALCULATED.3IONS-SCNC: 12 MMOL/L (ref 4–13)
APTT PPP: 31 SECONDS (ref 23–37)
AST SERPL W P-5'-P-CCNC: 52 U/L (ref 5–45)
BACTERIA UR QL AUTO: ABNORMAL /HPF
BASOPHILS # BLD AUTO: 0.05 THOUSANDS/ΜL (ref 0–0.1)
BASOPHILS NFR BLD AUTO: 0 % (ref 0–1)
BILIRUB SERPL-MCNC: 1.33 MG/DL (ref 0.2–1)
BILIRUB UR QL STRIP: NEGATIVE
BUN SERPL-MCNC: 45 MG/DL (ref 5–25)
CALCIUM SERPL-MCNC: 9.7 MG/DL (ref 8.3–10.1)
CHLORIDE SERPL-SCNC: 106 MMOL/L (ref 100–108)
CLARITY UR: CLEAR
CO2 SERPL-SCNC: 28 MMOL/L (ref 21–32)
COLOR UR: YELLOW
CREAT SERPL-MCNC: 1.97 MG/DL (ref 0.6–1.3)
EOSINOPHIL # BLD AUTO: 0.01 THOUSAND/ΜL (ref 0–0.61)
EOSINOPHIL NFR BLD AUTO: 0 % (ref 0–6)
ERYTHROCYTE [DISTWIDTH] IN BLOOD BY AUTOMATED COUNT: 15.5 % (ref 11.6–15.1)
GFR SERPL CREATININE-BSD FRML MDRD: 30 ML/MIN/1.73SQ M
GLUCOSE SERPL-MCNC: 154 MG/DL (ref 65–140)
GLUCOSE SERPL-MCNC: 178 MG/DL (ref 65–140)
GLUCOSE SERPL-MCNC: 191 MG/DL (ref 65–140)
GLUCOSE UR STRIP-MCNC: NEGATIVE MG/DL
HCT VFR BLD AUTO: 52 % (ref 36.5–49.3)
HGB BLD-MCNC: 17 G/DL (ref 12–17)
HGB UR QL STRIP.AUTO: ABNORMAL
IMM GRANULOCYTES # BLD AUTO: 0.2 THOUSAND/UL (ref 0–0.2)
IMM GRANULOCYTES NFR BLD AUTO: 1 % (ref 0–2)
INR PPP: 1.15 (ref 0.84–1.19)
KETONES UR STRIP-MCNC: NEGATIVE MG/DL
LEUKOCYTE ESTERASE UR QL STRIP: ABNORMAL
LYMPHOCYTES # BLD AUTO: 2.75 THOUSANDS/ΜL (ref 0.6–4.47)
LYMPHOCYTES NFR BLD AUTO: 16 % (ref 14–44)
MCH RBC QN AUTO: 31.8 PG (ref 26.8–34.3)
MCHC RBC AUTO-ENTMCNC: 32.7 G/DL (ref 31.4–37.4)
MCV RBC AUTO: 97 FL (ref 82–98)
MONOCYTES # BLD AUTO: 1.46 THOUSAND/ΜL (ref 0.17–1.22)
MONOCYTES NFR BLD AUTO: 9 % (ref 4–12)
NEUTROPHILS # BLD AUTO: 12.34 THOUSANDS/ΜL (ref 1.85–7.62)
NEUTS SEG NFR BLD AUTO: 74 % (ref 43–75)
NITRITE UR QL STRIP: NEGATIVE
NON-SQ EPI CELLS URNS QL MICRO: ABNORMAL /HPF
NRBC BLD AUTO-RTO: 0 /100 WBCS
NT-PROBNP SERPL-MCNC: 1395 PG/ML
PH UR STRIP.AUTO: 5 [PH] (ref 4.5–8)
PLATELET # BLD AUTO: 153 THOUSANDS/UL (ref 149–390)
PMV BLD AUTO: 12.7 FL (ref 8.9–12.7)
POTASSIUM SERPL-SCNC: 4 MMOL/L (ref 3.5–5.3)
PROT SERPL-MCNC: 8.2 G/DL (ref 6.4–8.2)
PROT UR STRIP-MCNC: NEGATIVE MG/DL
PROTHROMBIN TIME: 14.9 SECONDS (ref 11.6–14.5)
RBC # BLD AUTO: 5.35 MILLION/UL (ref 3.88–5.62)
RBC #/AREA URNS AUTO: ABNORMAL /HPF
SARS-COV-2 RNA RESP QL NAA+PROBE: NEGATIVE
SODIUM SERPL-SCNC: 146 MMOL/L (ref 136–145)
SP GR UR STRIP.AUTO: 1.02 (ref 1–1.03)
TROPONIN I SERPL-MCNC: 0.02 NG/ML
UROBILINOGEN UR QL STRIP.AUTO: 0.2 E.U./DL
WBC # BLD AUTO: 16.81 THOUSAND/UL (ref 4.31–10.16)
WBC #/AREA URNS AUTO: ABNORMAL /HPF

## 2020-07-03 PROCEDURE — 82948 REAGENT STRIP/BLOOD GLUCOSE: CPT

## 2020-07-03 PROCEDURE — 99285 EMERGENCY DEPT VISIT HI MDM: CPT

## 2020-07-03 PROCEDURE — 71046 X-RAY EXAM CHEST 2 VIEWS: CPT

## 2020-07-03 PROCEDURE — 99220 PR INITIAL OBSERVATION CARE/DAY 70 MINUTES: CPT | Performed by: FAMILY MEDICINE

## 2020-07-03 PROCEDURE — 85610 PROTHROMBIN TIME: CPT | Performed by: EMERGENCY MEDICINE

## 2020-07-03 PROCEDURE — 93005 ELECTROCARDIOGRAM TRACING: CPT

## 2020-07-03 PROCEDURE — 87635 SARS-COV-2 COVID-19 AMP PRB: CPT | Performed by: EMERGENCY MEDICINE

## 2020-07-03 PROCEDURE — 85025 COMPLETE CBC W/AUTO DIFF WBC: CPT | Performed by: EMERGENCY MEDICINE

## 2020-07-03 PROCEDURE — 83880 ASSAY OF NATRIURETIC PEPTIDE: CPT | Performed by: EMERGENCY MEDICINE

## 2020-07-03 PROCEDURE — 70450 CT HEAD/BRAIN W/O DYE: CPT

## 2020-07-03 PROCEDURE — 36415 COLL VENOUS BLD VENIPUNCTURE: CPT | Performed by: EMERGENCY MEDICINE

## 2020-07-03 PROCEDURE — 85730 THROMBOPLASTIN TIME PARTIAL: CPT | Performed by: EMERGENCY MEDICINE

## 2020-07-03 PROCEDURE — 1123F ACP DISCUSS/DSCN MKR DOCD: CPT | Performed by: PHYSICIAN ASSISTANT

## 2020-07-03 PROCEDURE — 80053 COMPREHEN METABOLIC PANEL: CPT | Performed by: EMERGENCY MEDICINE

## 2020-07-03 PROCEDURE — 81001 URINALYSIS AUTO W/SCOPE: CPT

## 2020-07-03 PROCEDURE — 84484 ASSAY OF TROPONIN QUANT: CPT | Performed by: EMERGENCY MEDICINE

## 2020-07-03 PROCEDURE — 99285 EMERGENCY DEPT VISIT HI MDM: CPT | Performed by: EMERGENCY MEDICINE

## 2020-07-03 PROCEDURE — 96365 THER/PROPH/DIAG IV INF INIT: CPT

## 2020-07-03 RX ORDER — SODIUM CHLORIDE 9 MG/ML
75 INJECTION, SOLUTION INTRAVENOUS CONTINUOUS
Status: DISCONTINUED | OUTPATIENT
Start: 2020-07-03 | End: 2020-07-04

## 2020-07-03 RX ORDER — INSULIN GLARGINE 100 [IU]/ML
18 INJECTION, SOLUTION SUBCUTANEOUS
Status: DISCONTINUED | OUTPATIENT
Start: 2020-07-03 | End: 2020-07-05 | Stop reason: HOSPADM

## 2020-07-03 RX ORDER — MIRTAZAPINE 15 MG/1
30 TABLET, FILM COATED ORAL
Status: DISCONTINUED | OUTPATIENT
Start: 2020-07-03 | End: 2020-07-05 | Stop reason: HOSPADM

## 2020-07-03 RX ORDER — FOLIC ACID 1 MG/1
1000 TABLET ORAL DAILY
Status: DISCONTINUED | OUTPATIENT
Start: 2020-07-04 | End: 2020-07-05 | Stop reason: HOSPADM

## 2020-07-03 RX ORDER — GABAPENTIN 400 MG/1
400 CAPSULE ORAL 3 TIMES DAILY
Status: DISCONTINUED | OUTPATIENT
Start: 2020-07-03 | End: 2020-07-05 | Stop reason: HOSPADM

## 2020-07-03 RX ORDER — HEPARIN SODIUM 5000 [USP'U]/ML
5000 INJECTION, SOLUTION INTRAVENOUS; SUBCUTANEOUS EVERY 8 HOURS SCHEDULED
Status: DISCONTINUED | OUTPATIENT
Start: 2020-07-03 | End: 2020-07-05 | Stop reason: HOSPADM

## 2020-07-03 RX ORDER — PANTOPRAZOLE SODIUM 40 MG/1
40 TABLET, DELAYED RELEASE ORAL
Status: DISCONTINUED | OUTPATIENT
Start: 2020-07-04 | End: 2020-07-05 | Stop reason: HOSPADM

## 2020-07-03 RX ADMIN — SODIUM CHLORIDE, SODIUM LACTATE, POTASSIUM CHLORIDE, AND CALCIUM CHLORIDE 500 ML: .6; .31; .03; .02 INJECTION, SOLUTION INTRAVENOUS at 15:16

## 2020-07-03 RX ADMIN — INSULIN GLARGINE 18 UNITS: 100 INJECTION, SOLUTION SUBCUTANEOUS at 21:45

## 2020-07-03 RX ADMIN — SODIUM CHLORIDE, SODIUM LACTATE, POTASSIUM CHLORIDE, AND CALCIUM CHLORIDE 1000 ML: .6; .31; .03; .02 INJECTION, SOLUTION INTRAVENOUS at 14:54

## 2020-07-03 RX ADMIN — SODIUM CHLORIDE 75 ML/HR: 0.9 INJECTION, SOLUTION INTRAVENOUS at 18:16

## 2020-07-03 RX ADMIN — MIRTAZAPINE 30 MG: 15 TABLET, FILM COATED ORAL at 21:45

## 2020-07-03 RX ADMIN — HEPARIN SODIUM 5000 UNITS: 5000 INJECTION INTRAVENOUS; SUBCUTANEOUS at 21:45

## 2020-07-03 RX ADMIN — GABAPENTIN 400 MG: 400 CAPSULE ORAL at 21:45

## 2020-07-03 NOTE — ASSESSMENT & PLAN NOTE
Wt Readings from Last 3 Encounters:   07/03/20 78 8 kg (173 lb 11 6 oz)   01/06/20 79 8 kg (176 lb)   11/08/19 78 9 kg (174 lb)       Patient appears to be a dry weight no lower extremity edema however has pulmonary congestion seen on chest x-ray suspect cardiorenal syndrome with significant TAN likely secondary to dehydration  - continue metoprolol 12 5 mg , will hold Lasix 40 milligrams p o   For now which is home dose

## 2020-07-03 NOTE — ASSESSMENT & PLAN NOTE
- acute kidney injury with creatinine 1 97, baseline 0 8 to 0 95, this is likely due to dehydration, patient reports that he has not eaten in 9 days however he will not describe if caused by  loss of appetite or another reason, unsure of baseline mental status, he likely has a component of cardiorenal syndrome, pulmonary fluid congestion seen on chest x-ray, his mucous membranes are very dry therefore will give 75 mL/hour of fluids and hold Lasix for now as he does not have JVD or lower extremity edema on exam    - consult nephrology appreciate recommendations  - likely has malnutrition component will consult nutrition services

## 2020-07-03 NOTE — PLAN OF CARE
Problem: Potential for Falls  Goal: Patient will remain free of falls  Description  INTERVENTIONS:  - Assess patient frequently for physical needs  -  Identify cognitive and physical deficits and behaviors that affect risk of falls  -  [place] fall precautions as indicated by assessment   - Educate patient/family on patient safety including physical limitations  - Instruct patient to call for assistance with activity based on assessment  - Modify environment to reduce risk of injury  - Consider OT/PT consult to assist with strengthening/mobility  Outcome: Progressing     Problem: NEUROSENSORY - ADULT  Goal: Achieves stable or improved neurological status  Description  INTERVENTIONS  - Monitor and report changes in neurological status  - Monitor vital signs such as temperature, blood pressure, glucose, and any other labs ordered   - Initiate measures to prevent increased intracranial pressure  - Monitor for seizure activity and implement precautions if appropriate      Outcome: Progressing  Goal: Remains free of injury related to seizures activity  Description  INTERVENTIONS  - Maintain airway, patient safety  and administer oxygen as ordered  - Monitor patient for seizure activity, document and report duration and description of seizure to physician/advanced practitioner  - If seizure occurs,  ensure patient safety during seizure  - Reorient patient post seizure  - Seizure pads on all 4 side rails  - Instruct patient/family to notify RN of any seizure activity including if an aura is experienced  - Instruct patient/family to call for assistance with activity based on nursing assessment  - Administer anti-seizure medications if ordered    Outcome: Progressing  Goal: Achieves maximal functionality and self care  Description  INTERVENTIONS  - Monitor swallowing and airway patency with patient fatigue and changes in neurological status  - Encourage and assist patient to increase activity and self care     - Encourage visually impaired, hearing impaired and aphasic patients to use assistive/communication devices  Outcome: Progressing     Problem: CARDIOVASCULAR - ADULT  Goal: Maintains optimal cardiac output and hemodynamic stability  Description  INTERVENTIONS:  - Monitor I/O, vital signs and rhythm  - Monitor for S/S and trends of decreased cardiac output  - Administer and titrate ordered vasoactive medications to optimize hemodynamic stability  - Assess quality of pulses, skin color and temperature  - Assess for signs of decreased coronary artery perfusion  - Instruct patient to report change in severity of symptoms  Outcome: Progressing  Goal: Absence of cardiac dysrhythmias or at baseline rhythm  Description  INTERVENTIONS:  - Continuous cardiac monitoring, vital signs, obtain 12 lead EKG if ordered  - Administer antiarrhythmic and heart rate control medications as ordered  - Monitor electrolytes and administer replacement therapy as ordered  Outcome: Progressing     Problem: RESPIRATORY - ADULT  Goal: Achieves optimal ventilation and oxygenation  Description  INTERVENTIONS:  - Assess for changes in respiratory status  - Assess for changes in mentation and behavior  - Position to facilitate oxygenation and minimize respiratory effort  - Oxygen administered by appropriate delivery if ordered  - Initiate smoking cessation education as indicated  - Encourage broncho-pulmonary hygiene including cough, deep breathe, Incentive Spirometry  - Assess the need for suctioning and aspirate as needed  - Assess and instruct to report SOB or any respiratory difficulty  - Respiratory Therapy support as indicated  Outcome: Progressing     Problem: GASTROINTESTINAL - ADULT  Goal: Minimal or absence of nausea and/or vomiting  Description  INTERVENTIONS:  - Administer IV fluids if ordered to ensure adequate hydration  - Maintain NPO status until nausea and vomiting are resolved  - Nasogastric tube if ordered  - Administer ordered antiemetic medications as needed  - Provide nonpharmacologic comfort measures as appropriate  - Advance diet as tolerated, if ordered  - Consider nutrition services referral to assist patient with adequate nutrition and appropriate food choices  Outcome: Progressing  Goal: Maintains or returns to baseline bowel function  Description  INTERVENTIONS:  - Assess bowel function  - Encourage oral fluids to ensure adequate hydration  - Administer IV fluids if ordered to ensure adequate hydration  - Administer ordered medications as needed  - Encourage mobilization and activity  - Consider nutritional services referral to assist patient with adequate nutrition and appropriate food choices  Outcome: Progressing  Goal: Maintains adequate nutritional intake  Description  INTERVENTIONS:  - Monitor percentage of each meal consumed  - Identify factors contributing to decreased intake, treat as appropriate  - Assist with meals as needed  - Monitor I&O, weight, and lab values if indicated  - Obtain nutrition services referral as needed  Outcome: Progressing     Problem: GENITOURINARY - ADULT  Goal: Maintains or returns to baseline urinary function  Description  INTERVENTIONS:  - Assess urinary function  - Encourage oral fluids to ensure adequate hydration if ordered  - Administer IV fluids as ordered to ensure adequate hydration  - Administer ordered medications as needed  - Offer frequent toileting  - Follow urinary retention protocol if ordered  Outcome: Progressing  Goal: Absence of urinary retention  Description  INTERVENTIONS:  - Assess patients ability to void and empty bladder  - Monitor I/O  - Bladder scan as needed  - Discuss with physician/AP medications to alleviate retention as needed  - Discuss catheterization for long term situations as appropriate  Outcome: Progressing     Problem: METABOLIC, FLUID AND ELECTROLYTES - ADULT  Goal: Electrolytes maintained within normal limits  Description  INTERVENTIONS:  - Monitor labs and assess patient for signs and symptoms of electrolyte imbalances  - Administer electrolyte replacement as ordered  - Monitor response to electrolyte replacements, including repeat lab results as appropriate  - Instruct patient on fluid and nutrition as appropriate  Outcome: Progressing  Goal: Fluid balance maintained  Description  INTERVENTIONS:  - Monitor labs   - Monitor I/O and WT  - Instruct patient on fluid and nutrition as appropriate  - Assess for signs & symptoms of volume excess or deficit  Outcome: Progressing  Goal: Glucose maintained within target range  Description  INTERVENTIONS:  - Monitor Blood Glucose as ordered  - Assess for signs and symptoms of hyperglycemia and hypoglycemia  - Administer ordered medications to maintain glucose within target range  - Assess nutritional intake and initiate nutrition service referral as needed  Outcome: Progressing     Problem: SKIN/TISSUE INTEGRITY - ADULT  Goal: Skin integrity remains intact  Description  INTERVENTIONS  - Identify patients at risk for skin breakdown  - Assess and monitor skin integrity  - Assess and monitor nutrition and hydration status  - Monitor labs (i e  albumin)  - Assess for incontinence   - Turn and reposition patient  - Assist with mobility/ambulation  - Relieve pressure over bony prominences  - Avoid friction and shearing  - Provide appropriate hygiene as needed including keeping skin clean and dry  - Evaluate need for skin moisturizer/barrier cream  - Collaborate with interdisciplinary team (i e  Nutrition, Rehabilitation, etc )   - Patient/family teaching  Outcome: Progressing  Goal: Incision(s), wounds(s) or drain site(s) healing without S/S of infection  Description  INTERVENTIONS  - Assess and document risk factors for skin impairment   - Assess and document dressing, incision, wound bed, drain sites and surrounding tissue  - Consider nutrition services referral as needed  - Oral mucous membranes remain intact  - Provide patient/ family education  Outcome: Progressing  Goal: Oral mucous membranes remain intact  Description  INTERVENTIONS  - Assess oral mucosa and hygiene practices  - Implement preventative oral hygiene regimen  - Implement oral medicated treatments as ordered  - Initiate Nutrition services referral as needed  Outcome: Progressing     Problem: HEMATOLOGIC - ADULT  Goal: Maintains hematologic stability  Description  INTERVENTIONS  - Assess for signs and symptoms of bleeding or hemorrhage  - Monitor labs  - Administer supportive blood products/factors as ordered and appropriate  Outcome: Progressing     Problem: MUSCULOSKELETAL - ADULT  Goal: Maintain or return mobility to safest level of function  Description  INTERVENTIONS:  - Assess patient's ability to carry out ADLs; assess patient's baseline for ADL function and identify physical deficits which impact ability to perform ADLs (bathing, care of mouth/teeth, toileting, grooming, dressing, etc )  - Assess/evaluate cause of self-care deficits   - Assess range of motion  - Assess patient's mobility  - Assess patient's need for assistive devices and provide as appropriate  - Encourage maximum independence but intervene and supervise when necessary  - Involve family in performance of ADLs  - Assess for home care needs following discharge   - Consider OT consult to assist with ADL evaluation and planning for discharge  - Provide patient education as appropriate  Outcome: Progressing  Goal: Maintain proper alignment of affected body part  Description  INTERVENTIONS:  - Support, maintain and protect limb and body alignment  - Provide patient/ family with appropriate education  Outcome: Progressing

## 2020-07-03 NOTE — ASSESSMENT & PLAN NOTE
Lab Results   Component Value Date    HGBA1C 7 8 (H) 03/24/2020       No results for input(s): POCGLU in the last 72 hours      Blood Sugar Average: Last 72 hrs:     Patient has hyperglycemia with glucose of 191, will continue home Lantus 18 units at bedtime, give sliding scale insulin

## 2020-07-03 NOTE — ASSESSMENT & PLAN NOTE
- patient has atrial fibrillation with slow ventricular response, continue metoprolol 12 5 milligrams daily, he is not on anticoagulation

## 2020-07-03 NOTE — ASSESSMENT & PLAN NOTE
Patient came in with some confusion which could be secondary to electrolyte balance from severe dehydration as patient reports not eating for 9 days, he is oriented to person, place, time  However he is unable to answer some questions including why he is not eating  He is on Remeron as an outpatient will continue in the hospital   Uncertain of baseline mental state    - will consult Geriatrics

## 2020-07-03 NOTE — H&P
H&P- Eura Rho 1935, 81 y o  male MRN: [id number]    Unit/Bed#: [facility] Encounter: [id number]    Primary Care Provider: Rufus Flores MD   Date and time admitted to hospital: 7/3/2020  1:05 PM        Mild cognitive impairment  Assessment & Plan  Patient came in with some confusion which could be secondary to electrolyte balance from severe dehydration as patient reports not eating for 9 days, he is oriented to person, place, time  However he is unable to answer some questions including why he is not eating  He is on Remeron as an outpatient will continue in the hospital   Uncertain of baseline mental state  - patient did make several inappropriate comments may have a component of frontal temporal dementia  - will consult Geriatrics    Acute on chronic diastolic congestive heart failure (HCC)  Assessment & Plan  Wt Readings from Last 3 Encounters:   07/03/20 78 8 kg (173 lb 11 6 oz)   01/06/20 79 8 kg (176 lb)   11/08/19 78 9 kg (174 lb)       Patient appears to be a dry weight no lower extremity edema however has pulmonary congestion seen on chest x-ray suspect cardiorenal syndrome with significant TAN likely secondary to dehydration  - continue metoprolol 12 5 mg , will hold Lasix 40 milligrams p o  For now which is home dose  - proBNP 1395 however appears to be at baseline which is usually about 1600  Gastroesophageal reflux disease without esophagitis  Assessment & Plan  Continue pantoprazole 40 milligrams daily    CAD S/P percutaneous coronary angioplasty  Assessment & Plan  Patient has CAD with history of 5 stents, continue metoprolol 25 milligrams b i d      Atrial fibrillation with slow ventricular response (HCC)  Assessment & Plan  - patient has atrial fibrillation with slow ventricular response, continue metoprolol 12 5 milligrams daily, he is not on anticoagulation    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, without long-term current use of insulin [facility])  Assessment & Plan  Lab Results   Component Value Date    HGBA1C 7 8 (H) 03/24/2020       No results for input(s): POCGLU in the last 72 hours  Blood Sugar Average: Last 72 hrs:     Patient has hyperglycemia with glucose of 191, will continue home Lantus 18 units at bedtime, give sliding scale insulin    * TAN (acute kidney injury) ([facility] [address] )  Assessment & Plan  - acute kidney injury with creatinine 1 97, baseline 0 8 to 0 95, this is likely due to dehydration, patient reports that he has not eaten in 9 days however he will not describe if caused by  loss of appetite or another reason, unsure of baseline mental status, he likely has a component of cardiorenal syndrome, pulmonary fluid congestion seen on chest x-ray, his mucous membranes are very dry therefore will give 75 mL/hour of fluids and hold Lasix for now as he does not have JVD or lower extremity edema on exam    - consult nephrology appreciate recommendations  - likely has malnutrition component will consult nutrition services        VTE Prophylaxis: Heparin  / sequential compression device   Code Status:  Level 1 full code  POLST: POLST form is not discussed and not completed at this time  Discussion with family:  None    Anticipated Length of Stay:  Patient will be admitted on an Observation basis with an anticipated length of stay of  < 2 midnights  Justification for Hospital Stay:  Acute kidney injury which may be cardiorenal syndrome, need for IV fluids and possible diuresis  Total Time for Visit, including Counseling / Coordination of Care: 30 minutes  Greater than 50% of this total time spent on direct patient counseling and coordination of care  Chief Complaint:   Confusion    History of Present Illness:    Carrie Miller is a 81 y o  male who presents with confusion  This is an 60-year-old patient who was brought in from [facility]    The nursing home staff found him such did chair and checked his blood sugar which was normal and called EMS to be evaluated patient was also having tremors  The patient is a history of mild cognitive impairment unknown of his baseline cognition  He does not know why he came into the hospital today however he is oriented to person and place  He reports that he has not eaten in 9 days but when further asked if this is due to poor appetite he does not appropriately respond  He denies nausea, vomiting, diarrhea or abdominal pain  He has a past medical history of CAD, mild cognitive impairment, atrial fibrillation with slow ventricular response not on anticoagulation  Review of Systems:    Review of Systems   Constitutional: Negative for chills and fever  Respiratory: Negative for cough and shortness of breath  Cardiovascular: Negative for chest pain  Gastrointestinal: Negative for abdominal pain, constipation, diarrhea, nausea and vomiting  Genitourinary: Negative for dysuria  Musculoskeletal: Negative for back pain  Neurological: Negative for dizziness and headaches         Past Medical and Surgical History:     Past Medical History:   Diagnosis Date    Anxiety     Arthritis     Atrial fibrillation (HCC)     BPH (benign prostatic hyperplasia)     CAD (coronary artery disease)     Chronic back pain     Chronic diastolic (congestive) heart failure (HCC)     COPD (chronic obstructive pulmonary disease) (HCC)     Critical illness polyneuropathy (HCC)     Diabetes mellitus (HCC)     type 2    Dysphagia     GERD (gastroesophageal reflux disease)     Hemorrhoids     History of colon cancer     History of DVT (deep vein thrombosis)     Hyperlipidemia     Hypertension     Hyponatremia     Mal[facility] colon ([facility] [address] [facility]     Myocardial infarct ([facility] [address] [facility]     Osteomyelitis ([facility] [address] [facility]     Pneumonia     Sepsis ([facility] [address] )     Urinary retention        Past Surgical History:   Procedure Laterality Date    APPENDECTOMY      BACK SURGERY      BOTOX INJECTION N/A 2/8/2019    Procedure: BOTOX INJECTION 100 UNITS;  Surgeon: Rissa Stevens MD;  [facility];  Service: Urology    BRONCHOSCOPY N/A 6/30/2016    Procedure: BRONCHOSCOPY FLEXIBLE with bronchial lavage to the left lower lobe; Surgeon: Niall Rivera MD;  Location: [facility]; Service:     CATARACT EXTRACTION      CHOLECYSTECTOMY      COLECTOMY      COLON SURGERY      polypectomy for cancerous lesion    COLONOSCOPY      CORONARY ANGIOPLASTY WITH STENT PLACEMENT      x5  pt unsure of where    GASTROSTOMY TUBE PLACEMENT      percutaneous placement of gastrostomy tube placed 4/16 - dc 8/16    HEMORROIDECTOMY      LUMBAR LAMINECTOMY      LUNG SURGERY      PEG TUBE REMOVAL      MO CYSTOURETHRO W/IMPLANT N/A 8/27/2018    Procedure: CYSTOSCOPY WITH INSERTION UROLIFT;  Surgeon: Rissa Stevens MD;  [facility];  Service: Urology    MO CYSTOURETHROSCOPY N/A 2/8/2019    Procedure: Luan Soto;  Surgeon: Rissa Stevens MD;  [facility];  Service: Urology    TRACHEOSTOMY         Meds/Allergies:    Prior to Admission medications    Medication Sig Start Date End Date Taking?  Authorizing Provider   furosemide (LASIX) 20 mg tablet TAKE 2 TABLETS BY MOUTH IN  THE MORNING Take an additional tablet daily as needed for edema, shortness of breath 4/9/20  Yes Fuad Acevedo MD   Accu-Chek FastClix Lancets MISC Test once daily 6/4/20   Fuad Acevedo MD   Ascorbic Acid (VITAMIN C) 1000 MG tablet Take 1,000 mg by mouth daily 12/19/19   Historical Provider, MD   famotidine (PEPCID) 20 mg tablet TAKE 1 TABLET BY MOUTH TWO  TIMES DAILY 6/2/20   Fuad Acevedo MD   folic acid (FOLVITE) 1 mg tablet TAKE 1 TABLET BY MOUTH  DAILY 6/2/20   Fuad Acevedo MD   gabapentin (NEURONTIN) 400 mg capsule Take 400 mg by mouth 3 (three) times a day   8/4/16   Historical Provider, MD   glucose blood (ONE TOUCH ULTRA TEST) test strip Test once daily 6/4/20   Fuad Acevedo MD hydrocortisone 2 5 % cream Apply topically 2 (two) times a day 1/6/20   Shirley Wood MD   Insulin Pen Needle 32G X 4 MM MISC Test once daily 6/4/20   Shirley Wood MD   LANTUS SOLOSTAR 100 units/mL injection pen INJECT SUBCUTANEOUSLY 18  UNITS DAILY 2/20/20   Shirley Wood MD   levalbuterol (XOPENEX) 1 25 mg/3 mL nebulizer solution Take 1 vial (1 25 mg total) by nebulization every 4 (four) hours as needed for wheezing or shortness of breath 6/24/19   Shirley Wood MD   methenamine hippurate (HIPREX) 1 g tablet Take 1 g by mouth 2 (two) times a day with meals 12/19/19   Historical Provider, MD   metoprolol tartrate (LOPRESSOR) 25 mg tablet TAKE ONE-HALF TABLET BY  MOUTH DAILY 6/2/20   Shirley Wood MD   Mirabegron ER (MYRBETRIQ) 50 MG TB24 Take 50 mg by mouth daily      Historical Provider, MD   mirtazapine (REMERON) 30 mg tablet TAKE 1 TABLET BY MOUTH  DAILY AT BEDTIME 2/20/20   Shirley Wood MD   Multiple Vitamin (MULTIVITAMIN) tablet Take 1 tablet by mouth daily    Historical Provider, MD   nitroglycerin (NITROLINGUAL) 0 4 mg/spray spray 1 spray every 5 (five) minutes as needed   1/20/17   Historical Provider, MD   pantoprazole (PROTONIX) 40 mg tablet TAKE 1 TABLET BY MOUTH  DAILY 6/3/20   Shirley Wood MD   sodium chloride 0 9 % nebulizer solution Inhale every 4 (four) hours as needed      Historical Provider, MD   traMADol (ULTRAM) 50 mg tablet Take 1 tablet (50 mg total) by mouth 2 (two) times a day as needed for moderate pain 6/2/20   Shirley Wood MD         Allergies: No Known Allergies    Social History:     Marital Status:     Patient Pre-hospital Living Situation:  [facility] nursing home  Patient Pre-hospital Level of Mobility:  Walks without assistance  Patient Pre-hospital Diet Restrictions:  None  Substance Use History:   Social History     Substance and Sexual Activity   Alcohol Use No     Social History     Tobacco Use   Smoking Status Former Smoker    Packs/day: 1 00    Years: 20 00    Pack years: 20 00    Types: Cigarettes    Last attempt to quit: 1978    Years since quittin 8   Smokeless Tobacco Never Used   Tobacco Comment    quit 40 years ago     Social History     Substance and Sexual Activity   Drug Use No       Physical Exam   Constitutional: He is oriented to person, place, and time  He appears well-developed  HENT:   Mucous membranes very dry   Eyes: Conjunctivae are normal    Neck: Neck supple  Cardiovascular: Normal rate and normal heart sounds  No murmur heard  Irregularly irregular rhythm   Pulmonary/Chest: Effort normal and breath sounds normal  No stridor  No respiratory distress  He has no wheezes  Abdominal: Soft  Bowel sounds are normal  He exhibits no distension  There is no tenderness  Neurological: He is alert and oriented to person, place, and time  Skin: Skin is warm  Vitals reviewed

## 2020-07-03 NOTE — ED PROVIDER NOTES
History  Chief Complaint   Patient presents with    Medical Problem     Per EMS staff at [facility] found pt sluched in chair checked his sugar which was normal called ems to have pt evaluated for tremers       History provided by:  Patient and EMS personnel   used: No    Medical Problem   Associated symptoms: no abdominal pain, no chest pain, no cough, no diarrhea, no fever, no headaches, no rash, no shortness of breath, no sore throat and no vomiting    Altered Mental Status   Presenting symptoms: partial responsiveness    Severity:  Mild  Episode history:  Unable to specify  Timing:  Sporadic  Progression:  Improving  Chronicity:  New  Context: nursing home resident    Associated symptoms: no abdominal pain, no agitation, no fever, no headaches, no rash and no vomiting        Prior to Admission Medications   Prescriptions Last Dose Informant Patient Reported? Taking?    Accu-Chek FastClix Lancets MISC   No No   Sig: Test once daily   Ascorbic Acid (VITAMIN C) 1000 MG tablet   Yes No   Sig: Take 1,000 mg by mouth daily   Insulin Pen Needle 32G X 4 MM MISC   No No   Sig: Test once daily   LANTUS SOLOSTAR 100 units/mL injection pen   No No   Sig: INJECT SUBCUTANEOUSLY 18  UNITS DAILY   Mirabegron ER (MYRBETRIQ) 50 MG TB24  Self Yes No   Sig: Take 50 mg by mouth daily     Multiple Vitamin (MULTIVITAMIN) tablet  Self Yes No   Sig: Take 1 tablet by mouth daily   famotidine (PEPCID) 20 mg tablet   No No   Sig: TAKE 1 TABLET BY MOUTH TWO  TIMES DAILY   folic acid (FOLVITE) 1 mg tablet   No No   Sig: TAKE 1 TABLET BY MOUTH  DAILY   furosemide (LASIX) 20 mg tablet   No Yes   Sig: TAKE 2 TABLETS BY MOUTH IN  THE MORNING Take an additional tablet daily as needed for edema, shortness of breath   gabapentin (NEURONTIN) 400 mg capsule  Self Yes No   Sig: Take 400 mg by mouth 3 (three) times a day     glucose blood (ONE TOUCH ULTRA TEST) test strip   No No   Sig: Test once daily   hydrocortisone 2 5 % cream   No No   Sig: Apply topically 2 (two) times a day   levalbuterol (XOPENEX) 1 25 mg/3 mL nebulizer solution  Self No No   Sig: Take 1 vial (1 25 mg total) by nebulization every 4 (four) hours as needed for wheezing or shortness of breath   methenamine hippurate (HIPREX) 1 g tablet   Yes No   Sig: Take 1 g by mouth 2 (two) times a day with meals   metoprolol tartrate (LOPRESSOR) 25 mg tablet   No No   Sig: TAKE ONE-HALF TABLET BY  MOUTH DAILY   mirtazapine (REMERON) 30 mg tablet   No No   Sig: TAKE 1 TABLET BY MOUTH  DAILY AT BEDTIME   nitroglycerin (NITROLINGUAL) 0 4 mg/spray spray  Self Yes No   Si spray every 5 (five) minutes as needed     pantoprazole (PROTONIX) 40 mg tablet   No No   Sig: TAKE 1 TABLET BY MOUTH  DAILY   sodium chloride 0 9 % nebulizer solution  Self Yes No   Sig: Inhale every 4 (four) hours as needed     traMADol (ULTRAM) 50 mg tablet   No No   Sig: Take 1 tablet (50 mg total) by mouth 2 (two) times a day as needed for moderate pain      Facility-Administered Medications: None       Past Medical History:   Diagnosis Date    Anxiety     Arthritis     Atrial fibrillation (HCC)     BPH (benign prostatic hyperplasia)     CAD (coronary artery disease)     Chronic back pain     Chronic diastolic (congestive) heart failure (HCC)     COPD (chronic obstructive pulmonary disease) (HCC)     Critical illness polyneuropathy (HCC)     Diabetes mellitus (HCC)     type 2    Dysphagia     GERD (gastroesophageal reflux disease)     Hemorrhoids     History of colon cancer     History of DVT (deep vein thrombosis)     Hyperlipidemia     Hypertension     Hyponatremia     [facility]     Myocardial infarct (HCC)     Osteomyelitis (HCC)     Pneumonia     [facility]     Urinary retention        Past Surgical History:   Procedure Laterality Date    APPENDECTOMY      BACK SURGERY      BOTOX INJECTION N/A 2019    Procedure: BOTOX INJECTION 100 UNITS;  Surgeon: Jarvis Guidry MD;  [facility];  Service: Urology    BRONCHOSCOPY N/A 2016    Procedure: BRONCHOSCOPY FLEXIBLE with bronchial lavage to the left lower lobe; Surgeon: Lisa Clarke MD;  [facility]; Service:     CATARACT EXTRACTION      CHOLECYSTECTOMY      COLECTOMY      COLON SURGERY      polypectomy for cancerous lesion    COLONOSCOPY      CORONARY ANGIOPLASTY WITH STENT PLACEMENT      x5  pt unsure of where    GASTROSTOMY TUBE PLACEMENT      percutaneous placement of gastrostomy tube placed  - dc     HEMORROIDECTOMY      LUMBAR LAMINECTOMY      LUNG SURGERY      PEG TUBE REMOVAL      LA CYSTOURETHRO W/IMPLANT N/A 2018    Procedure: CYSTOSCOPY WITH INSERTION UROLIFT;  Surgeon: Jarvis Guidry MD;  [facility];  Service: Urology    LA CYSTOURETHROSCOPY N/A 2019    Procedure: Marybeth Anna;  Surgeon: Jarvis Guidry MD;  [facility];  Service: Urology    TRACHEOSTOMY         Family History   Problem Relation Age of Onset    Heart attack Mother      I have reviewed and agree with the history as documented  E-Cigarette/Vaping    E-Cigarette Use Never User      E-Cigarette/Vaping Substances     Social History     Tobacco Use    Smoking status: Former Smoker     Packs/day: 1 00     Years: 20 00     Pack years: 20 00     Types: Cigarettes     Last attempt to quit: 1978     Years since quittin 8    Smokeless tobacco: Never Used    Tobacco comment: quit 40 years ago   Substance Use Topics    Alcohol use: No    Drug use: No       Review of Systems   Constitutional: Negative for chills and fever  HENT: Negative for facial swelling, sore throat and trouble swallowing  Eyes: Negative for pain and visual disturbance  Respiratory: Negative for cough and shortness of breath  Cardiovascular: Negative for chest pain and leg swelling  Gastrointestinal: Negative for abdominal pain, diarrhea and vomiting  Genitourinary: Negative for dysuria and flank pain  Musculoskeletal: Negative for back pain, neck pain and neck stiffness  Skin: Negative for pallor and rash  Allergic/Immunologic: Negative for environmental allergies and immunocompromised state  Neurological: Negative for dizziness and headaches  Hematological: Negative for adenopathy  Does not bruise/bleed easily  Psychiatric/Behavioral: Negative for agitation and behavioral problems  All other systems reviewed and are negative  Physical Exam  Physical Exam   Constitutional: He is oriented to person, place, and time  He appears well-developed and well-nourished  No distress  HENT:   Head: Normocephalic and atraumatic  Eyes: EOM are normal    Neck: Normal range of motion  Neck supple  Cardiovascular: Normal rate, regular rhythm, normal heart sounds and intact distal pulses  Pulmonary/Chest: Effort normal and breath sounds normal    Abdominal: Soft  Bowel sounds are normal  There is no tenderness  There is no rebound and no guarding  Musculoskeletal: Normal range of motion  Neurological: He is alert and oriented to person, place, and time  No cranial nerve deficit  Coordination normal    Skin: Skin is warm and dry  Psychiatric: He has a normal mood and affect  Nursing note and vitals reviewed        Vital Signs  ED Triage Vitals [07/03/20 1308]   Temperature Pulse Respirations Blood Pressure SpO2   98 6 °F (37 °C) 77 18 129/85 94 %      Temp Source Heart Rate Source Patient Position - Orthostatic VS BP Location FiO2 (%)   Oral Monitor Sitting Right arm --      Pain Score       --           Vitals:    07/03/20 1308 07/03/20 1516 07/03/20 1615 07/03/20 1658   BP: 129/85 142/88 152/82 153/83   Pulse: 77 75 70 68   Patient Position - Orthostatic VS: Sitting Lying Lying Lying         Visual Acuity      ED Medications  Medications   folic acid (FOLVITE) tablet 1,000 mcg (has no administration in time range)   gabapentin (NEURONTIN) capsule 400 mg (has no administration in time range)   insulin glargine (LANTUS) subcutaneous injection 18 Units 0 18 mL (has no administration in time range)   metoprolol tartrate (LOPRESSOR) partial tablet 12 5 mg (has no administration in time range)   mirtazapine (REMERON) tablet 30 mg (has no administration in time range)   pantoprazole (PROTONIX) EC tablet 40 mg (has no administration in time range)   sodium chloride 0 9 % infusion (has no administration in time range)   heparin (porcine) subcutaneous injection 5,000 Units (has no administration in time range)   insulin lispro (HumaLOG) 100 units/mL subcutaneous injection 1-5 Units (has no administration in time range)   lactated ringers bolus 500 mL (0 mL Intravenous Stopped 7/3/20 1635)       Diagnostic Studies  Results Reviewed     Procedure Component Value Units Date/Time    Novel Coronavirus [facility] [[id number]]  (Normal) Collected:  07/03/20 1456    Lab Status:  Final result Specimen:  Nares from Nose Updated:  07/03/20 1558     SARS-CoV-2 Negative    Narrative: The specimen collection materials, transport medium, and/or testing methodology utilized in the production of these test results have been proven to be reliable in a limited validation with an abbreviated program under the Emergency Utilization Authorization provided by the FDA  Testing reported as "Presumptive positive" will be confirmed with secondary testing with a reference laboratory to ensure result accuracy  Clinical caution and judgement should be used with the interpretation of these results with consideration of the clinical impression and other laboratory testing  Testing reported as "Positive" or "Negative" has been proven to be accurate according to standard laboratory validation requirements  All testing is performed with control materials showing appropriate reactivity at standard intervals        Urine Microscopic [[id number]]  (Abnormal) Collected:  07/03/20 1521    Lab Status:  Final result Specimen:  Urine, Other Updated:  07/03/20 1534     RBC, UA 2-4 /hpf      WBC, UA 0-1 /hpf      Epithelial Cells None Seen /hpf      Bacteria, UA Occasional /hpf     POCT urinalysis dipstick [[id number]]  (Abnormal) Resulted:  07/03/20 1523    Lab Status:  Final result Specimen:  Urine Updated:  07/03/20 1523    Urine Macroscopic, POC [[id number]]  (Abnormal) Collected:  07/03/20 1521    Lab Status:  Final result Specimen:  Urine Updated:  07/03/20 1522     Color, UA Yellow     Clarity, UA Clear     pH, UA 5 0     Leukocytes, UA Trace     Nitrite, UA Negative     Protein, UA Negative mg/dl      Glucose, UA Negative mg/dl      Ketones, UA Negative mg/dl      Urobilinogen, UA 0 2 E U /dl      Bilirubin, UA Negative     Blood, UA Moderate     Specific Gravity, UA 1 025    Narrative:       CLINITEK RESULT    Protime-INR [[id number]]  (Abnormal) Collected:  07/03/20 1332    Lab Status:  Final result Specimen:  Blood from Arm, Left Updated:  07/03/20 1402     Protime 14 9 seconds      INR 1 15    APTT [[id number]]  (Normal) Collected:  07/03/20 1332    Lab Status:  Final result Specimen:  Blood from Arm, Left Updated:  07/03/20 1402     PTT 31 seconds     NT-BNP PRO [[id number]]  (Abnormal) Collected:  07/03/20 1332    Lab Status:  Final result Specimen:  Blood from Arm, Left Updated:  07/03/20 1401     NT-proBNP 1,395 pg/mL     Troponin I [[id number]]  (Normal) Collected:  07/03/20 1332    Lab Status:  Final result Specimen:  Blood from Arm, Left Updated:  07/03/20 1359     Troponin I 0 02 ng/mL     Comprehensive metabolic panel [[id number]]  (Abnormal) Collected:  07/03/20 1332    Lab Status:  Final result Specimen:  Blood from Arm, Left Updated:  07/03/20 1355     Sodium 146 mmol/L      Potassium 4 0 mmol/L      Chloride 106 mmol/L      CO2 28 mmol/L      ANION GAP 12 mmol/L      BUN 45 mg/dL      Creatinine 1 97 mg/dL      Glucose 191 mg/dL      Calcium 9 7 mg/dL      AST 52 U/L      ALT 41 U/L      Alkaline Phosphatase 103 U/L      Total Protein 8 2 g/dL      Albumin 4 0 g/dL      Total Bilirubin 1 33 mg/dL      eGFR 30 ml/min/1 73sq m     Narrative:       [place] guidelines for Chronic Kidney Disease (CKD):     Stage 1 with normal or high GFR (GFR > 90 mL/min/1 73 square meters)    Stage 2 Mild CKD (GFR = 60-89 mL/min/1 73 square meters)    Stage 3A Moderate CKD (GFR = 45-59 mL/min/1 73 square meters)    Stage 3B Moderate CKD (GFR = 30-44 mL/min/1 73 square meters)    Stage 4 Severe CKD (GFR = 15-29 mL/min/1 73 square meters)    Stage 5 End Stage CKD (GFR <15 mL/min/1 73 square meters)  Note: GFR calculation is accurate only with a steady state creatinine    CBC and differential [[id number]]  (Abnormal) Collected:  07/03/20 1332    Lab Status:  Final result Specimen:  Blood from Arm, Left Updated:  07/03/20 1337     WBC 16 81 Thousand/uL      RBC 5 35 Million/uL      Hemoglobin 17 0 g/dL      Hematocrit 52 0 %      MCV 97 fL      MCH 31 8 pg      MCHC 32 7 g/dL      RDW 15 5 %      MPV 12 7 fL      Platelets 931 Thousands/uL      nRBC 0 /100 WBCs      Neutrophils Relative 74 %      Immat GRANS % 1 %      Lymphocytes Relative 16 %      Monocytes Relative 9 %      Eosinophils Relative 0 %      Basophils Relative 0 %      Neutrophils Absolute 12 34 Thousands/µL      Immature Grans Absolute 0 20 Thousand/uL      Lymphocytes Absolute 2 75 Thousands/µL      Monocytes Absolute 1 46 Thousand/µL      Eosinophils Absolute 0 01 Thousand/µL      Basophils Absolute 0 05 Thousands/µL                  CT head without contrast   Final Result by Aleida Gonzales MD (07/03 3317)         1  Persistent complete opacification of the right posterior ethmoid air cells and sphenoid sinus with possible underlying soft tissue mass versus polyp or inspissated secretions  Nonemergent MRI of the sinuses with gadolinium recommended  2   No acute intracranial process  Workstation performed: [id number]         XR chest 2 views   Final Result by Mariana Burrell MD (07/03 1502)      Findings suggestive of CHF with mild bibasilar edema            Workstation performed: [id number]                    Procedures  Procedures         ED Course  ED Course as of Jul 03 1814 Fri Jul 03, 2020   1343 WBC(!): 16 81   1343 Hemoglobin: 17 0   1343 WBC noted  Platelet Count: 528   1407 Sodium(!): 146   1407 Potassium: 4 0   1407 Anion Gap: 12   1407 BUN(!): 45   1407 TAN noted  We will order IVF 1 litre LR   Creatinine(!): 1 97   1553 CXR was reviewed, possible CHF, we will curtail fluids to 500 ml       1606 Case discussed with Dr Penny Manrique, SLIM, agree with Observation  MDM  Number of Diagnoses or Management Options  TAN [facility]acute kidney injury) ([facility] ): new and requires workup  Altered mental status: new and requires workup  Dehydration: new and requires workup  Diagnosis management comments: Patient is an 46-year-old male, sent in from [address] [place] [address] for evaluation of possible transient altered mental status, found sluched in chair, patient denies headache, chest pain, dyspnea  On exam, patient is conscious, alert, no focal neuro deficits, dry mucous membranes noted, no respiratory distress, abdomen soft nontender, no peripheral edema, no calf tenderness or swelling  Impression: Altered mental status, dehydration, acute kidney injury, will give IV fluids, admit for observation         Amount and/or Complexity of Data Reviewed  Clinical lab tests: reviewed and ordered  Tests in the radiology section of CPT®: ordered and reviewed  Tests in the medicine section of CPT®: ordered and reviewed  Discuss the patient with other providers: yes  Independent visualization of images, tracings, or specimens: yes          Disposition  Final diagnoses:   Dehydration   TAN (acute kidney injury) ([facility] )   Altered mental status     Time reflects when diagnosis was documented in both MDM as applicable and the Disposition within this note     Time User Action Codes Description Comment    7/3/2020  2:09 PM Barby Perez Add [E86 0] Dehydration     7/3/2020  2:09 PM Barby Perez Add [N17 9] TAN (acute kidney injury) ([facility] [address] )     7/3/2020  2:46 PM Barby Perez Add [R41 82] Altered mental status     7/3/2020  4:55 PM Maryjane Ormond Add [I50 33] Acute on chronic diastolic congestive heart failure [facility])       ED Disposition     ED Disposition Condition Date/Time Comment    Admit Stable Fri Jul 3, 2020  4:05 PM Case was discussed with Dr Silvia Raman and the patient's admission status was agreed to be Admission Status: observation status to the service of Dr Silvia Raman          Follow-up Information    None         Current Discharge Medication List      CONTINUE these medications which have NOT CHANGED    Details   furosemide (LASIX) 20 mg tablet TAKE 2 TABLETS BY MOUTH IN  THE MORNING Take an additional tablet daily as needed for edema, shortness of breath  Qty: 270 tablet, Refills: 3    Associated Diagnoses: Chronic combined systolic and diastolic congestive heart failure (HCC)      Accu-Chek FastClix Lancets MISC Test once daily  Qty: 100 each, Refills: 3    Comments: Dx: E11 9 insulin dependent  Associated Diagnoses: 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, without long-term current use of insulin ([facility])      Ascorbic Acid (VITAMIN C) 1000 MG tablet Take 1,000 mg by mouth daily      famotidine (PEPCID) 20 mg tablet TAKE 1 TABLET BY MOUTH TWO  TIMES DAILY  Qty: 180 tablet, Refills: 3    Associated Diagnoses: Gastroesophageal reflux disease without esophagitis      folic acid (FOLVITE) 1 mg tablet TAKE 1 TABLET BY MOUTH  DAILY  Qty: 90 tablet, Refills: 3    Associated Diagnoses: Atrial fibrillation with slow ventricular response ([facility])      gabapentin (NEURONTIN) 400 mg capsule Take 400 mg by mouth 3 (three) times a day        glucose blood (ONE TOUCH ULTRA TEST) test strip Test once daily  Qty: 200 each, Refills: 3    Comments: Dx: E11 9 insulin dependent  Associated Diagnoses: 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, without long-term current use of insulin ([facility])      hydrocortisone 2 5 % cream Apply topically 2 (two) times a day  Qty: 60 g, Refills: 1    Associated Diagnoses: Dermatitis      Insulin Pen Needle 32G X 4 MM MISC Test once daily  Qty: 100 each, Refills: 3    Comments: Dx: E11 9 insulin dependent  Associated Diagnoses: 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, without long-term current use of insulin ([facility])      LANTUS SOLOSTAR 100 units/mL injection pen INJECT SUBCUTANEOUSLY 18  UNITS DAILY  Qty: 30 mL, Refills: 11    Associated Diagnoses: 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, without long-term current use of insulin ([facility])      levalbuterol (XOPENEX) 1 25 mg/3 mL nebulizer solution Take 1 vial (1 25 mg total) by nebulization every 4 (four) hours as needed for wheezing or shortness of breath  Qty: 75 vial, Refills: 3    Associated Diagnoses: Chronic obstructive pulmonary disease, unspecified COPD type ([facility])      methenamine hippurate (HIPREX) 1 g tablet Take 1 g by mouth 2 (two) times a day with meals      metoprolol tartrate (LOPRESSOR) 25 mg tablet TAKE ONE-HALF TABLET BY  MOUTH DAILY  Qty: 45 tablet, Refills: 5    Associated Diagnoses: Atrial fibrillation with slow ventricular response ([facility])      Mirabegron ER (MYRBETRIQ) 50 MG TB24 Take 50 mg by mouth daily        mirtazapine (REMERON) 30 mg tablet TAKE 1 TABLET BY MOUTH  DAILY AT BEDTIME  Qty: 90 tablet, Refills: 3    Associated Diagnoses: Insomnia, unspecified type      Multiple Vitamin (MULTIVITAMIN) tablet Take 1 tablet by mouth daily      nitroglycerin (NITROLINGUAL) 0 4 mg/spray spray 1 spray every 5 (five) minutes as needed        pantoprazole (PROTONIX) 40 mg tablet TAKE 1 TABLET BY MOUTH  DAILY  Qty: 90 tablet, Refills: 1    Associated Diagnoses: Gastroesophageal reflux disease without esophagitis      sodium chloride 0 9 % nebulizer solution Inhale every 4 (four) hours as needed        traMADol (ULTRAM) 50 mg tablet Take 1 tablet (50 mg total) by mouth 2 (two) times a day as needed for moderate pain  Qty: 60 tablet, Refills: 1    Associated Diagnoses: Chronic right-sided low back pain without sciatica           No discharge procedures on file      PDMP Review       Value Time User    PDMP Reviewed  Yes 7/3/2020  4:42 PM Anjum Salmeron DO          ED Provider  Electronically Signed by           Anabelle Joseph MD  07/03/20 9731

## 2020-07-04 LAB
ANION GAP SERPL CALCULATED.3IONS-SCNC: 8 MMOL/L (ref 4–13)
BASOPHILS # BLD AUTO: 0.04 THOUSANDS/ΜL (ref 0–0.1)
BASOPHILS NFR BLD AUTO: 0 % (ref 0–1)
BUN SERPL-MCNC: 41 MG/DL (ref 5–25)
CALCIUM SERPL-MCNC: 8.7 MG/DL (ref 8.3–10.1)
CHLORIDE SERPL-SCNC: 109 MMOL/L (ref 100–108)
CO2 SERPL-SCNC: 30 MMOL/L (ref 21–32)
CREAT SERPL-MCNC: 1.18 MG/DL (ref 0.6–1.3)
EOSINOPHIL # BLD AUTO: 0.14 THOUSAND/ΜL (ref 0–0.61)
EOSINOPHIL NFR BLD AUTO: 1 % (ref 0–6)
ERYTHROCYTE [DISTWIDTH] IN BLOOD BY AUTOMATED COUNT: 15.4 % (ref 11.6–15.1)
GFR SERPL CREATININE-BSD FRML MDRD: 56 ML/MIN/1.73SQ M
GLUCOSE SERPL-MCNC: 116 MG/DL (ref 65–140)
GLUCOSE SERPL-MCNC: 126 MG/DL (ref 65–140)
GLUCOSE SERPL-MCNC: 134 MG/DL (ref 65–140)
GLUCOSE SERPL-MCNC: 139 MG/DL (ref 65–140)
GLUCOSE SERPL-MCNC: 193 MG/DL (ref 65–140)
HCT VFR BLD AUTO: 46.6 % (ref 36.5–49.3)
HGB BLD-MCNC: 14.9 G/DL (ref 12–17)
IMM GRANULOCYTES # BLD AUTO: 0.06 THOUSAND/UL (ref 0–0.2)
IMM GRANULOCYTES NFR BLD AUTO: 1 % (ref 0–2)
LYMPHOCYTES # BLD AUTO: 2.63 THOUSANDS/ΜL (ref 0.6–4.47)
LYMPHOCYTES NFR BLD AUTO: 22 % (ref 14–44)
MCH RBC QN AUTO: 31.5 PG (ref 26.8–34.3)
MCHC RBC AUTO-ENTMCNC: 32 G/DL (ref 31.4–37.4)
MCV RBC AUTO: 99 FL (ref 82–98)
MONOCYTES # BLD AUTO: 0.97 THOUSAND/ΜL (ref 0.17–1.22)
MONOCYTES NFR BLD AUTO: 8 % (ref 4–12)
NEUTROPHILS # BLD AUTO: 8.26 THOUSANDS/ΜL (ref 1.85–7.62)
NEUTS SEG NFR BLD AUTO: 68 % (ref 43–75)
NRBC BLD AUTO-RTO: 0 /100 WBCS
PLATELET # BLD AUTO: 131 THOUSANDS/UL (ref 149–390)
PMV BLD AUTO: 12.5 FL (ref 8.9–12.7)
POTASSIUM SERPL-SCNC: 3.7 MMOL/L (ref 3.5–5.3)
PROCALCITONIN SERPL-MCNC: 0.05 NG/ML
RBC # BLD AUTO: 4.73 MILLION/UL (ref 3.88–5.62)
SODIUM SERPL-SCNC: 147 MMOL/L (ref 136–145)
WBC # BLD AUTO: 12.1 THOUSAND/UL (ref 4.31–10.16)

## 2020-07-04 PROCEDURE — 97163 PT EVAL HIGH COMPLEX 45 MIN: CPT

## 2020-07-04 PROCEDURE — 99232 SBSQ HOSP IP/OBS MODERATE 35: CPT | Performed by: FAMILY MEDICINE

## 2020-07-04 PROCEDURE — 99222 1ST HOSP IP/OBS MODERATE 55: CPT | Performed by: INTERNAL MEDICINE

## 2020-07-04 PROCEDURE — 97530 THERAPEUTIC ACTIVITIES: CPT

## 2020-07-04 PROCEDURE — 82948 REAGENT STRIP/BLOOD GLUCOSE: CPT

## 2020-07-04 PROCEDURE — 80048 BASIC METABOLIC PNL TOTAL CA: CPT | Performed by: FAMILY MEDICINE

## 2020-07-04 PROCEDURE — 84145 PROCALCITONIN (PCT): CPT | Performed by: FAMILY MEDICINE

## 2020-07-04 PROCEDURE — 97166 OT EVAL MOD COMPLEX 45 MIN: CPT

## 2020-07-04 PROCEDURE — 85025 COMPLETE CBC W/AUTO DIFF WBC: CPT | Performed by: FAMILY MEDICINE

## 2020-07-04 RX ORDER — DEXTROSE MONOHYDRATE 50 MG/ML
50 INJECTION, SOLUTION INTRAVENOUS CONTINUOUS
Status: DISCONTINUED | OUTPATIENT
Start: 2020-07-04 | End: 2020-07-05

## 2020-07-04 RX ADMIN — METOPROLOL TARTRATE 12.5 MG: 25 TABLET ORAL at 09:00

## 2020-07-04 RX ADMIN — GABAPENTIN 400 MG: 400 CAPSULE ORAL at 09:00

## 2020-07-04 RX ADMIN — FOLIC ACID 1000 MCG: 1 TABLET ORAL at 09:00

## 2020-07-04 RX ADMIN — HEPARIN SODIUM 5000 UNITS: 5000 INJECTION INTRAVENOUS; SUBCUTANEOUS at 21:37

## 2020-07-04 RX ADMIN — INSULIN GLARGINE 18 UNITS: 100 INJECTION, SOLUTION SUBCUTANEOUS at 21:37

## 2020-07-04 RX ADMIN — PANTOPRAZOLE SODIUM 40 MG: 40 TABLET, DELAYED RELEASE ORAL at 09:00

## 2020-07-04 RX ADMIN — HEPARIN SODIUM 5000 UNITS: 5000 INJECTION INTRAVENOUS; SUBCUTANEOUS at 06:57

## 2020-07-04 RX ADMIN — GABAPENTIN 400 MG: 400 CAPSULE ORAL at 21:36

## 2020-07-04 RX ADMIN — HEPARIN SODIUM 5000 UNITS: 5000 INJECTION INTRAVENOUS; SUBCUTANEOUS at 13:35

## 2020-07-04 RX ADMIN — DEXTROSE 50 ML/HR: 5 SOLUTION INTRAVENOUS at 13:35

## 2020-07-04 RX ADMIN — INSULIN LISPRO 1 UNITS: 100 INJECTION, SOLUTION INTRAVENOUS; SUBCUTANEOUS at 11:59

## 2020-07-04 RX ADMIN — MIRTAZAPINE 30 MG: 15 TABLET, FILM COATED ORAL at 21:37

## 2020-07-04 NOTE — PHYSICAL THERAPY NOTE
PHYSICAL THERAPY EVALUATION          Patient Name: Deon Chaudhari  FDHDW'P Date: 7/4/2020   PT EVALUATION    81 y o     [id number]    Dehydration [E86 0]  Altered mental status [R41 82]  TAN (acute kidney injury) ([facility] ) [N17 9]  Acute on chronic diastolic congestive heart failure ([facility] ) [I50 33]    Past Medical History:   Diagnosis Date    Anxiety     Arthritis     Atrial fibrillation (HCC)     BPH (benign prostatic hyperplasia)     CAD (coronary artery disease)     Chronic back pain     Chronic diastolic (congestive) heart failure (HCC)     COPD (chronic obstructive pulmonary disease) (HCC)     Critical illness polyneuropathy (HCC)     Diabetes mellitus (HCC)     type 2    Dysphagia     GERD (gastroesophageal reflux disease)     Hemorrhoids     History of colon cancer     History of DVT (deep vein thrombosis)     Hyperlipidemia     Hypertension     Hyponatremia     Malignant neoplasm of colon (HCC)     Myocardial infarct ([facility] )     Osteomyelitis ([facility] )     Pneumonia     Sepsis ([place] [address] )     Urinary retention      Past Surgical History:   Procedure Laterality Date    APPENDECTOMY      BACK SURGERY      BOTOX INJECTION N/A 2/8/2019    Procedure: BOTOX INJECTION 100 UNITS;  Surgeon: Yuri Powell MD;  Location: [facility];  Service: Urology    BRONCHOSCOPY N/A 6/30/2016    Procedure: BRONCHOSCOPY FLEXIBLE with bronchial lavage to the left lower lobe; Surgeon: Ricco Winters MD;  Location: [facility];   Service:     CATARACT EXTRACTION      CHOLECYSTECTOMY      COLECTOMY      COLON SURGERY      polypectomy for cancerous lesion    COLONOSCOPY      CORONARY ANGIOPLASTY WITH STENT PLACEMENT      x5  pt unsure of where    GASTROSTOMY TUBE PLACEMENT      percutaneous placement of gastrostomy tube placed 4/16 - dc 8/16    HEMORROIDECTOMY      LUMBAR LAMINECTOMY      LUNG SURGERY      PEG TUBE REMOVAL  DC CYSTOURETHRO W/IMPLANT N/A 8/27/2018    Procedure: CYSTOSCOPY WITH INSERTION Gabriela Campbell;  Surgeon: Samra Grant MD;  [facility];  Service: Urology    DC CYSTOURETHROSCOPY N/A 2/8/2019    Procedure: Marty Mcburney;  Surgeon: Samra Grant MD;  [facility];  Service: Urology    TRACHEOSTOMY          07/04/20 1137   Note Type   Note type Eval only   Pain Assessment   Pain Assessment Tool 0-10   Pain Score 2   Pain Location/Orientation Orientation: Left;Orientation: Upper; Location: Leg  (inner thigh)   Hospital Pain Intervention(s) Repositioned; Ambulation/increased activity; Rest   Home Living   Type of Home Apartment  ([facility])   Home Layout One level;Elevator   Bathroom Shower/Tub Walk-in shower   Bathroom Toilet Standard   Bathroom Equipment Grab bars in shower;Built-in shower seat;Grab bars around toilet   [facility] (Comment)  (Rollator)   Prior Function   Level of [place] Independent with ADLs and functional mobility; Needs assistance with IADLs   Lives With Alone   Receives Help From Family  (son visits daily)   ADL Assistance Independent   IADLs Needs assistance  (meals and laundry services provided)   Falls in the last 6 months 0  (pt denies)   Vocational Retired   Comments pta pt reports being indep w ADLs  use of "whatever" device in apt and within community including RW vs cane vs Rollator  -   Restrictions/Precautions   Weight Bearing Precautions Per Order No   Other Precautions Cognitive; Chair Alarm; Bed Alarm;Multiple lines;Telemetry; Fall Risk;Pain; Impulsive   General   Additional Pertinent History pt admitted 7/3/20 for TAN  presents to ED w AMS  hx/ tremors, depression, CHF, mild cognitive impairment   Cognition   Overall Cognitive Status Impaired   Arousal/Participation Cooperative   Attention Attends with cues to redirect   Orientation Level Oriented to person;Oriented to place; Disoriented to time;Disoriented to situation  (consistently states year is 2017)   Memory Decreased recall of precautions;Decreased recall of recent events;Decreased short term memory   Following Commands Follows one step commands with increased time or repetition   Comments unclear baseline cognition  hx of mild cognitive impairment  RLE Assessment   RLE Assessment WFL  (grossly 4/5, knee flexion 4+)   LLE Assessment   LLE Assessment WFL  (grossly 4/5, knee flexion 4+)   Coordination   Movements are Fluid and Coordinated 1   Sensation WFL   Light Touch   RLE Light Touch Grossly intact   LLE Light Touch Grossly intact   Bed Mobility   Additional Comments pt EOB upon PT arrival   Transfers   Sit to Stand 4  Minimal assistance   Additional items Assist x 2;Bedrails; Increased time required;Verbal cues; Other  (RW)   Stand to Sit 4  Minimal assistance   Additional items Assist x 2;Armrests; Impulsive;Verbal cues; Other  (RW)   Additional Comments min Ax2 and vc for safety   Ambulation/Elevation   Gait pattern Poor UE support; Improper Weight shift; Short stride; Excessively slow; Wide VILMA   Gait Assistance 4  Minimal assist   Additional items Assist x 2;Verbal cues  (vc for safety)   Assistive Device Rolling walker   Distance 4'   Balance   Dynamic Sitting Fair +   Static Standing Fair -   Dynamic Standing Poor +   Ambulatory Poor +  (Key w RW)   Activity Tolerance   Activity Tolerance Patient tolerated treatment well   Medical Staff Made Aware Polly OTMary OTS   Nurse Made Aware Kavya RN   Assessment   Prognosis Fair  (given impaired cognition potentially impacting carryover)   Problem List Decreased strength; Impaired balance;Decreased mobility; Impaired judgement;Decreased safety awareness;Pain   Assessment Alyse Randhawa is a 81 y o  male admitted to [facility] on 7/3/2020 for TAN (acute kidney injury) ([facility] )  PT was consulted and pt was seen on 7/4/2020 for mobility assessment and d/c planning  Pt presents high fall risk, PIV, telemetry    Pt is currently functioning at a minimum assistance x2 level for transfers, minimum assistance x2 level for ambulation with Rolling Walker  Pt demonstrated decreased safety awareness, require frequent vc and min A for safety  Impulsive at times [place] during transf pt will abandon RW and attempted to sit in chair without being fully positioned in front of it  Also require vc for use of RW despite pt saying he uses one for mobility  Primarily pt limited by impaired cognition impacting safety awareness and fall risk  Pt will benefit from continued skilled IP PT to address the above mentioned impairments  in order to maximize recovery and increase functional independence when completing mobility and ADLs  Currently PT recommendations for DME include cont use of RW  At this time PT recommendations for d/c are STR given fall risk, decline from baseline fxn, social barriers (lives alone)  End of session pt seated in bedside chair, OT present  Barriers to Discharge Decreased caregiver support   Barriers to Discharge Comments lives alone   Goals   Patient Goals to go home   STG Expiration Date 07/18/20   Short Term Goal #1 1)  Pt will perform bed mobility with Paulo demonstrating appropriate technique 100% of the time in order to improve function  2)  Perform all transfers with Paulo demonstrating safe and appropriate technique 100% of the time in order to improve ability to negotiate safely in home environment  3) Amb with least restrictive AD > 150'x1 with mod I in order to demonstrate ability to negotiate in home environment  4)  Improve overall strength and balance 1/2 grade in order to optimize ability to perform functional tasks and reduce fall risk  5) Increase activity tolerance to 45 minutes in order to improve endurance to functional tasks  6) PT for ongoing patient and family/caregiver education, DME needs and d/c planning in order to promote highest level of function in least restrictive environment     PT Treatment Day 0 Plan   Treatment/Interventions Functional transfer training;LE strengthening/ROM; Therapeutic exercise; Endurance training;Cognitive reorientation;Patient/family training;Equipment eval/education; Bed mobility;Gait training;Continued evaluation;Spoke to nursing;OT   PT Frequency Other (Comment)  (4-5x)   Recommendation   PT Discharge Recommendation Post-Acute Rehabilitation Services   Equipment Recommended   (cont use of RW)   PT - OK to Discharge Yes   Additional Comments to STR   Modified [place] Scale   Modified [place] Scale 4   Barthel Index   Feeding 10   Bathing 0   Grooming Score 0   Dressing Score 5   Bladder Score 10   Bowels Score 5   Toilet Use Score 5   Transfers (Bed/Chair) Score 10   Mobility (Level Surface) Score 0   Stairs Score 0   Barthel Index Score 45   History: co - morbidities, age, coping styles (depression), social background (live alone), fall risk, use of assistive device, assist for iadl's, cognition, multiple lines  Exam: impairments in systems including musculoskeletal (strength, posture), neuromuscular (balance, gait, transfers, motor function), cognition  Clinical: unstable/unpredictable  Complexity:high      Deyanira Bragg, PT

## 2020-07-04 NOTE — ASSESSMENT & PLAN NOTE
Wt Readings from Last 3 Encounters:   07/04/20 75 4 kg (166 lb 3 6 oz)   01/06/20 79 8 kg (176 lb)   11/08/19 78 9 kg (174 lb)     - continue metoprolol 12 5 mg , will hold Lasix 40 milligrams p o   For now which is home dose

## 2020-07-04 NOTE — OCCUPATIONAL THERAPY NOTE
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, without long-term current use of insulin (HCC)    Atrial fibrillation with slow ventricular response (HCC)    CAD S/P percutaneous coronary angioplasty    Gastroesophageal reflux disease without esophagitis    Acute on chronic diastolic congestive heart failure (HCC)    Mild cognitive impairment    Past Medical History  Past Medical History:   Diagnosis Date    Anxiety     Arthritis     Atrial fibrillation (HCC)     BPH (benign prostatic hyperplasia)     CAD (coronary artery disease)     Chronic back pain     Chronic diastolic (congestive) heart failure (HCC)     COPD [facility]chronic obstructive pulmonary disease) ([facility] )     Critical illness polyneuropathy ([facility] )     Diabetes mellitus ([facility] )     type 2    Dysphagia     GERD (gastroesophageal reflux disease)     Hemorrhoids     History of colon cancer     History of DVT (deep vein thrombosis)     Hyperlipidemia     Hypertension     Hyponatremia     Malignant neoplasm of colon ([facility] )     Myocardial infarct ([facility] )     Osteomyelitis ([facility] )     Pneumonia     Sepsis ([facility] )     Urinary retention      Past Surgical History  Past Surgical History:   Procedure Laterality Date    APPENDECTOMY      BACK SURGERY      BOTOX INJECTION N/A 2/8/2019    Procedure: BOTOX INJECTION 100 UNITS;  Surgeon: Adolfo Shepherd MD;  [facility]: [facility];  Service: Urology    BRONCHOSCOPY N/A 6/30/2016    Procedure: BRONCHOSCOPY FLEXIBLE with bronchial lavage to the left lower lobe; Surgeon: Sue Sampson MD;  Location: [facility];   Service:    [address] [place] [address] CATARACT EXTRACTION      CHOLECYSTECTOMY      COLECTOMY      COLON SURGERY      polypectomy for cancerous lesion    COLONOSCOPY      CORONARY ANGIOPLASTY WITH STENT PLACEMENT x5  pt unsure of where    GASTROSTOMY TUBE PLACEMENT      percutaneous placement of gastrostomy tube placed 4/16 - dc 8/16    HEMORROIDECTOMY      LUMBAR LAMINECTOMY      LUNG SURGERY      PEG TUBE REMOVAL      MT CYSTOURETHRO W/IMPLANT N/A 8/27/2018    Procedure: CYSTOSCOPY WITH INSERTION Saad Davison;  Surgeon: Magdiel Sutton MD;  Location[facility];  Service: Urology    MT CYSTOURETHROSCOPY N/A 2/8/2019    Procedure: Thaemily Catholic;  Surgeon: Magdiel Sutton MD;  Location: [facility];  Service: Urology    TRACHEOSTOMY               07/04/20 1156   Note Type   Note type Eval/Treat   Restrictions/Precautions   Weight Bearing Precautions Per Order No   Other Precautions Chair Alarm; Bed Alarm;Cognitive;Multiple lines;Telemetry; Fall Risk;Impulsive   Pain Assessment   Pain Assessment Tool Pain Assessment not indicated - pt denies pain   Pain Score No Pain   Home Living   Type of Home Apartment  ([facility] independent [facility])   Home Layout One level;Elevator   Bathroom Shower/Tub Walk-in shower   Bathroom Toilet Standard   Bathroom Equipment Grab bars in shower;Built-in shower seat;Grab bars around toilet   [address] Walker;Cane  (pt reports not using AD at baseline)   Additional Comments Pt reports living alone in ILF at [facility], local son checks in daily, pt has private nurse who provides medications   Prior Function   Level of [place] Independent with ADLs and functional mobility; Needs assistance with IADLs   Lives With Alone   Receives Help From Family; Other (Comment)  (Son comes by daily, facility staff available)   ADL Assistance Independent   IADLs Needs assistance  (reports meals provided, cleaning service 1x/wk, I w/ laundry)   Falls in the last 6 months 0  (per pt report)   Vocational Retired   Comments Pt reports independence for ADLs and functional mobility w/o AD, has A for IADLs but independent w/ laundry, (-) , pt poor historian and ? accuracy of info   Lifestyle   Autonomy Pt independent w/ ADLs and functional mobility w/o AD, has A for IADLs   Reciprocal Relationships son, daughter   Service to Others Retired    Intrinsic Gratification Listening to music   Subjective   Subjective "I don't know why i'm in here"   ADL   Where Assessed Edge of bed   Eating Assistance 5  Supervision/Setup   Grooming Assistance 5  Supervision/Setup   UB Bathing Assistance 4  Minimal Assistance   LB [address] 3  Moderate Assistance   [address] 3  Moderate [address]  3  Moderate Assistance   Toileting Deficit   (incontinent of bowel, steadying w/ RW x1 and A for hygiene)   Functional Assistance 4  Minimal Assistance   Additional Comments Pt req A for safety w/ functional tasks, and VCs for impaired cognition   Bed Mobility   Supine to Sit 5  Supervision   Additional items HOB elevated; Increased time required;Verbal cues; Impulsive   Additional Comments Pt req close supervision for safety, unable to scoot up in bed when asked but no issue reaching EOB   Transfers   Sit to Stand 4  Minimal assistance   Additional items Assist x 2;Bedrails; Increased time required;Verbal cues; Impulsive   Stand to Sit 4  Minimal assistance   Additional items Assist x 2;Armrests; Increased time required; Impulsive;Verbal cues   Additional Comments Pt req VCs to reach for armrests, req A for sherrill hygiene while standing   Functional Mobility   Functional Mobility 4  Minimal assistance   Additional Comments x2 w/ VCs for safety   Additional items Rolling walker   Balance   Static Sitting Fair +   Dynamic Sitting Fair +   Static Standing Fair -   Dynamic Standing Poor +   Ambulatory Poor +   Activity Tolerance   Activity Tolerance Patient tolerated treatment well   Medical Staff Made Aware PT Bipin Alves, [facility]   Nurse Made Aware Appropriate to see per LUANA Small   RUE Assessment   RUE Assessment WFL  (4/5)   LUE Assessment   LUE Assessment WFL  (4/5)   Hand Function   Gross Motor Coordination Functional   Fine Motor Coordination Functional  (pt c/o decreased sensation in R hand 1st digit )   Sensation   Light Touch No apparent deficits   Vision-Basic Assessment   Current Vision Wears glasses all the time   Vision - Complex Assessment   Ocular Range of Motion WFL   Cognition   Overall Cognitive Status Impaired   Arousal/Participation Alert; Cooperative   Attention Attends with cues to redirect   Orientation Level Oriented to person;Oriented to place; Disoriented to situation;Disoriented to time  (Stated July 4th 2017, stated hospital but unsure why)   Memory Decreased long term memory;Decreased short term memory;Decreased recall of recent events;Decreased recall of precautions   Following Commands Follows one step commands with increased time or repetition   Comments Pt w/ impaired cognition, unsure of baseline as pt does have dx of mild cognitive impairment, req VCs for safety w/ tasks, impulsive w/ functional mobility   Assessment   Limitation Decreased ADL status; Decreased Safe judgement during ADL;Decreased cognition;Decreased endurance;Decreased fine motor control;Decreased self-care trans;Decreased high-level ADLs   Prognosis Good   Assessment Pt is a 81 y o  male admitted to [facility] 7/3/2020 w/ confusion likely due to dehydration and TAN  CT showed no acute intracranial process  Pt has comorbidities of mild cognitive impairment, CHF, GERD, a-fib, DM, CAD, COPD, HTN, and h/o DVT  Personal factors affecting pt condition include impaired memory, and lives alone  PTA pt resides in independent living at [facility] and reports being independent w/ ADLs and functional mobility w/o AD  Pt reports having A for IADLs (meals provided, cleaning service) but is independent w/ laundry  Pt poor historian 2* cognitive impairment and ? Accuracy of information given   At the time of eval pt req supervision/set-up for grooming and eating  Pt able to perform bed mobility w/ supervision (supine>sit) but difficulty observed w/ scooting to HOB while supine  Pt req min A for UB bathing/dressing, and mod A for LB dressing/bathing and toileting  Pt min A x2 for functional mobility w/ RW and VCs for safety  Pt min A x2 for functional transfers w/ VCs to reach for arm rests  Impulsivity observed t/o eval  Pt presents to OT below baseline w/ the following deficits impacting occupational performance: decreased balance, decreased activity tolerance, decreased endurance, impaired cognition, decreased safety, falls risk, and decreased insight  Recommend cont skilled inpt OT to address above deficits and increase independence w/ ADLs of dressing, bathing, grooming, and toileting, and to increase safety w/ functional transfers and functional mobility  From OT standpoint, recommend STR  Goals   Patient Goals to go home today   LTG Time Frame 10-14   Long Term Goal please see below goals   Plan   Treatment Interventions ADL retraining;Functional transfer training;UE strengthening/ROM; Cognitive reorientation;Patient/family training;Equipment evaluation/education; Activityengagement; Energy conservation;Continued evaluation; Compensatory technique education   Goal Expiration Date 07/18/20   OT Treatment Day 1   OT Frequency 3-5x/wk   Additional Treatment Session   Start Time 1146   End Time 0480   Treatment Assessment Pt seen for skilled OT for cognitive assessment  Seated in bedside chair, ACLS was administered  Pt scored 3 8/6 0 indicating 24 hour supervision is recommended to gather supplies needed for ADLs, to check results and to remove dangerous objects (see below for further info on 3 8 score)  Pt was left seated in bedside chair w/ chair alarm on, call bell in reach, w/ lunch   Pt req set-up for lunch, able to self-fed w/o assist  Deficits and limitations continuing to impact occupational performance include: decreased safety, impaired cognition, decreased balance, decreased safety, decreased insight, falls risk, and decreased activity tolerance  Based on ACLS results, pt unsafe to return home alone  OT recommends d/c to STR w/ plans for 24/7 support at home  Additional Treatment Day 1   Recommendation   Recommendation Geriatric Consult   OT Discharge Recommendation Post-Acute Rehabilitation Services   OT - OK to Discharge   (when medically stable to STR vs  home OT at [facility])   Barthel Index   Feeding 10   Bathing 0   Grooming Score 0   Dressing Score 5   Bladder Score 10   Bowels Score 5   Toilet Use Score 5   Transfers (Bed/Chair) Score 10   Mobility (Level Surface) Score 0   Stairs Score 0   Barthel Index Score 45   Modified [place] Scale   Modified [place] Scale 4     OT goals to be met in 10-14 days:    1  Pt will be supervision for UB/LB dressing/bathing  2  Pt will complete toileting routine w/ supervision using G safety techniques  3  Pt will improve dynamic standing balance to fair+ to perform grooming tasks standing at sink  4  Pt will increase activity tolerance to good (25-30 min) to increase engagement in ADLs/IADLs and leisure tasks  5  Pt will improve functional transfers to supervision w/ G safety techniques to increase engagement in ADL/IADL tasks  6  Pt will be supervision w/ functional mobility using AD in a home-like environment to improve participation in ADL/IADLs and leisure activities  7  Pt will identify s&s of fatigue and implement energy conservation techniques @ mod I w/ handout  8  Pt will attend to ongoing cognitive assessment w/ G participation for safe d/c planning  9  Pt will be educated on and independently recall 3 fall prevention techniques to increase safety w/ ADL/IADL and leisure activities  3 8    Administered Rigo Gone Cognitive Level Screen (ACLS)    PT scored 3 8/6 0 indicating 24 hour supervision is recommended to gather supplies needed for ADLs, to check results and to remove dangerous objects  Behavior:  May not ask for assistance  May abandon tasks  May be disoriented to day and date and will likely not retain information when told  Needs frequent redirection to tasks  Pace is slow & univariant  May insist on engaging in activity with potential harm (ie: driving)  Grooming:  [place], brushes hair or shaves until all areas are covered  Recognizes completion of tasks  May attempt to style hair but may miss back of head  May use all available products  Recommend checking every few minutes if left alone  Dressing:  May learn to dress at a particular time of the day  Dons common articles of clothing slowly with some errors in sequence  Recognizes when dressing is completed  May stop when a problem occurs  May argue with caregiver suggestions to change selections  Bathing:  Covers all visible body parts with soap and rinses it off  May forget hidden parts or steps  Is aware of task completion  Expect waste if allowed to measure amounts  Do not leave unsupervised for more than a few minutes  Walking/Exercising:  Ambulates within familiar living area or short distances like the backyard  May be unable to retrace steps if lost   May run/walk until tired or stopped  Impulsive actions may result in falls  Eating:   May learn to present self at dining area at regular meal times or follow routine for passing dishes  Does not engage in social conversation at the table  Check food and beverage temperatures  Cannot follow dietary restrictions  Toileting:  May complete sequence of toileting with 1 or 2 steps left out  May forget to zip pants or wash hands  Medications: May be trained to take medications at a particular time of the day  May be able to tell when medications have been altered  Store medications in secure location away from the individual in child proof containers  Supervise to ensure compliance    Use of adaptive devices:  May need frequent redirection for safe carryover in use of assistive device  Do not expect to recall safety precautions  Housekeeping:  No awareness of need for housekeeping  May follow directed sequence of actions like putting away dishes  Do not expect effective results  Food Preparation:  Does not plan for food  May be trained not to eat from refrigerator or present self in dining area  May be able to make a simple sandwich with 1-2 ingredients  Watch for distractibility  Restrict access to sharp tools  Spending money:  May be trained to do the simple steps in a transaction with a fixed amount of money  Recognizes the completion of transaction  May be trained to ask for assistance  If given money, may give away or lose it  Shopping: Follows a guide to shopping areas  Looks into windows or at displays with no intent to purchase  May take items without paying  May fail to notice price or if they have enough money to pay  Do not leave the individual unsupervised in shopping areas  Laundry:   May fold all available items or drape all laundry on clothes line without redirection  Traveling: Able to sit in car for periods up to 1 hour  May express awareness of destination after arrival   Accompany individual on all trips  Telephoning:  May be trained to complete a simple telephone call involving a new number and procedure  May be unable to determine when to call  Does not  when emergency exists and may call 911 at inappropriate times  Driving:  Should not operate a motor vehicle      Documentation completed by: VASU Miller

## 2020-07-04 NOTE — CONSULTS
Current po intake appears adequate, pt reporting good appetite, answers question appropriately, though he doesn't know why he's here, when reminded his dx was dehydration he states "Oh, that's right", states he typically has a good appetite  No muscle/fat wasting noted at this time, but will continue to monitor for need for supplement and follow per protocol  No changes desired at present

## 2020-07-04 NOTE — PLAN OF CARE
Problem: OCCUPATIONAL THERAPY ADULT  Goal: Performs self-care activities at highest level of function for planned discharge setting  See evaluation for individualized goals  Description  Treatment Interventions: ADL retraining, Functional transfer training, UE strengthening/ROM, Cognitive reorientation, Patient/family training, Equipment evaluation/education, Activityengagement, Energy conservation, Continued evaluation, Compensatory technique education          See flowsheet documentation for full assessment, interventions and recommendations  Note:   Limitation: Decreased ADL status, Decreased Safe judgement during ADL, Decreased cognition, Decreased endurance, Decreased fine motor control, Decreased self-care trans, Decreased high-level ADLs  Prognosis: Good  Assessment: Pt is a 81 y o  male admitted [facility]/2020 w/ confusion likely due to dehydration and TAN  CT showed no acute intracranial process  Pt has comorbidities of mild cognitive impairment, CHF, GERD, a-fib, DM, CAD, COPD, HTN, and h/o DVT  Personal factors affecting pt condition include impaired memory, and lives alone  PTA pt resides in independent living at [facility] and reports being independent w/ ADLs and functional mobility w/o AD  Pt reports having A for IADLs (meals provided, cleaning service) but is independent w/ laundry  Pt poor historian 2* cognitive impairment and ? Accuracy of information given  At the time of eval pt req supervision/set-up for grooming and eating  Pt able to perform bed mobility w/ supervision (supine>sit) but difficulty observed w/ scooting to HOB while supine  Pt req min A for UB bathing/dressing, and mod A for LB dressing/bathing and toileting  Pt min A x2 for functional mobility w/ RW and VCs for safety  Pt min A x2 for functional transfers w/ VCs to reach for arm rests   Impulsivity observed t/o eval  Pt presents to OT below baseline w/ the following deficits impacting occupational performance: decreased balance, decreased activity tolerance, decreased endurance, impaired cognition, decreased safety, falls risk, and decreased insight  Recommend cont skilled inpt OT to address above deficits and increase independence w/ ADLs of dressing, bathing, grooming, and toileting, and to increase safety w/ functional transfers and functional mobility  From OT standpoint, recommend STR     Recommendation: Geriatric Consult  OT Discharge Recommendation: Post-Acute Rehabilitation Services  OT - OK to Discharge: (when medically stable to STR vs  home OT at [place])

## 2020-07-04 NOTE — ASSESSMENT & PLAN NOTE
Lab Results   Component Value Date    HGBA1C 7 8 (H) 03/24/2020       Recent Labs     07/03/20  1757 07/03/20  2048 07/04/20  0803 07/04/20  1121   POCGLU 154* 178* 116 193*       Blood Sugar Average: Last 72 hrs:  (P) 160 25   Patient has hyperglycemia with glucose of 191, will continue home Lantus 18 units at bedtime, give sliding scale insulin

## 2020-07-04 NOTE — UTILIZATION REVIEW
Initial Clinical Review    Admission: Date/Time/Statement:   Admission Orders (From admission, onward)     Ordered        07/04/20 1502  Inpatient Admission  Once                   Orders Placed This Encounter   Procedures    Inpatient Admission     Standing Status:   Standing     Number of Occurrences:   1     Order Specific Question:   Admitting Physician     Answer:   Genevieve Morris [[id number]]     Order Specific Question:   Level of Care     Answer:   Med Surg [[id number]]     Order Specific Question:   Estimated length of stay     Answer:   More than 2 Midnights     Order Specific Question:   Certification     Answer:   I certify that inpatient services are medically necessary for this patient for a duration of greater than two midnights  See H&P and MD Progress Notes for additional information about the patient's course of treatment  ED Arrival Information     Expected Arrival Acuity Means of Arrival Escorted By Service Admission Type    - 7/3/2020 13:05 Urgent Ambulance [place] [facility] ([address]) Hospitalist Urgent    Arrival Complaint    Medical Problem        Chief Complaint   Patient presents with    Medical Problem     [place] [facility] [place] in chair checked his sugar which was normal called ems to have pt evaluated for tremers     Assessment/Plan:  81 y o  male who presents with confusion  This is an 71-year-old patient who was brought in from [facility]  The nursing home staff found him slouched in chair and checked his blood sugar which was normal and called EMS to be evaluated patient was also having tremors  The patient is a history of mild cognitive impairment unknown of his baseline cognition  He does not know why he came into the hospital today however he is oriented to person and place  He reports that he has not eaten in 9 days but when further asked if this is due to poor appetite he does not appropriately respond    He denies nausea, vomiting, diarrhea or abdominal pain  He has a past medical history of CAD, mild cognitive impairment, atrial fibrillation with slow ventricular response not on anticoagulation   Very dry MM      ED Triage Vitals   Temperature Pulse Respirations Blood Pressure SpO2   07/03/20 1308 07/03/20 1308 07/03/20 1308 07/03/20 1308 07/03/20 1308   98 6 °F (37 °C) 77 18 129/85 94 %      Temp Source Heart Rate Source Patient Position - Orthostatic VS BP Location FiO2 (%)   07/03/20 1308 07/03/20 1308 07/03/20 1308 07/03/20 1308 --   Oral Monitor Sitting Right arm       Pain Score       07/03/20 1715       No Pain          Wt Readings from Last 1 Encounters:   07/04/20 75 4 kg (166 lb 3 6 oz)     Additional Vital Signs:   Date/Time  Temp  Pulse  Resp  BP  MAP (mmHg)  SpO2  O2 Device  Patient Position - Orthostatic VS   07/03/20 2342  97 6 °F (36 4 °C)  67  16  120/71    90 %  None (Room air)  Lying   07/03/20 1658  97 °F (36 1 °C)Abnormal   68  16  153/83    92 %  None (Room air)  Lying   07/03/20 1615    70  14  152/82  111  95 %  None (Room air)  Lying   07/03/20 1516    75  16  142/88    95 %  None (Room air)  Lying   07/03/20 1402              None (Room air)         Pertinent Labs/Diagnostic Test Results:   Results from last 7 days   Lab Units 07/03/20  1456   SARS-COV-2  Negative     Results from last 7 days   Lab Units 07/04/20  0447 07/03/20  1332   WBC Thousand/uL 12 10* 16 81*   HEMOGLOBIN g/dL 14 9 17 0   HEMATOCRIT % 46 6 52 0*   PLATELETS Thousands/uL 131* 153   NEUTROS ABS Thousands/µL 8 26* 12 34*         Results from last 7 days   Lab Units 07/04/20  0447 07/03/20  1332   SODIUM mmol/L 147* 146*   POTASSIUM mmol/L 3 7 4 0   CHLORIDE mmol/L 109* 106   CO2 mmol/L 30 28   ANION GAP mmol/L 8 12   BUN mg/dL 41* 45*   CREATININE mg/dL 1 18 1 97*   EGFR ml/min/1 73sq m 56 30   CALCIUM mg/dL 8 7 9 7     Results from last 7 days   Lab Units 07/03/20  1332   AST U/L 52*   ALT U/L 41   ALK PHOS U/L 103   TOTAL PROTEIN g/dL 8 2 ALBUMIN g/dL 4 0   TOTAL BILIRUBIN mg/dL 1 33*     Results from last 7 days   Lab Units 07/04/20  1121 07/04/20  0803 07/03/20  2048 07/03/20  1757   POC GLUCOSE mg/dl 193* 116 178* 154*     Results from last 7 days   Lab Units 07/04/20  0447 07/03/20  1332   GLUCOSE RANDOM mg/dL 134 191*     Results from last 7 days   Lab Units 07/03/20  1332   TROPONIN I ng/mL 0 02     Results from last 7 days   Lab Units 07/03/20  1332   PROTIME seconds 14 9*   INR  1 15   PTT seconds 31     Results from last 7 days   Lab Units 07/03/20  1332   NT-PRO BNP pg/mL 1,395*     Results from last 7 days   Lab Units 07/03/20  1521   CLARITY UA  Clear   COLOR UA  Yellow   SPEC GRAV UA  1 025   PH UA  5 0   GLUCOSE UA mg/dl Negative   KETONES UA mg/dl Negative   BLOOD UA  Moderate*   PROTEIN UA mg/dl Negative   NITRITE UA  Negative   BILIRUBIN UA  Negative   UROBILINOGEN UA E U /dl 0 2   LEUKOCYTES UA  Trace*   WBC UA /hpf 0-1*   RBC UA /hpf 2-4*   BACTERIA UA /hpf Occasional   EPITHELIAL CELLS WET PREP /hpf None Seen     CT head 07-03-20   Persistent complete opacification of the right posterior ethmoid air cells and sphenoid sinus with possible underlying soft tissue mass versus polyp or inspissated secretions   Nonemergent MRI of the sinuses with gadolinium recommended  2   No acute intracranial process      CXR 07-03-20  Findings suggestive of CHF with mild bibasilar edema        ED Treatment:   Medication Administration from 07/03/2020 1305 to 07/03/2020 1716       Date/Time Order Dose Route Action     07/03/2020 1454 lactated ringers bolus 1,000 mL 1,000 mL Intravenous New Bag     07/03/2020 1516 lactated ringers bolus 500 mL 500 mL Intravenous New Bag        Past Medical History:   Diagnosis Date    Anxiety     Arthritis     Atrial fibrillation (HCC)     BPH (benign prostatic hyperplasia)     CAD (coronary artery disease)     Chronic back pain     Chronic diastolic (congestive) heart failure (HCC)     [facility]     Critical illness polyneuropathy [facility]     Diabetes mellitus ([place] [address] )     type 2    Dysphagia     GERD (gastroesophageal reflux disease)     Hemorrhoids     History of colon cancer     History of DVT (deep vein thrombosis)     Hyperlipidemia     Hypertension     Hyponatremia     Malignant neoplasm of colon ([facility]     Myocardial infarct ([place] [address] )     Osteomyelitis ([place] [address] )     Pneumonia     Sepsis ([place] [address] )     Urinary retention      Present on Admission:   TAN (acute kidney injury) ([place] [address] )   Acute on chronic diastolic congestive heart failure ([facility])   Atrial fibrillation with slow ventricular response ([facility]   Gastroesophageal reflux disease without esophagitis   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, without long-term current use of insulin ([facility])      Admitting Diagnosis: Dehydration [E86 0]  Altered mental status [R41 82]  TAN (acute kidney injury) ([place] [address] ) [N17 9]  Acute on chronic diastolic congestive heart failure ([facility]) [I50 33]  Age/Sex: 81 y o  male  Admission Orders:  Scheduled Medications:    Medications:  folic acid 8,002 mcg Oral Daily   gabapentin 400 mg Oral TID   heparin (porcine) 5,000 Units Subcutaneous Q8H [facility]   insulin glargine 18 Units Subcutaneous HS   insulin lispro 1-5 Units Subcutaneous TID AC   metoprolol tartrate 12 5 mg Oral Daily   mirtazapine 30 mg Oral HS   pantoprazole 40 mg Oral Daily Before Breakfast     Continuous IV Infusions:    dextrose 50 mL/hr Intravenous Continuous     PRN Meds:       IP CONSULT TO NEPHROLOGY  IP CONSULT TO GERONTOLOGY  IP CONSULT TO NUTRITION SERVICES   Telemetry  SCD  PT/OT       Network Utilization Review Department  [email or web address]  ATTENTION: Please call with any questions or concerns to [phone] and carefully listen to the prompts so that you are directed to the right person   All voicemails are confidential   Alex Delude all requests for admission clinical reviews, approved or denied determinations and any other requests to dedicated fax number below belonging to the campus where the patient is receiving treatment   List of dedicated fax numbers for the Facilities:  [address] DENIALS (Administrative/Medical Necessity) [phone]   [address]  (Maternity/NICU/Pediatrics) [phone]   Conda Sas [phone]   Jamas Piggs [phone]   Maryanne Dress [phone]   Levell Pleasure [phone]   [address] [place] [address] [phone]   [facility]  [phone]   [address] [facility]  [address] [facility] [address] [facility] [phone]

## 2020-07-04 NOTE — PROGRESS NOTES
Progress Note - Kasey Stewart 1935, 81 y o  male MRN: [id number]    Unit/Bed#: E5 -01 Encounter: [id number]    Primary Care Provider: Willi Everett MD   Date and time admitted to hospital: 7/3/2020  1:05 PM        * TAN (acute kidney injury) [facility])  Assessment & Plan  Improving  - acute kidney injury with creatinine 1 97, baseline 0 8 to 0 95, this is likely due to dehydration, Continue hold Lasix for now - consult nephrology appreciate recommendations  CMP am    Acute on chronic diastolic congestive heart failure [facility])  Assessment & Plan  Wt Readings from Last 3 Encounters:   07/04/20 75 4 kg (166 lb 3 6 oz)   01/06/20 79 8 kg (176 lb)   11/08/19 78 9 kg (174 lb)     - continue metoprolol 12 5 mg , will hold Lasix 40 milligrams p o  For now which is home dose    CAD S/P percutaneous coronary angioplasty  Assessment & Plan  Patient has CAD with history of 5 stents, continue metoprolol 25 milligrams b i d  Atrial fibrillation with slow ventricular response (HCC)  Assessment & Plan  - patient has atrial fibrillation with slow ventricular response, continue metoprolol 12 5 milligrams daily, he is not on anticoagulation    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, without long-term current use of insulin [facility])  Assessment & Plan  Lab Results   Component Value Date    HGBA1C 7 8 (H) 03/24/2020       Recent Labs     07/03/20  1757 07/03/20  2048 07/04/20  0803 07/04/20  1121   POCGLU 154* 178* 116 193*       Blood Sugar Average: Last 72 hrs:  (P) 160 25   Patient has hyperglycemia with glucose of 191, will continue home Lantus 18 units at bedtime, give sliding scale insulin        VTE Pharmacologic Prophylaxis:   Pharmacologic: Heparin  Mechanical VTE Prophylaxis in Place: Yes    Patient Centered Rounds: I have performed bedside rounds with nursing staff today      Discussions with Specialists or Other Care Team Provider:     Education and Discussions with Family / Patient: patient    Time Spent for Care: 20 minutes  More than 50% of total time spent on counseling and coordination of care as described above  Current Length of Stay: 0 day(s)    Current Patient Status: Observation   Certification Statement: The patient will continue to require additional inpatient hospital stay due to Acute above conditions    Discharge Plan: tomorrow    Code Status: Level 1 - Full Code      Subjective:   Patient states he is better  Objective:     Vitals:   Temp (24hrs), Av 3 °F (36 3 °C), Min:97 °F (36 1 °C), Max:97 6 °F (36 4 °C)    Temp:  [97 °F (36 1 °C)-97 6 °F (36 4 °C)] 97 6 °F (36 4 °C)  HR:  [67-75] 67  Resp:  [14-16] 16  BP: (120-153)/(71-88) 120/71  SpO2:  [90 %-95 %] 90 %  Body mass index is 23 85 kg/m²  Input and Output Summary (last 24 hours): Intake/Output Summary (Last 24 hours) at 2020 1457  Last data filed at 2020 0411  Gross per 24 hour   Intake 550 ml   Output 1175 ml   Net -625 ml       Physical Exam:     Physical Exam   Constitutional: He is oriented to person, place, and time  No distress  Cardiovascular: Regular rhythm, normal heart sounds and intact distal pulses  Exam reveals no friction rub  No murmur heard  Pulmonary/Chest: No stridor  No respiratory distress  He has no wheezes  Abdominal: Bowel sounds are normal  He exhibits no distension  There is no tenderness  Neurological: He is alert and oriented to person, place, and time  No cranial nerve deficit  Coordination normal    Skin: Skin is warm  He is not diaphoretic  There is pallor  Psychiatric: He has a normal mood and affect         Additional Data:     Labs:    Results from last 7 days   Lab Units 20   WBC Thousand/uL 12 10*   HEMOGLOBIN g/dL 14 9   HEMATOCRIT % 46 6   PLATELETS Thousands/uL 131*   NEUTROS PCT % 68   LYMPHS PCT % 22   MONOS PCT % 8   EOS PCT % 1     Results from last 7 days   Lab Units 20  1332   POTASSIUM mmol/L 3 7 4 0 CHLORIDE mmol/L 109* 106   CO2 mmol/L 30 28   BUN mg/dL 41* 45*   CREATININE mg/dL 1 18 1 97*   CALCIUM mg/dL 8 7 9 7   ALK PHOS U/L  --  103   ALT U/L  --  41   AST U/L  --  52*     Results from last 7 days   Lab Units 07/03/20  1332   INR  1 15       * I Have Reviewed All Lab Data Listed Above  * Additional Pertinent Lab Tests Reviewed: All Labs Within Last 24 Hours Reviewed    Imaging:    Imaging Reports Reviewed Today Include:   Imaging Personally Reviewed by Myself Includes:      Recent Cultures (last 7 days):           Last 24 Hours Medication List:     Current Facility-Administered Medications:  dextrose 50 mL/hr Intravenous Continuous Melly Mosqueda PA-C Last Rate: 50 mL/hr (06/14/43 7395)   folic acid 3,605 mcg Oral Daily Jessi Lakia, DO    gabapentin 400 mg Oral TID Miguel Klein,     heparin (porcine) 5,000 Units Subcutaneous Q8H [facility] Jessi Lakia, DO    insulin glargine 18 Units Subcutaneous HS Jessi Lakia, DO    insulin lispro 1-5 Units Subcutaneous TID AC Jessiandressa Dorman, DO    metoprolol tartrate 12 5 mg Oral Daily Jessi Lakia, DO    mirtazapine 30 mg Oral HS Jessi Lakia, DO    pantoprazole 40 mg Oral Daily Before Breakfast Miguel Klein DO         Today, Patient Was Seen By: Fernanda Olson MD    ** Please Note: Dragon 360 Dictation voice to text software may have been used in the creation of this document   **

## 2020-07-04 NOTE — UTILIZATION REVIEW
Initial Clinical Review    Admission: Date/Time/Statement: Admission Orders (From admission, onward)     Ordered        07/03/20 1606  Place in Observation  Once                   Orders Placed This Encounter   Procedures    Place in Observation     Standing Status:   Standing     Number of Occurrences:   1     Order Specific Question:   Admitting Physician     Answer:   Grace Galvez [[id number]]     Order Specific Question:   Level of Care     Answer:   Med Surg [16]     ED Arrival Information     Expected Arrival Acuity Means of Arrival Escorted By Service Admission Type    - 7/3/2020 13:05 Urgent Ambulance [facility] ([address]) Hospitalist Urgent    Arrival Complaint    Medical Problem        Chief Complaint   Patient presents with    Medical Problem     [place] [facility] [place] sluched in chair checked his sugar which was normal called ems to have pt evaluated for tremers     Assessment/Plan:  81 y o  male who presents with confusion  This is an 81-year-old patient who was brought in from [facility]  The nursing home staff found him slouched in chair and checked his blood sugar which was normal and called EMS to be evaluated patient was also having tremors  The patient is a history of mild cognitive impairment unknown of his baseline cognition  He does not know why he came into the hospital today however he is oriented to person and place  He reports that he has not eaten in 9 days but when further asked if this is due to poor appetite he does not appropriately respond  He denies nausea, vomiting, diarrhea or abdominal pain  He has a past medical history of CAD, mild cognitive impairment, atrial fibrillation with slow ventricular response not on anticoagulation   Very dry MM      ED Triage Vitals   Temperature Pulse Respirations Blood Pressure SpO2   07/03/20 1308 07/03/20 1308 07/03/20 1308 07/03/20 1308 07/03/20 1308   98 6 °F (37 °C) 77 18 129/85 94 %      Temp Source Heart Rate Source Patient Position - Orthostatic VS BP Location FiO2 (%)   07/03/20 1308 07/03/20 1308 07/03/20 1308 07/03/20 1308 --   Oral Monitor Sitting Right arm       Pain Score       07/03/20 1715       No Pain        Wt Readings from Last 1 Encounters:   07/04/20 75 4 kg (166 lb 3 6 oz)     Additional Vital Signs:   Date/Time  Temp  Pulse  Resp  BP  MAP (mmHg)  SpO2  O2 Device  Patient Position - Orthostatic VS   07/03/20 2342  97 6 °F (36 4 °C)  67  16  120/71    90 %  None (Room air)  Lying   07/03/20 1658  97 °F (36 1 °C)Abnormal   68  16  153/83    92 %  None (Room air)  Lying   07/03/20 1615    70  14  152/82  111  95 %  None (Room air)  Lying   07/03/20 1516    75  16  142/88    95 %  None (Room air)  Lying   07/03/20 1402              None (Room air)         Pertinent Labs/Diagnostic Test Results:   Results from last 7 days   Lab Units 07/03/20  1456   SARS-COV-2  Negative     Results from last 7 days   Lab Units 07/04/20  0447 07/03/20  1332   WBC Thousand/uL 12 10* 16 81*   HEMOGLOBIN g/dL 14 9 17 0   HEMATOCRIT % 46 6 52 0*   PLATELETS Thousands/uL 131* 153   NEUTROS ABS Thousands/µL 8 26* 12 34*         Results from last 7 days   Lab Units 07/04/20  0447 07/03/20  1332   SODIUM mmol/L 147* 146*   POTASSIUM mmol/L 3 7 4 0   CHLORIDE mmol/L 109* 106   CO2 mmol/L 30 28   ANION GAP mmol/L 8 12   BUN mg/dL 41* 45*   CREATININE mg/dL 1 18 1 97*   EGFR ml/min/1 73sq m 56 30   CALCIUM mg/dL 8 7 9 7     Results from last 7 days   Lab Units 07/03/20  1332   AST U/L 52*   ALT U/L 41   ALK PHOS U/L 103   TOTAL PROTEIN g/dL 8 2   ALBUMIN g/dL 4 0   TOTAL BILIRUBIN mg/dL 1 33*     Results from last 7 days   Lab Units 07/04/20  0803 07/03/20  2048 07/03/20  1757   POC GLUCOSE mg/dl 116 178* 154*     Results from last 7 days   Lab Units 07/04/20  0447 07/03/20  1332   GLUCOSE RANDOM mg/dL 134 191*     Results from last 7 days   Lab Units 07/03/20  1332   TROPONIN I ng/mL 0 02     Results from last 7 days Lab Units 07/03/20  1332   PROTIME seconds 14 9*   INR  1 15   PTT seconds 31     Results from last 7 days   Lab Units 07/03/20  1332   NT-PRO BNP pg/mL 1,395*     Results from last 7 days   Lab Units 07/03/20  1521   CLARITY UA  Clear   COLOR UA  Yellow   SPEC GRAV UA  1 025   PH UA  5 0   GLUCOSE UA mg/dl Negative   KETONES UA mg/dl Negative   BLOOD UA  Moderate*   PROTEIN UA mg/dl Negative   NITRITE UA  Negative   BILIRUBIN UA  Negative   UROBILINOGEN UA E U /dl 0 2   LEUKOCYTES UA  Trace*   WBC UA /hpf 0-1*   RBC UA /hpf 2-4*   BACTERIA UA /hpf Occasional   EPITHELIAL CELLS WET PREP /hpf None Seen     CT head 07-03-20   Persistent complete opacification of the right posterior ethmoid air cells and sphenoid sinus with possible underlying soft tissue mass versus polyp or inspissated secretions   Nonemergent MRI of the sinuses with gadolinium recommended  2   No acute intracranial process      CXR 07-03-20  Findings suggestive of CHF with mild bibasilar edema        ED Treatment:   Medication Administration from 07/03/2020 1305 to 07/03/2020 1716       Date/Time Order Dose Route Action     07/03/2020 1454 lactated ringers bolus 1,000 mL 1,000 mL Intravenous New Bag     07/03/2020 1516 lactated ringers bolus 500 mL 500 mL Intravenous New Bag        Past Medical History:   Diagnosis Date    Anxiety     Arthritis     Atrial fibrillation (HCC)     BPH (benign prostatic hyperplasia)     CAD (coronary artery disease)     Chronic back pain     Chronic diastolic (congestive) heart failure (HCC)     [facility] obstructive [facility]) [facility]     [facility]     Diabetes mellitus [facility]     type 2    Dysphagia     GERD (gastroesophageal reflux disease)     Hemorrhoids     History of colon cancer     History of DVT (deep vein thrombosis)     Hyperlipidemia     Hypertension     Hyponatremia     Malignant neoplasm of colon ([facility] )     Myocardial infarct ([facility] )     Osteomyelitis ([facility] )     Pneumonia     Sepsis ([place] [address] )     Urinary retention      Present on Admission:   TAN (acute kidney injury) ([facility] )   Acute on chronic diastolic congestive heart failure ([facility])   Atrial fibrillation with slow ventricular response ([facility])   Gastroesophageal reflux disease without esophagitis   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, without long-term current use of insulin ([facility])      Admitting Diagnosis: Dehydration [E86 0]  Altered mental status [R41 82]  TAN (acute kidney injury) ([place] [address] ) [N17 9]  Acute on chronic diastolic congestive heart failure ([facility]) [I50 33]  Age/Sex: 81 y o  male  Admission Orders:  Scheduled Medications:    Medications:  folic acid 6,065 mcg Oral Daily   gabapentin 400 mg Oral TID   heparin (porcine) 5,000 Units Subcutaneous Q8H [facility]   insulin glargine 18 Units Subcutaneous HS   insulin lispro 1-5 Units Subcutaneous TID AC   metoprolol tartrate 12 5 mg Oral Daily   mirtazapine 30 mg Oral HS   pantoprazole 40 mg Oral Daily Before Breakfast     Continuous IV Infusions:    sodium chloride 75 mL/hr Intravenous Continuous     PRN Meds:       IP CONSULT TO NEPHROLOGY  IP CONSULT TO GERONTOLOGY  IP CONSULT TO NUTRITION SERVICES   Telemetry  SCD  PT/OT       Network Utilization Review Department  [email or web address]  ATTENTION: Please call with any questions or concerns to [phone] and carefully listen to the prompts so that you are directed to the right person  All voicemails are confidential   Elyssa Horowitz all requests for admission clinical reviews, approved or denied determinations and any other requests to dedicated fax number below belonging to the campus where the patient is receiving treatment   List of dedicated fax numbers for the Facilities:  [address] DENIALS (Administrative/Medical Necessity) [phone]   [address] (Maternity/NICU/Pediatrics) [phone]   Nolberto Baez [place] [phone]   Jamas Piggs [phone]   Maryanne Dress [phone]   [facility] [address] [facility] [phone]   [facility] [phone]   [address]  [address] [facility] [address] [facility] [phone]

## 2020-07-04 NOTE — ASSESSMENT & PLAN NOTE
Improving  - acute kidney injury with creatinine 1 97, baseline 0 8 to 0 95, this is likely due to dehydration, Continue hold Lasix for now - consult nephrology appreciate recommendations  [facility] am

## 2020-07-04 NOTE — CONSULTS
Consultation - Nephrology   Cielo Massey 81 y o  male MRN: [id number]  Unit/Bed#: E5 -01 Encounter: [id number]    ASSESSMENT/PLAN:   1  TAN, POA: prerenal with poor oral intake and lasix  Creatinine 1 97 on admission and improved to 1 18 today with IVF  Baseline creatinine <1    · Change IVF as below due to hypernatremia   · Check am BMP   2  Hypernatremia: sodium trending up to 147 in the setting of hypotonic fluids   · Will change IVF to D5W at 50cc/h and encourage oral intake   3  Chronic diastolic heart failure:  Continue to hold on Lasix  X-ray on admission did show mild bibasilar edema  But currently has no shortness of breath  · D/c normal saline   4  Mild cognitive impairment   5  DM II     HISTORY OF PRESENT ILLNESS:  Requesting Physician: Miley King MD  Reason for Consult: TAN Massey is a 81y o  year old male who was admitted to [facility] with confusion  Patient resides at [facility] and was found slouched over in his chair  He was also having tremors so EMS was called  According to the H&P the patient had not been eating or drinking well recently, although he tells me that he normally eats fine  He was started on IV fluids on admission and Nephrology was consulted for acute kidney injury  Patient is unsure why he is in the hospital today but tells me he has no issues on wants to go home  He denies any current chest pain, shortness of breath, nausea, vomiting or diarrhea  He does have an empty water cup on his tray so it seems he is drinking well and he has eaten about half of his lunch        PAST MEDICAL HISTORY:  Past Medical History:   Diagnosis Date    Anxiety     Arthritis     Atrial fibrillation (HCC)     BPH (benign prostatic hyperplasia)     CAD (coronary artery disease)     Chronic back pain     Chronic diastolic (congestive) heart failure (HCC)     COPD (chronic obstructive pulmonary disease) (HCC)     [facility]uropathy ([facility] )     Diabetes mellitus ([place] [address] )     type 2    Dysphagia     GERD (gastroesophageal reflux disease)     Hemorrhoids     History of colon cancer     History of DVT (deep vein thrombosis)     Hyperlipidemia     Hypertension     Hyponatremia     Malignant neoplasm of colon (HCC)     Myocardial infarct ([facility] )     Osteomyelitis ([facility] )     Pneumonia     Sepsis ([place] [address] )     Urinary retention        PAST SURGICAL HISTORY:  Past Surgical History:   Procedure Laterality Date    APPENDECTOMY      BACK SURGERY      BOTOX INJECTION N/A 2/8/2019    Procedure: BOTOX INJECTION 100 UNITS;  Surgeon: Moni Amador MD;  [facility];  Service: Urology    BRONCHOSCOPY N/A 6/30/2016    Procedure: BRONCHOSCOPY FLEXIBLE with bronchial lavage to the left lower lobe; Surgeon: Arsen Nair MD;  [facility];   Service:     CATARACT EXTRACTION      CHOLECYSTECTOMY      COLECTOMY      COLON SURGERY      polypectomy for cancerous lesion    COLONOSCOPY      CORONARY ANGIOPLASTY WITH STENT PLACEMENT      x5  pt unsure of where    GASTROSTOMY TUBE PLACEMENT      percutaneous placement of gastrostomy tube placed 4/16 - dc 8/16    HEMORROIDECTOMY      LUMBAR LAMINECTOMY      LUNG SURGERY      PEG TUBE REMOVAL      OK CYSTOURETHRO W/IMPLANT N/A 8/27/2018    Procedure: CYSTOSCOPY WITH INSERTION UROLIFT;  Surgeon: Moni Amador MD;  [facility];  Service: Urology    OK CYSTOURETHROSCOPY N/A 2/8/2019    Procedure: Betzy Lopez;  Surgeon: Moni Amador MD;  [facility];  Service: Urology    TRACHEOSTOMY         ALLERGIES:  No Known Allergies    SOCIAL HISTORY:  Social History     Substance and Sexual Activity   Alcohol Use No     Social History     Substance and Sexual Activity   Drug Use No     Social History     Tobacco Use   Smoking Status Former Smoker    Packs/day: 1 00    Years: 20 00    Pack years: 20 00    Types: Cigarettes    Last attempt to quit: 1978    Years since quittin 8   Smokeless Tobacco Never Used   Tobacco Comment    quit 40 years ago       FAMILY HISTORY:  Family History   Problem Relation Age of Onset    Heart attack Mother        MEDICATIONS:  Scheduled Meds:  Current Facility-Administered Medications:  folic acid 8,505 mcg Oral Daily Jessi Lakia, DO    gabapentin 400 mg Oral TID Delcie Fare, DO    heparin (porcine) 5,000 Units Subcutaneous Q8H [address] Jessi Lakia, DO    insulin glargine 18 Units Subcutaneous HS Jessi Lakia, DO    insulin lispro 1-5 Units Subcutaneous TID AC Jessi Lakia, DO    metoprolol tartrate 12 5 mg Oral Daily Jessi Lakia, DO    mirtazapine 30 mg Oral HS Jessi Lakia, DO    pantoprazole 40 mg Oral Daily Before Breakfast Jessi Lakia, DO    sodium chloride 75 mL/hr Intravenous Continuous Delcie Fare, DO Last Rate: 75 mL/hr (20)       PRN Meds:     Continuous Infusions:  sodium chloride 75 mL/hr Last Rate: 75 mL/hr (20)       REVIEW OF SYSTEMS:  A complete review of systems was done  Pertinent positives and negatives noted in the HPI but otherwise the review of systems is negative      PHYSICAL EXAM:  Current Weight: Weight - Scale: 75 4 kg (166 lb 3 6 oz)  First Weight: Weight - Scale: 78 8 kg (173 lb 11 6 oz)  Vitals:    20 2342   BP: 120/71   Pulse: 67   Resp: 16   Temp: 97 6 °F (36 4 °C)   SpO2: 90%       Intake/Output Summary (Last 24 hours) at 2020 1217  Last data filed at 2020 0411  Gross per 24 hour   Intake 550 ml   Output 1175 ml   Net -625 ml     General:  No acute distress  Skin:  No rash  Eyes:  Sclerae anicteric  ENT:  Moist mucous membranes  Neck:  Trachea midline with no JVD  Chest:  Clear to auscultation bilaterally  CVS:  Regular rate and rhythm  Abdomen:  Soft, nontender, nondistended  Extremities: mild L ankle edema   Neuro:  Awake and alert  Psych:  Appropriate affect    Lab Results:   Results from last 7 days   Lab Units 20  0447 20  1332   WBC Thousand/uL 12 10* 16 81*   HEMOGLOBIN g/dL 14 9 17 0   HEMATOCRIT % 46 6 52 0*   PLATELETS Thousands/uL 131* 153   SODIUM mmol/L 147* 146*   POTASSIUM mmol/L 3 7 4 0   CHLORIDE mmol/L 109* 106   CO2 mmol/L 30 28   BUN mg/dL 41* 45*   CREATININE mg/dL 1 18 1 97*   CALCIUM mg/dL 8 7 9 7       Radiology Results:   CT head without contrast   Final Result by Pollo Del Valle MD (07/03 8377)         1  Persistent complete opacification of the right posterior ethmoid air cells and sphenoid sinus with possible underlying soft tissue mass versus polyp or inspissated secretions  Nonemergent MRI of the sinuses with gadolinium recommended  2   No acute intracranial process                    Workstation performed: [id number]         XR chest 2 views   Final Result by Agnes Hinton MD (07/03 5532)      Findings suggestive of CHF with mild bibasilar edema            Workstation performed: [id number]

## 2020-07-04 NOTE — PLAN OF CARE
Problem: PHYSICAL THERAPY ADULT  Goal: Performs mobility at highest level of function for planned discharge setting  See evaluation for individualized goals  Description  Treatment/Interventions: Functional transfer training, LE strengthening/ROM, Therapeutic exercise, Endurance training, Cognitive reorientation, Patient/family training, Equipment eval/education, Bed mobility, Gait training, Continued evaluation, Spoke to nursing, OT  Equipment Recommended: (cont use of RW)       See flowsheet documentation for full assessment, interventions and recommendations  Note:   Prognosis: Fair(given impaired cognition potentially impacting carryover)  Problem List: Decreased strength, Impaired balance, Decreased mobility, Impaired judgement, Decreased safety awareness, Pain  Assessment: Adam Baig is a 81 y o  male admitted to [facility] on 7/3/2020 for TAN (acute kidney injury) ([facility] )  PT was consulted and pt was seen on 7/4/2020 for mobility assessment and d/c planning  Pt presents high fall risk, PIV, telemetry  Pt is currently functioning at a minimum assistance x2 level for transfers, minimum assistance x2 level for ambulation with Rolling Walker  Pt demonstrated decreased safety awareness, require frequent vc and min A for safety  Impulsive at times [place] during transf pt will abandon RW and attempted to sit in chair without being fully positioned in front of it  Also require vc for use of RW despite pt saying he uses one for mobility  Primarily pt limited by impaired cognition impacting safety awareness and fall risk  Pt will benefit from continued skilled IP PT to address the above mentioned impairments  in order to maximize recovery and increase functional independence when completing mobility and ADLs  Currently PT recommendations for DME include cont use of RW  At this time PT recommendations for d/c are STR given fall risk, decline from baseline fxn, social barriers (lives alone)   End of session pt seated in bedside chair, OT present  Barriers to Discharge: Decreased caregiver support  Barriers to Discharge Comments: lives alone  PT Discharge Recommendation: Post-Acute Rehabilitation Services     PT - OK to Discharge: Yes    See flowsheet documentation for full assessment

## 2020-07-05 VITALS
TEMPERATURE: 97.9 F | OXYGEN SATURATION: 97 % | BODY MASS INDEX: 24.96 KG/M2 | DIASTOLIC BLOOD PRESSURE: 62 MMHG | HEART RATE: 63 BPM | SYSTOLIC BLOOD PRESSURE: 117 MMHG | WEIGHT: 173.94 LBS | RESPIRATION RATE: 18 BRPM

## 2020-07-05 LAB
ANION GAP SERPL CALCULATED.3IONS-SCNC: 7 MMOL/L (ref 4–13)
BUN SERPL-MCNC: 22 MG/DL (ref 5–25)
CALCIUM SERPL-MCNC: 8.2 MG/DL (ref 8.3–10.1)
CHLORIDE SERPL-SCNC: 106 MMOL/L (ref 100–108)
CO2 SERPL-SCNC: 29 MMOL/L (ref 21–32)
CREAT SERPL-MCNC: 0.85 MG/DL (ref 0.6–1.3)
GFR SERPL CREATININE-BSD FRML MDRD: 80 ML/MIN/1.73SQ M
GLUCOSE SERPL-MCNC: 110 MG/DL (ref 65–140)
GLUCOSE SERPL-MCNC: 115 MG/DL (ref 65–140)
GLUCOSE SERPL-MCNC: 165 MG/DL (ref 65–140)
POTASSIUM SERPL-SCNC: 3.4 MMOL/L (ref 3.5–5.3)
SODIUM SERPL-SCNC: 142 MMOL/L (ref 136–145)

## 2020-07-05 PROCEDURE — 99239 HOSP IP/OBS DSCHRG MGMT >30: CPT | Performed by: FAMILY MEDICINE

## 2020-07-05 PROCEDURE — 99232 SBSQ HOSP IP/OBS MODERATE 35: CPT | Performed by: PHYSICIAN ASSISTANT

## 2020-07-05 PROCEDURE — 82948 REAGENT STRIP/BLOOD GLUCOSE: CPT

## 2020-07-05 PROCEDURE — 80048 BASIC METABOLIC PNL TOTAL CA: CPT | Performed by: PHYSICIAN ASSISTANT

## 2020-07-05 RX ORDER — FUROSEMIDE 20 MG/1
20 TABLET ORAL DAILY
Qty: 270 TABLET | Refills: 0 | Status: SHIPPED | OUTPATIENT
Start: 2020-07-05 | End: 2020-07-05 | Stop reason: SDUPTHER

## 2020-07-05 RX ORDER — FUROSEMIDE 20 MG/1
20 TABLET ORAL DAILY
Qty: 270 TABLET | Refills: 0 | Status: SHIPPED | OUTPATIENT
Start: 2020-07-05 | End: 2020-10-19 | Stop reason: SDUPTHER

## 2020-07-05 RX ORDER — POTASSIUM CHLORIDE 20 MEQ/1
20 TABLET, EXTENDED RELEASE ORAL ONCE
Status: COMPLETED | OUTPATIENT
Start: 2020-07-05 | End: 2020-07-05

## 2020-07-05 RX ADMIN — METOPROLOL TARTRATE 12.5 MG: 25 TABLET ORAL at 09:26

## 2020-07-05 RX ADMIN — HEPARIN SODIUM 5000 UNITS: 5000 INJECTION INTRAVENOUS; SUBCUTANEOUS at 13:10

## 2020-07-05 RX ADMIN — GABAPENTIN 400 MG: 400 CAPSULE ORAL at 09:26

## 2020-07-05 RX ADMIN — HEPARIN SODIUM 5000 UNITS: 5000 INJECTION INTRAVENOUS; SUBCUTANEOUS at 05:08

## 2020-07-05 RX ADMIN — DEXTROSE 50 ML/HR: 5 SOLUTION INTRAVENOUS at 09:29

## 2020-07-05 RX ADMIN — INSULIN LISPRO 1 UNITS: 100 INJECTION, SOLUTION INTRAVENOUS; SUBCUTANEOUS at 11:40

## 2020-07-05 RX ADMIN — FOLIC ACID 1000 MCG: 1 TABLET ORAL at 09:26

## 2020-07-05 RX ADMIN — POTASSIUM CHLORIDE 20 MEQ: 1500 TABLET, EXTENDED RELEASE ORAL at 13:10

## 2020-07-05 RX ADMIN — PANTOPRAZOLE SODIUM 40 MG: 40 TABLET, DELAYED RELEASE ORAL at 05:07

## 2020-07-05 NOTE — DISCHARGE SUMMARY
Discharge- Amna Arellano 1935, 81 y o  male MRN: [id number]    Unit/Bed#: E5 -01 Encounter: [id number]    Primary Care Provider: Shalini Sadler MD   Date and time admitted to hospital: 7/3/2020  1:05 PM        * TAN (acute kidney injury) [facility])  Assessment & Plan  [id number]  - acute kidney injury with creatinine 1 97, baseline 0 8 to 0 95, this is likely due to dehydration  -Restart home kidney medication  Outpatient follow up    Mild cognitive impairment  Assessment & Plan  Outpatient work up    Acute on chronic diastolic congestive heart failure (HCC)  Assessment & Plan  Wt Readings from Last 3 Encounters:   07/05/20 78 9 kg (173 lb 15 1 oz)   01/06/20 79 8 kg (176 lb)   11/08/19 78 9 kg (174 lb)     - continue metoprolol 12 5 mg    CAD S/P percutaneous coronary angioplasty  Assessment & Plan  Patient has CAD with history of 5 stents, continue metoprolol 25 milligrams b i d      Atrial fibrillation with slow ventricular response ([facility])  Assessment & Plan  - patient has atrial fibrillation with slow ventricular response, continue metoprolol 12 5 milligrams daily, he is not on anticoagulation    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, without long-term current use of insulin [facility])  Assessment & Plan  Lab Results   Component Value Date    HGBA1C 7 8 (H) 03/24/2020       Recent Labs     07/04/20  1618 07/04/20  2056 07/05/20  0758 07/05/20  1105   POCGLU 139 126 115 165*       Blood Sugar Average: Last 72 hrs:  (P) 148 25   Continue home medication        Discharging Physician / Practitioner: Kristin Philip MD  PCP: Shalini Sadler MD  Admission Date:   Admission Orders (From admission, onward)     Ordered        07/04/20 1502  Inpatient Admission  Once         07/03/20 1606  Place in Observation  Once                   Discharge Date: 07/05/20    Resolved Problems  Date Reviewed: 7/5/2020    None          Consultations During [facility]:  · NEPHROLOGY    Procedures Performed:     Significant Findings / Test Results: Incidental Findings:     Test Results Pending at Discharge (will require follow up):   ·      Outpatient Tests Requested:  ·     Complications:      Reason for Admission: TAN    Hospital Course:     Enid Stark is a 81 y o  male patient who originally presented to the hospital on 7/3/2020 due to acute kidney injury setting of dehydration  Patient stay overnight for close monitor of any electrolyte imbalance  He was found to have an elevated white blood cell count  Procalcitonin was negative  Nephrology evaluation was done  There will follow as an outpatient :  He does not have any sign of acute decompensation of chronic diastolic congestive heart failure  The patient, initially admitted to the hospital as inpatient, was discharged earlier than expected given the following: improvement of acute conditions  Please see above list of diagnoses and related plan for additional information  Condition at Discharge: stable     Discharge Day Visit / Exam:     Subjective:  I am better want to go home  No chest pain or short of breath  Vitals: Blood Pressure: 117/62 (07/05/20 0755)  Pulse: 63 (07/05/20 0755)  Temperature: 97 9 °F (36 6 °C) (07/05/20 0755)  Temp Source: Temporal (07/05/20 0755)  Respirations: 18 (07/05/20 0755)  Weight - Scale: 78 9 kg (173 lb 15 1 oz) (07/05/20 0547)  SpO2: 97 % (07/05/20 0755)  Exam:   Physical Exam   Constitutional: He is oriented to person, place, and time  No distress  Cardiovascular: Normal rate, regular rhythm and normal heart sounds  No murmur heard  Pulmonary/Chest: No stridor  No respiratory distress  He has no wheezes  Abdominal: Soft  Bowel sounds are normal  He exhibits no distension  There is no tenderness  There is no guarding  Musculoskeletal: He exhibits no edema or deformity  Neurological: He is alert and oriented to person, place, and time   He displays normal reflexes  No cranial nerve deficit  Coordination normal    Skin: Skin is warm  He is not diaphoretic  Psychiatric: He has a normal mood and affect  Discussion with Family:     Discharge instructions/Information to patient and family:   See after visit summary for information provided to patient and family  Provisions for Follow-Up Care:  See after visit summary for information related to follow-up care and any pertinent home health orders  Disposition:     Home    For Discharges to [facility] SNF:   · Not Applicable to this Patient - Not Applicable to this Patient    Planned Readmission:      Discharge Statement:  I spent 30 minutes discharging the patient  This time was spent on the day of discharge  I had direct contact with the patient on the day of discharge  Greater than 50% of the total time was spent examining patient, answering all patient questions, arranging and discussing plan of care with patient as well as directly providing post-discharge instructions  Additional time then spent on discharge activities  Discharge Medications:  See after visit summary for reconciled discharge medications provided to patient and family        ** Please Note: This note has been constructed using a voice recognition system **

## 2020-07-05 NOTE — ASSESSMENT & PLAN NOTE
[id number]  - acute kidney injury with creatinine 1 97, baseline 0 8 to 0 95, this is likely due to dehydration  -Restart home kidney medication  Outpatient follow up

## 2020-07-05 NOTE — PLAN OF CARE
Problem: Potential for Falls  Goal: Patient will remain free of falls  Description  INTERVENTIONS:  - Assess patient frequently for physical needs  -  Identify cognitive and physical deficits and behaviors that affect risk of falls  -  [place] fall precautions as indicated by assessment   - Educate patient/family on patient safety including physical limitations  - Instruct patient to call for assistance with activity based on assessment  - Modify environment to reduce risk of injury  - Consider OT/PT consult to assist with strengthening/mobility  Outcome: Progressing     Problem: NEUROSENSORY - ADULT  Goal: Achieves stable or improved neurological status  Description  INTERVENTIONS  - Monitor and report changes in neurological status  - Monitor vital signs such as temperature, blood pressure, glucose, and any other labs ordered   - Initiate measures to prevent increased intracranial pressure  - Monitor for seizure activity and implement precautions if appropriate      Outcome: Progressing  Goal: Remains free of injury related to seizures activity  Description  INTERVENTIONS  - Maintain airway, patient safety  and administer oxygen as ordered  - Monitor patient for seizure activity, document and report duration and description of seizure to physician/advanced practitioner  - If seizure occurs,  ensure patient safety during seizure  - Reorient patient post seizure  - Seizure pads on all 4 side rails  - Instruct patient/family to notify RN of any seizure activity including if an aura is experienced  - Instruct patient/family to call for assistance with activity based on nursing assessment  - Administer anti-seizure medications if ordered    Outcome: Progressing  Goal: Achieves maximal functionality and self care  Description  INTERVENTIONS  - Monitor swallowing and airway patency with patient fatigue and changes in neurological status  - Encourage and assist patient to increase activity and self care     - Encourage visually impaired, hearing impaired and aphasic patients to use assistive/communication devices  Outcome: Progressing     Problem: CARDIOVASCULAR - ADULT  Goal: Maintains optimal cardiac output and hemodynamic stability  Description  INTERVENTIONS:  - Monitor I/O, vital signs and rhythm  - Monitor for S/S and trends of decreased cardiac output  - Administer and titrate ordered vasoactive medications to optimize hemodynamic stability  - Assess quality of pulses, skin color and temperature  - Assess for signs of decreased coronary artery perfusion  - Instruct patient to report change in severity of symptoms  Outcome: Progressing  Goal: Absence of cardiac dysrhythmias or at baseline rhythm  Description  INTERVENTIONS:  - Continuous cardiac monitoring, vital signs, obtain 12 lead EKG if ordered  - Administer antiarrhythmic and heart rate control medications as ordered  - Monitor electrolytes and administer replacement therapy as ordered  Outcome: Progressing     Problem: RESPIRATORY - ADULT  Goal: Achieves optimal ventilation and oxygenation  Description  INTERVENTIONS:  - Assess for changes in respiratory status  - Assess for changes in mentation and behavior  - Position to facilitate oxygenation and minimize respiratory effort  - Oxygen administered by appropriate delivery if ordered  - Initiate smoking cessation education as indicated  - Encourage broncho-pulmonary hygiene including cough, deep breathe, Incentive Spirometry  - Assess the need for suctioning and aspirate as needed  - Assess and instruct to report SOB or any respiratory difficulty  - Respiratory Therapy support as indicated  Outcome: Progressing     Problem: GASTROINTESTINAL - ADULT  Goal: Minimal or absence of nausea and/or vomiting  Description  INTERVENTIONS:  - Administer IV fluids if ordered to ensure adequate hydration  - Maintain NPO status until nausea and vomiting are resolved  - Nasogastric tube if ordered  - Administer ordered antiemetic medications as needed  - Provide nonpharmacologic comfort measures as appropriate  - Advance diet as tolerated, if ordered  - Consider nutrition services referral to assist patient with adequate nutrition and appropriate food choices  Outcome: Progressing  Goal: Maintains or returns to baseline bowel function  Description  INTERVENTIONS:  - Assess bowel function  - Encourage oral fluids to ensure adequate hydration  - Administer IV fluids if ordered to ensure adequate hydration  - Administer ordered medications as needed  - Encourage mobilization and activity  - Consider nutritional services referral to assist patient with adequate nutrition and appropriate food choices  Outcome: Progressing  Goal: Maintains adequate nutritional intake  Description  INTERVENTIONS:  - Monitor percentage of each meal consumed  - Identify factors contributing to decreased intake, treat as appropriate  - Assist with meals as needed  - Monitor I&O, weight, and lab values if indicated  - Obtain nutrition services referral as needed  Outcome: Progressing     Problem: GENITOURINARY - ADULT  Goal: Maintains or returns to baseline urinary function  Description  INTERVENTIONS:  - Assess urinary function  - Encourage oral fluids to ensure adequate hydration if ordered  - Administer IV fluids as ordered to ensure adequate hydration  - Administer ordered medications as needed  - Offer frequent toileting  - Follow urinary retention protocol if ordered  Outcome: Progressing  Goal: Absence of urinary retention  Description  INTERVENTIONS:  - Assess patients ability to void and empty bladder  - Monitor I/O  - Bladder scan as needed  - Discuss with physician/AP medications to alleviate retention as needed  - Discuss catheterization for long term situations as appropriate  Outcome: Progressing     Problem: METABOLIC, FLUID AND ELECTROLYTES - ADULT  Goal: Electrolytes maintained within normal limits  Description  INTERVENTIONS:  - Monitor labs and assess patient for signs and symptoms of electrolyte imbalances  - Administer electrolyte replacement as ordered  - Monitor response to electrolyte replacements, including repeat lab results as appropriate  - Instruct patient on fluid and nutrition as appropriate  Outcome: Progressing  Goal: Fluid balance maintained  Description  INTERVENTIONS:  - Monitor labs   - Monitor I/O and WT  - Instruct patient on fluid and nutrition as appropriate  - Assess for signs & symptoms of volume excess or deficit  Outcome: Progressing  Goal: Glucose maintained within target range  Description  INTERVENTIONS:  - Monitor Blood Glucose as ordered  - Assess for signs and symptoms of hyperglycemia and hypoglycemia  - Administer ordered medications to maintain glucose within target range  - Assess nutritional intake and initiate nutrition service referral as needed  Outcome: Progressing     Problem: SKIN/TISSUE INTEGRITY - ADULT  Goal: Skin integrity remains intact  Description  INTERVENTIONS  - Identify patients at risk for skin breakdown  - Assess and monitor skin integrity  - Assess and monitor nutrition and hydration status  - Monitor labs (i e  albumin)  - Assess for incontinence   - Turn and reposition patient  - Assist with mobility/ambulation  - Relieve pressure over bony prominences  - Avoid friction and shearing  - Provide appropriate hygiene as needed including keeping skin clean and dry  - Evaluate need for skin moisturizer/barrier cream  - Collaborate with interdisciplinary team (i e  Nutrition, Rehabilitation, etc )   - Patient/family teaching  Outcome: Progressing  Goal: Incision(s), wounds(s) or drain site(s) healing without S/S of infection  Description  INTERVENTIONS  - Assess and document risk factors for skin impairment   - Assess and document dressing, incision, wound bed, drain sites and surrounding tissue  - Consider nutrition services referral as needed  - Oral mucous membranes remain intact  - Provide patient/ family education  Outcome: Progressing  Goal: Oral mucous membranes remain intact  Description  INTERVENTIONS  - Assess oral mucosa and hygiene practices  - Implement preventative oral hygiene regimen  - Implement oral medicated treatments as ordered  - Initiate Nutrition services referral as needed  Outcome: Progressing     Problem: HEMATOLOGIC - ADULT  Goal: Maintains hematologic stability  Description  INTERVENTIONS  - Assess for signs and symptoms of bleeding or hemorrhage  - Monitor labs  - Administer supportive blood products/factors as ordered and appropriate  Outcome: Progressing     Problem: MUSCULOSKELETAL - ADULT  Goal: Maintain or return mobility to safest level of function  Description  INTERVENTIONS:  - Assess patient's ability to carry out ADLs; assess patient's baseline for ADL function and identify physical deficits which impact ability to perform ADLs (bathing, care of mouth/teeth, toileting, grooming, dressing, etc )  - Assess/evaluate cause of self-care deficits   - Assess range of motion  - Assess patient's mobility  - Assess patient's need for assistive devices and provide as appropriate  - Encourage maximum independence but intervene and supervise when necessary  - Involve family in performance of ADLs  - Assess for home care needs following discharge   - Consider OT consult to assist with ADL evaluation and planning for discharge  - Provide patient education as appropriate  Outcome: Progressing  Goal: Maintain proper alignment of affected body part  Description  INTERVENTIONS:  - Support, maintain and protect limb and body alignment  - Provide patient/ family with appropriate education  Outcome: Progressing     Problem: Prexisting or High Potential for Compromised Skin Integrity  Goal: Skin integrity is maintained or improved  Description  INTERVENTIONS:  - Identify patients at risk for skin breakdown  - Assess and monitor skin integrity  - Assess and monitor nutrition and hydration status  - Monitor labs   - Assess for incontinence   - Turn and reposition patient  - Assist with mobility/ambulation  - Relieve pressure over bony prominences  - Avoid friction and shearing  - Provide appropriate hygiene as needed including keeping skin clean and dry  - Evaluate need for skin moisturizer/barrier cream  - Collaborate with interdisciplinary team   - Patient/family teaching  - Consider wound care consult   Outcome: Progressing     Problem: Nutrition/Hydration-ADULT  Goal: Nutrient/Hydration intake appropriate for improving, restoring or maintaining nutritional needs  Description  Monitor and assess patient's nutrition/hydration status for malnutrition  Collaborate with interdisciplinary team and initiate plan and interventions as ordered  Monitor patient's weight and dietary intake as ordered or per policy  Utilize nutrition screening tool and intervene as necessary  Determine patient's food preferences and provide high-protein, high-caloric foods as appropriate       INTERVENTIONS:  - Monitor oral intake, urinary output, labs, and treatment plans  - Assess nutrition and hydration status and recommend course of action  - Evaluate amount of meals eaten  - Assist patient with eating if necessary   - Allow adequate time for meals  - Recommend/ encourage appropriate diets, oral nutritional supplements, and vitamin/mineral supplements  - Order, calculate, and assess calorie counts as needed  - Recommend, monitor, and adjust tube feedings and TPN/PPN based on assessed needs  - Assess need for intravenous fluids  - Provide specific nutrition/hydration education as appropriate  - Include patient/family/caregiver in decisions related to nutrition  Outcome: Progressing

## 2020-07-05 NOTE — ASSESSMENT & PLAN NOTE
Lab Results   Component Value Date    HGBA1C 7 8 (H) 03/24/2020       Recent Labs     07/04/20  1618 07/04/20  2056 07/05/20  0758 07/05/20  1105   POCGLU 139 126 115 165*       Blood Sugar Average: Last 72 hrs:  (P) 148 25   Continue home medication

## 2020-07-05 NOTE — PROGRESS NOTES
NEPHROLOGY PROGRESS NOTE   Jonathan Coronel 81 y o  male MRN: [id number]  Unit/Bed#: E5 -01 Encounter: [id number]      ASSESSMENT/PLAN:  1  TAN, POA: prerenal with poor oral intake and lasix  Creatinine 1 97 on admission and improved to 0 85 today with IVF  Baseline creatinine <1    2  Hypernatremia: sodium improved to 142 today with hypotonic fluids   · Encourage oral intake and d/c IVF   3  Chronic diastolic heart failure:  Continue to hold on Lasix  X-ray on admission did show mild bibasilar edema  But currently has no shortness of breath   4  Hypokalemia: will give k-dur 20meq x 1 today   4  Mild cognitive impairment   5  DM II     Plan Summary:    D/c IVF    k-dur 20meq x 1    If discharged would decrease lasix to 20mg daily as he came in prerenal but CXR did show bibasilar edema and repeat BMP next week     SUBJECTIVE:  Feeling well and asking when he can go home  Now eating and drinking well per patient and nurse       OBJECTIVE:  Current Weight: Weight - Scale: 78 9 kg (173 lb 15 1 oz)  Vitals:    07/05/20 0755   BP: 117/62   Pulse: 63   Resp: 18   Temp: 97 9 °F (36 6 °C)   SpO2: 97%       Intake/Output Summary (Last 24 hours) at 7/5/2020 1224  Last data filed at 7/5/2020 0800  Gross per 24 hour   Intake 858 33 ml   Output 1230 ml   Net -371 67 ml       General:  No acute distress  Skin:  No rash  Eyes:  Sclerae anicteric  ENT:  Moist mucous membranes  Neck:  Trachea midline with no JVD  Chest:  Clear to auscultation bilaterally  CVS:  Regular rate and rhythm  Abdomen:  Soft, nontender, nondistended  Extremities:  No edema  Neuro:  Awake and alert  Psych:  Appropriate affect      Medications:  Scheduled Meds:  Current Facility-Administered Medications:  dextrose 50 mL/hr Intravenous Continuous Bo Clement PA-C Last Rate: 50 mL/hr (62/96/11 0325)   folic acid 9,251 mcg Oral Daily Jessi Dorman DO    gabapentin 400 mg Oral TID Lyskarena Grad, DO    heparin (porcine) 5,000 Units Subcutaneous Q8H [address] Jessi Lakia, DO    insulin glargine 18 Units Subcutaneous HS Jessi Lakia, DO    insulin lispro 1-5 Units Subcutaneous TID AC Jessi Lakia, DO    metoprolol tartrate 12 5 mg Oral Daily Jessi Lakia, DO    mirtazapine 30 mg Oral HS Jessi Lakia, DO    pantoprazole 40 mg Oral Daily Before Breakfast Jessi Lakia, DO        PRN Meds:     Continuous Infusions:  dextrose 50 mL/hr Last Rate: 50 mL/hr (07/05/20 0929)       Laboratory Results:  Results from last 7 days   Lab Units 07/05/20  0518 07/04/20  0447 07/03/20  1332   WBC Thousand/uL  --  12 10* 16 81*   HEMOGLOBIN g/dL  --  14 9 17 0   HEMATOCRIT %  --  46 6 52 0*   PLATELETS Thousands/uL  --  131* 153   SODIUM mmol/L 142 147* 146*   POTASSIUM mmol/L 3 4* 3 7 4 0   CHLORIDE mmol/L 106 109* 106   CO2 mmol/L 29 30 28   BUN mg/dL 22 41* 45*   CREATININE mg/dL 0 85 1 18 1 97*   CALCIUM mg/dL 8 2* 8 7 9 7

## 2020-07-05 NOTE — PLAN OF CARE
Problem: Potential for Falls  Goal: Patient will remain free of falls  Description  INTERVENTIONS:  - Assess patient frequently for physical needs  -  Identify cognitive and physical deficits and behaviors that affect risk of falls  -  [place] fall precautions as indicated by assessment   - Educate patient/family on patient safety including physical limitations  - Instruct patient to call for assistance with activity based on assessment  - Modify environment to reduce risk of injury  - Consider OT/PT consult to assist with strengthening/mobility  Outcome: Progressing     Problem: NEUROSENSORY - ADULT  Goal: Achieves stable or improved neurological status  Description  INTERVENTIONS  - Monitor and report changes in neurological status  - Monitor vital signs such as temperature, blood pressure, glucose, and any other labs ordered   - Initiate measures to prevent increased intracranial pressure  - Monitor for seizure activity and implement precautions if appropriate      Outcome: Progressing  Goal: Remains free of injury related to seizures activity  Description  INTERVENTIONS  - Maintain airway, patient safety  and administer oxygen as ordered  - Monitor patient for seizure activity, document and report duration and description of seizure to physician/advanced practitioner  - If seizure occurs,  ensure patient safety during seizure  - Reorient patient post seizure  - Seizure pads on all 4 side rails  - Instruct patient/family to notify RN of any seizure activity including if an aura is experienced  - Instruct patient/family to call for assistance with activity based on nursing assessment  - Administer anti-seizure medications if ordered    Outcome: Progressing  Goal: Achieves maximal functionality and self care  Description  INTERVENTIONS  - Monitor swallowing and airway patency with patient fatigue and changes in neurological status  - Encourage and assist patient to increase activity and self care     - Encourage visually impaired, hearing impaired and aphasic patients to use assistive/communication devices  Outcome: Progressing     Problem: CARDIOVASCULAR - ADULT  Goal: Maintains optimal cardiac output and hemodynamic stability  Description  INTERVENTIONS:  - Monitor I/O, vital signs and rhythm  - Monitor for S/S and trends of decreased cardiac output  - Administer and titrate ordered vasoactive medications to optimize hemodynamic stability  - Assess quality of pulses, skin color and temperature  - Assess for signs of decreased coronary artery perfusion  - Instruct patient to report change in severity of symptoms  Outcome: Progressing  Goal: Absence of cardiac dysrhythmias or at baseline rhythm  Description  INTERVENTIONS:  - Continuous cardiac monitoring, vital signs, obtain 12 lead EKG if ordered  - Administer antiarrhythmic and heart rate control medications as ordered  - Monitor electrolytes and administer replacement therapy as ordered  Outcome: Progressing     Problem: RESPIRATORY - ADULT  Goal: Achieves optimal ventilation and oxygenation  Description  INTERVENTIONS:  - Assess for changes in respiratory status  - Assess for changes in mentation and behavior  - Position to facilitate oxygenation and minimize respiratory effort  - Oxygen administered by appropriate delivery if ordered  - Initiate smoking cessation education as indicated  - Encourage broncho-pulmonary hygiene including cough, deep breathe, Incentive Spirometry  - Assess the need for suctioning and aspirate as needed  - Assess and instruct to report SOB or any respiratory difficulty  - Respiratory Therapy support as indicated  Outcome: Progressing     Problem: GASTROINTESTINAL - ADULT  Goal: Minimal or absence of nausea and/or vomiting  Description  INTERVENTIONS:  - Administer IV fluids if ordered to ensure adequate hydration  - Maintain NPO status until nausea and vomiting are resolved  - Nasogastric tube if ordered  - Administer ordered antiemetic medications as needed  - Provide nonpharmacologic comfort measures as appropriate  - Advance diet as tolerated, if ordered  - Consider nutrition services referral to assist patient with adequate nutrition and appropriate food choices  Outcome: Progressing  Goal: Maintains or returns to baseline bowel function  Description  INTERVENTIONS:  - Assess bowel function  - Encourage oral fluids to ensure adequate hydration  - Administer IV fluids if ordered to ensure adequate hydration  - Administer ordered medications as needed  - Encourage mobilization and activity  - Consider nutritional services referral to assist patient with adequate nutrition and appropriate food choices  Outcome: Progressing  Goal: Maintains adequate nutritional intake  Description  INTERVENTIONS:  - Monitor percentage of each meal consumed  - Identify factors contributing to decreased intake, treat as appropriate  - Assist with meals as needed  - Monitor I&O, weight, and lab values if indicated  - Obtain nutrition services referral as needed  Outcome: Progressing     Problem: GENITOURINARY - ADULT  Goal: Maintains or returns to baseline urinary function  Description  INTERVENTIONS:  - Assess urinary function  - Encourage oral fluids to ensure adequate hydration if ordered  - Administer IV fluids as ordered to ensure adequate hydration  - Administer ordered medications as needed  - Offer frequent toileting  - Follow urinary retention protocol if ordered  Outcome: Progressing  Goal: Absence of urinary retention  Description  INTERVENTIONS:  - Assess patients ability to void and empty bladder  - Monitor I/O  - Bladder scan as needed  - Discuss with physician/AP medications to alleviate retention as needed  - Discuss catheterization for long term situations as appropriate  Outcome: Progressing     Problem: METABOLIC, FLUID AND ELECTROLYTES - ADULT  Goal: Electrolytes maintained within normal limits  Description  INTERVENTIONS:  - Monitor labs and assess patient for signs and symptoms of electrolyte imbalances  - Administer electrolyte replacement as ordered  - Monitor response to electrolyte replacements, including repeat lab results as appropriate  - Instruct patient on fluid and nutrition as appropriate  Outcome: Progressing  Goal: Fluid balance maintained  Description  INTERVENTIONS:  - Monitor labs   - Monitor I/O and WT  - Instruct patient on fluid and nutrition as appropriate  - Assess for signs & symptoms of volume excess or deficit  Outcome: Progressing  Goal: Glucose maintained within target range  Description  INTERVENTIONS:  - Monitor Blood Glucose as ordered  - Assess for signs and symptoms of hyperglycemia and hypoglycemia  - Administer ordered medications to maintain glucose within target range  - Assess nutritional intake and initiate nutrition service referral as needed  Outcome: Progressing     Problem: SKIN/TISSUE INTEGRITY - ADULT  Goal: Skin integrity remains intact  Description  INTERVENTIONS  - Identify patients at risk for skin breakdown  - Assess and monitor skin integrity  - Assess and monitor nutrition and hydration status  - Monitor labs (i e  albumin)  - Assess for incontinence   - Turn and reposition patient  - Assist with mobility/ambulation  - Relieve pressure over bony prominences  - Avoid friction and shearing  - Provide appropriate hygiene as needed including keeping skin clean and dry  - Evaluate need for skin moisturizer/barrier cream  - Collaborate with interdisciplinary team (i e  Nutrition, Rehabilitation, etc )   - Patient/family teaching  Outcome: Progressing  Goal: Incision(s), wounds(s) or drain site(s) healing without S/S of infection  Description  INTERVENTIONS  - Assess and document risk factors for skin impairment   - Assess and document dressing, incision, wound bed, drain sites and surrounding tissue  - Consider nutrition services referral as needed  - Oral mucous membranes remain intact  - Provide patient/ family education  Outcome: Progressing  Goal: Oral mucous membranes remain intact  Description  INTERVENTIONS  - Assess oral mucosa and hygiene practices  - Implement preventative oral hygiene regimen  - Implement oral medicated treatments as ordered  - Initiate Nutrition services referral as needed  Outcome: Progressing     Problem: HEMATOLOGIC - ADULT  Goal: Maintains hematologic stability  Description  INTERVENTIONS  - Assess for signs and symptoms of bleeding or hemorrhage  - Monitor labs  - Administer supportive blood products/factors as ordered and appropriate  Outcome: Progressing     Problem: MUSCULOSKELETAL - ADULT  Goal: Maintain or return mobility to safest level of function  Description  INTERVENTIONS:  - Assess patient's ability to carry out ADLs; assess patient's baseline for ADL function and identify physical deficits which impact ability to perform ADLs (bathing, care of mouth/teeth, toileting, grooming, dressing, etc )  - Assess/evaluate cause of self-care deficits   - Assess range of motion  - Assess patient's mobility  - Assess patient's need for assistive devices and provide as appropriate  - Encourage maximum independence but intervene and supervise when necessary  - Involve family in performance of ADLs  - Assess for home care needs following discharge   - Consider OT consult to assist with ADL evaluation and planning for discharge  - Provide patient education as appropriate  Outcome: Progressing  Goal: Maintain proper alignment of affected body part  Description  INTERVENTIONS:  - Support, maintain and protect limb and body alignment  - Provide patient/ family with appropriate education  Outcome: Progressing     Problem: Prexisting or High Potential for Compromised Skin Integrity  Goal: Skin integrity is maintained or improved  Description  INTERVENTIONS:  - Identify patients at risk for skin breakdown  - Assess and monitor skin integrity  - Assess and monitor nutrition and hydration status  - Monitor labs   - Assess for incontinence   - Turn and reposition patient  - Assist with mobility/ambulation  - Relieve pressure over bony prominences  - Avoid friction and shearing  - Provide appropriate hygiene as needed including keeping skin clean and dry  - Evaluate need for skin moisturizer/barrier cream  - Collaborate with interdisciplinary team   - Patient/family teaching  - Consider wound care consult   Outcome: Progressing     Problem: Nutrition/Hydration-ADULT  Goal: Nutrient/Hydration intake appropriate for improving, restoring or maintaining nutritional needs  Description  Monitor and assess patient's nutrition/hydration status for malnutrition  Collaborate with interdisciplinary team and initiate plan and interventions as ordered  Monitor patient's weight and dietary intake as ordered or per policy  Utilize nutrition screening tool and intervene as necessary  Determine patient's food preferences and provide high-protein, high-caloric foods as appropriate       INTERVENTIONS:  - Monitor oral intake, urinary output, labs, and treatment plans  - Assess nutrition and hydration status and recommend course of action  - Evaluate amount of meals eaten  - Assist patient with eating if necessary   - Allow adequate time for meals  - Recommend/ encourage appropriate diets, oral nutritional supplements, and vitamin/mineral supplements  - Order, calculate, and assess calorie counts as needed  - Recommend, monitor, and adjust tube feedings and TPN/PPN based on assessed needs  - Assess need for intravenous fluids  - Provide specific nutrition/hydration education as appropriate  - Include patient/family/caregiver in decisions related to nutrition  Outcome: Progressing

## 2020-07-05 NOTE — ASSESSMENT & PLAN NOTE
Wt Readings from Last 3 Encounters:   07/05/20 78 9 kg (173 lb 15 1 oz)   01/06/20 79 8 kg (176 lb)   11/08/19 78 9 kg (174 lb)     - continue metoprolol 12 5 mg

## 2020-07-06 ENCOUNTER — TRANSITIONAL CARE MANAGEMENT (OUTPATIENT)
Dept: FAMILY MEDICINE CLINIC | Facility: CLINIC | Age: 85
End: 2020-07-06

## 2020-07-06 DIAGNOSIS — N17.9 AKI (ACUTE KIDNEY INJURY) (HCC): Primary | ICD-10-CM

## 2020-07-06 LAB
ATRIAL RATE: 535 BPM
QRS AXIS: -51 DEGREES
QRSD INTERVAL: 86 MS
QT INTERVAL: 366 MS
QTC INTERVAL: 427 MS
T WAVE AXIS: 70 DEGREES
VENTRICULAR RATE: 82 BPM

## 2020-07-06 PROCEDURE — 93010 ELECTROCARDIOGRAM REPORT: CPT | Performed by: INTERNAL MEDICINE

## 2020-07-09 NOTE — PHYSICIAN ADVISOR
Current patient class: Inpatient  The patient is currently on Hospital Day: 3 at [address] [place] [address]      The patient was admitted to the hospital at [phone] on 7/4/20 for the following diagnosis:  Dehydration [E86 0]  Altered mental status [R41 82]  TAN (acute kidney injury) ([facility] [address] ) [N17 9]  Acute on chronic diastolic congestive heart failure ([facility] [address] ) [I50 33]       There is documentation in the medical record of an expected length of stay of at least 2 midnights  The patient is therefore expected to satisfy the 2 midnight benchmark and given the 2 midnight presumption is appropriate for INPATIENT ADMISSION  Given this expectation of a satisfying stay, CMS instructs us that the patient is most often appropriate for inpatient admission under part A provided medical necessity is documented in the chart  After review of the relevant documentation, labs, vital signs and test results, the patient is appropriate for INPATIENT ADMISSION  Admission to the hospital as an inpatient is a complex decision making process which requires the practitioner to consider the patients presenting complaint, history and physical examination and all relevant testing  With this in mind, in this case, the patient was deemed appropriate for INPATIENT ADMISSION  After review of the documentation and testing available at the time of the admission I concur with this clinical determination of medical necessity  Rationale is as follows: The patient is a 81 yrs old Male who presented to the ED at 7/3/2020  1:05 PM with a chief complaint of Medical Problem (Per EMS staff at [address] [place] [address] found pt sluched in chair checked his sugar which was normal called ems to have pt evaluated for tremers)  When the patient came to the hospital patient was noted to be in acute renal insufficiency and his diuretics were held  Patient was seen by Nephrology and they felt that this was pre renal and the patient was put on IV fluids    Patient was also hyponatremic and change to D5 half-normal saline  On day 2 of admission the patient was maintained on IV fluids    Given the patient came in and was found to be in acute renal insufficiency in elderly male patient with underlying dementia in need of acute inpatient management with IV fluids the patient did cross the 2 midnight benchmark as set by Medicare and is inpatient status appropriate based on medical necessity    The patients vitals on arrival were ED Triage Vitals   Temperature Pulse Respirations Blood Pressure SpO2   07/03/20 1308 07/03/20 1308 07/03/20 1308 07/03/20 1308 07/03/20 1308   98 6 °F (37 °C) 77 18 129/85 94 %      Temp Source Heart Rate Source Patient Position - Orthostatic VS BP Location FiO2 (%)   07/03/20 1308 07/03/20 1308 07/03/20 1308 07/03/20 1308 --   Oral Monitor Sitting Right arm       Pain Score       07/03/20 1715       No Pain           Past Medical History:   Diagnosis Date    Anxiety     Arthritis     Atrial fibrillation (HCC)     BPH (benign prostatic hyperplasia)     CAD (coronary artery disease)     Chronic back pain     Chronic diastolic (congestive) heart failure (HCC)     COPD (chronic obstructive pulmonary disease) (HCC)     Critical illness polyneuropathy ([facility] [address] )     Diabetes mellitus ([facility] [address] )     type 2    Dysphagia     GERD (gastroesophageal reflux disease)     Hemorrhoids     History of colon cancer     History of DVT (deep vein thrombosis)     Hyperlipidemia     Hypertension     Hyponatremia     Malignant neoplasm of colon ([facility] [address] )     Myocardial infarct ([facility] [address] )     Osteomyelitis ([facility] [address] )     Pneumonia     Sepsis ([facility] [address] )     Urinary retention      Past Surgical History:   Procedure Laterality Date    APPENDECTOMY      BACK SURGERY      BOTOX INJECTION N/A 2/8/2019    Procedure: BOTOX INJECTION 100 UNITS;  Surgeon: Rissa Stevens MD;  [facility];  Service: Urology    BRONCHOSCOPY N/A 6/30/2016    Procedure: BRONCHOSCOPY FLEXIBLE with bronchial lavage to the left lower lobe; Surgeon: Jonathan Castro MD;  Location: [facility]; Service:     CATARACT EXTRACTION      CHOLECYSTECTOMY      COLECTOMY      COLON SURGERY      polypectomy for cancerous lesion    COLONOSCOPY      CORONARY ANGIOPLASTY WITH STENT PLACEMENT      x5  pt unsure of where    GASTROSTOMY TUBE PLACEMENT      percutaneous placement of gastrostomy tube placed 4/16 - dc 8/16    HEMORROIDECTOMY      LUMBAR LAMINECTOMY      LUNG SURGERY      PEG TUBE REMOVAL      [place] CYSTOURETHRO W/IMPLANT N/A 8/27/2018    Procedure: CYSTOSCOPY WITH INSERTION UROLIFT;  Surgeon: Melyssa Dinero MD;  [facility];  Service: Urology    [place] CYSTOURETHROSCOPY N/A 2/8/2019    Procedure: Jaguar Giordano;  Surgeon: Melyssa Dinero MD;  [facility];  Service: Urology    TRACHEOSTOMY             Consults have been placed to:   IP CONSULT TO NEPHROLOGY  IP CONSULT TO NUTRITION SERVICES    Vitals:    07/04/20 2228 07/04/20 2230 07/05/20 0547 07/05/20 0755   BP: 91/59 132/83  117/62   BP Location: Right arm Right arm  Right arm   Pulse: 85 57  63   Resp: 18 18  18   Temp: 98 7 °F (37 1 °C) 97 8 °F (36 6 °C)  97 9 °F (36 6 °C)   TempSrc: Tympanic Tympanic  Temporal   SpO2: 93% 92%  97%   Weight:   78 9 kg (173 lb 15 1 oz)        Most recent labs:    No results for input(s): WBC, HGB, HCT, PLT, K, NA, CALCIUM, BUN, CREATININE, LIPASE, AMYLASE, INR, TROPONINI, CKTOTAL, AST, ALT, ALKPHOS, BILITOT in the last 72 hours  Scheduled Meds:  Continuous Infusions:  No current facility-administered medications for this encounter  PRN Meds:      Surgical procedures (if appropriate):

## 2020-07-13 ENCOUNTER — TELEPHONE (OUTPATIENT)
Dept: NEPHROLOGY | Facility: CLINIC | Age: 85
End: 2020-07-13

## 2020-07-13 NOTE — TELEPHONE ENCOUNTER
----- Message from Yury Suarez sent at 7/6/2020  9:11 AM EDT -----  BMP has been ordered please schedule patient per Dr Davida Harris   ----- Message -----  From: Unique Buckner DO  Sent: 7/5/2020   5:25 PM EDT  To: Neph[facility]    Patient discharge from [address]  Needs a BMP within 1 week  Follow-up appointment within 4-6 weeks

## 2020-07-13 NOTE — TELEPHONE ENCOUNTER
Called patient and left a message with his nurse  She stated that she wanted to run the hospital follow up appointment by Autumn Castellanos son prior to scheduling   She has our office contact information and will be giving us a call back

## 2020-07-16 NOTE — PROGRESS NOTES
Assessment/Plan:     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, without long-term current use of insulin ([facility] )  We reviewed that diabetic control is stable  He will continue with Lantus 18 units daily  He was encouraged to get up-to-date on his eye exam   His foot exam is currently up-to-date  A microalbumin was ordered as well since this is due  He will follow-up next in about 3 months with Dr Mateo Dixon  Lab Results   Component Value Date    HGBA1C 7 9 (A) 07/17/2020       CHF (congestive heart failure) ([facility])  Wt Readings from Last 3 Encounters:   07/17/20 79 4 kg (175 lb)   07/05/20 78 9 kg (173 lb 15 1 oz)   01/06/20 79 8 kg (176 lb)       Appears euvolemic  He will continue with medications as currently doing including Lasix 20 mg daily  Chronic atrial fibrillation    Currently asymptomatic  He denies any shortness of breath or palpitations  He is rate controlled with metoprolol  He is not currently on any anticoagulation  Bilateral leg weakness    Patient notes chronic leg weakness  He is interested in trying therapy so referral for PT was provided today  TAN (acute kidney injury) ([place] [address] )    Patient was supposed to have BMP done 1 week after discharge  Slip to have this done was printed for him today  He is also supposed to have a 4-6 week follow-up with Nephrology, but this does not appear to have yet been scheduled  Order was placed for this to be set up  Diagnoses and all orders for this visit:    TAN (acute kidney injury) [facility])  -     Ambulatory referral to Nephrology; Future  -     Basic metabolic panel; Future    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, without long-term current use of insulin ([facility])  -     POCT hemoglobin A1c  -     Accu-Chek FastClix Lancets MISC; Test once daily  -     Microalbumin / creatinine urine ratio; Future  -     glucose blood (ONE TOUCH ULTRA TEST) test strip;  Test once daily    Overweight with body mass index (BMI) of 25 to 25 9 in adult    Chronic combined systolic and diastolic congestive heart failure (HCC)    Chronic atrial fibrillation (HCC)    Bilateral leg weakness  -     Ambulatory referral to Physical Therapy; Future        BMI Counseling: Body mass index is 25 11 kg/m²  The BMI is above normal  Nutrition recommendations include encouraging healthy choices of fruits and vegetables  Exercise recommendations include exercising 3-5 times per week  Subjective:     Patient ID: Tania Nguyen is a 81 y o  male  [facility] Follow-up:  The patient is being seen today for follow-up after hospitalization  Hospital records were reviewed  The patient was hospitalized at [facility]  The date of admission was 07/03/2020,  and date of discharge was 07/05/2020  Diagnosis:  TAN     Hospital Course:  Patient presented to the hospital due to acute single true relieve the situation  He stayed overnight to monitor electrolytes levels for possible imbalance  He was found to have elevated white blood cell count but procalcitonin was negative  Nephrology evaluated him and will follow-up outpatient  There is no evidence of decompensation of his chronic CHF  Medication changes: none    The patient was discharged to home  Follow-up appointments:  Nephrology 4-6 weeks status post discharge, due for BMP as ordered by Nephrology        Patient denies any symptoms of atrial fibrillation/CAD/CHF such as chest pain, palpitations, shortness of breath or leg swelling  He is on metoprolol 12 5 mg twice a day       The patient presents today for follow-up on diabetes  At the last visit, A1c was 7 8% in March 2020  The patient reports that current diabetic medications include Lantus 18 units daily  The patient denies hypoglycemic episodes  Last eye exam was 2017  Last foot exam was 1/2020  Today's A1c in the office is 7 9%      He notes that his legs have been weak for a long time - not worsening - trouble getting up from chair and weak while walking - not doing PT currently - feels mostly in the calves - denies falling      Review of Systems   Constitutional: Negative for chills and fever  Respiratory: Negative for shortness of breath  Cardiovascular: Negative for chest pain, palpitations and leg swelling  Neurological: Negative for dizziness and headaches  Objective:     Physical Exam   Constitutional: He appears well-developed and well-nourished  No distress  HENT:   Head: Normocephalic and atraumatic  Neck: Neck supple  No thyromegaly present  Cardiovascular: Normal rate, regular rhythm, normal heart sounds and intact distal pulses  No murmur heard  No carotid bruits noted   Pulmonary/Chest: Effort normal and breath sounds normal  He has no wheezes  He has no rales  Musculoskeletal: He exhibits no edema  Lymphadenopathy:     He has no cervical adenopathy  Neurological: He is alert  Skin: Skin is warm and dry  Capillary refill takes less than 2 seconds  Normal skin turgor   Psychiatric: He has a normal mood and affect  Vitals reviewed  Vitals:    07/17/20 1319   BP: 138/80   Pulse: 72   Resp: 18   SpO2: 97%   Weight: 79 4 kg (175 lb)   Height: 5' 10" (1 778 m)       Transitional Care Management Review:  Jenni Torre is a 81 y o  male here for TCM follow up       During the TCM phone call patient stated:    TCM Call (since 6/16/2020)     Date and time call was made  7/6/2020 12:04 PM    Hospital care reviewed  Records reviewed    Patient was hospitialized at  [facility]    Date of Admission  07/03/20    Date of discharge  07/05/20    Diagnosis  TAN    Disposition  Home    Were the patients medications reviewed and updated  No    Current Symptoms  None      TCM Call (since 6/16/2020)     Post hospital issues  None    I have advised the patient to call PCP with any new or worsening symptoms  andres chua ma    Living Arrangements  Family members    Are you recieving any outpatient services  No    Are you recieving home care services  No    Are you using any community resources  No    Current waiver services  No    Have you fallen in the last 12 months  No    Interperter language line needed  No          Jorge Brown PA-C

## 2020-07-17 ENCOUNTER — OFFICE VISIT (OUTPATIENT)
Dept: FAMILY MEDICINE CLINIC | Facility: CLINIC | Age: 85
End: 2020-07-17
Payer: MEDICARE

## 2020-07-17 VITALS
HEIGHT: 70 IN | BODY MASS INDEX: 25.05 KG/M2 | HEART RATE: 72 BPM | SYSTOLIC BLOOD PRESSURE: 138 MMHG | OXYGEN SATURATION: 97 % | RESPIRATION RATE: 18 BRPM | DIASTOLIC BLOOD PRESSURE: 80 MMHG | WEIGHT: 175 LBS

## 2020-07-17 DIAGNOSIS — E66.3 OVERWEIGHT WITH BODY MASS INDEX (BMI) OF 25 TO 25.9 IN ADULT: ICD-10-CM

## 2020-07-17 DIAGNOSIS — IMO0002 UNCONTROLLED TYPE 2 DIABETES MELLITUS WITH DIABETIC POLYNEUROPATHY, WITHOUT LONG-TERM CURRENT USE OF INSULIN: ICD-10-CM

## 2020-07-17 DIAGNOSIS — R29.898 BILATERAL LEG WEAKNESS: ICD-10-CM

## 2020-07-17 DIAGNOSIS — N17.9 AKI (ACUTE KIDNEY INJURY) (HCC): Primary | ICD-10-CM

## 2020-07-17 DIAGNOSIS — I50.42 CHRONIC COMBINED SYSTOLIC AND DIASTOLIC CONGESTIVE HEART FAILURE (HCC): ICD-10-CM

## 2020-07-17 DIAGNOSIS — I48.20 CHRONIC ATRIAL FIBRILLATION (HCC): ICD-10-CM

## 2020-07-17 LAB — SL AMB POCT HEMOGLOBIN AIC: 7.9 (ref ?–6.5)

## 2020-07-17 PROCEDURE — 99495 TRANSJ CARE MGMT MOD F2F 14D: CPT | Performed by: PHYSICIAN ASSISTANT

## 2020-07-17 PROCEDURE — 83036 HEMOGLOBIN GLYCOSYLATED A1C: CPT | Performed by: PHYSICIAN ASSISTANT

## 2020-07-17 RX ORDER — LANCETS
EACH MISCELLANEOUS
Qty: 100 EACH | Refills: 3 | Status: SHIPPED | OUTPATIENT
Start: 2020-07-17

## 2020-07-17 NOTE — ASSESSMENT & PLAN NOTE
Currently asymptomatic  He denies any shortness of breath or palpitations  He is rate controlled with metoprolol  He is not currently on any anticoagulation

## 2020-07-17 NOTE — PATIENT INSTRUCTIONS
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, without long-term current use of insulin ([facility] )  We reviewed that diabetic control is stable  He will continue with Lantus 18 units daily  He was encouraged to get up-to-date on his eye exam   His foot exam is currently up-to-date  A microalbumin was ordered as well since this is due  He will follow-up next in about 3 months with Dr Duran Search  Lab Results   Component Value Date    HGBA1C 7 9 (A) 07/17/2020       CHF (congestive heart failure) (HCC)  Wt Readings from Last 3 Encounters:   07/17/20 79 4 kg (175 lb)   07/05/20 78 9 kg (173 lb 15 1 oz)   01/06/20 79 8 kg (176 lb)       Appears euvolemic  He will continue with medications as currently doing including Lasix 20 mg daily  Chronic atrial fibrillation    Currently asymptomatic  He denies any shortness of breath or palpitations  He is rate controlled with metoprolol  He is not currently on any anticoagulation  Bilateral leg weakness    Patient notes chronic leg weakness  He is interested in trying therapy so referral for PT was provided today  TAN (acute kidney injury) ([facility] )    Patient was supposed to have BMP done 1 week after discharge  Slip to have this done was printed for him today  He is also supposed to have a 4-6 week follow-up with Nephrology, but this does not appear to have yet been scheduled  Order was placed for this to be set up

## 2020-07-17 NOTE — ASSESSMENT & PLAN NOTE
Wt Readings from Last 3 Encounters:   07/17/20 79 4 kg (175 lb)   07/05/20 78 9 kg (173 lb 15 1 oz)   01/06/20 79 8 kg (176 lb)       Appears euvolemic  He will continue with medications as currently doing including Lasix 20 mg daily

## 2020-07-17 NOTE — ASSESSMENT & PLAN NOTE
Patient notes chronic leg weakness  He is interested in trying therapy so referral for PT was provided today

## 2020-07-17 NOTE — ASSESSMENT & PLAN NOTE
Patient was supposed to have BMP done 1 week after discharge  Slip to have this done was printed for him today  He is also supposed to have a 4-6 week follow-up with Nephrology, but this does not appear to have yet been scheduled  Order was placed for this to be set up

## 2020-07-17 NOTE — ASSESSMENT & PLAN NOTE
We reviewed that diabetic control is stable  He will continue with Lantus 18 units daily  He was encouraged to get up-to-date on his eye exam   His foot exam is currently up-to-date  A microalbumin was ordered as well since this is due  He will follow-up next in about 3 months with Dr Herlinda Worley    Lab Results   Component Value Date    HGBA1C 7 9 (A) 07/17/2020

## 2020-07-20 LAB
CREAT ?TM UR-SCNC: 96.3 UMOL/L
EXT MICROALBUMIN URINE RANDOM: 26.5
MICROALBUMIN/CREAT UR: 275.2 MG/G{CREAT}

## 2020-07-28 ENCOUNTER — EVALUATION (OUTPATIENT)
Dept: PHYSICAL THERAPY | Facility: CLINIC | Age: 85
End: 2020-07-28
Payer: MEDICARE

## 2020-07-28 DIAGNOSIS — R29.898 BILATERAL LEG WEAKNESS: Primary | ICD-10-CM

## 2020-07-28 PROCEDURE — 97162 PT EVAL MOD COMPLEX 30 MIN: CPT

## 2020-07-28 PROCEDURE — 97110 THERAPEUTIC EXERCISES: CPT
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
## 2020-07-30 ENCOUNTER — OFFICE VISIT (OUTPATIENT)
Dept: PHYSICAL THERAPY | Facility: CLINIC | Age: 85
End: 2020-07-30
Payer: MEDICARE

## 2020-07-30 DIAGNOSIS — R29.898 BILATERAL LEG WEAKNESS: Primary | ICD-10-CM

## 2020-07-30 PROCEDURE — 97110 THERAPEUTIC EXERCISES: CPT

## 2020-07-30 PROCEDURE — 97112 NEUROMUSCULAR REEDUCATION: CPT

## 2020-07-30 PROCEDURE — 97530 THERAPEUTIC ACTIVITIES: CPT

## 2020-07-30 NOTE — PROGRESS NOTES
Daily Note     Today's date: 2020  Patient name: Amna Arellano  : 1935  MRN: [id number]  Referring provider: Roshni eTe PA-C  Dx:   Encounter Diagnosis     ICD-10-CM    1  Bilateral leg weakness R29 898        Start Time: 1100  Stop Time: 1150  Total time in clinic (min): 50 minutes  Subjective: Pt arrives to today's Tx stating he feels "lousy"  Objective: See treatment diary below  SpO2 pre bike: 97  SpO2 /p 5' bike: 95    Assessment: SpO2 monitored throughout Tx secondary to pt appearing SOB - no significant fluctuations noted  Pt requires occasional rest breaks secondary to fatigue & ambulates /c slow diana /c use of SPC and occasionally needs second UE support and will hold on to objects in the clinic  Pt c/o (L) medial/distal calf pain during heel walking - followed /c seated calf stretch  Pt asked for band to perform at home - provided pt /c TB to perform self calf stretch and verbalized understanding  Due to increased time required for pt ambulation/transfers, held table activities this visit and performed hip strengthening abd/add iso in chair - tolerated well  At conclusion of Tx pt reported "my (L) calf hurts " referring to distal/medial aspect of (L) calf - pt rated pain as 9/10, however, was able to tolerated medium/deep pressure and denied pain Sx /c passive DF, skin assessment performed by PTA's TE & SP, no erythema or edema present other than pre-existing erethema present in (B/L) foot/ankle  Pt would benefit from continued PT to improve quality of movement, (B/L)LE weakness and pain Sx  Plan: Cont /c PT POC  Progress as tolerated   "My (L) calf hurts"      Precautions: Hx of colon cancer; CAD; heart attack; HTN; chronic obstructive lung disease; A-Fib; CHF; hx of DVT thru (R) LE; osteomyelitis of back; overactive bladder; insomnia; mild depression; swallow dysfunction; tremor; mild cognitive impairment  Date             FOTO IE             Re-eval IE Manuals 7/28 7/30                                                               Neuro Re-Ed 7/28 7/30           Rhomberg on foam             Semi tandem stance             Heel walk  1 lap           Toe walk  1 lap           Tandem walk             TVA Contractions             March w/ TVA             BKFO w/ TVA                          Ther Ex 7/28 7/30           Standing hip abd/ext  15ea (B/L)           Standing HR   30           Standing hip flex (S) on step             LAQs - (L)  20ea           HS curl (L) EOB             Sidelying hip abd             Clams x10            Bridges x10            Piriformis (S) 4x15" ea            Hip abd iso seated  OTB 5"x15           Hip add iso seated  5"x15                        HS (S) w/ strap, supine 4x15" ea            Ther Activity 7/28 7/30           Recumbent bike  5'            Sidesteps  3 laps           Mini squats             Forward step ups*             Repeated sit to stands             Gait Training                                       Modalities                                           Alexander Parr, PTA

## 2020-08-03 ENCOUNTER — OFFICE VISIT (OUTPATIENT)
Dept: PHYSICAL THERAPY | Facility: CLINIC | Age: 85
End: 2020-08-03
Payer: MEDICARE

## 2020-08-03 DIAGNOSIS — R29.898 BILATERAL LEG WEAKNESS: Primary | ICD-10-CM

## 2020-08-03 PROCEDURE — 97112 NEUROMUSCULAR REEDUCATION: CPT | Performed by: PHYSICAL THERAPIST

## 2020-08-03 PROCEDURE — 97110 THERAPEUTIC EXERCISES: CPT | Performed by: PHYSICAL THERAPIST

## 2020-08-03 NOTE — PROGRESS NOTES
Daily Note     Today's date: 8/3/2020  Patient name: Enid Stark  : 1935  MRN: [id number]  Referring provider: Kian Fang PA-C  Dx:   Encounter Diagnosis     ICD-10-CM    1  Bilateral leg weakness  R29 898                   Subjective: Pt comes to therapy stating his LEs are "feeling lousy," upon arrival        Objective: See treatment diary below      Assessment: Tolerated treatment well  Pt reported inability to walk on toes  Reported fatigue through session with continued exercise  Patient demonstrated fatigue post treatment and would benefit from continued PT      Plan: Progress treatment as tolerated         Precautions: Hx of colon cancer; CAD; heart attack; HTN; chronic obstructive lung disease; A-Fib; CHF; hx of DVT thru (R) LE; osteomyelitis of back; overactive bladder; insomnia; mild depression; swallow dysfunction; tremor; mild cognitive impairment    Date 7/28 7/30 8/3          FOTO IE             Re-eval IE                Manuals  8/3                                                              Neuro Re-Ed  8/3          Rhomberg on foam             Semi tandem stance             Heel walk  1 lap 2 laps          Toe walk  1 lap unable          Tandem walk             TVA Contractions             March w/ TVA             BKFO w/ TVA                          Ther Ex  8/3          Standing hip abd/ext  15ea (B/L) 20 ea (B/L)          Standing HR   30 x30          Standing hip flex (S) on step             LAQs - (L)  20ea 2x10 ea          HS curl (L) EOB             Sidelying hip abd             Clams x10  np          Bridges x10  2x10          Piriformis (S) 4x15" ea  np          Hip abd iso seated  OTB 5"x15 OTB 5"x15          Hip add iso seated  5"x15 5"x15                       HS (S) w/ strap, supine 4x15" ea            Ther Activity  8/3          Recumbent bike  5'  5'           Sidesteps  3 laps 3 laps          Mini squats             Forward step ups* Repeated sit to stands             Gait Training                                       Modalities

## 2020-08-06 ENCOUNTER — OFFICE VISIT (OUTPATIENT)
Dept: PHYSICAL THERAPY | Facility: CLINIC | Age: 85
End: 2020-08-06
Payer: MEDICARE

## 2020-08-06 DIAGNOSIS — R29.898 BILATERAL LEG WEAKNESS: Primary | ICD-10-CM

## 2020-08-06 PROCEDURE — 97112 NEUROMUSCULAR REEDUCATION: CPT

## 2020-08-06 PROCEDURE — 97110 THERAPEUTIC EXERCISES: CPT

## 2020-08-06 PROCEDURE — 97530 THERAPEUTIC ACTIVITIES: CPT

## 2020-08-06 NOTE — PROGRESS NOTES
Daily Note     Today's date: 2020  Patient name: Deon Chaudhari  : 1935  MRN: [id number]  Referring provider: Joanna Ponce PA-C  Dx:   Encounter Diagnosis     ICD-10-CM    1  Bilateral leg weakness  R29 898        Start Time: 1100  Stop Time: 1140  Total time in clinic (min): 40 minutes  Subjective: Pt comes to therapy stating he feels "lousy today" and about "the same" since IE  Objective: See treatment diary below    Assessment: Pt tolerated treatment well and without c/o of pain or instability  Pt required UE support during balance activities but was instructed to use UE minimally for [address]  Pt required elevated foam seat &  use of UE during sit<>stands and completed 5 reps before fatigue prevented continuation of exercise - consider performing on high/low table to allow more reps before fatigue sets in  Patient demonstrated fatigue post treatment and would benefit from continued PT    Plan: Cont /c PT POC  Progress as tolerated       Precautions: Hx of colon cancer; CAD; heart attack; HTN; chronic obstructive lung disease; A-Fib; CHF; hx of DVT thru (R) LE; osteomyelitis of back; overactive bladder; insomnia; mild depression; swallow dysfunction; tremor; mild cognitive impairment  Date 7/28 7/30 8/3 8/06         FOTO IE             Re-eval IE                Manuals 7/28 7/30 8/3 8/06                                                             Neuro Re-Ed 7/28 7/30 8/3 8/06         Rhomberg on foam    20"x3         Semi tandem stance    20"x5ea         Heel walk  1 lap 2 laps          Toe walk  1 lap unable          Tandem walk    6 laps         TVA Contractions             March w/ TVA             BKFO w/ TVA                          Ther Ex 7/28 7/30 8/3 8/06         Standing hip abd/ext  15ea (B/L) 20 ea (B/L)          Standing HR   30 x30 30x         Standing hip flex (S) on step             LAQs - (L)  20ea 2x10 ea          HS curl (L) EOB             Sidelying hip abd             Clams x10  np          Bridges x10  2x10          Piriformis (S) 4x15" ea  np          Hip abd iso seated  OTB 5"x15 OTB 5"x15 OTB 5"x20         Hip add iso seated  5"x15 5"x15 5"x20                      HS (S) w/ strap, supine 4x15" ea            Ther Activity 7/28 7/30 8/3 8/06         Recumbent bike  5'  5'  L1 5'          Sidesteps  3 laps 3 laps 6 laps         Mini squats             Forward step ups*             Repeated sit to stands    5x (B)UE foam seat         Gait Training    8/06                                   Modalities    8/06                                       Jackalyn Heading, PTA

## 2020-08-11 ENCOUNTER — OFFICE VISIT (OUTPATIENT)
Dept: PHYSICAL THERAPY | Facility: CLINIC | Age: 85
End: 2020-08-11
Payer: MEDICARE

## 2020-08-11 DIAGNOSIS — R29.898 BILATERAL LEG WEAKNESS: Primary | ICD-10-CM

## 2020-08-11 PROCEDURE — 97110 THERAPEUTIC EXERCISES: CPT

## 2020-08-11 PROCEDURE — 97530 THERAPEUTIC ACTIVITIES: CPT

## 2020-08-11 NOTE — PROGRESS NOTES
Daily Note     Today's date: 2020  Patient name: Nallely Moya  : 1935  MRN: [id number]  Referring provider: Rob Gaspar PA-C  Dx:   Encounter Diagnosis     ICD-10-CM    1  Bilateral leg weakness  R29 898                   Subjective: Pt states that he is not feeling very good today, reporting generalized fatigue and soreness thru the (L) lower leg  Objective: See treatment diary below      Assessment: Tolerated treatment fair  Despite pt's initial reports of fatigue,reported feeling "better" throughout tx session compared to beginning  Pt showed an improved ability completing STS from raised surface compared to last visit and was able to complete inc reps w/out use of UE  Overall, pt challenged by current prograf and frequently reports LE fatigue/requires seated rest breaks  Educated pt about DOMs and monitoring fatigue levels  Patient demonstrated fatigue post treatment, exhibited good technique with therapeutic exercises and would benefit from continued PT to progress LE strength and lower quarter stability to improve pt's tolerance to ambulating prolonged periods of time w/ ease  Plan: Progress treatment as tolerated         Precautions: Hx of colon cancer; CAD; heart attack; HTN; chronic obstructive lung disease; A-Fib; CHF; hx of DVT thru (R) LE; osteomyelitis of back; overactive bladder; insomnia; mild depression; swallow dysfunction; tremor; mild cognitive impairment    Date 7/28 7/30 8/3 8/06 8/11        FOTO IE             Re-eval IE                Manuals 7/28 7/30 8/3 8/06 8/11                                                            Neuro Re-Ed 7/28 7/30 8/3 8/06 8/11        Rhomberg on foam    20"x3 3x20"        Semi tandem stance    20"x5ea 20x3"        Heel walk  1 lap 2 laps          Toe walk  1 lap unable          Tandem walk    6 laps 6 laps        TVA Contractions             March w/ TVA             BKFO w/ TVA                          Ther Ex 7/28 7/30 8/3 8/06 8/11 Standing hip abd/ext  15ea (B/L) 20 ea (B/L)  20x ea LC        Standing HR   30 x30 30x 30x        Standing hip flex (S) on step             LAQs - (L)  20ea 2x10 ea  2x10 ea        HS curl (L) EOB             Sidelying hip abd             Clams x10  np          Bridges x10  2x10          Piriformis (S) 4x15" ea  np          Hip abd iso seated  OTB 5"x15 OTB 5"x15 OTB 5"x20 OTB 20x5"        Hip add iso seated  5"x15 5"x15 5"x20 20x5"                     HS (S) w/ strap, supine 4x15" ea            Ther Activity 7/28 7/30 8/3 8/06 8/11        Recumbent bike  5'  5'  L1 5'  5' L1        Sidesteps  3 laps 3 laps 6 laps 6 laps        Mini squats             Forward step ups*             Repeated sit to stands    5x (B)UE foam seat 2x5 no UE raised table - 24" Dec table height       Gait Training    8/06 8/11                                  Modalities    8/06

## 2020-08-13 ENCOUNTER — OFFICE VISIT (OUTPATIENT)
Dept: PHYSICAL THERAPY | Facility: CLINIC | Age: 85
End: 2020-08-13
Payer: MEDICARE

## 2020-08-13 DIAGNOSIS — R29.898 BILATERAL LEG WEAKNESS: Primary | ICD-10-CM

## 2020-08-13 PROCEDURE — 97110 THERAPEUTIC EXERCISES: CPT

## 2020-08-13 PROCEDURE — 97530 THERAPEUTIC ACTIVITIES: CPT

## 2020-08-13 PROCEDURE — 97112 NEUROMUSCULAR REEDUCATION: CPT

## 2020-08-13 NOTE — PROGRESS NOTES
Daily Note     Today's date: 2020  Patient name: Shyanne Faye  : 1935  MRN: [id number]  Referring provider: Magnolia Godinez PA-C  Dx:   Encounter Diagnosis     ICD-10-CM    1  Bilateral leg weakness  R29 898                   Subjective: Patient reports that he feels the same as last time, offers no new complaints at this time  Objective: See treatment diary below      Assessment: LOB x1 with forward step up and 2x with tandem stance  Reports no changes in sx during or post session, will continue to benefit from skilled PT to maximize safe functional mobility  Plan: Continue per plan of care        Precautions: Hx of colon cancer; CAD; heart attack; HTN; chronic obstructive lung disease; A-Fib; CHF; hx of DVT thru (R) LE; osteomyelitis of back; overactive bladder; insomnia; mild depression; swallow dysfunction; tremor; mild cognitive impairment    Date 7/28 7/30 8/3 8/06 8/11 8/13       FOTO IE             Re-eval IE                Manuals 7/28 7/30 8/3 8/06 8/11 8/13                                                           Neuro Re-Ed 7/28 7/30 8/3 8/06 8/11 8/13       Rhomberg on foam    20"x3 3x20"        Semi tandem stance    20"x5ea 20"x3        Heel walk  1 lap 2 laps          Toe walk  1 lap unable          Tandem walk    6 laps 6 laps 6 laps       TVA Contractions             March w/ TVA             BKFO w/ TVA                          Ther Ex 7/28 7/30 8/3 8/06 8/11 8/13       Standing hip abd/ext  15ea (B/L) 20 ea (B/L)  20x ea LC 20x ea BL       Standing HR   30 x30 30x 30x 30x       Standing hip flex (S) on step             LAQs - (L)  20ea 2x10 ea  2x10 ea 2# 2x10       HS curl (L) EOB             Sidelying hip abd             Clams x10  np          Bridges x10  2x10          Piriformis (S) 4x15" ea  np          Hip abd iso seated  OTB 5"x15 OTB 5"x15 OTB 5"x20 OTB 20x5" OTB 20x5"       Hip add iso seated  5"x15 5"x15 5"x20 20x5" 20x5"                    HS (S) w/ strap, supine 4x15" ea            Ther Activity 7/28 7/30 8/3 8/06 8/11 8/13       Recumbent bike  5'  5'  L1 5'  5' L1 6' L1       Sidesteps  3 laps 3 laps 6 laps 6 laps 6 laps       Mini squats             Forward step ups*      4"x10 ea       Repeated sit to stands    5x (B)UE foam seat 2x5 no UE raised table - 24" Dec table height 2x15 no UE       Gait Training    8/06 8/11                                  Modalities    8/06

## 2020-08-18 ENCOUNTER — OFFICE VISIT (OUTPATIENT)
Dept: PHYSICAL THERAPY | Facility: CLINIC | Age: 85
End: 2020-08-18
Payer: MEDICARE

## 2020-08-18 DIAGNOSIS — R29.898 BILATERAL LEG WEAKNESS: Primary | ICD-10-CM

## 2020-08-18 PROCEDURE — 97110 THERAPEUTIC EXERCISES: CPT

## 2020-08-18 PROCEDURE — 97530 THERAPEUTIC ACTIVITIES: CPT

## 2020-08-18 NOTE — PROGRESS NOTES
Daily Note     Today's date: 2020  Patient name: Amna Arellano  : 1935  MRN: [id number]  Referring provider: Roshni Tee PA-C  Dx:   Encounter Diagnosis     ICD-10-CM    1  Bilateral leg weakness  R29 898        Start Time:   Stop Time:   Total time in clinic (min): 45 minutes    Subjective: Pt arrives to today's Tx stating he feels "okay today  Today is my last appointment, I'm getting shots in my back tomorrow at the doctor "   Pt is unable to recall what type of shots he will be receiving  Objective: See treatment diary below    Assessment: Pt started himself on bike as warm up prior to appointment start time and without recommendation of PTA, SP - hence increased warm up time noted in exercise diary below; pt noted no adverse Sx /c increased warm up time  Pt fatigued /c today's Tx, noting "my back hurts" following today's Tx  Although pt would benefit from continued PT to improve (B/L)LE weakness and maximize safe, functional mobility he wishes to DC from PT at this time  Plan: DC'd at pt's request - pt will continue to perform original HEP at home  Pt confirmed he will seek PT in the future if necessary or per referring physician's recommendation        Precautions: Hx of colon cancer; CAD; heart attack; HTN; chronic obstructive lung disease; A-Fib; CHF; hx of DVT thru (R) LE; osteomyelitis of back; overactive bladder; insomnia; mild depression; swallow dysfunction; tremor; mild cognitive impairment    Date 7/28 7/30 8/3 8/06 8/11 8/13       FOTO IE             Re-eval IE                Manuals 7/28 7/30 8/3 8/06 8/11 8/13 8/18                                                          Neuro Re-Ed 7/28 7/30 8/3 8/06 8/11 8/13 8/18      Rhomberg on foam    20"x3 3x20"        Semi tandem stance    20"x5ea 20"x3        Heel walk  1 lap 2 laps          Toe walk  1 lap unable          Tandem walk    6 laps 6 laps 6 laps       TVA Contractions             March w/ TVA             BKFO w/ TVA                          Ther Ex 7/28 7/30 8/3 8/06 8/11 8/13 8/18      Standing hip abd/ext  15ea (B/L) 20 ea (B/L)  20x ea LC 20x ea BL       Standing HR   30 x30 30x 30x 30x 30x      Standing hip flex (S) on step             LAQs - (L)  20ea 2x10 ea  2x10 ea 2# 2x10 2#2x15      HS curl (L) EOB             Sidelying hip abd             Clams x10  np          Bridges x10  2x10          Piriformis (S) 4x15" ea  np          Hip abd iso seated  OTB 5"x15 OTB 5"x15 OTB 5"x20 OTB 20x5" OTB 20x5" GTB 5"x20      Hip add iso seated  5"x15 5"x15 5"x20 20x5" 20x5" 5"x20                   HS (S) w/ strap, supine 4x15" ea            Ther Activity 7/28 7/30 8/3 8/06 8/11 8/13 8/18      Recumbent bike  5'  5'  L1 5'  5' L1 6' L1 20'       Sidesteps  3 laps 3 laps 6 laps 6 laps 6 laps 6 laps      Mini squats             Forward step ups*      4"x10 ea 1R 15ea      Repeated sit to stands    5x (B)UE foam seat 2x5 no UE raised table - 24" Dec table height 2x15 no UE 20x 10x dec height 2nd rep      Gait Training    8/06 8/11 8/18                                Modalities    8/06                                       Etta Sharma, PTA

## 2020-08-20 ENCOUNTER — APPOINTMENT (OUTPATIENT)
Dept: PHYSICAL THERAPY | Facility: CLINIC | Age: 85
End: 2020-08-20
Payer: MEDICARE

## 2020-09-03 ENCOUNTER — OFFICE VISIT (OUTPATIENT)
Dept: NEPHROLOGY | Facility: CLINIC | Age: 85
End: 2020-09-03
Payer: MEDICARE

## 2020-09-03 VITALS
WEIGHT: 174 LBS | SYSTOLIC BLOOD PRESSURE: 110 MMHG | BODY MASS INDEX: 24.91 KG/M2 | HEIGHT: 70 IN | DIASTOLIC BLOOD PRESSURE: 62 MMHG | RESPIRATION RATE: 16 BRPM | TEMPERATURE: 97.5 F

## 2020-09-03 DIAGNOSIS — I48.91 ATRIAL FIBRILLATION WITH SLOW VENTRICULAR RESPONSE (HCC): Chronic | ICD-10-CM

## 2020-09-03 DIAGNOSIS — I11.0 HYPERTENSIVE HEART DISEASE WITH CHRONIC COMBINED SYSTOLIC AND DIASTOLIC CONGESTIVE HEART FAILURE (HCC): ICD-10-CM

## 2020-09-03 DIAGNOSIS — N13.8 BPH WITH OBSTRUCTION/LOWER URINARY TRACT SYMPTOMS: ICD-10-CM

## 2020-09-03 DIAGNOSIS — N17.9 AKI (ACUTE KIDNEY INJURY) (HCC): Primary | ICD-10-CM

## 2020-09-03 DIAGNOSIS — IMO0002 UNCONTROLLED TYPE 2 DIABETES MELLITUS WITH DIABETIC POLYNEUROPATHY, WITHOUT LONG-TERM CURRENT USE OF INSULIN: Chronic | ICD-10-CM

## 2020-09-03 DIAGNOSIS — N40.1 BPH WITH OBSTRUCTION/LOWER URINARY TRACT SYMPTOMS: ICD-10-CM

## 2020-09-03 DIAGNOSIS — I82.511 CHRONIC DEEP VEIN THROMBOSIS (DVT) OF FEMORAL VEIN OF RIGHT LOWER EXTREMITY (HCC): ICD-10-CM

## 2020-09-03 DIAGNOSIS — I50.42 HYPERTENSIVE HEART DISEASE WITH CHRONIC COMBINED SYSTOLIC AND DIASTOLIC CONGESTIVE HEART FAILURE (HCC): ICD-10-CM

## 2020-09-03 DIAGNOSIS — I50.42 CHRONIC COMBINED SYSTOLIC AND DIASTOLIC CONGESTIVE HEART FAILURE (HCC): ICD-10-CM

## 2020-09-03 PROCEDURE — 99214 OFFICE O/P EST MOD 30 MIN: CPT | Performed by: NURSE PRACTITIONER

## 2020-09-03 NOTE — PROGRESS NOTES
OFFICE FOLLOW UP - Nephrology   Shravan Sawyer 81 y o  male MRN: [id number]       ASSESSMENT and PLAN:  Diagnoses and all orders for this visit:    TAN (acute kidney injury) ([facility] )    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, without long-term current use of insulin (HCC)    Atrial fibrillation with slow ventricular response (HCC)    Chronic combined systolic and diastolic congestive heart failure (HCC)    Chronic deep vein thrombosis (DVT) of femoral vein of right lower extremity ([facility] )    Hypertensive heart disease with chronic combined systolic and diastolic congestive heart failure (HCC)    BPH with obstruction/lower urinary tract symptoms        Acute kidney injury:  Suspected prerenal in nature with poor oral intake and on diuretic  Creatinine peaked at 1 97  Creatinine was 0 8 upon discharge  Baseline creatinine less than 1 0   -most recent follow-up creatinine was stable at 0 87   -continue Lasix 20 mg po daily   -continue to avoid nephrotoxins, hypotension  -will sign off from Renal     Hypokalemia: resolved  Most recent potassium level is stable at 4 6  Patient is currently not on supplements  Continue to monitor with diuretic  CHF:  EF of 55% in 2019    -continue Lasix 20 mg daily   -continue to check daily weights  Call office for 2-3 lb weight gain in 1 day or 5 lbs in 1 week   -baseline weight 173-175 lbs    -continue to monitor fluid intake  Diabetes:  Last hemoglobin A1c was 7 9  He continues on Lantus 18 units daily  He is currently following with his PCP  He reports normal blood sugar readings  Bilateral lower extremity weakness: The patient was receiving physical therapy but decided to discontinue this as of 8/18  Atrial fibrillation:  -currently not on anticoagulation   -continues on beta-blocker  BPH: currently denies urinary symptoms besides frequency  Other: CAD with Hx of stent placment, Hx of DVT, hemorrhoids, dysphagia, Hx of colon cancer  Will sign off from Renal   Will follow-up as needed  Patient may continue to follow with PCP  Acute kidney injury has resolved  Patient has no underlying CKD  Age related screening: N/A  Your primary caregiver may do yearly screening for colorectal cancer  It is recommended in all men and women over 48years old  You may have screening earlier if you have colon disease or a family history of colorectal cancer  HPI: Jesica Wylie is a 81 y o  male with past medical history of diabetes, hypertension, CHF, CAD status post stent placement, and colon cancer, who is here for hospital follow-up for acute kidney injury  The patient was hospitalized from 07/03 to 7/5 at [place] [address]  The patient was found to have prerenal TAN with creatinine 1 9  His baseline creatinine is 0 8-1 0  His diuretic was decreased at discharge to Lasix 20 mg daily  [facility] is [facility] at [facility]  He denies chest pain, SO, fevers or chills  He denies N,V,D, or issues with urination  He states that he urinates frequently  He denies issues with is diet  He is having his weight checked daily  He states he typically weighs 173-175 lbs  The patient overall is doing very well  ROS:   A complete review of systems was done  Pertinent positives and negatives as noted in the HPI, otherwise the review of systems is negative  Allergies: Patient has no known allergies      Medications:   Current Outpatient Medications:     Accu-Chek FastClix Lancets MISC, Test once daily, Disp: 100 each, Rfl: 3    Ascorbic Acid (VITAMIN C) 1000 MG tablet, Take 1,000 mg by mouth daily, Disp: , Rfl:     famotidine (PEPCID) 20 mg tablet, TAKE 1 TABLET BY MOUTH TWO  TIMES DAILY, Disp: 180 tablet, Rfl: 3    folic acid (FOLVITE) 1 mg tablet, TAKE 1 TABLET BY MOUTH  DAILY, Disp: 90 tablet, Rfl: 3    furosemide (LASIX) 20 mg tablet, Take 1 tablet (20 mg total) by mouth daily TAKE 2 TABLETS BY MOUTH IN  THE MORNING Take an additional tablet daily as needed for edema, shortness of breath, Disp: 270 tablet, Rfl: 0    gabapentin (NEURONTIN) 400 mg capsule, Take 400 mg by mouth 3 (three) times a day  , Disp: , Rfl:     glucose blood (ONE TOUCH ULTRA TEST) test strip, Test once daily, Disp: 200 each, Rfl: 3    hydrocortisone 2 5 % cream, Apply topically 2 (two) times a day, Disp: 60 g, Rfl: 1    Insulin Pen Needle 32G X 4 MM MISC, Test once daily, Disp: 100 each, Rfl: 3    LANTUS SOLOSTAR 100 units/mL injection pen, INJECT SUBCUTANEOUSLY 18  UNITS DAILY, Disp: 30 mL, Rfl: 11    levalbuterol (XOPENEX) 1 25 mg/3 mL nebulizer solution, Take 1 vial (1 25 mg total) by nebulization every 4 (four) hours as needed for wheezing or shortness of breath, Disp: 75 vial, Rfl: 3    methenamine hippurate (HIPREX) 1 g tablet, Take 1 g by mouth 2 (two) times a day with meals, Disp: , Rfl:     metoprolol tartrate (LOPRESSOR) 25 mg tablet, TAKE ONE-HALF TABLET BY  MOUTH DAILY, Disp: 45 tablet, Rfl: 5    Mirabegron ER (MYRBETRIQ) 50 MG TB24, Take 50 mg by mouth daily  , Disp: , Rfl:     mirtazapine (REMERON) 30 mg tablet, TAKE 1 TABLET BY MOUTH  DAILY AT BEDTIME, Disp: 90 tablet, Rfl: 3    Multiple Vitamin (MULTIVITAMIN) tablet, Take 1 tablet by mouth daily, Disp: , Rfl:     nitroglycerin (NITROLINGUAL) 0 4 mg/spray spray, 1 spray every 5 (five) minutes as needed  , Disp: , Rfl:     pantoprazole (PROTONIX) 40 mg tablet, TAKE 1 TABLET BY MOUTH  DAILY, Disp: 90 tablet, Rfl: 1    sodium chloride 0 9 % nebulizer solution, Inhale every 4 (four) hours as needed  , Disp: , Rfl:     traMADol (ULTRAM) 50 mg tablet, Take 1 tablet (50 mg total) by mouth 2 (two) times a day as needed for moderate pain, Disp: 60 tablet, Rfl: 1    Past Medical History:   Diagnosis Date    Anxiety     Arthritis     Atrial fibrillation (HCC)     BPH (benign prostatic hyperplasia)     CAD (coronary artery disease)     Chronic back pain     Chronic diastolic (congestive) heart failure (HCC)     COPD (chronic obstructive pulmonary disease) (HCC)     Critical illness polyneuropathy (HCC)     Diabetes mellitus ([place] [address] )     type 2    Dysphagia     GERD (gastroesophageal reflux disease)     Hemorrhoids     History of colon cancer     History of DVT (deep vein thrombosis)     Hyperlipidemia     Hypertension     Hyponatremia     Malignant neoplasm of colon (HCC)     Myocardial infarct ([facility] )     Osteomyelitis ([facility] )     Pneumonia     Sepsis ([place] [address] )     Urinary retention      Past Surgical History:   Procedure Laterality Date    APPENDECTOMY      BACK SURGERY      BOTOX INJECTION N/A 2/8/2019    Procedure: BOTOX INJECTION 100 UNITS;  Surgeon: Adolfo Shepherd MD;  [facility];  Service: Urology    BRONCHOSCOPY N/A 6/30/2016    Procedure: BRONCHOSCOPY FLEXIBLE with bronchial lavage to the left lower lobe; Surgeon: Sue Sampson MD;  Location: [facility]; Service:     CATARACT EXTRACTION      CHOLECYSTECTOMY      COLECTOMY      COLON SURGERY      polypectomy for cancerous lesion    COLONOSCOPY      CORONARY ANGIOPLASTY WITH STENT PLACEMENT      x5  pt unsure of where    GASTROSTOMY TUBE PLACEMENT      percutaneous placement of gastrostomy tube placed 4/16 - dc 8/16    HEMORROIDECTOMY      LUMBAR LAMINECTOMY      LUNG SURGERY      PEG TUBE REMOVAL      SC CYSTOURETHRO W/IMPLANT N/A 8/27/2018    Procedure: CYSTOSCOPY WITH INSERTION UROLIFT;  Surgeon: Adolfo Shepherd MD;  [facility];  Service: Urology    SC CYSTOURETHROSCOPY N/A 2/8/2019    Procedure: Bakari Parmar;  Surgeon: Adolfo Shepherd MD;  [facility];  Service: Urology    TRACHEOSTOMY       Family History   Problem Relation Age of Onset    Heart attack Mother       reports that he quit smoking about 42 years ago  His smoking use included cigarettes  He has a 20 00 pack-year smoking history   He has never used smokeless tobacco  He reports that he does not drink alcohol or use drugs       Physical Exam:   There were no vitals filed for this visit  There is no height or weight on file to calculate BMI  General: no acute distress   Eyes: conjunctivae pink, anicteric sclerae  ENT: mucous membranes moist  Neck: supple, no JVD  Chest: clear to auscultation bilaterally with no wheezes, rale or rhochi  CVS: regular rate and rhythm   Abdomen: soft, non-tender, non-distended  Extremities: LLE/ankle edema  Skin: no rash, LLE erythema  Neuro: awake and alert       Lab Results:  Results for orders placed or performed in visit on 07/20/20   Microalbumin / creatinine urine ratio   Result Value Ref Range    EXT Creatinine Urine 96 3     Microalbum  ,U,Random 26 5     EXTERNAL Microalb/Creat Ratio 275 2              Invalid input(s): ALBUMIN      Portions of the record may have been created with voice recognition software  Occasional wrong word or "sound a like" substitutions may have occurred due to the inherent limitations of voice recognition software  Read the chart carefully and recognize, using context, where substitutions have occurred  If you have any questions, please contact the dictating provider

## 2020-09-03 NOTE — PATIENT INSTRUCTIONS
We are seeing you in the office today for follow-up on your kidneys  Your most recent creatinine has improved to 0 8  Your kidneys are doing well  Please continue to take her Lasix 20 mg daily  Continue to avoid any Motrin/ibuprofen products for pain  You are able to take Tylenol  We discussed that you will continue to follow with your family doctor and if for some reason you need to see a nephrologist in the future, you may feel free to call the office  Continue to check her weight every day  Please call your PCP your heart doctor if you gain 2-3 lb in 1 day or 5 lb in 1 week  Continue to follow a diabetic, low-salt diet      Thank you

## 2020-10-13 DIAGNOSIS — IMO0002 UNCONTROLLED TYPE 2 DIABETES MELLITUS WITH DIABETIC POLYNEUROPATHY, WITHOUT LONG-TERM CURRENT USE OF INSULIN: Chronic | ICD-10-CM

## 2020-10-13 RX ORDER — INSULIN GLARGINE 100 [IU]/ML
18 INJECTION, SOLUTION SUBCUTANEOUS DAILY
Qty: 17 ML | Refills: 2 | Status: SHIPPED | OUTPATIENT
Start: 2020-10-13 | End: 2020-12-30 | Stop reason: SDUPTHER

## 2020-10-19 DIAGNOSIS — I50.42 CHRONIC COMBINED SYSTOLIC AND DIASTOLIC CONGESTIVE HEART FAILURE (HCC): ICD-10-CM

## 2020-10-19 DIAGNOSIS — I48.91 ATRIAL FIBRILLATION WITH SLOW VENTRICULAR RESPONSE (HCC): Chronic | ICD-10-CM

## 2020-10-19 RX ORDER — FUROSEMIDE 20 MG/1
20 TABLET ORAL DAILY
Qty: 270 TABLET | Refills: 0 | Status: SHIPPED | OUTPATIENT
Start: 2020-10-19 | End: 2020-10-26 | Stop reason: SDUPTHER

## 2020-10-19 RX ORDER — FUROSEMIDE 20 MG/1
20 TABLET ORAL DAILY
Qty: 270 TABLET | Refills: 0 | Status: SHIPPED | OUTPATIENT
Start: 2020-10-19 | End: 2020-10-19 | Stop reason: SDUPTHER

## 2020-10-26 ENCOUNTER — APPOINTMENT (OUTPATIENT)
Dept: LAB | Facility: CLINIC | Age: 85
End: 2020-10-26
Payer: MEDICARE

## 2020-10-26 ENCOUNTER — OFFICE VISIT (OUTPATIENT)
Dept: FAMILY MEDICINE CLINIC | Facility: CLINIC | Age: 85
End: 2020-10-26
Payer: MEDICARE

## 2020-10-26 VITALS
BODY MASS INDEX: 28.56 KG/M2 | DIASTOLIC BLOOD PRESSURE: 78 MMHG | HEIGHT: 67 IN | WEIGHT: 182 LBS | HEART RATE: 71 BPM | OXYGEN SATURATION: 93 % | RESPIRATION RATE: 17 BRPM | SYSTOLIC BLOOD PRESSURE: 124 MMHG | TEMPERATURE: 96.8 F

## 2020-10-26 DIAGNOSIS — K21.9 GASTROESOPHAGEAL REFLUX DISEASE WITHOUT ESOPHAGITIS: ICD-10-CM

## 2020-10-26 DIAGNOSIS — E21.1 HYPERPARATHYROIDISM DUE TO 1,25(0H)2D3 (HCC): ICD-10-CM

## 2020-10-26 DIAGNOSIS — IMO0002 UNCONTROLLED TYPE 2 DIABETES MELLITUS WITH DIABETIC POLYNEUROPATHY, WITHOUT LONG-TERM CURRENT USE OF INSULIN: Chronic | ICD-10-CM

## 2020-10-26 DIAGNOSIS — N17.9 AKI (ACUTE KIDNEY INJURY) (HCC): ICD-10-CM

## 2020-10-26 DIAGNOSIS — I25.10 CAD S/P PERCUTANEOUS CORONARY ANGIOPLASTY: ICD-10-CM

## 2020-10-26 DIAGNOSIS — J43.1 PANLOBULAR EMPHYSEMA (HCC): ICD-10-CM

## 2020-10-26 DIAGNOSIS — I48.91 ATRIAL FIBRILLATION WITH SLOW VENTRICULAR RESPONSE (HCC): Chronic | ICD-10-CM

## 2020-10-26 DIAGNOSIS — Z98.61 CAD S/P PERCUTANEOUS CORONARY ANGIOPLASTY: ICD-10-CM

## 2020-10-26 DIAGNOSIS — C18.9 MALIGNANT NEOPLASM OF COLON, UNSPECIFIED PART OF COLON (HCC): ICD-10-CM

## 2020-10-26 DIAGNOSIS — Z79.4 ENCOUNTER FOR LONG-TERM (CURRENT) USE OF INSULIN (HCC): ICD-10-CM

## 2020-10-26 DIAGNOSIS — R60.0 EDEMA OF LEFT LOWER LEG: ICD-10-CM

## 2020-10-26 DIAGNOSIS — D72.829 LEUKOCYTOSIS, UNSPECIFIED TYPE: ICD-10-CM

## 2020-10-26 DIAGNOSIS — E78.00 PURE HYPERCHOLESTEROLEMIA: ICD-10-CM

## 2020-10-26 DIAGNOSIS — Z00.00 MEDICARE ANNUAL WELLNESS VISIT, SUBSEQUENT: ICD-10-CM

## 2020-10-26 DIAGNOSIS — R79.89 POSITIVE D-DIMER: Primary | ICD-10-CM

## 2020-10-26 DIAGNOSIS — I48.20 CHRONIC ATRIAL FIBRILLATION (HCC): ICD-10-CM

## 2020-10-26 DIAGNOSIS — F32.A MILD DEPRESSION: ICD-10-CM

## 2020-10-26 DIAGNOSIS — I48.20 CHRONIC ATRIAL FIBRILLATION (HCC): Primary | ICD-10-CM

## 2020-10-26 DIAGNOSIS — I42.8 OTHER CARDIOMYOPATHIES (HCC): ICD-10-CM

## 2020-10-26 DIAGNOSIS — R60.0 LEG EDEMA, LEFT: ICD-10-CM

## 2020-10-26 DIAGNOSIS — G31.84 MILD COGNITIVE IMPAIRMENT: ICD-10-CM

## 2020-10-26 DIAGNOSIS — I50.42 CHRONIC COMBINED SYSTOLIC AND DIASTOLIC CONGESTIVE HEART FAILURE (HCC): ICD-10-CM

## 2020-10-26 LAB
ALBUMIN SERPL BCP-MCNC: 3.8 G/DL (ref 3.5–5)
ALP SERPL-CCNC: 108 U/L (ref 46–116)
ALT SERPL W P-5'-P-CCNC: 32 U/L (ref 12–78)
ANION GAP SERPL CALCULATED.3IONS-SCNC: 3 MMOL/L (ref 4–13)
AST SERPL W P-5'-P-CCNC: 29 U/L (ref 5–45)
BASOPHILS # BLD AUTO: 0.08 THOUSANDS/ΜL (ref 0–0.1)
BASOPHILS NFR BLD AUTO: 1 % (ref 0–1)
BILIRUB SERPL-MCNC: 1.01 MG/DL (ref 0.2–1)
BUN SERPL-MCNC: 20 MG/DL (ref 5–25)
CALCIUM SERPL-MCNC: 9.4 MG/DL (ref 8.3–10.1)
CHLORIDE SERPL-SCNC: 104 MMOL/L (ref 100–108)
CO2 SERPL-SCNC: 34 MMOL/L (ref 21–32)
CREAT SERPL-MCNC: 0.91 MG/DL (ref 0.6–1.3)
D DIMER PPP FEU-MCNC: 3.93 UG/ML FEU
EOSINOPHIL # BLD AUTO: 0.14 THOUSAND/ΜL (ref 0–0.61)
EOSINOPHIL NFR BLD AUTO: 2 % (ref 0–6)
ERYTHROCYTE [DISTWIDTH] IN BLOOD BY AUTOMATED COUNT: 16.5 % (ref 11.6–15.1)
EST. AVERAGE GLUCOSE BLD GHB EST-MCNC: 194 MG/DL
GFR SERPL CREATININE-BSD FRML MDRD: 77 ML/MIN/1.73SQ M
GLUCOSE P FAST SERPL-MCNC: 53 MG/DL (ref 65–99)
HBA1C MFR BLD: 8.4 %
HCT VFR BLD AUTO: 53 % (ref 36.5–49.3)
HGB BLD-MCNC: 17.1 G/DL (ref 12–17)
IMM GRANULOCYTES # BLD AUTO: 0.17 THOUSAND/UL (ref 0–0.2)
IMM GRANULOCYTES NFR BLD AUTO: 2 % (ref 0–2)
LYMPHOCYTES # BLD AUTO: 2.88 THOUSANDS/ΜL (ref 0.6–4.47)
LYMPHOCYTES NFR BLD AUTO: 31 % (ref 14–44)
MCH RBC QN AUTO: 32.9 PG (ref 26.8–34.3)
MCHC RBC AUTO-ENTMCNC: 32.3 G/DL (ref 31.4–37.4)
MCV RBC AUTO: 102 FL (ref 82–98)
MONOCYTES # BLD AUTO: 0.81 THOUSAND/ΜL (ref 0.17–1.22)
MONOCYTES NFR BLD AUTO: 9 % (ref 4–12)
NEUTROPHILS # BLD AUTO: 5.14 THOUSANDS/ΜL (ref 1.85–7.62)
NEUTS SEG NFR BLD AUTO: 55 % (ref 43–75)
NRBC BLD AUTO-RTO: 0 /100 WBCS
PLATELET # BLD AUTO: 163 THOUSANDS/UL (ref 149–390)
PMV BLD AUTO: 12.5 FL (ref 8.9–12.7)
POTASSIUM SERPL-SCNC: 3.8 MMOL/L (ref 3.5–5.3)
PROT SERPL-MCNC: 7.9 G/DL (ref 6.4–8.2)
RBC # BLD AUTO: 5.2 MILLION/UL (ref 3.88–5.62)
SODIUM SERPL-SCNC: 141 MMOL/L (ref 136–145)
TSH SERPL DL<=0.05 MIU/L-ACNC: 1.8 UIU/ML (ref 0.36–3.74)
WBC # BLD AUTO: 9.22 THOUSAND/UL (ref 4.31–10.16)

## 2020-10-26 PROCEDURE — 93000 ELECTROCARDIOGRAM COMPLETE: CPT | Performed by: FAMILY MEDICINE

## 2020-10-26 PROCEDURE — 80053 COMPREHEN METABOLIC PANEL: CPT

## 2020-10-26 PROCEDURE — 85379 FIBRIN DEGRADATION QUANT: CPT

## 2020-10-26 PROCEDURE — 99214 OFFICE O/P EST MOD 30 MIN: CPT | Performed by: FAMILY MEDICINE

## 2020-10-26 PROCEDURE — 84443 ASSAY THYROID STIM HORMONE: CPT

## 2020-10-26 PROCEDURE — 36415 COLL VENOUS BLD VENIPUNCTURE: CPT

## 2020-10-26 PROCEDURE — 85025 COMPLETE CBC W/AUTO DIFF WBC: CPT

## 2020-10-26 PROCEDURE — 83036 HEMOGLOBIN GLYCOSYLATED A1C: CPT

## 2020-10-26 RX ORDER — ASPIRIN 81 MG/1
81 TABLET ORAL DAILY
Refills: 0
Start: 2020-10-26

## 2020-10-26 RX ORDER — FUROSEMIDE 20 MG/1
TABLET ORAL
Qty: 270 TABLET | Refills: 3 | Status: SHIPPED | OUTPATIENT
Start: 2020-10-26 | End: 2020-11-23 | Stop reason: SDUPTHER

## 2020-10-26 RX ORDER — NITROGLYCERIN 400 UG/1
1 SPRAY ORAL
Qty: 1 SPRAY | Refills: 1 | Status: SHIPPED | OUTPATIENT
Start: 2020-10-26

## 2020-10-29 ENCOUNTER — HOSPITAL ENCOUNTER (OUTPATIENT)
Dept: NON INVASIVE DIAGNOSTICS | Facility: HOSPITAL | Age: 85
Discharge: HOME | End: 2020-10-29
Payer: MEDICARE

## 2020-10-29 DIAGNOSIS — R60.0 LEG EDEMA, LEFT: ICD-10-CM

## 2020-10-29 DIAGNOSIS — R79.89 POSITIVE D-DIMER: ICD-10-CM

## 2020-10-29 PROCEDURE — 93970 EXTREMITY STUDY: CPT

## 2020-10-30 PROCEDURE — 93970 EXTREMITY STUDY: CPT | Performed by: SURGERY

## 2020-11-17 DIAGNOSIS — G89.29 CHRONIC RIGHT-SIDED LOW BACK PAIN WITHOUT SCIATICA: ICD-10-CM

## 2020-11-17 DIAGNOSIS — M54.50 CHRONIC RIGHT-SIDED LOW BACK PAIN WITHOUT SCIATICA: ICD-10-CM

## 2020-11-19 RX ORDER — TRAMADOL HYDROCHLORIDE 50 MG/1
TABLET ORAL
Qty: 60 TABLET | Refills: 0 | Status: SHIPPED | OUTPATIENT
Start: 2020-11-19

## 2020-11-23 DIAGNOSIS — N39.0 RECURRENT UTI: ICD-10-CM

## 2020-11-23 DIAGNOSIS — G89.29 CHRONIC RIGHT-SIDED LOW BACK PAIN WITHOUT SCIATICA: Primary | ICD-10-CM

## 2020-11-23 DIAGNOSIS — M54.50 CHRONIC RIGHT-SIDED LOW BACK PAIN WITHOUT SCIATICA: Primary | ICD-10-CM

## 2020-11-23 DIAGNOSIS — K21.9 GASTROESOPHAGEAL REFLUX DISEASE WITHOUT ESOPHAGITIS: ICD-10-CM

## 2020-11-23 DIAGNOSIS — I50.42 CHRONIC COMBINED SYSTOLIC AND DIASTOLIC CONGESTIVE HEART FAILURE (HCC): ICD-10-CM

## 2020-11-24 RX ORDER — METHENAMINE HIPPURATE 1000 MG/1
1 TABLET ORAL 2 TIMES DAILY WITH MEALS
Qty: 180 TABLET | Refills: 3 | Status: SHIPPED | OUTPATIENT
Start: 2020-11-24

## 2020-11-24 RX ORDER — PANTOPRAZOLE SODIUM 40 MG/1
40 TABLET, DELAYED RELEASE ORAL DAILY
Qty: 90 TABLET | Refills: 3 | Status: SHIPPED | OUTPATIENT
Start: 2020-11-24

## 2020-11-24 RX ORDER — GABAPENTIN 400 MG/1
400 CAPSULE ORAL 2 TIMES DAILY
Qty: 180 CAPSULE | Refills: 3 | Status: SHIPPED | OUTPATIENT
Start: 2020-11-24

## 2020-11-24 RX ORDER — FUROSEMIDE 20 MG/1
TABLET ORAL
Qty: 270 TABLET | Refills: 3 | Status: SHIPPED | OUTPATIENT
Start: 2020-11-24

## 2020-12-14 DIAGNOSIS — G47.00 INSOMNIA, UNSPECIFIED TYPE: ICD-10-CM

## 2020-12-15 RX ORDER — MIRTAZAPINE 30 MG/1
TABLET, FILM COATED ORAL
Qty: 90 TABLET | Refills: 3 | Status: SHIPPED | OUTPATIENT
Start: 2020-12-15

## 2020-12-30 DIAGNOSIS — IMO0002 UNCONTROLLED TYPE 2 DIABETES MELLITUS WITH DIABETIC POLYNEUROPATHY, WITHOUT LONG-TERM CURRENT USE OF INSULIN: Chronic | ICD-10-CM

## 2020-12-30 RX ORDER — INSULIN GLARGINE 100 [IU]/ML
18 INJECTION, SOLUTION SUBCUTANEOUS DAILY
Qty: 17 ML | Refills: 5 | Status: SHIPPED | OUTPATIENT
Start: 2020-12-30

## 2021-02-12 DIAGNOSIS — Z23 ENCOUNTER FOR IMMUNIZATION: ICD-10-CM

## 2021-02-23 ENCOUNTER — TELEPHONE (OUTPATIENT)
Dept: FAMILY MEDICINE CLINIC | Facility: CLINIC | Age: 86
End: 2021-02-23

## 2021-12-21 NOTE — TELEPHONE ENCOUNTER
I spoke with the patient's caregiver, April  In light of his significantly elevated A1c as well as his high BNP, I suggested that he come see me within about a week  Please try to fit him in early on Wednesday or later on Friday either this week or next week  Aprils phone number is    [phone]    I also increased his Lantus to 16 units  Please update this    Thank you Reports rash to palm of hands x 2 weeks c facial swelling x 1 week

## 2022-07-19 NOTE — ED PROVIDER NOTES
History  Chief Complaint   Patient presents with    Altered Mental Status     Pt coming in with caregiver, reports "acute mental status change  He was confused this morning but could be reoriented " Reports, "Shakey and chills but no fever " Caregiver reports urinary history and today has nitrates, leuks, and protein  States, "Not himself, but he's out of it " Caregiver is a nurse, reports, "His urine is thick and cloudy " Reports finished course of macrobid  History provided by:  Patient and caregiver  History limited by:  Mental status change  Altered Mental Status   Presenting symptoms: confusion    Severity:  Severe  Most recent episode: Today  Episode history:  Single  Duration:  1 day  Timing:  Constant  Progression:  Unchanged  Chronicity:  New  Context comment:  Recently completed bactrim  nitrate and leuk + urine at [facility] on dip by caregiver  Associated symptoms: weakness    Associated symptoms: no agitation, no difficulty breathing and no seizures        Prior to Admission Medications   Prescriptions Last Dose Informant Patient Reported? Taking?    ACCU-CHEK FASTCLIX LANCETS MISC  Self No Yes   Sig: Test once daily   Insulin Pen Needle 32G X 4 MM MISC  Self No Yes   Sig: Test once daily   LANTUS SOLOSTAR 100 units/mL injection pen  Self No Yes   Sig: INJECT SUBCUTANEOUSLY 18  UNITS DAILY   Patient taking differently: INJECT SUBCUTANEOUSLY 18  UNITS DAILY IN AM   Mirabegron ER (MYRBETRIQ) 50 MG TB24  Self Yes Yes   Sig: Take 50 mg by mouth daily     Multiple Vitamin (MULTIVITAMIN) tablet  Self Yes Yes   Sig: Take 1 tablet by mouth daily   ONE TOUCH ULTRA TEST test strip  Self No Yes   Sig: Test once daily   apixaban (ELIQUIS) 5 mg 8/29/2019  No Yes   Sig: Take 1 tablet (5 mg total) by mouth 2 (two) times a day   ergocalciferol (VITAMIN D2) 50,000 units  Self No Yes   Sig: Take 1 capsule (50,000 Units total) by mouth once a week   famotidine (PEPCID) 20 mg tablet  Self No Yes   Sig: Take 1 tablet (20 mg total) by mouth 2 (two) times a day   furosemide (LASIX) 20 mg tablet  Self No Yes   Si mg alternating with 40 mg, add additional 20 mg for weight gain more than 3 lb   Patient taking differently: Take 40 mg by mouth daily    gabapentin (NEURONTIN) 400 mg capsule  Self Yes Yes   Sig: Take 400 mg by mouth 3 (three) times a day     levalbuterol (XOPENEX) 1 25 mg/3 mL nebulizer solution  Self No Yes   Sig: Take 1 vial (1 25 mg total) by nebulization every 4 (four) hours as needed for wheezing or shortness of breath   lidocaine (LIDODERM) 5 %  Self No Yes   Sig: Apply 1 patch topically every 24 hours Remove & Discard patch within 12 hours or as directed by MD   metoprolol tartrate (LOPRESSOR) 25 mg tablet  Self No Yes   Sig: TAKE ONE-HALF TABLET BY  MOUTH DAILY   mirtazapine (REMERON) 30 mg tablet  Self No Yes   Sig: Take 1 tablet (30 mg total) by mouth daily at bedtime   nitroglycerin (NITROLINGUAL) 0 4 mg/spray spray  Self Yes Yes   Si spray every 5 (five) minutes as needed     sodium chloride 0 9 % nebulizer solution  Self Yes Yes   Sig: Inhale every 4 (four) hours as needed     traMADol (ULTRAM) 50 mg tablet   No Yes   Sig: TAKE 1 TABLET BY MOUTH TWO  TIMES DAILY   Patient taking differently: every 8 (eight) hours as needed       Facility-Administered Medications: None       Past Medical History:   Diagnosis Date    Anxiety     Arthritis     Atrial fibrillation (HCC)     BPH (benign prostatic hyperplasia)     CAD (coronary artery disease)     Chronic back pain     Chronic diastolic (congestive) heart failure (HCC)     COPD (chronic obstructive pulmonary disease) (HCC)     Critical illness polyneuropathy (HCC)     Diabetes mellitus (HCC)     type 2    Dysphagia     GERD (gastroesophageal reflux disease)     Hemorrhoids     History of colon cancer     History of DVT (deep vein thrombosis)     Hyperlipidemia     Hypertension     Hyponatremia     [facility] [address] [facility]     [facility] infarct [facility] [address]     Osteomyelitis ([facility] [address] [facility]     Pneumonia     Sepsis ([facility] [address] [facility]     Urinary retention        Past Surgical History:   Procedure Laterality Date    APPENDECTOMY      BACK SURGERY      BOTOX INJECTION N/A 2019    Procedure: BOTOX INJECTION 100 UNITS;  Surgeon: Morteza Chand MD;  [facility];  Service: Urology    BRONCHOSCOPY N/A 2016    Procedure: BRONCHOSCOPY FLEXIBLE with bronchial lavage to the left lower lobe; Surgeon: Mounika Newsome MD;  [facility]: [facility]; Service:     CATARACT EXTRACTION      CHOLECYSTECTOMY      COLECTOMY      COLON SURGERY      polypectomy for cancerous lesion    COLONOSCOPY      CORONARY ANGIOPLASTY WITH STENT PLACEMENT      x5  pt unsure of where    GASTROSTOMY TUBE PLACEMENT      percutaneous placement of gastrostomy tube placed  - dc     HEMORROIDECTOMY      LUMBAR LAMINECTOMY      LUNG SURGERY      PEG TUBE REMOVAL      IA CYSTOURETHRO W/IMPLANT N/A 2018    Procedure: CYSTOSCOPY WITH INSERTION UROLIFT;  Surgeon: Morteza Chand MD;  [facility];  Service: Urology    IA CYSTOURETHROSCOPY N/A 2019    Procedure: Casi Sherman;  Surgeon: Morteza Chand MD;  [facility];  Service: Urology    TRACHEOSTOMY         Family History   Problem Relation Age of Onset    Heart attack Mother      I have reviewed and agree with the history as documented  Social History     Tobacco Use    Smoking status: Former Smoker     Packs/day: 1 00     Years: 20 00     Pack years: 20 00     Types: Cigarettes     Last attempt to quit: 1978     Years since quittin 0    Smokeless tobacco: Never Used    Tobacco comment: quit 40 years ago   Substance Use Topics    Alcohol use: No    Drug use: No        Review of Systems   Unable to perform ROS: Mental status change   Constitutional: Positive for chills  Neurological: Positive for weakness  Negative for seizures  Psychiatric/Behavioral: Positive for confusion  Negative for agitation  Physical Exam  Physical Exam   Constitutional: He appears well-developed and well-nourished  HENT:   Mouth/Throat: No oropharyngeal exudate  TMs normal bilaterally no pharyngeal erythema no rhinorrhea nontender palpation of sinuses, normal looking turbinates   Eyes: Conjunctivae and EOM are normal    Neck: Normal range of motion  Neck supple  No meningeal signs   Cardiovascular: Normal rate, regular rhythm, normal heart sounds and intact distal pulses  Pulmonary/Chest: Effort normal and breath sounds normal  No respiratory distress  He has no wheezes  He has no rales  He exhibits no tenderness  Abdominal: Soft  Bowel sounds are normal  He exhibits no distension and no mass  There is no tenderness  No hernia  No cvat   Musculoskeletal: Normal range of motion  He exhibits no edema  Lymphadenopathy:     He has no cervical adenopathy  Neurological: He is alert  No cranial nerve deficit  nonfocal neuro exam     Skin: No rash noted  No erythema  No edema   Psychiatric: He has a normal mood and affect  His behavior is normal    Nursing note and vitals reviewed        Vital Signs  ED Triage Vitals [09/06/19 1326]   Temperature Pulse Respirations Blood Pressure SpO2   97 6 °F (36 4 °C) 103 16 142/66 94 %      Temp Source Heart Rate Source Patient Position - Orthostatic VS BP Location FiO2 (%)   Oral Monitor Sitting Right arm --      Pain Score       No Pain           Vitals:    09/06/19 1326 09/06/19 1417 09/06/19 1609   BP: 142/66 152/87 151/77   Pulse: 103 83 80   Patient Position - Orthostatic VS: Sitting Lying Lying         Visual Acuity  Visual Acuity      Most Recent Value   L Pupil Size (mm)  3   R Pupil Size (mm)  3          ED Medications  Medications   cefepime (MAXIPIME) 2 g/50 mL dextrose IVPB (0 mg Intravenous Stopped 9/6/19 1529)   sodium chloride 0 9 % bolus 500 mL (0 mL Intravenous Stopped 9/6/19 1617) Diagnostic Studies  Results Reviewed     Procedure Component Value Units Date/Time    Urine Microscopic [[id number]]  (Abnormal) Collected:  09/06/19 1448    Lab Status:  Final result Specimen:  Urine, Clean Catch Updated:  09/06/19 1510     RBC, UA       Field obscured, unable to enumerate     /hpf     WBC, UA       Field obscured, unable to enumerate     /hpf     Epithelial Cells       Field obscured, unable to enumerate     /hpf     Bacteria, UA       Field obscured, unable to enumerate     /hpf    Urine culture [[id number]] Collected:  09/06/19 1448    Lab Status: In process Specimen:  Urine, Clean Catch Updated:  09/06/19 1510    ED Urine Macroscopic [[id number]]  (Abnormal) Collected:  09/06/19 1448    Lab Status:  Final result Specimen:  Urine Updated:  09/06/19 1449     Color, UA Yellow     Clarity, UA Cloudy     pH, UA 6 0     Leukocytes, UA Large     Nitrite, UA Positive     Protein,  (2+) mg/dl      Glucose, UA Negative mg/dl      Ketones, UA Negative mg/dl      Urobilinogen, UA 0 2 E U /dl      Bilirubin, UA Negative     Blood, UA Moderate     Specific [place], UA 1 020    Narrative:       CLINITEK RESULT    POCT urinalysis dipstick [[id number]]  (Normal) Resulted:  09/06/19 1444    Lab Status:  Final result Specimen:  Urine Updated:  09/06/19 1444     Color, UA yellow     Clarity, UA cloudy    Lactic acid, plasma x2 [[id number]]  (Normal) Collected:  09/06/19 1406    Lab Status:  Final result Specimen:  Blood from Arm, Right Updated:  09/06/19 1436     LACTIC ACID 1 9 mmol/L     Narrative:       Result may be elevated if tourniquet was used during collection      B-type natriuretic peptide [[id number]]  (Abnormal) Collected:  09/06/19 1357    Lab Status:  Final result Specimen:  Blood from Arm, Right Updated:  09/06/19 1426     NT-proBNP 1,691 pg/mL     Troponin I [[id number]]  (Normal) Collected:  09/06/19 1357    Lab Status:  Final result Specimen:  Blood from Arm, Right Updated:  09/06/19 1420 Troponin I <0 02 ng/mL     Comprehensive metabolic panel [[id number]]  (Abnormal) Collected:  09/06/19 1357    Lab Status:  Final result Specimen:  Blood from Arm, Right Updated:  09/06/19 1418     Sodium 138 mmol/L      Potassium 4 0 mmol/L      Chloride 98 mmol/L      CO2 31 mmol/L      ANION GAP 9 mmol/L      BUN 15 mg/dL      Creatinine 1 11 mg/dL      Glucose 239 mg/dL      Calcium 9 2 mg/dL      AST 30 U/L      ALT 28 U/L      Alkaline Phosphatase 114 U/L      Total Protein 8 4 g/dL      Albumin 3 6 g/dL      Total Bilirubin 1 43 mg/dL      eGFR 61 ml/min/1 73sq m     Narrative:       [place] guidelines for Chronic Kidney Disease (CKD):     Stage 1 with normal or high GFR (GFR > 90 mL/min/1 73 square meters)    Stage 2 Mild CKD (GFR = 60-89 mL/min/1 73 square meters)    Stage 3A Moderate CKD (GFR = 45-59 mL/min/1 73 square meters)    Stage 3B Moderate CKD (GFR = 30-44 mL/min/1 73 square meters)    Stage 4 Severe CKD (GFR = 15-29 mL/min/1 73 square meters)    Stage 5 End Stage CKD (GFR <15 mL/min/1 73 square meters)  Note: GFR calculation is accurate only with a steady state creatinine    Blood culture #1 [[id number]] Collected:  09/06/19 1407    Lab Status: In process Specimen:  Blood from Arm, Left Updated:  09/06/19 1416    Blood culture #2 [[id number]] Collected:  09/06/19 1407    Lab Status:   In process Specimen:  Blood from Arm, Right Updated:  09/06/19 1416    CBC and differential [[id number]]  (Abnormal) Collected:  09/06/19 1357    Lab Status:  Final result Specimen:  Blood from Arm, Right Updated:  09/06/19 1406     WBC 16 17 Thousand/uL      RBC 5 27 Million/uL      Hemoglobin 16 9 g/dL      Hematocrit 51 5 %      MCV 98 fL      MCH 32 1 pg      MCHC 32 8 g/dL      RDW 14 2 %      MPV 12 7 fL      Platelets 408 Thousands/uL      nRBC 0 /100 WBCs      Neutrophils Relative 65 %      Immat GRANS % 1 %      Lymphocytes Relative 24 %      Monocytes Relative 10 % Eosinophils Relative 0 %      Basophils Relative 0 %      Neutrophils Absolute 10 40 Thousands/µL      Immature Grans Absolute 0 14 Thousand/uL      Lymphocytes Absolute 3 88 Thousands/µL      Monocytes Absolute 1 68 Thousand/µL      Eosinophils Absolute 0 03 Thousand/µL      Basophils Absolute 0 04 Thousands/µL     Procalcitonin [[id number]] Collected:  09/06/19 1357    Lab Status: In process Specimen:  Blood from Arm, Right Updated:  09/06/19 1402                 XR chest 2 views   Final Result by Gisele Seo MD (09/06 1622)      Enlargement of cardiac silhouette  Shallow depth of inspiration  Mild pulmonary vascular congestion without overt pulmonary edema              Workstation performed: [id number]                    Procedures  ECG 12 Lead Documentation Only  Date/Time: 9/6/2019 2:01 PM  Performed by: Bharati Betancourt MD  Authorized by: Bharati Betancourt MD     Rate:     ECG rate:  94    ECG rate assessment: normal    Rhythm:     Rhythm: atrial fibrillation    Ectopy:     Ectopy: none    QRS:     QRS axis:  Left    QRS intervals:  Normal  Conduction:     Conduction: normal    ST segments:     ST segments:  Normal  T waves:     T waves: normal    CriticalCare Time  Performed by: Bharati Betancourt MD  Authorized by: Bharati Betancourt MD     Critical care provider statement:     Critical care time (minutes):  35    Critical care time was exclusive of:  Separately billable procedures and treating other patients and teaching time    Critical care was necessary to treat or prevent imminent or life-threatening deterioration of the following conditions:  Sepsis    Critical care was time spent personally by me on the following activities:  Blood draw for specimens, obtaining history from patient or surrogate, development of treatment plan with patient or surrogate, discussions with consultants, evaluation of patient's response to treatment, examination of patient, interpretation of cardiac output measurements, ordering and performing treatments and interventions, ordering and review of laboratory studies, ordering and review of radiographic studies, re-evaluation of patient's condition and review of old charts           ED Course  ED Course as of Sep 06 1702   Fri Sep 06, 2019   1620 Patient's son who is power of  refuses CT  Will admit to insulin  Initial Sepsis Screening     Row Name 09/06/19 1651 09/06/19 1412             Is the patient's history suggestive of a new or worsening infection? (!) Yes (Proceed)  -EP  (!) Yes (Proceed)  -MH       Suspected source of infection    urinary tract infection;acute abdominal infection  -MH       Are two or more of the following signs & symptoms of infection both present and new to the patient? (!) Yes (Proceed)  -EP  (!) Yes (Proceed)  -MH       Indicate SIRS criteria    Leukocytosis (WBC > 83748 IJL); Tachycardia > 90 bpm  -       If the answer is yes to both questions, suspicion of sepsis is present  [de-identified]         If severe sepsis is present AND tissue hypoperfusion perists in the hour after fluid resuscitation or lactate > 4, the patient meets criteria for SEPTIC SHOCK           Are any of the following organ dysfunction criteria present within 6 hours of suspected infection and SIRS criteria that are NOT considered to be chronic conditions?          Organ dysfunction           Date of presentation of severe sepsis           Time of presentation of severe sepsis           Tissue hypoperfusion persists in the hour after crystalloid fluid administration, evidenced, by either:           Was hypotension present within one hour of the conclusion of crystalloid fluid administration?   Abhay Lilly       Date of presentation of septic shock           Time of presentation of septic shock             User Key  (r) = Recorded By, (t) = Taken By, (c) = Cosigned By    [address] Name Provider Type    Myriam Sheridan MD Physician    EP Rahul Guerra MD Physician MDM    Disposition  Final diagnoses:   Urinary tract infection   Sepsis ([facility] [address] )     Time reflects when diagnosis was documented in both MDM as applicable and the Disposition within this note     Time User Action Codes Description Comment    9/6/2019  5:01 PM Marcos Simpson Add [N39 0] Urinary tract infection     9/6/2019  5:02 PM Porsche Mares Add [A41 9] Sepsis [facility])       ED Disposition     ED Disposition Condition Date/Time Comment    Admit Stable Fri Sep 6, 2019  5:02 PM Case was discussed with Dr Chinmay Lloyd and the patient's admission status was agreed to be Admission Status: inpatient status to the service of Dr Chinmay Lloyd   Follow-up Information    None         Patient's Medications   Discharge Prescriptions    No medications on file     No discharge procedures on file      ED Provider  Electronically Signed by           Fermin Aguilar MD  09/06/19 2012 no concerns

## 2025-05-17 NOTE — TELEPHONE ENCOUNTER
Received phone message from patient's nurse April, yesterday  [phone]  She had left a message to call her back regarding switching from eliquis to warfarin  Called to April, [phone], left message for April to call back office regarding warfarin  Requested she inform me of the amount of eliquis patient has available in home, so dr Javier Jacobs can then give direction on switching from eliquis to warfarin  Waited for call back from April  Today, paced call to April x 2, had to leave voice message each call, requesting she call office regarding warfarin  Placed call to patient, however, he had difficulty understanding my request to speak with April, he hung up phone  Will wait for return call from April  dizziness

## (undated) DEVICE — INVIEW CLEAR LEGGINGS: Brand: CONVERTORS

## (undated) DEVICE — CHLORHEXIDINE 4PCT 4 OZ

## (undated) DEVICE — STERILE SURGICAL LUBRICANT,  TUBE: Brand: SURGILUBE

## (undated) DEVICE — GLOVE SRG BIOGEL 7

## (undated) DEVICE — CATH FOLEY 20FR 5ML 2 WAY UNCOATED SILICONE

## (undated) DEVICE — BAG URINE DRAINAGE 2000ML ANTI RFLX LF

## (undated) DEVICE — PACK TUR

## (undated) DEVICE — UROCATCH BAG

## (undated) DEVICE — PREMIUM DRY TRAY LF: Brand: MEDLINE INDUSTRIES, INC.

## (undated) DEVICE — BONEE® NEEDLE FOR BLADDER INJECTION CH FR 05, 22G, 35 CM, BOX OF 1: Brand: PORGES COLOPLAST

## (undated) DEVICE — GUIDEWIRE STRGHT TIP 0.035 IN  SOLO PLUS